# Patient Record
Sex: FEMALE | Race: WHITE | Employment: OTHER | URBAN - METROPOLITAN AREA
[De-identification: names, ages, dates, MRNs, and addresses within clinical notes are randomized per-mention and may not be internally consistent; named-entity substitution may affect disease eponyms.]

---

## 2017-11-14 ENCOUNTER — HOSPITAL ENCOUNTER (OUTPATIENT)
Dept: MRI IMAGING | Age: 67
Discharge: HOME | End: 2017-11-14
Attending: ORTHOPAEDIC SURGERY
Payer: MEDICARE

## 2017-11-14 DIAGNOSIS — M87.00 AVN (AVASCULAR NECROSIS OF BONE) (HCC): ICD-10-CM

## 2017-11-14 PROCEDURE — 72195 MRI PELVIS W/O DYE: CPT

## 2018-10-01 ENCOUNTER — HOSPITAL ENCOUNTER (OUTPATIENT)
Dept: MRI IMAGING | Age: 68
Discharge: HOME | End: 2018-10-01
Attending: ORTHOPAEDIC SURGERY
Payer: MEDICARE

## 2018-10-01 VITALS
RESPIRATION RATE: 13 BRPM | WEIGHT: 175 LBS | BODY MASS INDEX: 31.01 KG/M2 | HEIGHT: 63 IN | HEART RATE: 77 BPM | OXYGEN SATURATION: 96 % | DIASTOLIC BLOOD PRESSURE: 45 MMHG | SYSTOLIC BLOOD PRESSURE: 130 MMHG

## 2018-10-01 DIAGNOSIS — M75.41 IMPINGEMENT SYNDROME OF RIGHT SHOULDER: ICD-10-CM

## 2018-10-01 DIAGNOSIS — G89.11 ACUTE SHOULDER PAIN DUE TO TRAUMA, RIGHT: ICD-10-CM

## 2018-10-01 DIAGNOSIS — M25.511 ACUTE SHOULDER PAIN DUE TO TRAUMA, RIGHT: ICD-10-CM

## 2018-10-01 DIAGNOSIS — S46.011A TRAUMATIC COMPLETE TEAR OF RIGHT ROTATOR CUFF, INITIAL ENCOUNTER: ICD-10-CM

## 2018-10-01 PROCEDURE — 99153 MOD SED SAME PHYS/QHP EA: CPT

## 2018-10-01 PROCEDURE — 99152 MOD SED SAME PHYS/QHP 5/>YRS: CPT

## 2018-10-01 PROCEDURE — 74011250636 HC RX REV CODE- 250/636: Performed by: RADIOLOGY

## 2018-10-01 PROCEDURE — 73221 MRI JOINT UPR EXTREM W/O DYE: CPT

## 2018-10-01 RX ORDER — MIDAZOLAM HYDROCHLORIDE 1 MG/ML
5 INJECTION, SOLUTION INTRAMUSCULAR; INTRAVENOUS
Status: DISPENSED | OUTPATIENT
Start: 2018-10-01 | End: 2018-10-01

## 2018-10-01 RX ORDER — FENTANYL CITRATE 50 UG/ML
100 INJECTION, SOLUTION INTRAMUSCULAR; INTRAVENOUS
Status: DISPENSED | OUTPATIENT
Start: 2018-10-01 | End: 2018-10-01

## 2018-10-01 RX ADMIN — MIDAZOLAM 1 MG: 1 INJECTION INTRAMUSCULAR; INTRAVENOUS at 08:08

## 2018-10-01 RX ADMIN — FENTANYL CITRATE 25 MCG: 50 INJECTION, SOLUTION INTRAMUSCULAR; INTRAVENOUS at 08:11

## 2018-10-01 RX ADMIN — FENTANYL CITRATE 25 MCG: 50 INJECTION, SOLUTION INTRAMUSCULAR; INTRAVENOUS at 08:15

## 2018-10-01 RX ADMIN — FENTANYL CITRATE 25 MCG: 50 INJECTION, SOLUTION INTRAMUSCULAR; INTRAVENOUS at 08:05

## 2018-10-01 RX ADMIN — MIDAZOLAM 1 MG: 1 INJECTION INTRAMUSCULAR; INTRAVENOUS at 08:11

## 2018-10-01 RX ADMIN — MIDAZOLAM 1 MG: 1 INJECTION INTRAMUSCULAR; INTRAVENOUS at 08:15

## 2018-10-01 RX ADMIN — MIDAZOLAM 1 MG: 1 INJECTION INTRAMUSCULAR; INTRAVENOUS at 08:05

## 2018-10-01 RX ADMIN — MIDAZOLAM 1 MG: 1 INJECTION INTRAMUSCULAR; INTRAVENOUS at 08:18

## 2018-10-01 RX ADMIN — FENTANYL CITRATE 25 MCG: 50 INJECTION, SOLUTION INTRAMUSCULAR; INTRAVENOUS at 08:08

## 2018-10-01 NOTE — IP AVS SNAPSHOT
[facility] 
 
 
 [address] [place] [address] 
[phone] Patient: Kathe Reece MRN: [id number] PXY:4/3/9461 A check farzad indicates which time of day the medication should be taken. My Medications ASK your doctor about these medications Instructions Each Dose to Equal  
 Morning Noon Evening Bedtime  
 budesonide-formoterol 160-4.5 mcg/actuation Hfaa Commonly known as:  SYMBICORT Your last dose was: Your next dose is: Take 2 Puffs by inhalation two (2) times a day. 2 Puff CALCIUM 600 PO Your last dose was: Your next dose is: Take 2 Tabs by mouth Daily (before breakfast). 2 Tab  
    
   
   
   
  
 cholecalciferol 1,000 unit tablet Commonly known as:  VITAMIN D3 Your last dose was: Your next dose is: Take 1,000 Units by mouth daily. 1000 Units  
    
   
   
   
  
 docusate sodium 250 mg capsule Commonly known as:  Joselito Dorsey Your last dose was: Your next dose is: Take 250 mg by mouth two (2) times a day. 250 mg  
    
   
   
   
  
 latanoprost 0.005 % ophthalmic solution Commonly known as:  Chhaya Villagran Your last dose was: Your next dose is:    
   
   
 Administer 1 Drop to both eyes nightly. 1 Drop  
    
   
   
   
  
 lisinopril-hydroCHLOROthiazide 10-12.5 mg per tablet Commonly known as:  Jeanna Billings Your last dose was: Your next dose is: Take 1 Tab by mouth daily. 1 Tab  
    
   
   
   
  
 metFORMIN 500 mg tablet Commonly known as:  GLUCOPHAGE Your last dose was: Your next dose is: Take  by mouth daily (with breakfast). montelukast 10 mg tablet Commonly known as:  SINGULAIR Your last dose was: Your next dose is: Take 10 mg by mouth every evening. 10 mg  
    
   
   
   
  
 pravastatin 80 mg tablet Commonly known as:  PRAVACHOL Your last dose was: Your next dose is: Take 80 mg by mouth nightly. 80 mg  
    
   
   
   
  
 SPIRIVA WITH HANDIHALER 18 mcg inhalation capsule Generic drug:  tiotropium Your last dose was: Your next dose is: Take 1 Cap by inhalation daily. 1 Cap VENTOLIN HFA 90 mcg/actuation inhaler Generic drug:  albuterol Your last dose was: Your next dose is: Take 2 Puffs by inhalation as needed. 2 Puff WELCHOL 3.75 gram Pwpk powder packet Generic drug:  colesevelam  
   
Your last dose was: Your next dose is: Take 3.75 g by mouth daily (after breakfast).   
 3.75 g

## 2018-10-01 NOTE — DISCHARGE INSTRUCTIONS
Sedation for a Medical Procedure: After Your Visit  Instructions. Sedatives are used to relax you for a procedure. You may or may not be awake during the procedure. Common side effects of sedation medications include:                   Drowsiness, dizziness, euphoria, sleepiness or confusion. I              Unsteady gait. Loss of fine muscle control and delayed reaction time. Visual                     disturbances, difficulty focusing and blurred vision. Impaired memory recall. Follow-up care is a key component for your health and safety. Be sure to make and go to all medical appointments. Call your doctor if you are having problems. It's also a good idea to keep a list of your allergies, medicines with doses and test results on hand    Home care following your sedation procedure: You may experience some of these side effects or you may not be aware of subtle changes in your behavior or reaction time. Because you received these medications, we are giving you the following instructions: Activity   For your safety, you should not drive until the medicine wears off and you can think clearly and react easily. Do not drive for 24 hours. Rest when you feel tired. Getting enough sleep will help you recover. Diet    You can eat your normal diet. If your stomach is upset, try bland, low-fat foods like plain rice, broiled chicken, toast, and yogurt. Drink plenty of fluids unless your doctor advised you to limit your fluids. Do not consume alcoholic beverages for 24 hours. Instructions  Do not make important personal, business, or legal decisions for 24 hours. Move slowly and carefully, do not make sudden position changes. Be alert for dizziness or lightheadedness and move accordingly. Have a responsible person assist you. Do not drive for 24 hours. Do not operate equipment for 24 hours. [employer], power tools, kitchen appliances, etc.)    Discharge medications:  Resume prior to test medications as prescribed by your personal physician. If you have any questions or concerns call Radiology RN at [phone]  After hours call Radiology on-call at [phone]    Call [phone] any time you think you may need emergency care. For example:                Call if you passed out (lost consciousness). The medicine is not wearing off and you cannot think clearly. Watch closely for changes in your health, and be sure to contact your doctor if                  you have any problems. Where can you learn more? Go to [employer]   Enter G817 in the search box to learn more about \"Sedation for a Medical Procedure: After Your Visit. \"

## 2018-10-01 NOTE — IP AVS SNAPSHOT
[address] 
[phone] Patient: Mina Whatley MRN: [id number] MRJ:2/2/4458 About your hospitalization You were admitted on:  October 1, 2018 You last received care in the:  [address] You were discharged on:  October 1, 2018 Why you were hospitalized Your primary diagnosis was:  Not on File Follow-up Information None Discharge Orders None A check farzad indicates which time of day the medication should be taken. My Medications ASK your doctor about these medications Instructions Each Dose to Equal  
 Morning Noon Evening Bedtime  
 budesonide-formoterol 160-4.5 mcg/actuation Hfaa Commonly known as:  SYMBICORT Your last dose was: Your next dose is: Take 2 Puffs by inhalation two (2) times a day. 2 Puff CALCIUM 600 PO Your last dose was: Your next dose is: Take 2 Tabs by mouth Daily (before breakfast). 2 Tab  
    
   
   
   
  
 cholecalciferol 1,000 unit tablet Commonly known as:  VITAMIN D3 Your last dose was: Your next dose is: Take 1,000 Units by mouth daily. 1000 Units  
    
   
   
   
  
 docusate sodium 250 mg capsule Commonly known as:  [address] Your last dose was: Your next dose is: Take 250 mg by mouth two (2) times a day. 250 mg  
    
   
   
   
  
 latanoprost 0.005 % ophthalmic solution Commonly known as:  Trista Astudillo Your last dose was: Your next dose is:    
   
   
 Administer 1 Drop to both eyes nightly. 1 Drop  
    
   
   
   
  
 lisinopril-hydroCHLOROthiazide 10-12.5 mg per tablet Commonly known as:  Rosalino Lindsay Your last dose was: Your next dose is: Take 1 Tab by mouth daily. 1 Tab metFORMIN 500 mg tablet Commonly known as:  GLUCOPHAGE Your last dose was: Your next dose is: Take  by mouth daily (with breakfast). montelukast 10 mg tablet Commonly known as:  SINGULAIR Your last dose was: Your next dose is: Take 10 mg by mouth every evening. 10 mg  
    
   
   
   
  
 pravastatin 80 mg tablet Commonly known as:  PRAVACHOL Your last dose was: Your next dose is: Take 80 mg by mouth nightly. 80 mg  
    
   
   
   
  
 SPIRIVA WITH HANDIHALER 18 mcg inhalation capsule Generic drug:  tiotropium Your last dose was: Your next dose is: Take 1 Cap by inhalation daily. 1 Cap VENTOLIN HFA 90 mcg/actuation inhaler Generic drug:  albuterol Your last dose was: Your next dose is: Take 2 Puffs by inhalation as needed. 2 Puff WELCHOL 3.75 gram Pwpk powder packet Generic drug:  colesevelam  
   
Your last dose was: Your next dose is: Take 3.75 g by mouth daily (after breakfast). 3.75 g Discharge Instructions Sedation for a Medical Procedure: After Your Visit  Instructions. Sedatives are used to relax you for a procedure. You may or may not be awake during the procedure. Common side effects of sedation medications include:    
 
            Drowsiness, dizziness, euphoria, sleepiness or confusion. I 
            Unsteady gait. Loss of fine muscle control and delayed reaction time. Visual                     disturbances, difficulty focusing and blurred vision. Impaired memory recall. Follow-up care is a key component for your health and safety. Be sure to make and go to all medical appointments.   Call your doctor if you are having problems. It's also a good idea to keep a list of your allergies, medicines with doses and test results on hand Home care following your sedation procedure: You may experience some of these side effects or you may not be aware of subtle changes in your behavior or reaction time. Because you received these medications, we are giving you the following instructions: Activity For your safety, you should not drive until the medicine wears off and you can think clearly and react easily. Do not drive for 24 hours. Rest when you feel tired. Getting enough sleep will help you recover. Diet You can eat your normal diet. If your stomach is upset, try bland, low-fat foods like plain rice, broiled chicken, toast, and yogurt. Drink plenty of fluids unless your doctor advised you to limit your fluids. Do not consume alcoholic beverages for 24 hours. Instructions Do not make important personal, business, or legal decisions for 24 hours. Move slowly and carefully, do not make sudden position changes. Be alert for dizziness or lightheadedness and move accordingly. Have a responsible person assist you. Do not drive for 24 hours. Do not operate equipment for 24 hours. [employer] mowers, power tools, kitchen appliances, etc.) Discharge medications: 
Resume prior to test medications as prescribed by your personal physician. If you have any questions or concerns call Radiology RN at [phone] After hours call Radiology on-call at [phone] Call [phone] any time you think you may need emergency care. For example:  
             Call if you passed out (lost consciousness). The medicine is not wearing off and you cannot think clearly. Watch closely for changes in your health, and be sure to contact your doctor if                  you have any problems. Where can you learn more? Go to [employer].be Enter X479 in the search box to learn more about \"Sedation for a Medical Procedure: After Your Visit. \" Introducing [facility]dez As a [facility] patient, I wanted to make you aware of our electronic visit tool called [facility]. [facility] 24/7 allows you to connect within minutes with a medical provider 24 hours a day, seven days a week via a mobile device or tablet or logging into a secure website from your computer. You can access Bar Hdez from anywhere in the [place]. A virtual visit might be right for you when you have a simple condition and feel like you just dont want to get out of bed, or cant get away from work for an appointment, when your regular [facility] provider is not available (evenings, weekends or holidays), or when youre out of town and need minor care. Electronic visits cost only $49 and if the [facility] 24/7 provider determines a prescription is needed to treat your condition, one can be electronically transmitted to a nearby pharmacy*. Please take a moment to enroll today if you have not already done so. The enrollment process is free and takes just a few minutes. To enroll, please download the [employer] 24/7 bhakti to your tablet or phone, or visit [email or web address] to enroll on your computer. And, as an [address] [place] [address] patient with a [facility] account, the results of your visits will be scanned into your electronic medical record and your primary care provider will be able to view the scanned results. We urge you to continue to see your regular [facility] provider for your ongoing medical care. And while your primary care provider may not be the one available when you seek a Bar Hdez virtual visit, the peace of mind you get from getting a real diagnosis real time can be priceless.    
 
For more information on [facility] [place], view our Frequently Asked Questions (FAQs) at [email or web address]. Sincerely, 
 
Willian Hamm MD 
Chief Medical Officer Steve Bailey *:  certain medications cannot be prescribed via Bar Hdez Unresulted Labs-Please follow up with your PCP about these lab tests Order Current Status MRI SHOULDER RT WO CONT In process Providers Seen During Your Hospitalization Provider Specialty Primary office phone Nithya Watts MD Orthopedic Surgery [phone] Your Primary Care Physician (PCP) Primary Care Physician Office Phone Office Fax Zaki Jose R [phone] You are allergic to the following Allergen Reactions Diclofenac Other (comments) SWELLING, DIFFICULTY BREATHING Flexeril (Cyclobenzaprine) Swelling Hands, face and throat swelling Medrol (Methylprednisolone) Unknown (comments) Naproxen Swelling Tolectin (Tolmetin) Swelling Took this with Flexeril and does not know which med caused swelling of hands,face and throat Recent Documentation Height Weight BMI OB Status Smoking Status 1.6 m 79.4 kg 31 kg/m2 Hysterectomy Former Smoker Emergency Contacts Name Discharge Info Relation Home Work Mobile [address] CAREGIVER [3] Spouse [3] Patient Belongings The following personal items are in your possession at time of discharge: 
                             
 
  
  
 Please provide this summary of care documentation to your next provider. Signatures-by signing, you are acknowledging that this After Visit Summary has been reviewed with you and you have received a copy. Patient Signature:  ____________________________________________________________ Date:  ____________________________________________________________  
  
Mariangel Ibarra Provider Signature:  ____________________________________________________________ Date:  ____________________________________________________________

## 2018-10-01 NOTE — PROGRESS NOTES
9499 Patient brought back to [facility] for assessment prior to MRI right shoulder with sedation. Patient with  and NPO verified. Patient placed in a gown and IV started. S1S2, lungs CTA. Airway assessed. Consent obtained and baseline [facility] Dr. Del Angel Fuss over to consent patient with review of risks and benefits. 9212 Patient taken to [facility] and placed on table and connected to monitor. Time out 0805 Start time 0806 End time 0839. Versed 5 mg and Fentanyl 100 mcg given for sedation, patient tolerated well. 3014 Patient brought back to [facility] and placed back on the monitor. Provided fluids and cookies. Notified ride in waiting area of approximate time of discharge. Patient awake and alert. [id number] Discharge instructions reviewed with verbalization of understanding. 2529 IV removed and patient dressed and patient's ride notified to bring car to [facility] for discharge to home. 1082 Discharge instructions signed as received in computer and patient taken via wheel to [facility].

## 2018-12-21 ENCOUNTER — HOSPITAL ENCOUNTER (OUTPATIENT)
Dept: PREADMISSION TESTING | Age: 68
Discharge: HOME | End: 2018-12-21
Payer: MEDICARE

## 2018-12-21 VITALS
RESPIRATION RATE: 16 BRPM | OXYGEN SATURATION: 95 % | DIASTOLIC BLOOD PRESSURE: 69 MMHG | HEIGHT: 63 IN | BODY MASS INDEX: 31.22 KG/M2 | WEIGHT: 176.2 LBS | HEART RATE: 86 BPM | SYSTOLIC BLOOD PRESSURE: 165 MMHG | TEMPERATURE: 97.9 F

## 2018-12-21 LAB
ABO + RH BLD: NORMAL
ALBUMIN SERPL-MCNC: 4.1 G/DL (ref 3.5–5)
ALBUMIN/GLOB SERPL: 1.3 {RATIO} (ref 1.1–2.2)
ALP SERPL-CCNC: 82 U/L (ref 45–117)
ALT SERPL-CCNC: 28 U/L (ref 12–78)
ANION GAP SERPL CALC-SCNC: 10 MMOL/L (ref 5–15)
APPEARANCE UR: CLEAR
APTT PPP: 29.9 SEC (ref 22.1–32)
AST SERPL-CCNC: 17 U/L (ref 15–37)
ATRIAL RATE: 80 BPM
BACTERIA URNS QL MICRO: NEGATIVE /HPF
BASOPHILS # BLD: 0 K/UL (ref 0–0.1)
BASOPHILS NFR BLD: 1 % (ref 0–1)
BILIRUB SERPL-MCNC: 0.3 MG/DL (ref 0.2–1)
BILIRUB UR QL: NEGATIVE
BLOOD GROUP ANTIBODIES SERPL: NORMAL
BUN SERPL-MCNC: 21 MG/DL (ref 6–20)
BUN/CREAT SERPL: 25 (ref 12–20)
CALCIUM SERPL-MCNC: 9.6 MG/DL (ref 8.5–10.1)
CALCULATED P AXIS, ECG09: 66 DEGREES
CALCULATED R AXIS, ECG10: 49 DEGREES
CALCULATED T AXIS, ECG11: 62 DEGREES
CHLORIDE SERPL-SCNC: 98 MMOL/L (ref 97–108)
CO2 SERPL-SCNC: 28 MMOL/L (ref 21–32)
COLOR UR: ABNORMAL
CREAT SERPL-MCNC: 0.85 MG/DL (ref 0.55–1.02)
DIAGNOSIS, 93000: NORMAL
DIFFERENTIAL METHOD BLD: ABNORMAL
EOSINOPHIL # BLD: 0.2 K/UL (ref 0–0.4)
EOSINOPHIL NFR BLD: 3 % (ref 0–7)
EPITH CASTS URNS QL MICRO: ABNORMAL /LPF
ERYTHROCYTE [DISTWIDTH] IN BLOOD BY AUTOMATED COUNT: 13.3 % (ref 11.5–14.5)
EST. AVERAGE GLUCOSE BLD GHB EST-MCNC: 123 MG/DL
GLOBULIN SER CALC-MCNC: 3.1 G/DL (ref 2–4)
GLUCOSE SERPL-MCNC: 98 MG/DL (ref 65–100)
GLUCOSE UR STRIP.AUTO-MCNC: NEGATIVE MG/DL
HBA1C MFR BLD: 5.9 % (ref 4.2–6.3)
HCT VFR BLD AUTO: 36.6 % (ref 35–47)
HGB BLD-MCNC: 12.4 G/DL (ref 11.5–16)
HGB UR QL STRIP: NEGATIVE
HYALINE CASTS URNS QL MICRO: ABNORMAL /LPF (ref 0–5)
IMM GRANULOCYTES # BLD: 0 K/UL (ref 0–0.04)
IMM GRANULOCYTES NFR BLD AUTO: 0 % (ref 0–0.5)
INR PPP: 1 (ref 0.9–1.1)
KETONES UR QL STRIP.AUTO: NEGATIVE MG/DL
LEUKOCYTE ESTERASE UR QL STRIP.AUTO: ABNORMAL
LYMPHOCYTES # BLD: 1.5 K/UL (ref 0.8–3.5)
LYMPHOCYTES NFR BLD: 31 % (ref 12–49)
MCH RBC QN AUTO: 32.2 PG (ref 26–34)
MCHC RBC AUTO-ENTMCNC: 33.9 G/DL (ref 30–36.5)
MCV RBC AUTO: 95.1 FL (ref 80–99)
MONOCYTES # BLD: 0.8 K/UL (ref 0–1)
MONOCYTES NFR BLD: 16 % (ref 5–13)
NEUTS SEG # BLD: 2.4 K/UL (ref 1.8–8)
NEUTS SEG NFR BLD: 49 % (ref 32–75)
NITRITE UR QL STRIP.AUTO: NEGATIVE
NRBC # BLD: 0 K/UL (ref 0–0.01)
NRBC BLD-RTO: 0 PER 100 WBC
P-R INTERVAL, ECG05: 190 MS
PH UR STRIP: 7.5 [PH] (ref 5–8)
PLATELET # BLD AUTO: 273 K/UL (ref 150–400)
PMV BLD AUTO: 10.5 FL (ref 8.9–12.9)
POTASSIUM SERPL-SCNC: 5.3 MMOL/L (ref 3.5–5.1)
PROT SERPL-MCNC: 7.2 G/DL (ref 6.4–8.2)
PROT UR STRIP-MCNC: NEGATIVE MG/DL
PROTHROMBIN TIME: 9.9 SEC (ref 9–11.1)
Q-T INTERVAL, ECG07: 362 MS
QRS DURATION, ECG06: 72 MS
QTC CALCULATION (BEZET), ECG08: 417 MS
RBC # BLD AUTO: 3.85 M/UL (ref 3.8–5.2)
RBC #/AREA URNS HPF: ABNORMAL /HPF (ref 0–5)
SODIUM SERPL-SCNC: 136 MMOL/L (ref 136–145)
SP GR UR REFRACTOMETRY: 1.01 (ref 1–1.03)
SPECIMEN EXP DATE BLD: NORMAL
THERAPEUTIC RANGE,PTTT: NORMAL SECS (ref 58–77)
UA: UC IF INDICATED,UAUC: ABNORMAL
UROBILINOGEN UR QL STRIP.AUTO: 0.2 EU/DL (ref 0.2–1)
VENTRICULAR RATE, ECG03: 80 BPM
WBC # BLD AUTO: 4.9 K/UL (ref 3.6–11)
WBC URNS QL MICRO: ABNORMAL /HPF (ref 0–4)

## 2018-12-21 PROCEDURE — 85730 THROMBOPLASTIN TIME PARTIAL: CPT

## 2018-12-21 PROCEDURE — 85610 PROTHROMBIN TIME: CPT

## 2018-12-21 PROCEDURE — 36415 COLL VENOUS BLD VENIPUNCTURE: CPT

## 2018-12-21 PROCEDURE — 86901 BLOOD TYPING SEROLOGIC RH(D): CPT

## 2018-12-21 PROCEDURE — 85025 COMPLETE CBC W/AUTO DIFF WBC: CPT

## 2018-12-21 PROCEDURE — 83036 HEMOGLOBIN GLYCOSYLATED A1C: CPT

## 2018-12-21 PROCEDURE — 80053 COMPREHEN METABOLIC PANEL: CPT

## 2018-12-21 PROCEDURE — 81001 URINALYSIS AUTO W/SCOPE: CPT

## 2018-12-21 PROCEDURE — 93005 ELECTROCARDIOGRAM TRACING: CPT

## 2018-12-21 RX ORDER — ASPIRIN 325 MG
325 TABLET ORAL
COMMUNITY
End: 2019-01-09

## 2018-12-21 RX ORDER — ASCORBIC ACID 500 MG
500 TABLET ORAL DAILY
COMMUNITY
End: 2022-06-30

## 2018-12-21 RX ORDER — CALCIUM CARBONATE 500(1250)
2 TABLET ORAL
COMMUNITY
End: 2018-12-21

## 2018-12-21 RX ORDER — VITAMIN E 268 MG
400 CAPSULE ORAL
COMMUNITY

## 2018-12-21 RX ORDER — MAGNESIUM 200 MG
2 TABLET ORAL
COMMUNITY

## 2018-12-21 RX ORDER — OXYCODONE AND ACETAMINOPHEN 10; 325 MG/1; MG/1
1 TABLET ORAL
COMMUNITY
End: 2019-01-09

## 2018-12-21 RX ORDER — GLUCOSAMINE SULFATE 500 MG
1000 TABLET ORAL
COMMUNITY

## 2018-12-21 NOTE — PERIOP NOTES
[address] , [address] [facility]                            MAIN OR                                  [phone]   MAIN PRE OP                          [phone]                                                                                AMBULATORY PRE OP          [phone]  PRE-ADMISSION TESTING    [phone]     Surgery Date:   Monday 1/7/19         Is surgery arrival time given by surgeon? NO  If NO, [facility] staff will call you between 3 and 7pm the day before your surgery with your arrival time. (If your surgery is on a Monday, we will call you the Friday before.)    Call [phone] after 7pm Monday-Friday if you did not receive your arrival time. INSTRUCTIONS BEFORE YOUR SURGERY   When You  Arrive   Arrive at the 2nd [address] [facility] on the day of your surgery  Have your insurance card, photo ID, and any copayment (if needed)     Food   and   Drink   NO food or drink after midnight the night before surgery    This means NO water, gum, mints, coffee, juice, etc.  No alcohol (beer, wine, liquor) 24 hours before and after surgery     Medications to   TAKE   Morning of Surgery   MEDICATIONS TO TAKE THE MORNING OF SURGERY WITH A SIP OF WATER:    Anoro, percocet if needed     Medications  To  STOP      7 days before surgery    Non-Steroidal anti-inflammatory Drugs (NSAID's): for example, Ibuprofen (Advil, Motrin), Naproxen (Aleve)   Aspirin, if taking for pain    Herbal supplements, vitamins, and fish oil   Other:  (Pain medications not listed above, including Tylenol may be taken)   Blood  Thinners    If you take  Aspirin, Plavix, Coumadin, or any blood-thinning or anti-blood clot medicine, talk to the doctor who prescribed the medications for pre-operative instructions - Contact Dr. Maricruz Luna for instructions with aspirin.      Bathing Clothing  Jewelry  Valuables       If you shower the morning of surgery, please do not apply anything to your skin (lotions, powders, deodorant, or makeup, especially mascara)   Follow all special bath instructions (for total joint replacement, spine and bowel surgeries)   Do not shave or trim anywhere 24 hours before surgery   Wear your hair loose or down; no pony-tails, buns, or metal hair clips   Wear loose, comfortable, clean clothes   Wear glasses instead of contacts   Leave money, valuables, and jewelry, including body piercings, at home     Going Home       or Spending the Night    SAME-DAY SURGERY: You must have a responsible adult drive you home and stay with you 24 hours after surgery   ADMITS: If your doctor is keeping you into the hospital after surgery, leave personal belongings/luggage in your car until you have a hospital room number. Hospital discharge time is 12 noon  Drivers must be here before 12 noon unless you are told differently   Special Instructions   16. Special Instructions:  · Use Chlorhexidine Care Fusion wash and sponges 3 days prior to surgery as instructed. · Incentive spirometer given with instructions to practice at home and bring back to the hospital on the day of surgery. · [facility] will contact you if your Hemoglobin A1C is greater than 7.5. · Ensure/Glucerna  sample, nutritional information, and Ensure/Glucerna coupon given. · Pain pamphlet and Call Don't Fall reminder reviewed with patient. ·  parking is complimentary Monday - Friday 7 am - 5 pm  · Bring PTA Medication list day of surgery with the last doses taken documented   · Do not bring medication bottles the day of surgery       Follow all instructions so your surgery wont be cancelled. Please, be on time. If a situation occurs and you are delayed the day of surgery, call [phone] or          [phone]. If your physical condition changes (like a fever, cold, flu, etc.) call your surgeon. The patient was contacted  in person. The patient verbalizes understanding of all instructions and does not  need reinforcement.

## 2018-12-21 NOTE — H&P
PAT Pre-Op History & Physical    Patient: Tylor Kang                  MRN: [id number]          SSN: [id number]  YOB: 1950          Age: 70 y.o. Sex: female        Addendum: MRSA and EKG WNL        Subjective:     Patient is a 70 y.o.  female who presents with history of reaching for a object with her right arm in July, which she states she did farily quickly. She remembers she felt instant pain in her shoulder after. The pain has gotten progressively worse and now she has limited ROM to her right arm. Pain ranges from 4/10-10/10 with the most pain coming at night while trying to sleep. She has to get up in the middle of the night to take a percocet. She notes her right arm is weaker and she has difficulty picking up a coffee cup. She is left hand dominant. She has failed narcotics, injections, PT, ice application. The patient was evaluated in the surgeon's office and it was determined that the most appropriate plan of care is to proceed with surgical intervention. Patient's PCP Aubrie Talley MD    Patient states she went to her pulmonologist for clearance. Past Medical History:   Diagnosis Date    Arthritis     Claustrophobia     COPD     Degenerative disc disease, lumbar 1/6/2016    Diabetes ([address] 2012    She states the metformin is prevention only    Glaucoma     Hypertension     Obesity (BMI 30-39. 9)     Peripheral vascular disease ([address]     stents in each iliac artery, states they are \"watching\" carotids    Unspecified adverse effect of anesthesia     \"BLOOD PRESSURE DROPS\"    Unspecified sleep apnea     Does not uses CPAP      Past Surgical History:   Procedure Laterality Date    HX BACK SURGERY  2006    LUMBAR- PLATES, SCREWS    HX HYSTERECTOMY  1980    HX LUMBAR LAMINECTOMY N/A 2016 & 2017    ALIF and then did the posterior after it didnt work    HX ORTHOPAEDIC Right     [address] HX ORTHOPAEDIC Left 2013    rotator cuff shoulder    VASCULAR SURGERY PROCEDURE UNLIST Bilateral 2010    ILIAC STENT      Prior to Admission medications    Medication Sig Start Date End Date Taking? Authorizing Provider   umeclidinium-vilanterol (ANORO ELLIPTA) 62.5-25 mcg/actuation inhaler Take 1 Puff by inhalation every morning. Yes Provider, Historical   aspirin (ASPIRIN) 325 mg tablet Take 325 mg by mouth every morning. Yes Provider, Historical   glucosamine sulfate (GLUCOSAMINE) 500 mg tablet Take 1,000 mg by mouth daily. Yes Provider, Historical   magnesium 200 mg tab Take 1 Tab by mouth every morning. Yes Provider, Historical   vitamin E (AQUA GEMS) 400 unit capsule Take 400 Units by mouth every morning. Yes Provider, Historical   Omega-3 Fatty Acids 60- mg cpDR Take 2 Tabs by mouth every morning. Yes Provider, Historical   multivit with minerals/lutein (MULTIVITAMIN 50 PLUS PO) Take 1 Tab by mouth every morning. Yes Provider, Historical   oxyCODONE-acetaminophen (PERCOCET)  mg per tablet Take 1 Tab by mouth every six (6) hours as needed for Pain. Yes Provider, Historical   ascorbic acid, vitamin C, (VITAMIN C) 500 mg tablet Take 500 mg by mouth daily. Yes Provider, Historical   CALCIUM PO Take 500 mg by mouth two (2) times a day. Yes Provider, Historical   cholecalciferol (VITAMIN D3) 1,000 unit tablet Take 1,000 Units by mouth every morning. Yes Provider, Historical   docusate sodium (DOK) 250 mg capsule Take 250 mg by mouth two (2) times a day. Yes Provider, Historical   metFORMIN (GLUCOPHAGE) 500 mg tablet Take 500 mg by mouth two (2) times daily (with meals). Yes Provider, Historical   montelukast (SINGULAIR) 10 mg tablet Take 10 mg by mouth every evening. Yes Provider, Historical   lisinopril-hydrochlorothiazide (PRINZIDE, ZESTORETIC) 10-12.5 mg per tablet Take 1 Tab by mouth every morning. Yes Provider, Historical   pravastatin (PRAVACHOL) 80 mg tablet Take 80 mg by mouth nightly.      Yes Provider, Historical   latanoprost (XALATAN) 0.005 % ophthalmic solution Administer 1 Drop to both eyes nightly. Yes Provider, Historical     Current Outpatient Medications   Medication Sig    umeclidinium-vilanterol (ANORO ELLIPTA) 62.5-25 mcg/actuation inhaler Take 1 Puff by inhalation every morning.  aspirin (ASPIRIN) 325 mg tablet Take 325 mg by mouth every morning.  glucosamine sulfate (GLUCOSAMINE) 500 mg tablet Take 1,000 mg by mouth daily.  magnesium 200 mg tab Take 1 Tab by mouth every morning.  vitamin E (AQUA GEMS) 400 unit capsule Take 400 Units by mouth every morning.  Omega-3 Fatty Acids 60- mg cpDR Take 2 Tabs by mouth every morning.  multivit with minerals/lutein (MULTIVITAMIN 50 PLUS PO) Take 1 Tab by mouth every morning.  oxyCODONE-acetaminophen (PERCOCET)  mg per tablet Take 1 Tab by mouth every six (6) hours as needed for Pain.  ascorbic acid, vitamin C, (VITAMIN C) 500 mg tablet Take 500 mg by mouth daily.  CALCIUM PO Take 500 mg by mouth two (2) times a day.  cholecalciferol (VITAMIN D3) 1,000 unit tablet Take 1,000 Units by mouth every morning.  docusate sodium (DOK) 250 mg capsule Take 250 mg by mouth two (2) times a day.  metFORMIN (GLUCOPHAGE) 500 mg tablet Take 500 mg by mouth two (2) times daily (with meals).  montelukast (SINGULAIR) 10 mg tablet Take 10 mg by mouth every evening.  lisinopril-hydrochlorothiazide (PRINZIDE, ZESTORETIC) 10-12.5 mg per tablet Take 1 Tab by mouth every morning.  pravastatin (PRAVACHOL) 80 mg tablet Take 80 mg by mouth nightly.  latanoprost (XALATAN) 0.005 % ophthalmic solution Administer 1 Drop to both eyes nightly. No current facility-administered medications for this encounter.        Allergies   Allergen Reactions    Diclofenac Anaphylaxis     SWELLING, DIFFICULTY BREATHING    Flexeril [Cyclobenzaprine] Swelling     Hands, face and throat swelling    Medrol [Methylprednisolone] Unknown (comments)     She has forgotten    Naproxen Swelling    Tolectin [Tolmetin] Swelling     Took this with Flexeril and does not know which med caused swelling of hands,face and throat      Social History     Tobacco Use    Smoking status: Former Smoker     Packs/day: 2.00     Years: 35.00     Pack years: 70.00     Types: Cigarettes     Last attempt to quit: 10/2/2004     Years since quittin.2    Smokeless tobacco: Never Used   Substance Use Topics    Alcohol use: Yes     Alcohol/week: 2.4 oz     Types: 2 Glasses of wine, 2 Cans of beer per week      Social History     Substance and Sexual Activity   Drug Use No     Family History   Problem Relation Age of Onset    Heart Disease Father     Heart Attack Father 36    No Known Problems Sister     Deep Vein Thrombosis Brother     Heart Disease Brother        Review of Systems    Patient denies difficulty swallowing, mouth sores, or loose teeth. Patient denies any recent dental procedures or any planned prior to surgery. Patient denies chest pain, tightness, pain radiating down left arm, palpitations. Denies dizziness, visual disturbances, or lightheadedness. Patient denies shortness of breath, wheezing, cough, fever, or chills. Patient denies diarrhea, constipation, or abdominal pain. Patient denies urinary problems including dysuria, hesitancy, urgency, or incontinence. Denies skin breakdown, rashes, insect bites or open area. Objective:     Patient Vitals for the past 24 hrs:   Temp Pulse Resp BP SpO2   18 0842 97.9 °F (36.6 °C) 86 16 165/69 95 %     Temp (24hrs), Av.9 °F (36.6 °C), Min:97.9 °F (36.6 °C), Max:97.9 °F (36.6 °C)    Body mass index is 31.21 kg/m². Wt Readings from Last 1 Encounters:   18 79.9 kg (176 lb 3.2 oz)        Physical Exam:     General: Pleasant,  cooperative, no apparent distress, appears stated age. Eyes: Conjunctivae/corneas clear. EOMs intact.    Nose: Nares normal.   Mouth/Throat: Lips, mucosa, and tongue normal. Teeth and gums normal.   Lungs: Clear to auscultation bilaterally. Heart: Regular rate and rhythm, S1, S2 normal. No murmur, click, rub or gallop. Abdomen: Soft, non-tender. Bowel sounds normal. No distention. Musculoskeletal:  Limited ROM to right shoulder. Extremities:  Extremities normal, atraumatic, no cyanosis or edema. Calves                                 supple, non tender to palpation. Pulses: 2+ and symmetric bilateral upper extremities. Cap. refill <2 seconds   Skin: Skin color, texture, turgor normal. No visible open areas, examined fully clothed   Neurologic: CN II-XII grossly intact. Alert and oriented x3. Labs:   Recent Results (from the past 72 hour(s))   CBC WITH AUTOMATED DIFF    Collection Time: 12/21/18  9:21 AM   Result Value Ref Range    WBC 4.9 3.6 - 11.0 K/uL    RBC 3.85 3.80 - 5.20 M/uL    HGB 12.4 11.5 - 16.0 g/dL    HCT 36.6 35.0 - 47.0 %    MCV 95.1 80.0 - 99.0 FL    MCH 32.2 26.0 - 34.0 PG    MCHC 33.9 30.0 - 36.5 g/dL    RDW 13.3 11.5 - 14.5 %    PLATELET 111 668 - 605 K/uL    MPV 10.5 8.9 - 12.9 FL    NRBC 0.0 0  WBC    ABSOLUTE NRBC 0.00 0.00 - 0.01 K/uL    NEUTROPHILS 49 32 - 75 %    LYMPHOCYTES 31 12 - 49 %    MONOCYTES 16 (H) 5 - 13 %    EOSINOPHILS 3 0 - 7 %    BASOPHILS 1 0 - 1 %    IMMATURE GRANULOCYTES 0 0.0 - 0.5 %    ABS. NEUTROPHILS 2.4 1.8 - 8.0 K/UL    ABS. LYMPHOCYTES 1.5 0.8 - 3.5 K/UL    ABS. MONOCYTES 0.8 0.0 - 1.0 K/UL    ABS. EOSINOPHILS 0.2 0.0 - 0.4 K/UL    ABS. BASOPHILS 0.0 0.0 - 0.1 K/UL    ABS. IMM.  GRANS. 0.0 0.00 - 0.04 K/UL    DF AUTOMATED     METABOLIC PANEL, COMPREHENSIVE    Collection Time: 12/21/18  9:21 AM   Result Value Ref Range    Sodium 136 136 - 145 mmol/L    Potassium 5.3 (H) 3.5 - 5.1 mmol/L    Chloride 98 97 - 108 mmol/L    CO2 28 21 - 32 mmol/L    Anion gap 10 5 - 15 mmol/L    Glucose 98 65 - 100 mg/dL    BUN 21 (H) 6 - 20 MG/DL    Creatinine 0.85 0.55 - 1.02 MG/DL    BUN/Creatinine ratio 25 (H) 12 - 20 GFR est AA >60 >60 ml/min/1.73m2    GFR est non-AA >60 >60 ml/min/1.73m2    Calcium 9.6 8.5 - 10.1 MG/DL    Bilirubin, total 0.3 0.2 - 1.0 MG/DL    ALT (SGPT) 28 12 - 78 U/L    AST (SGOT) 17 15 - 37 U/L    Alk.  phosphatase 82 45 - 117 U/L    Protein, total 7.2 6.4 - 8.2 g/dL    Albumin 4.1 3.5 - 5.0 g/dL    Globulin 3.1 2.0 - 4.0 g/dL    A-G Ratio 1.3 1.1 - 2.2     HEMOGLOBIN A1C WITH EAG    Collection Time: 12/21/18  9:21 AM   Result Value Ref Range    Hemoglobin A1c 5.9 4.2 - 6.3 %    Est. average glucose 123 mg/dL   PROTHROMBIN TIME + INR    Collection Time: 12/21/18  9:21 AM   Result Value Ref Range    INR 1.0 0.9 - 1.1      Prothrombin time 9.9 9.0 - 11.1 sec   PTT    Collection Time: 12/21/18  9:21 AM   Result Value Ref Range    aPTT 29.9 22.1 - 32.0 sec    aPTT, therapeutic range     58.0 - 77.0 SECS   URINALYSIS W/ REFLEX CULTURE    Collection Time: 12/21/18  9:21 AM   Result Value Ref Range    Color YELLOW/STRAW      Appearance CLEAR CLEAR      Specific gravity 1.007 1.003 - 1.030      pH (UA) 7.5 5.0 - 8.0      Protein NEGATIVE  NEG mg/dL    Glucose NEGATIVE  NEG mg/dL    Ketone NEGATIVE  NEG mg/dL    Bilirubin NEGATIVE  NEG      Blood NEGATIVE  NEG      Urobilinogen 0.2 0.2 - 1.0 EU/dL    Nitrites NEGATIVE  NEG      Leukocyte Esterase TRACE (A) NEG      WBC 0-4 0 - 4 /hpf    RBC 0-5 0 - 5 /hpf    Epithelial cells FEW FEW /lpf    Bacteria NEGATIVE  NEG /hpf    UA:UC IF INDICATED CULTURE NOT INDICATED BY UA RESULT CNI      Hyaline cast 0-2 0 - 5 /lpf   TYPE & SCREEN    Collection Time: 12/21/18  9:21 AM   Result Value Ref Range    Crossmatch Expiration 01/04/2019     ABO/Rh(D) A POSITIVE     Antibody screen NEG    EKG, 12 LEAD, INITIAL    Collection Time: 12/21/18  9:38 AM   Result Value Ref Range    Ventricular Rate 80 BPM    Atrial Rate 80 BPM    P-R Interval 190 ms    QRS Duration 72 ms    Q-T Interval 362 ms    QTC Calculation (Bezet) 417 ms    Calculated P Axis 66 degrees    Calculated R Axis 49 degrees    Calculated T Axis 62 degrees    Diagnosis       Normal sinus rhythm  Normal ECG  When compared with ECG of 31-MAY-2016 11:32,  No significant change was found         Assessment:     OA Right Shoulder    Plan:     Scheduled for Right Reverse Total Shoulder    Labs reviewed. Potassium a tic high at 5.3. All other labs WNL. EKG pending along with MRSA. ASA plan sent by nurse to vascular physician    Pulmonologist clearance reviewed and placed in paper chart.        Marvel Barnes NP

## 2018-12-22 LAB
BACTERIA SPEC CULT: NORMAL
BACTERIA SPEC CULT: NORMAL
SERVICE CMNT-IMP: NORMAL

## 2018-12-26 NOTE — PERIOP NOTES
Called patient to confirm instruction to stop  10 days prior to surgery per Dr. Olu Wise. Patient verbalized understanding of instructions and marked December 28th as last day for ASA. Patient also confirmed knowledge of plan for other medications.

## 2019-01-04 ENCOUNTER — ANESTHESIA EVENT (OUTPATIENT)
Dept: SURGERY | Age: 69
DRG: 483 | End: 2019-01-04
Payer: MEDICARE

## 2019-01-07 ENCOUNTER — HOSPITAL ENCOUNTER (INPATIENT)
Age: 69
LOS: 2 days | Discharge: HOME | DRG: 483 | End: 2019-01-09
Attending: ORTHOPAEDIC SURGERY | Admitting: ORTHOPAEDIC SURGERY
Payer: MEDICARE

## 2019-01-07 ENCOUNTER — ANESTHESIA (OUTPATIENT)
Dept: SURGERY | Age: 69
DRG: 483 | End: 2019-01-07
Payer: MEDICARE

## 2019-01-07 ENCOUNTER — APPOINTMENT (OUTPATIENT)
Dept: GENERAL RADIOLOGY | Age: 69
DRG: 483 | End: 2019-01-07
Attending: ORTHOPAEDIC SURGERY
Payer: MEDICARE

## 2019-01-07 DIAGNOSIS — M75.120 COMPLETE TEAR OF ROTATOR CUFF, UNSPECIFIED LATERALITY: Primary | ICD-10-CM

## 2019-01-07 PROBLEM — M19.011 OSTEOARTHRITIS OF RIGHT SHOULDER: Status: ACTIVE | Noted: 2019-01-07

## 2019-01-07 LAB
ABO + RH BLD: NORMAL
BLOOD GROUP ANTIBODIES SERPL: NORMAL
GLUCOSE BLD STRIP.AUTO-MCNC: 104 MG/DL (ref 65–100)
GLUCOSE BLD STRIP.AUTO-MCNC: 110 MG/DL (ref 65–100)
GLUCOSE BLD STRIP.AUTO-MCNC: 157 MG/DL (ref 65–100)
GLUCOSE BLD STRIP.AUTO-MCNC: 159 MG/DL (ref 65–100)
HGB BLD-MCNC: 10.4 G/DL (ref 11.5–16)
SERVICE CMNT-IMP: ABNORMAL
SPECIMEN EXP DATE BLD: NORMAL

## 2019-01-07 PROCEDURE — 74011000272 HC RX REV CODE- 272: Performed by: ORTHOPAEDIC SURGERY

## 2019-01-07 PROCEDURE — 74011250636 HC RX REV CODE- 250/636: Performed by: ANESTHESIOLOGY

## 2019-01-07 PROCEDURE — 74011250637 HC RX REV CODE- 250/637: Performed by: ORTHOPAEDIC SURGERY

## 2019-01-07 PROCEDURE — 64415 NJX AA&/STRD BRCH PLXS IMG: CPT

## 2019-01-07 PROCEDURE — 74011250637 HC RX REV CODE- 250/637: Performed by: FAMILY MEDICINE

## 2019-01-07 PROCEDURE — 76210000016 HC OR PH I REC 1 TO 1.5 HR: Performed by: ORTHOPAEDIC SURGERY

## 2019-01-07 PROCEDURE — 77030018836 HC SOL IRR NACL ICUM -A: Performed by: ORTHOPAEDIC SURGERY

## 2019-01-07 PROCEDURE — 77030013837 HC NERV BLK KT BBMI -B

## 2019-01-07 PROCEDURE — C1713 ANCHOR/SCREW BN/BN,TIS/BN: HCPCS | Performed by: ORTHOPAEDIC SURGERY

## 2019-01-07 PROCEDURE — 74011250636 HC RX REV CODE- 250/636: Performed by: ORTHOPAEDIC SURGERY

## 2019-01-07 PROCEDURE — 77030020782 HC GWN BAIR PAWS FLX 3M -B

## 2019-01-07 PROCEDURE — 82962 GLUCOSE BLOOD TEST: CPT

## 2019-01-07 PROCEDURE — 74011250636 HC RX REV CODE- 250/636

## 2019-01-07 PROCEDURE — 77030002922 HC SUT FBRWRE ARTH -B: Performed by: ORTHOPAEDIC SURGERY

## 2019-01-07 PROCEDURE — 76060000034 HC ANESTHESIA 1.5 TO 2 HR: Performed by: ORTHOPAEDIC SURGERY

## 2019-01-07 PROCEDURE — 77030013708 HC HNDPC SUC IRR PULS STRY –B: Performed by: ORTHOPAEDIC SURGERY

## 2019-01-07 PROCEDURE — 0RRJ00Z REPLACEMENT OF RIGHT SHOULDER JOINT WITH REVERSE BALL AND SOCKET SYNTHETIC SUBSTITUTE, OPEN APPROACH: ICD-10-PCS | Performed by: ORTHOPAEDIC SURGERY

## 2019-01-07 PROCEDURE — 77030008684 HC TU ET CUF COVD -B: Performed by: ANESTHESIOLOGY

## 2019-01-07 PROCEDURE — 77030032497 HC WRP SHLDR WO BGS SOLM -B

## 2019-01-07 PROCEDURE — 77030011640 HC PAD GRND REM COVD -A: Performed by: ORTHOPAEDIC SURGERY

## 2019-01-07 PROCEDURE — 77030018673: Performed by: ORTHOPAEDIC SURGERY

## 2019-01-07 PROCEDURE — 74011000250 HC RX REV CODE- 250: Performed by: ORTHOPAEDIC SURGERY

## 2019-01-07 PROCEDURE — 77030006835 HC BLD SAW SAG STRY -B: Performed by: ORTHOPAEDIC SURGERY

## 2019-01-07 PROCEDURE — 77030020788: Performed by: ORTHOPAEDIC SURGERY

## 2019-01-07 PROCEDURE — 74011636637 HC RX REV CODE- 636/637: Performed by: FAMILY MEDICINE

## 2019-01-07 PROCEDURE — 77030018547 HC SUT ETHBND1 J&J -B: Performed by: ORTHOPAEDIC SURGERY

## 2019-01-07 PROCEDURE — 36415 COLL VENOUS BLD VENIPUNCTURE: CPT

## 2019-01-07 PROCEDURE — 77030002933 HC SUT MCRYL J&J -A: Performed by: ORTHOPAEDIC SURGERY

## 2019-01-07 PROCEDURE — 77030010507 HC ADH SKN DERMBND J&J -B

## 2019-01-07 PROCEDURE — 74011250637 HC RX REV CODE- 250/637: Performed by: ANESTHESIOLOGY

## 2019-01-07 PROCEDURE — 65270000029 HC RM PRIVATE

## 2019-01-07 PROCEDURE — 94640 AIRWAY INHALATION TREATMENT: CPT

## 2019-01-07 PROCEDURE — 76010000162 HC OR TIME 1.5 TO 2 HR INTENSV-TIER 1: Performed by: ORTHOPAEDIC SURGERY

## 2019-01-07 PROCEDURE — 77030028700 HC BLD TISS PLSM MEDT -E: Performed by: ORTHOPAEDIC SURGERY

## 2019-01-07 PROCEDURE — 86900 BLOOD TYPING SEROLOGIC ABO: CPT

## 2019-01-07 PROCEDURE — 94664 DEMO&/EVAL PT USE INHALER: CPT

## 2019-01-07 PROCEDURE — 77030026438 HC STYL ET INTUB CARD -A: Performed by: ANESTHESIOLOGY

## 2019-01-07 PROCEDURE — 74011000250 HC RX REV CODE- 250: Performed by: ANESTHESIOLOGY

## 2019-01-07 PROCEDURE — 74011000250 HC RX REV CODE- 250: Performed by: FAMILY MEDICINE

## 2019-01-07 PROCEDURE — 3E0T3BZ INTRODUCTION OF ANESTHETIC AGENT INTO PERIPHERAL NERVES AND PLEXI, PERCUTANEOUS APPROACH: ICD-10-PCS | Performed by: ANESTHESIOLOGY

## 2019-01-07 PROCEDURE — 85018 HEMOGLOBIN: CPT

## 2019-01-07 PROCEDURE — 77030029684 HC NEB SM VOL KT MONA -A

## 2019-01-07 PROCEDURE — 74011000250 HC RX REV CODE- 250

## 2019-01-07 PROCEDURE — C1776 JOINT DEVICE (IMPLANTABLE): HCPCS | Performed by: ORTHOPAEDIC SURGERY

## 2019-01-07 PROCEDURE — 77030039266 HC ADH SKN EXOFIN S2SG -A: Performed by: ORTHOPAEDIC SURGERY

## 2019-01-07 PROCEDURE — 77030031139 HC SUT VCRL2 J&J -A: Performed by: ORTHOPAEDIC SURGERY

## 2019-01-07 PROCEDURE — 73020 X-RAY EXAM OF SHOULDER: CPT

## 2019-01-07 PROCEDURE — 77030020471 HC BIT DRL CREV ZIMM -C: Performed by: ORTHOPAEDIC SURGERY

## 2019-01-07 DEVICE — SHOULDER SHRT ST/COMP RVS GLN -- E1 S4: Type: IMPLANTABLE DEVICE | Site: SHOULDER | Status: FUNCTIONAL

## 2019-01-07 DEVICE — IMPLANTABLE DEVICE
Type: IMPLANTABLE DEVICE | Site: SHOULDER | Status: FUNCTIONAL
Brand: COMPREHENSIVE REVERSE SHOULDER

## 2019-01-07 DEVICE — IMPLANTABLE DEVICE
Type: IMPLANTABLE DEVICE | Site: SHOULDER | Status: FUNCTIONAL
Brand: COMPREHENSIVE SHOULDER SYSTEM

## 2019-01-07 DEVICE — IMPLANTABLE DEVICE
Type: IMPLANTABLE DEVICE | Site: SHOULDER | Status: FUNCTIONAL
Brand: COMPREHENSIVE® REVERSE SHOULDER

## 2019-01-07 DEVICE — GLENOSPHERE RVS STD 0D 36MM -- COMPREHENSIVE: Type: IMPLANTABLE DEVICE | Site: SHOULDER | Status: FUNCTIONAL

## 2019-01-07 RX ORDER — HYDROMORPHONE HYDROCHLORIDE 1 MG/ML
.5-1 INJECTION, SOLUTION INTRAMUSCULAR; INTRAVENOUS; SUBCUTANEOUS
Status: DISCONTINUED | OUTPATIENT
Start: 2019-01-07 | End: 2019-01-07 | Stop reason: HOSPADM

## 2019-01-07 RX ORDER — CEFAZOLIN SODIUM/WATER 2 G/20 ML
2 SYRINGE (ML) INTRAVENOUS EVERY 8 HOURS
Status: COMPLETED | OUTPATIENT
Start: 2019-01-07 | End: 2019-01-08

## 2019-01-07 RX ORDER — FAMOTIDINE 20 MG/1
20 TABLET, FILM COATED ORAL 2 TIMES DAILY
Status: DISCONTINUED | OUTPATIENT
Start: 2019-01-07 | End: 2019-01-09 | Stop reason: HOSPADM

## 2019-01-07 RX ORDER — OXYCODONE HYDROCHLORIDE 5 MG/1
10 TABLET ORAL
Status: DISCONTINUED | OUTPATIENT
Start: 2019-01-07 | End: 2019-01-07 | Stop reason: HOSPADM

## 2019-01-07 RX ORDER — ONDANSETRON 2 MG/ML
4 INJECTION INTRAMUSCULAR; INTRAVENOUS
Status: ACTIVE | OUTPATIENT
Start: 2019-01-07 | End: 2019-01-08

## 2019-01-07 RX ORDER — ONDANSETRON 2 MG/ML
INJECTION INTRAMUSCULAR; INTRAVENOUS AS NEEDED
Status: DISCONTINUED | OUTPATIENT
Start: 2019-01-07 | End: 2019-01-07 | Stop reason: HOSPADM

## 2019-01-07 RX ORDER — AMOXICILLIN 250 MG
1 CAPSULE ORAL 2 TIMES DAILY
Status: DISCONTINUED | OUTPATIENT
Start: 2019-01-07 | End: 2019-01-09 | Stop reason: HOSPADM

## 2019-01-07 RX ORDER — LIDOCAINE HYDROCHLORIDE 40 MG/ML
SOLUTION TOPICAL AS NEEDED
Status: DISCONTINUED | OUTPATIENT
Start: 2019-01-07 | End: 2019-01-07 | Stop reason: HOSPADM

## 2019-01-07 RX ORDER — CEFAZOLIN SODIUM/WATER 2 G/20 ML
2 SYRINGE (ML) INTRAVENOUS ONCE
Status: COMPLETED | OUTPATIENT
Start: 2019-01-07 | End: 2019-01-07

## 2019-01-07 RX ORDER — HYDROMORPHONE HYDROCHLORIDE 2 MG/ML
0.5 INJECTION, SOLUTION INTRAMUSCULAR; INTRAVENOUS; SUBCUTANEOUS
Status: ACTIVE | OUTPATIENT
Start: 2019-01-07 | End: 2019-01-08

## 2019-01-07 RX ORDER — PRAVASTATIN SODIUM 20 MG/1
80 TABLET ORAL
Status: DISCONTINUED | OUTPATIENT
Start: 2019-01-07 | End: 2019-01-09 | Stop reason: HOSPADM

## 2019-01-07 RX ORDER — SODIUM CHLORIDE 0.9 % (FLUSH) 0.9 %
5-10 SYRINGE (ML) INJECTION EVERY 8 HOURS
Status: DISCONTINUED | OUTPATIENT
Start: 2019-01-07 | End: 2019-01-09 | Stop reason: HOSPADM

## 2019-01-07 RX ORDER — LIDOCAINE HYDROCHLORIDE 10 MG/ML
0.1 INJECTION, SOLUTION EPIDURAL; INFILTRATION; INTRACAUDAL; PERINEURAL AS NEEDED
Status: DISCONTINUED | OUTPATIENT
Start: 2019-01-07 | End: 2019-01-07 | Stop reason: HOSPADM

## 2019-01-07 RX ORDER — SODIUM CHLORIDE, SODIUM LACTATE, POTASSIUM CHLORIDE, CALCIUM CHLORIDE 600; 310; 30; 20 MG/100ML; MG/100ML; MG/100ML; MG/100ML
125 INJECTION, SOLUTION INTRAVENOUS CONTINUOUS
Status: DISCONTINUED | OUTPATIENT
Start: 2019-01-07 | End: 2019-01-07 | Stop reason: HOSPADM

## 2019-01-07 RX ORDER — OXYCODONE HYDROCHLORIDE 5 MG/1
10 TABLET ORAL
Status: DISCONTINUED | OUTPATIENT
Start: 2019-01-07 | End: 2019-01-09 | Stop reason: HOSPADM

## 2019-01-07 RX ORDER — ROCURONIUM BROMIDE 10 MG/ML
INJECTION, SOLUTION INTRAVENOUS AS NEEDED
Status: DISCONTINUED | OUTPATIENT
Start: 2019-01-07 | End: 2019-01-07 | Stop reason: HOSPADM

## 2019-01-07 RX ORDER — ONDANSETRON 2 MG/ML
4 INJECTION INTRAMUSCULAR; INTRAVENOUS AS NEEDED
Status: DISCONTINUED | OUTPATIENT
Start: 2019-01-07 | End: 2019-01-07 | Stop reason: HOSPADM

## 2019-01-07 RX ORDER — TRAMADOL HYDROCHLORIDE 50 MG/1
100 TABLET ORAL
Status: DISCONTINUED | OUTPATIENT
Start: 2019-01-07 | End: 2019-01-09 | Stop reason: HOSPADM

## 2019-01-07 RX ORDER — OXYCODONE HCL 10 MG/1
10 TABLET, FILM COATED, EXTENDED RELEASE ORAL ONCE
Status: COMPLETED | OUTPATIENT
Start: 2019-01-07 | End: 2019-01-07

## 2019-01-07 RX ORDER — SODIUM CHLORIDE 9 MG/ML
125 INJECTION, SOLUTION INTRAVENOUS CONTINUOUS
Status: DISPENSED | OUTPATIENT
Start: 2019-01-07 | End: 2019-01-08

## 2019-01-07 RX ORDER — FENTANYL CITRATE 50 UG/ML
25 INJECTION, SOLUTION INTRAMUSCULAR; INTRAVENOUS
Status: DISCONTINUED | OUTPATIENT
Start: 2019-01-07 | End: 2019-01-07 | Stop reason: HOSPADM

## 2019-01-07 RX ORDER — OXYCODONE HYDROCHLORIDE 5 MG/1
5 TABLET ORAL
Status: DISCONTINUED | OUTPATIENT
Start: 2019-01-07 | End: 2019-01-09 | Stop reason: HOSPADM

## 2019-01-07 RX ORDER — POLYETHYLENE GLYCOL 3350 17 G/17G
17 POWDER, FOR SOLUTION ORAL DAILY
Status: DISCONTINUED | OUTPATIENT
Start: 2019-01-07 | End: 2019-01-09 | Stop reason: HOSPADM

## 2019-01-07 RX ORDER — ACETAMINOPHEN 325 MG/1
975 TABLET ORAL ONCE
Status: COMPLETED | OUTPATIENT
Start: 2019-01-07 | End: 2019-01-07

## 2019-01-07 RX ORDER — MIDAZOLAM HYDROCHLORIDE 1 MG/ML
INJECTION, SOLUTION INTRAMUSCULAR; INTRAVENOUS AS NEEDED
Status: DISCONTINUED | OUTPATIENT
Start: 2019-01-07 | End: 2019-01-07 | Stop reason: HOSPADM

## 2019-01-07 RX ORDER — PROPOFOL 10 MG/ML
INJECTION, EMULSION INTRAVENOUS AS NEEDED
Status: DISCONTINUED | OUTPATIENT
Start: 2019-01-07 | End: 2019-01-07 | Stop reason: HOSPADM

## 2019-01-07 RX ORDER — SODIUM CHLORIDE 0.9 % (FLUSH) 0.9 %
5-10 SYRINGE (ML) INJECTION AS NEEDED
Status: DISCONTINUED | OUTPATIENT
Start: 2019-01-07 | End: 2019-01-09 | Stop reason: HOSPADM

## 2019-01-07 RX ORDER — MONTELUKAST SODIUM 10 MG/1
10 TABLET ORAL EVERY EVENING
Status: DISCONTINUED | OUTPATIENT
Start: 2019-01-07 | End: 2019-01-09 | Stop reason: HOSPADM

## 2019-01-07 RX ORDER — HYDROMORPHONE HYDROCHLORIDE 1 MG/ML
.5-1 INJECTION, SOLUTION INTRAMUSCULAR; INTRAVENOUS; SUBCUTANEOUS
Status: DISCONTINUED | OUTPATIENT
Start: 2019-01-07 | End: 2019-01-07 | Stop reason: SDUPTHER

## 2019-01-07 RX ORDER — OXYCODONE AND ACETAMINOPHEN 5; 325 MG/1; MG/1
1 TABLET ORAL
Qty: 42 TAB | Refills: 0 | Status: SHIPPED
Start: 2019-01-07 | End: 2019-06-12

## 2019-01-07 RX ORDER — ACETAMINOPHEN 325 MG/1
650 TABLET ORAL EVERY 6 HOURS
Status: DISCONTINUED | OUTPATIENT
Start: 2019-01-07 | End: 2019-01-09 | Stop reason: HOSPADM

## 2019-01-07 RX ORDER — FENTANYL CITRATE 50 UG/ML
INJECTION, SOLUTION INTRAMUSCULAR; INTRAVENOUS AS NEEDED
Status: DISCONTINUED | OUTPATIENT
Start: 2019-01-07 | End: 2019-01-07 | Stop reason: HOSPADM

## 2019-01-07 RX ORDER — MAGNESIUM SULFATE 100 %
4 CRYSTALS MISCELLANEOUS AS NEEDED
Status: DISCONTINUED | OUTPATIENT
Start: 2019-01-07 | End: 2019-01-09 | Stop reason: HOSPADM

## 2019-01-07 RX ORDER — IPRATROPIUM BROMIDE AND ALBUTEROL SULFATE 2.5; .5 MG/3ML; MG/3ML
3 SOLUTION RESPIRATORY (INHALATION)
Status: DISCONTINUED | OUTPATIENT
Start: 2019-01-07 | End: 2019-01-08

## 2019-01-07 RX ORDER — DEXTROSE 50 % IN WATER (D50W) INTRAVENOUS SYRINGE
12.5-25 AS NEEDED
Status: DISCONTINUED | OUTPATIENT
Start: 2019-01-07 | End: 2019-01-09 | Stop reason: HOSPADM

## 2019-01-07 RX ORDER — INSULIN LISPRO 100 [IU]/ML
INJECTION, SOLUTION INTRAVENOUS; SUBCUTANEOUS
Status: DISCONTINUED | OUTPATIENT
Start: 2019-01-07 | End: 2019-01-07

## 2019-01-07 RX ORDER — LATANOPROST 50 UG/ML
1 SOLUTION/ DROPS OPHTHALMIC
Status: DISCONTINUED | OUTPATIENT
Start: 2019-01-07 | End: 2019-01-09 | Stop reason: HOSPADM

## 2019-01-07 RX ORDER — ASPIRIN 325 MG
325 TABLET, DELAYED RELEASE (ENTERIC COATED) ORAL 2 TIMES DAILY
Status: DISCONTINUED | OUTPATIENT
Start: 2019-01-07 | End: 2019-01-09 | Stop reason: HOSPADM

## 2019-01-07 RX ORDER — NALOXONE HYDROCHLORIDE 0.4 MG/ML
0.4 INJECTION, SOLUTION INTRAMUSCULAR; INTRAVENOUS; SUBCUTANEOUS AS NEEDED
Status: DISCONTINUED | OUTPATIENT
Start: 2019-01-07 | End: 2019-01-09 | Stop reason: HOSPADM

## 2019-01-07 RX ORDER — ROPIVACAINE HYDROCHLORIDE 5 MG/ML
INJECTION, SOLUTION EPIDURAL; INFILTRATION; PERINEURAL AS NEEDED
Status: DISCONTINUED | OUTPATIENT
Start: 2019-01-07 | End: 2019-01-07 | Stop reason: HOSPADM

## 2019-01-07 RX ORDER — DIPHENHYDRAMINE HCL 12.5MG/5ML
12.5 ELIXIR ORAL
Status: ACTIVE | OUTPATIENT
Start: 2019-01-07 | End: 2019-01-08

## 2019-01-07 RX ORDER — ASPIRIN 325 MG
325 TABLET ORAL 2 TIMES DAILY WITH MEALS
Qty: 28 TAB | Refills: 0 | Status: SHIPPED
Start: 2019-01-07 | End: 2019-01-21

## 2019-01-07 RX ADMIN — ROPIVACAINE HYDROCHLORIDE 10 ML: 5 INJECTION, SOLUTION EPIDURAL; INFILTRATION; PERINEURAL at 07:12

## 2019-01-07 RX ADMIN — ACETAMINOPHEN 650 MG: 325 TABLET, FILM COATED ORAL at 12:17

## 2019-01-07 RX ADMIN — Medication 10 ML: at 21:39

## 2019-01-07 RX ADMIN — Medication 2 G: at 07:45

## 2019-01-07 RX ADMIN — SODIUM CHLORIDE, SODIUM LACTATE, POTASSIUM CHLORIDE, AND CALCIUM CHLORIDE 125 ML/HR: 600; 310; 30; 20 INJECTION, SOLUTION INTRAVENOUS at 06:24

## 2019-01-07 RX ADMIN — Medication 2 G: at 21:39

## 2019-01-07 RX ADMIN — INSULIN LISPRO 2 UNITS: 100 INJECTION, SOLUTION INTRAVENOUS; SUBCUTANEOUS at 17:02

## 2019-01-07 RX ADMIN — IPRATROPIUM BROMIDE AND ALBUTEROL SULFATE 3 ML: .5; 3 SOLUTION RESPIRATORY (INHALATION) at 13:39

## 2019-01-07 RX ADMIN — FENTANYL CITRATE 50 MCG: 50 INJECTION, SOLUTION INTRAMUSCULAR; INTRAVENOUS at 08:10

## 2019-01-07 RX ADMIN — LIDOCAINE HYDROCHLORIDE 3 ML: 40 SOLUTION TOPICAL at 07:36

## 2019-01-07 RX ADMIN — PROPOFOL 150 MG: 10 INJECTION, EMULSION INTRAVENOUS at 07:35

## 2019-01-07 RX ADMIN — SODIUM CHLORIDE 125 ML/HR: 900 INJECTION, SOLUTION INTRAVENOUS at 09:29

## 2019-01-07 RX ADMIN — FAMOTIDINE 20 MG: 20 TABLET ORAL at 17:02

## 2019-01-07 RX ADMIN — SODIUM CHLORIDE 125 ML/HR: 900 INJECTION, SOLUTION INTRAVENOUS at 17:51

## 2019-01-07 RX ADMIN — ACETAMINOPHEN 650 MG: 325 TABLET, FILM COATED ORAL at 23:31

## 2019-01-07 RX ADMIN — MIDAZOLAM HYDROCHLORIDE 2 MG: 1 INJECTION, SOLUTION INTRAMUSCULAR; INTRAVENOUS at 07:07

## 2019-01-07 RX ADMIN — ONDANSETRON 4 MG: 2 INJECTION INTRAMUSCULAR; INTRAVENOUS at 08:42

## 2019-01-07 RX ADMIN — ASPIRIN 325 MG: 325 TABLET, COATED ORAL at 17:02

## 2019-01-07 RX ADMIN — DOCUSATE SODIUM AND SENNOSIDES 1 TABLET: 8.6; 5 TABLET, FILM COATED ORAL at 17:02

## 2019-01-07 RX ADMIN — FAMOTIDINE 20 MG: 20 TABLET ORAL at 12:17

## 2019-01-07 RX ADMIN — IPRATROPIUM BROMIDE AND ALBUTEROL SULFATE 3 ML: .5; 3 SOLUTION RESPIRATORY (INHALATION) at 20:04

## 2019-01-07 RX ADMIN — PROPOFOL 50 MG: 10 INJECTION, EMULSION INTRAVENOUS at 08:48

## 2019-01-07 RX ADMIN — ACETAMINOPHEN 650 MG: 325 TABLET, FILM COATED ORAL at 17:02

## 2019-01-07 RX ADMIN — Medication 2 G: at 14:03

## 2019-01-07 RX ADMIN — PRAVASTATIN SODIUM 80 MG: 20 TABLET ORAL at 21:39

## 2019-01-07 RX ADMIN — BUPIVACAINE HYDROCHLORIDE 6 ML/HR: 2.5 INJECTION, SOLUTION EPIDURAL; INFILTRATION; INTRACAUDAL; PERINEURAL at 09:27

## 2019-01-07 RX ADMIN — ACETAMINOPHEN 975 MG: 325 TABLET, FILM COATED ORAL at 06:31

## 2019-01-07 RX ADMIN — PROPOFOL 20 MG: 10 INJECTION, EMULSION INTRAVENOUS at 08:57

## 2019-01-07 RX ADMIN — FENTANYL CITRATE 100 MCG: 50 INJECTION, SOLUTION INTRAMUSCULAR; INTRAVENOUS at 07:07

## 2019-01-07 RX ADMIN — DOCUSATE SODIUM AND SENNOSIDES 1 TABLET: 8.6; 5 TABLET, FILM COATED ORAL at 12:17

## 2019-01-07 RX ADMIN — MONTELUKAST SODIUM 10 MG: 10 TABLET, FILM COATED ORAL at 17:02

## 2019-01-07 RX ADMIN — PROPOFOL 30 MG: 10 INJECTION, EMULSION INTRAVENOUS at 08:53

## 2019-01-07 RX ADMIN — SODIUM CHLORIDE, SODIUM LACTATE, POTASSIUM CHLORIDE, AND CALCIUM CHLORIDE: 600; 310; 30; 20 INJECTION, SOLUTION INTRAVENOUS at 08:43

## 2019-01-07 RX ADMIN — OXYCODONE HYDROCHLORIDE 10 MG: 10 TABLET, FILM COATED, EXTENDED RELEASE ORAL at 06:31

## 2019-01-07 RX ADMIN — ROCURONIUM BROMIDE 50 MG: 10 INJECTION, SOLUTION INTRAVENOUS at 07:35

## 2019-01-07 NOTE — PROGRESS NOTES
Bedside and Verbal shift change report given to [address] [place] [address] (oncoming nurse) by MARY Tolbert (offgoing nurse). Report given with SBAR, Kardex, OR Summary, Intake/Output, MAR and Recent Results.

## 2019-01-07 NOTE — CONSULTS
[address]   Initial Consult Note    Patient:  Lorne Taylor  MRN:  [id number]  YOB: 1950  Age:  70 y.o. Primary care provider:  Deysi Walsh MD    Date of admission:  2019    Date of consultation:  2019    Requesting physician:  Dr. Brenda Stout    Reason for consultation:  Medical management. History of present illness  Lorne  is a 70 y.o. female who is s/p right reverse shoulder arthroplasty POD 0. The Family Medicine Service was consulted for medical management of prediabetes, HTN, COPD, HLD, Glaucoma. Pt feeling well after surgery. Pt denies chills, SOB, CP, headache, change in vision, N/V, abdominal pain. Home Medications   Prior to Admission medications    Medication Sig Start Date End Date Taking? Authorizing Provider   umeclidinium-vilanterol (ANORO ELLIPTA) 62.5-25 mcg/actuation inhaler Take 1 Puff by inhalation every morning. Provider, Historical   aspirin (ASPIRIN) 325 mg tablet Take 325 mg by mouth every morning. Provider, Historical   glucosamine sulfate (GLUCOSAMINE) 500 mg tablet Take 1,000 mg by mouth daily. Provider, Historical   magnesium 200 mg tab Take 1 Tab by mouth every morning. Provider, Historical   vitamin E (AQUA GEMS) 400 unit capsule Take 400 Units by mouth every morning. Provider, Historical   Omega-3 Fatty Acids 60- mg cpDR Take 2 Tabs by mouth every morning. Provider, Historical   multivit with minerals/lutein (MULTIVITAMIN 50 PLUS PO) Take 1 Tab by mouth every morning. Provider, Historical   oxyCODONE-acetaminophen (PERCOCET)  mg per tablet Take 1 Tab by mouth every six (6) hours as needed for Pain. Provider, Historical   ascorbic acid, vitamin C, (VITAMIN C) 500 mg tablet Take 500 mg by mouth daily. Provider, Historical   CALCIUM PO Take 500 mg by mouth two (2) times a day.     Provider, Historical   cholecalciferol (VITAMIN D3) 1,000 unit tablet Take 1,000 Units by mouth every morning. Provider, Historical   docusate sodium (DOK) 250 mg capsule Take 250 mg by mouth two (2) times a day. Provider, Historical   metFORMIN (GLUCOPHAGE) 500 mg tablet Take 500 mg by mouth two (2) times daily (with meals). Provider, Historical   montelukast (SINGULAIR) 10 mg tablet Take 10 mg by mouth every evening. Provider, Historical   lisinopril-hydrochlorothiazide (PRINZIDE, ZESTORETIC) 10-12.5 mg per tablet Take 1 Tab by mouth every morning. Provider, Historical   pravastatin (PRAVACHOL) 80 mg tablet Take 80 mg by mouth nightly. Provider, Historical   latanoprost (XALATAN) 0.005 % ophthalmic solution Administer 1 Drop to both eyes nightly. Provider, Historical       Allergies   Allergies   Allergen Reactions    Diclofenac Anaphylaxis     SWELLING, DIFFICULTY BREATHING    Flexeril [Cyclobenzaprine] Swelling     Hands, face and throat swelling    Medrol [Methylprednisolone] Unknown (comments)     She has forgotten    Naproxen Swelling    Tolectin [Tolmetin] Swelling     Took this with Flexeril and does not know which med caused swelling of hands,face and throat       Past Medical History   Past Medical History:   Diagnosis Date    Arthritis     Claustrophobia     COPD     Degenerative disc disease, lumbar 1/6/2016    Diabetes ([facility] 2012    She states the metformin is prevention only    Glaucoma     Hypertension     Obesity (BMI 30-39. 9)     Peripheral vascular disease ([facility] [facility]     stents in each iliac artery, states they are \"watching\" carotids    Unspecified adverse effect of anesthesia     \"BLOOD PRESSURE DROPS\"    Unspecified sleep apnea     Does not uses CPAP       Past Surgical History  Past Surgical History:   Procedure Laterality Date    HX BACK SURGERY  2006    LUMBAR- PLATES, SCREWS    HX HYSTERECTOMY  1980    HX LUMBAR LAMINECTOMY N/A 2016 & 2017    ALIF and then did the posterior after it didnt work    [employer] Right     ULNAR NERVE SURGERY    HX ORTHOPAEDIC Left 2013    rotator cuff shoulder    VASCULAR SURGERY PROCEDURE UNLIST Bilateral 2010    ILIAC STENT       Family History  Family History   Problem Relation Age of Onset    Heart Disease Father     Heart Attack Father 36    No Known Problems Sister     Deep Vein Thrombosis Brother     Heart Disease Brother        Social History   Patient resides  x  Independently      With family care      Assisted living      SNF      Ambulates  x  Independently      With cane       Assisted walker           Alcohol history     None   x  Social     Chronic     Smoking history    None   x  Former smoker     Current smoker     Social History     Tobacco Use   Smoking Status Former Smoker    Packs/day: 2.00    Years: 35.00    Pack years: 70.00    Types: Cigarettes    Last attempt to quit: 10/2/2004    Years since quittin.2   Smokeless Tobacco Never Used         Illicit drug history  x  None     Former user     Current user     Sexual history    Sexually active    x  Not sexually active     Code status  x  Full code     DNR/DNI     Partial    Code status discussed with the patient/caregivers.       Review of Systems   General ROS: negative for - chills, fatigue, fever or night sweats  Ophthalmic ROS: negative for - blurry vision, double vision, itchy eyes or loss of vision  ENT ROS: negative for - epistaxis, headaches, nasal discharge or sinus pain  Hematological and Lymphatic ROS: negative for - bleeding problems, bruising, fatigue or weight loss  Endocrine ROS: negative for - hot flashes, mood swings or skin changes  Respiratory ROS: negative for - cough, orthopnea, shortness of breath or tachypnea  Cardiovascular ROS: negative for - chest pain, edema, loss of consciousness or murmur  Gastrointestinal ROS: no abdominal pain, change in bowel habits, or black or bloody stools  Genito-Urinary ROS: no dysuria, trouble voiding, or hematuria  Musculoskeletal ROS: negative for - muscle pain or muscular weakness  Neurological ROS: no TIA or stroke symptoms  Dermatological ROS: negative for - eczema or rash    Physical Exam  Visit Vitals  /70 (BP 1 Location: Right arm, BP Patient Position: At rest)   Pulse 84   Temp 98 °F (36.7 °C)   Resp 18   Wt 176 lb 3.2 oz (79.9 kg)   SpO2 98%   BMI 31.21 kg/m²        General: No acute distress. Alert. Cooperative. Head: Normocephalic. Atraumatic. Eyes:  Conjunctiva pink. Sclera white. PERRL. Neck: Supple. Normal ROM. No stiffness. Respiratory: CTAB. No w/r/r/c.   Cardiovascular: RRR. Normal S1,S2. No m/r/g. Pulses 2+ throughout. GI: + bowel sounds. Nontender. No rebound tenderness or guarding. Nondistended. Extremities: Right UE in a sling. No edema. No palpable cord. No tenderness. Musculoskeletal: Full ROM in all extremities. Neuro: CN II-XII grossly intact. Strength 5/5 in all extremities except the RUE 2/2 surgery. Sensation intact in all extremities. Skin: Clear. No rashes. No ulcers. Laboratory Data  Recent Results (from the past 24 hour(s))   TYPE & SCREEN    Collection Time: 01/07/19  6:16 AM   Result Value Ref Range    Crossmatch Expiration 01/10/2019     ABO/Rh(D) A POSITIVE     Antibody screen NEG    GLUCOSE, POC    Collection Time: 01/07/19  6:16 AM   Result Value Ref Range    Glucose (POC) 104 (H) 65 - 100 mg/dL    Performed by [id number], POC    Collection Time: 01/07/19 10:22 AM   Result Value Ref Range    Glucose (POC) 110 (H) 65 - 100 mg/dL    Performed by [facility]        Impression/Recomendations   Sacha Anne is a 70 y.o. female who is s/p RIGHT  REVERSE SHOULDER ARTHROPLASTY  WITH BICEPS TENODESIS AND AXILLARY NERVE DESSECTION(GENERAL WITH BLOCK). The Family Medicine Service was consulted for medical managment.      S/p right shoulder arthroplasty with biceps tenodesis and axillary nerve: POD0   -Pain control regimen per primary  -PT    HTN: 137/73  -Restarting home medication of prinzide 10/12.5 mg daily    COPD: controlled. Room air at home. -Duo-nebs and Singulair     Prediabetes: HgbA1C 5.9  -POC glucose checks   -SSI   -Holding metformin. Glaucoma stable  - Xalatan eye drops    FEN/GI - Regular diabetic diet. NS at 125 mL/hr. Disposition - Plan to d/c per primary team       Thank you very much for allowing us to participate in the care of this pleasant patient. The family medicine service will continue to follow the patient's medical progress along with you. Please do not hesitate to page with any questions or to discuss the case. Patient seen with Dr. Mayank Garces by:      Talita Frazier DO  Family Medicine Resident

## 2019-01-07 NOTE — ANESTHESIA PREPROCEDURE EVALUATION
Anesthetic History No history of anesthetic complications Review of Systems / Medical History Patient summary reviewed, nursing notes reviewed and pertinent labs reviewed Pulmonary COPD Sleep apnea Neuro/Psych Within defined limits Cardiovascular Hypertension PAD 
 
 
  
GI/Hepatic/Renal 
Within defined limits Endo/Other Diabetes Arthritis Other Findings Physical Exam 
 
Airway Mallampati: II 
 
Neck ROM: normal range of motion Mouth opening: Normal 
 
 Cardiovascular Rhythm: regular Rate: normal 
 
 
 
 Dental 
No notable dental hx Pulmonary Breath sounds clear to auscultation Abdominal 
GI exam deferred Other Findings Anesthetic Plan ASA: 3 Anesthesia type: general 
 
 
Post-op pain plan if not by surgeon: peripheral nerve block continuous Induction: Intravenous Anesthetic plan and risks discussed with: Patient

## 2019-01-07 NOTE — ROUTINE PROCESS
TRANSFER - OUT REPORT: 
 
Verbal report given to MARYBETH Alcala(name) on Keli Sloan  being transferred to [facility](unit) for routine post - op Report consisted of patients Situation, Background, Assessment and  
Recommendations(SBAR). Information from the following report(s) SBAR and MAR was reviewed with the receiving nurse. Opportunity for questions and clarification was provided. Patient transported with: 
 O2 @ 2 liters Registered Nurse

## 2019-01-07 NOTE — INTERVAL H&P NOTE
H&P Update: 
Vishal Barboza was seen and examined. History and physical has been reviewed. The patient has been examined.  There have been no significant clinical changes since the completion of the originally dated History and Physical. 
 
Signed By: Laz Duarte MD   
 January 7, 2019 7:30 AM

## 2019-01-07 NOTE — OP NOTES
REVERSE TOTAL SHOULDER ARTHROPLASTY      Date of Procedure: 1/7/2019   Preoperative Diagnosis: RIGHT SHOULDER OSTEO ARTHRITIS  Postoperative Diagnosis: RIGHT SHOULDER OSTEO ARTHRITIS , BICEPS TENDONITIS  Procedure: Procedure(s):  RIGHT  REVERSE SHOULDER ARTHROPLASTY (GENERAL WITH BLOCK) , BICEPS TENODESIS, AXILLARY NERVE DISSECTION  Surgeon: Sandro Bravo MD  Assistant(s):   Anesthesia: General   Estimated Blood Loss: 100cc  Specimens: * No specimens in log *   Complications: None  Implants:  Implant Name Type Inv. Item Serial No.  Lot No. LRB No. Used Action   BASEPLT W/ADAPTER JUAN 25MM -- COMPREHENSIVE - SNA  BASEPLT W/ADAPTER JUAN 25MM -- COMPREHENSIVE NA [employer] [id number] Right 1 Implanted   Glenosphere   NA  [id number] Right 1 Implanted   SCREW LOCKING FIXED 4.75 X 15MM 3.5 HEX - SNA Screw SCREW LOCKING FIXED 4.75 X 15MM 3.5 HEX NA BI[employer]ET TRAUMA [id number] Right 2 Implanted   SCR CTRL RVS 6.5X25MM STRL/RST -- COMPREHENSIVE - SNA  SCR CTRL RVS 6.5X25MM STRL/RST -- COMPREHENSIVE NA [employer] [id number] Right 1 Implanted   STEM HUM LOYDA MINI 11MM -- COMPREHENSIVE - SNA  STEM HUM LOYDA MINI 11MM -- COMPREHENSIVE NA [employer] [id number] Right 1 Implanted   BEARING HUM STD E1 44-36MM --  - SNA  BEARING HUM STD E1 44-36MM --  NA [employer] [id number] Right 1 Implanted   HUM TRAY RVS SHLDR 44MM -- COMPREHENSIVE - SNA  HUM TRAY RVS SHLDR 44MM -- COMPREHENSIVE NA [employer] [id number] Right 1 Implanted   SCR LCK 3.5 HEX 4.75X25 STRL -- COMPREHENSIVE - SNA  SCR LCK 3.5 HEX 4.75X25 STRL -- COMPREHENSIVE NA [employer] [id number] Right 1 Implanted          1 Implanted         INDICATIONS:  The patient is a patient of mine who has had a long history of pain and discomfort in the right shoulder.  After failure of conservative treatment the patient has elected to proceed with surgical intervention for shoulder pain refractory to conservative treatment. DESCRIPTION OF PROCEDURE:  After being informed of the risks and benefits, which include, but are not limited to bleeding, infection, neurovascular damage, wound complications, periprosthetic loosening, fracture/dislocation, and early failure of the prosthesis, the patient consented for the procedure. After adequate general anesthesia all bony prominences were padded well, and the involved shoulder was prepped and draped in a sterile fashion. A standard deltopectoral incision was then made and the cephalic vein was identified and preserved. The vein was retracted medially. The subdeltoid and subcoracoid spaces were then dissected and retracted. The bicipital sheath was incised, and the biceps tendon was then noted to be significantly frayed, and it was tenodesed to the pectoralis major tendon with #2 FiberWire. The bicipital sheath was incised proximally, and the remnant of the subscapularis and capsule was taken down off the lesser tuberosity. The inferior medial capsule was then incised and the shoulder was externally rotated and dislocated. The anatomic neck of the shoulder was then marked and cut with the oscillating saw. We serially reamed and broached the humerus to the appropriate size. At that phase the glenoid was addressed. A lamina  was then inserted, and the axillary nerve was dissected as it coursed off the posterior cord of the brachial plexus. The nerve was dissected and preserved as it passed inferiorly to the capsule. The capsule was then incised and released. The labrum was then circumferentially removed as was the remnant of the biceps tendon. We subsequently released the anterior capsule and then inserted the index finger along the glenoid neck. Tthe guide pin for the reamer was inserted into the glenoid with a 15 degree caudal tilt. Care was taken to be sure that the guide pin exited at least 20-mm medial to the anterior glenoid edge.  The glenoid was then reamed with the base-plate reamer. The excess bone on the periphery was removed with the rongeur and a bur, and the base-plate was then inserted. The central screw was then drilled, measured and inserted. The peripheral locking screws were then inserted as well. After securing the base-plate we sized the glenosphere to establish appropriate tensioning and offset of the shoulder. The final glenosphere was then impacted into the Bhanu taper with the offset inferiorly to prevent scapular notching. We then trialed the humeral socket. The appropriate height of the humeral socket was then established to again provide appropriate tension of the deltoid. The shoulder was taken through a range of motion and no instability or scapular impingement was noted. The trial humeral components were removed, and the final stem was impacted into position. We did pass a #5 FiberWire suture around the stem and out the medial aspect of the humerus to aid in the repair of the capsule and subscapularis. At that point the humeral base plate and cup were then impacted on the humerus. The shoulder was again reduced and the wound was irrigated copiously with antibiotic irrigation. The subscapularis and capsule were repaired to the lesser tuberosity using modified Israel-Christian sutures. The wound was again irrigated copiously with antibiotic irrigation, and the deltopectoral interval was then closed taking care to preserve the cephalic vein. The skin was closed in layers. Sterile dressings were applied, and the patient was awakened and taken to the recovery room in stable condition.

## 2019-01-07 NOTE — ANESTHESIA POSTPROCEDURE EVALUATION
Procedure(s): RIGHT  REVERSE SHOULDER ARTHROPLASTY  WITH BICEPS TENODESIS AND AXILLARY NERVE DESSECTION(GENERAL WITH BLOCK). Anesthesia Post Evaluation Multimodal analgesia: multimodal analgesia used between 6 hours prior to anesthesia start to PACU discharge Patient location during evaluation: bedside Patient participation: complete - patient participated Level of consciousness: awake Pain management: adequate Airway patency: patent Anesthetic complications: no 
Cardiovascular status: acceptable Respiratory status: acceptable Hydration status: acceptable Post anesthesia nausea and vomiting:  none Visit Vitals /52 Pulse 82 Temp 36.8 °C (98.2 °F) Resp 13 SpO2 99%

## 2019-01-07 NOTE — ANESTHESIA PROCEDURE NOTES
Peripheral Block Start time: 1/7/2019 7:06 AM 
End time: 1/7/2019 7:24 AM 
Performed by: Evetta Sacks, MD 
Authorized by: Evetta Sacks, MD  
 
 
Pre-procedure: Indications: at surgeon's request and post-op pain management Preanesthetic Checklist: patient identified, risks and benefits discussed, site marked, timeout performed, anesthesia consent given and patient being monitored Timeout Time: 07:06 Block Type:  
Block Type: Interscalene Laterality:  Right Monitoring:  Continuous pulse ox, frequent vital sign checks, heart rate, responsive to questions and oxygen Injection Technique:  Continuous Procedures: ultrasound guided Patient Position: supine Prep: chlorhexidine Location:  Interscalene Needle Type:  Tuohy Needle Gauge:  18 G Needle Localization:  Ultrasound guidance Assessment: 
Number of attempts:  1 Injection Assessment:  Incremental injection every 5 mL, local visualized surrounding nerve on ultrasound, negative aspiration for blood and no intravascular symptoms Patient tolerance:  Patient tolerated the procedure well with no immediate complications

## 2019-01-07 NOTE — BRIEF OP NOTE
BRIEF OPERATIVE NOTE Date of Procedure: 1/7/2019 Preoperative Diagnosis: RIGHT SHOULDER OSTEO ARTHRITIS Postoperative Diagnosis: RIGHT SHOULDER OSTEO ARTHRITIS Procedure: Procedure(s): RIGHT  REVERSE SHOULDER ARTHROPLASTY (GENERAL WITH BLOCK) Surgeon: Elvia Childers MD 
Assistant(s):  
Anesthesia: General  
Estimated Blood Loss: minimal 
Specimens: * No specimens in log * Findings: RC tear Complications: None Implants:  
Implant Name Type Inv. Item Serial No.  Lot No. LRB No. Used Action BASEPLT W/ADAPTER JUAN 25MM -- COMPREHENSIVE - SNA  BASEPLT W/ADAPTER JUAN 25MM -- COMPREHENSIVE NA [employer] [id number] Right 1 Implanted Glenosphere   NA  [id number] Right 1 Implanted SCREW LOCKING FIXED 4.75 X 15MM 3.5 HEX - SNA Screw SCREW LOCKING FIXED 4.75 X 15MM 3.5 HEX NA BIOMET TRAUMA [id number] Right 2 Implanted SCR CTRL RVS 6.5X25MM STRL/RST -- COMPREHENSIVE - SNA  SCR CTRL RVS 6.5X25MM STRL/RST -- COMPREHENSIVE NA [employer] [id number] Right 1 Implanted STEM HUM LOYDA MINI 11MM -- COMPREHENSIVE - SNA  STEM HUM LOYDA MINI 11MM -- COMPREHENSIVE NA [employer] [id number] Right 1 Implanted BEARING HUM STD E1 44-36MM --  - SNA  BEARING HUM STD E1 44-36MM --  NA [employer] [id number] Right 1 Implanted HUM TRAY RVS SHLDR 44MM -- COMPREHENSIVE - SNA  HUM TRAY RVS SHLDR 44MM -- COMPREHENSIVE NA [employer] [id number] Right 1 Implanted SCR LCK 3.5 HEX 4.75X25 STRL -- COMPREHENSIVE - SNA  SCR LCK 3.5 HEX 4.75X25 STRL -- COMPREHENSIVE NA [employer] [id number] Right 1 Implanted 1 Implanted

## 2019-01-08 ENCOUNTER — APPOINTMENT (OUTPATIENT)
Dept: GENERAL RADIOLOGY | Age: 69
DRG: 483 | End: 2019-01-08
Attending: FAMILY MEDICINE
Payer: MEDICARE

## 2019-01-08 LAB
ANION GAP SERPL CALC-SCNC: 8 MMOL/L (ref 5–15)
ATRIAL RATE: 122 BPM
BUN SERPL-MCNC: 11 MG/DL (ref 6–20)
BUN/CREAT SERPL: 15 (ref 12–20)
CALCIUM SERPL-MCNC: 7.9 MG/DL (ref 8.5–10.1)
CALCULATED P AXIS, ECG09: 49 DEGREES
CALCULATED R AXIS, ECG10: 39 DEGREES
CALCULATED T AXIS, ECG11: 62 DEGREES
CHLORIDE SERPL-SCNC: 105 MMOL/L (ref 97–108)
CO2 SERPL-SCNC: 25 MMOL/L (ref 21–32)
CREAT SERPL-MCNC: 0.72 MG/DL (ref 0.55–1.02)
DIAGNOSIS, 93000: NORMAL
GLUCOSE BLD STRIP.AUTO-MCNC: 127 MG/DL (ref 65–100)
GLUCOSE SERPL-MCNC: 159 MG/DL (ref 65–100)
MAGNESIUM SERPL-MCNC: 1.6 MG/DL (ref 1.6–2.4)
P-R INTERVAL, ECG05: 168 MS
POTASSIUM SERPL-SCNC: 3.7 MMOL/L (ref 3.5–5.1)
Q-T INTERVAL, ECG07: 306 MS
QRS DURATION, ECG06: 68 MS
QTC CALCULATION (BEZET), ECG08: 436 MS
SERVICE CMNT-IMP: ABNORMAL
SODIUM SERPL-SCNC: 138 MMOL/L (ref 136–145)
TROPONIN I SERPL-MCNC: <0.05 NG/ML
TSH SERPL DL<=0.05 MIU/L-ACNC: 1.11 UIU/ML (ref 0.36–3.74)
VENTRICULAR RATE, ECG03: 122 BPM

## 2019-01-08 PROCEDURE — 77010033678 HC OXYGEN DAILY

## 2019-01-08 PROCEDURE — 83735 ASSAY OF MAGNESIUM: CPT

## 2019-01-08 PROCEDURE — 97165 OT EVAL LOW COMPLEX 30 MIN: CPT

## 2019-01-08 PROCEDURE — 74011250636 HC RX REV CODE- 250/636: Performed by: ORTHOPAEDIC SURGERY

## 2019-01-08 PROCEDURE — 94762 N-INVAS EAR/PLS OXIMTRY CONT: CPT

## 2019-01-08 PROCEDURE — 80048 BASIC METABOLIC PNL TOTAL CA: CPT

## 2019-01-08 PROCEDURE — 84443 ASSAY THYROID STIM HORMONE: CPT

## 2019-01-08 PROCEDURE — 74011250636 HC RX REV CODE- 250/636: Performed by: FAMILY MEDICINE

## 2019-01-08 PROCEDURE — 97116 GAIT TRAINING THERAPY: CPT

## 2019-01-08 PROCEDURE — 74011000250 HC RX REV CODE- 250: Performed by: FAMILY MEDICINE

## 2019-01-08 PROCEDURE — 74011250636 HC RX REV CODE- 250/636: Performed by: NURSE PRACTITIONER

## 2019-01-08 PROCEDURE — 82962 GLUCOSE BLOOD TEST: CPT

## 2019-01-08 PROCEDURE — 84484 ASSAY OF TROPONIN QUANT: CPT

## 2019-01-08 PROCEDURE — 74011000250 HC RX REV CODE- 250: Performed by: ORTHOPAEDIC SURGERY

## 2019-01-08 PROCEDURE — 74011250637 HC RX REV CODE- 250/637: Performed by: FAMILY MEDICINE

## 2019-01-08 PROCEDURE — 74011250637 HC RX REV CODE- 250/637: Performed by: ORTHOPAEDIC SURGERY

## 2019-01-08 PROCEDURE — 97110 THERAPEUTIC EXERCISES: CPT

## 2019-01-08 PROCEDURE — 36415 COLL VENOUS BLD VENIPUNCTURE: CPT

## 2019-01-08 PROCEDURE — 93005 ELECTROCARDIOGRAM TRACING: CPT

## 2019-01-08 PROCEDURE — 97161 PT EVAL LOW COMPLEX 20 MIN: CPT

## 2019-01-08 PROCEDURE — 71046 X-RAY EXAM CHEST 2 VIEWS: CPT

## 2019-01-08 PROCEDURE — 97535 SELF CARE MNGMENT TRAINING: CPT

## 2019-01-08 PROCEDURE — 94640 AIRWAY INHALATION TREATMENT: CPT

## 2019-01-08 PROCEDURE — 65660000000 HC RM CCU STEPDOWN

## 2019-01-08 PROCEDURE — 74011250637 HC RX REV CODE- 250/637: Performed by: NURSE PRACTITIONER

## 2019-01-08 RX ORDER — IPRATROPIUM BROMIDE AND ALBUTEROL SULFATE 2.5; .5 MG/3ML; MG/3ML
3 SOLUTION RESPIRATORY (INHALATION)
Status: DISCONTINUED | OUTPATIENT
Start: 2019-01-08 | End: 2019-01-09 | Stop reason: HOSPADM

## 2019-01-08 RX ORDER — MAGNESIUM SULFATE 1 G/100ML
1 INJECTION INTRAVENOUS ONCE
Status: COMPLETED | OUTPATIENT
Start: 2019-01-08 | End: 2019-01-08

## 2019-01-08 RX ORDER — METOPROLOL TARTRATE 25 MG/1
25 TABLET, FILM COATED ORAL EVERY 12 HOURS
Status: DISCONTINUED | OUTPATIENT
Start: 2019-01-08 | End: 2019-01-09 | Stop reason: HOSPADM

## 2019-01-08 RX ORDER — POTASSIUM CHLORIDE 750 MG/1
40 TABLET, FILM COATED, EXTENDED RELEASE ORAL
Status: COMPLETED | OUTPATIENT
Start: 2019-01-08 | End: 2019-01-08

## 2019-01-08 RX ADMIN — ASPIRIN 325 MG: 325 TABLET, COATED ORAL at 17:56

## 2019-01-08 RX ADMIN — IPRATROPIUM BROMIDE AND ALBUTEROL SULFATE 3 ML: .5; 3 SOLUTION RESPIRATORY (INHALATION) at 13:22

## 2019-01-08 RX ADMIN — METOPROLOL TARTRATE 25 MG: 25 TABLET ORAL at 16:34

## 2019-01-08 RX ADMIN — ASPIRIN 325 MG: 325 TABLET, COATED ORAL at 08:36

## 2019-01-08 RX ADMIN — PRAVASTATIN SODIUM 80 MG: 20 TABLET ORAL at 23:24

## 2019-01-08 RX ADMIN — ACETAMINOPHEN 650 MG: 325 TABLET, FILM COATED ORAL at 23:24

## 2019-01-08 RX ADMIN — Medication 10 ML: at 05:05

## 2019-01-08 RX ADMIN — LISINOPRIL: 5 TABLET ORAL at 08:36

## 2019-01-08 RX ADMIN — POTASSIUM CHLORIDE 40 MEQ: 750 TABLET, EXTENDED RELEASE ORAL at 17:56

## 2019-01-08 RX ADMIN — OXYCODONE HYDROCHLORIDE 10 MG: 5 TABLET ORAL at 02:35

## 2019-01-08 RX ADMIN — POLYETHYLENE GLYCOL 3350 17 G: 17 POWDER, FOR SOLUTION ORAL at 08:36

## 2019-01-08 RX ADMIN — DOCUSATE SODIUM AND SENNOSIDES 1 TABLET: 8.6; 5 TABLET, FILM COATED ORAL at 08:36

## 2019-01-08 RX ADMIN — OXYCODONE HYDROCHLORIDE 10 MG: 5 TABLET ORAL at 08:40

## 2019-01-08 RX ADMIN — SODIUM CHLORIDE 125 ML/HR: 900 INJECTION, SOLUTION INTRAVENOUS at 02:28

## 2019-01-08 RX ADMIN — ACETAMINOPHEN 650 MG: 325 TABLET, FILM COATED ORAL at 05:05

## 2019-01-08 RX ADMIN — OXYCODONE HYDROCHLORIDE 10 MG: 5 TABLET ORAL at 23:24

## 2019-01-08 RX ADMIN — ACETAMINOPHEN 650 MG: 325 TABLET, FILM COATED ORAL at 12:22

## 2019-01-08 RX ADMIN — OXYCODONE HYDROCHLORIDE 10 MG: 5 TABLET ORAL at 18:04

## 2019-01-08 RX ADMIN — DOCUSATE SODIUM AND SENNOSIDES 1 TABLET: 8.6; 5 TABLET, FILM COATED ORAL at 17:56

## 2019-01-08 RX ADMIN — SODIUM CHLORIDE 1000 ML: 900 INJECTION, SOLUTION INTRAVENOUS at 12:31

## 2019-01-08 RX ADMIN — FAMOTIDINE 20 MG: 20 TABLET ORAL at 08:36

## 2019-01-08 RX ADMIN — Medication 5 ML: at 14:00

## 2019-01-08 RX ADMIN — IPRATROPIUM BROMIDE AND ALBUTEROL SULFATE 3 ML: .5; 3 SOLUTION RESPIRATORY (INHALATION) at 01:01

## 2019-01-08 RX ADMIN — MONTELUKAST SODIUM 10 MG: 10 TABLET, FILM COATED ORAL at 17:57

## 2019-01-08 RX ADMIN — FAMOTIDINE 20 MG: 20 TABLET ORAL at 17:57

## 2019-01-08 RX ADMIN — Medication 2 G: at 05:05

## 2019-01-08 RX ADMIN — LATANOPROST 1 DROP: 50 SOLUTION/ DROPS OPHTHALMIC at 23:28

## 2019-01-08 RX ADMIN — ACETAMINOPHEN 650 MG: 325 TABLET, FILM COATED ORAL at 17:56

## 2019-01-08 RX ADMIN — IPRATROPIUM BROMIDE AND ALBUTEROL SULFATE 3 ML: .5; 3 SOLUTION RESPIRATORY (INHALATION) at 07:17

## 2019-01-08 RX ADMIN — OXYCODONE HYDROCHLORIDE 10 MG: 5 TABLET ORAL at 12:25

## 2019-01-08 RX ADMIN — MAGNESIUM SULFATE HEPTAHYDRATE 1 G: 1 INJECTION, SOLUTION INTRAVENOUS at 18:04

## 2019-01-08 NOTE — PROGRESS NOTES
Problem: Mobility Impaired (Adult and Pediatric) Goal: *Acute Goals and Plan of Care (Insert Text) Physical Therapy Goals Initiated 1/8/2019 1. Patient will move from supine to sit and sit to supine , scoot up and down and roll side to side in bed with modified independence within 7 day(s). 2.  Patient will transfer from bed to chair and chair to bed with modified independence using the least restrictive device within 7 day(s). 3.  Patient will perform sit to stand with modified independence within 7 day(s). 4.  Patient will ambulate with modified independence for 150 feet with the least restrictive device within 7 day(s). physical Therapy EVALUATION Patient: Allie Pryor (66 y.o. female) Date: 1/8/2019 Primary Diagnosis: RIGHT SHOULDER OSTEO ARTHRITIS Osteoarthritis of right shoulder Procedure(s) (LRB): 
RIGHT  REVERSE SHOULDER ARTHROPLASTY  WITH BICEPS TENODESIS AND AXILLARY NERVE DESSECTION(GENERAL WITH BLOCK) (Right) 1 Day Post-Op Precautions:Fall, Other (comment)(No AROM and NWB RUE ) ASSESSMENT : 
A brief evaluation completed with Ms Macie Faust as she returned from radiology and chest xray. Assisted patient from stretcher in the hallway to bedside chair. Patient with decreased dynamic standing balance and noted mild path deviations and lateral trunk sway with short distance amb 14'. Patient post op day #1  R shoulder reverse TSA and this am with tachycardia and shortness of breath. Chest xray results not yet reviewed and without clearance from RN to conduct further assessment, merely assisted from stretcher to back into room. Educated on On Q and potential side effects and fall prevention. Adjusted patient's sling which had been taken down for test. Positioned RUE on pillow prop support to unfasten neck strap and perform AROM exercises of cervical spine as niesha. Educated as to NWB and no AROM right shoulder, sling all times when upright. Patient verbalized good understanding of all education provided. Anticipate PT will be able to clear for discharge to home with constant support of her able bodied  however need to see again later today, once medical clears to work with PT, for tolerance for functional activity. Need to see patient can tolerate greater distance gait without LOB or symptomatic. She has no entry steps to home and does not use second floor, so does not require clearance of stair climbing before discharged. Patient will benefit from skilled intervention to address the above impairments. Patients rehabilitation potential is considered to be Excellent Factors which may influence rehabilitation potential include:  
[x]         None noted 
[]         Mental ability/status []         Medical condition 
[]         Home/family situation and support systems 
[]         Safety awareness 
[]         Pain tolerance/management 
[]         Other: PLAN : 
Recommendations and Planned Interventions: 
[x]           Bed Mobility Training             []    Neuromuscular Re-Education 
[x]           Transfer Training                   []    Orthotic/Prosthetic Training 
[x]           Gait Training                         []    Modalities []           Therapeutic Exercises           []    Edema Management/Control 
[]           Therapeutic Activities            [x]    Patient and Family Training/Education 
[]           Other (comment): Frequency/Duration: Patient will be followed by physical therapy  daily to address goals. Discharge Recommendations: Outpatient per Dr Mary Melvin Further Equipment Recommendations for Discharge: none SUBJECTIVE:  
Patient stated I am not gasping but I can definitely feel my heart racing and I have to work harder to get my breath.  OBJECTIVE DATA SUMMARY:  
HISTORY:   
Past Medical History:  
Diagnosis Date  Arthritis  Claustrophobia  COPD   
  Degenerative disc disease, lumbar 1/6/2016  Diabetes ([facility] [address] 2012 She states the metformin is prevention only  Glaucoma  Hypertension  Obesity (BMI 30-39. 9)  Peripheral vascular disease ([facility] [address] stents in each iliac artery, states they are \"watching\" carotids  Unspecified adverse effect of anesthesia \"BLOOD PRESSURE DROPS\"  Unspecified sleep apnea Does not uses CPAP Past Surgical History:  
Procedure Laterality Date  HX BACK SURGERY  2006 LUMBAR- PLATES, SCREWS  
 HX HYSTERECTOMY  1980  HX LUMBAR LAMINECTOMY N/A 2016 & 2017 ALIF and then did the posterior after it didnt work  HX ORTHOPAEDIC Right ULNAR NERVE SURGERY  
 HX ORTHOPAEDIC Left 2013  
 rotator cuff shoulder  VASCULAR SURGERY PROCEDURE UNLIST Bilateral 2010 ILIAC STENT Prior Level of Function/Home Situation:  
Personal factors and/or comorbidities impacting plan of care:  
 
Home Situation Home Environment: Private residence # Steps to Enter: 0 One/Two Story Residence: Two story, live on 1st floor Living Alone: No 
Support Systems: Spouse/Significant Other/Partner Patient Expects to be Discharged to[de-identified] Private residence Current DME Used/Available at Home: [employer], straight, Walker, rolling, Shower chair, Grab bars Tub or Shower Type: Tub/Shower combination EXAMINATION/PRESENTATION/DECISION MAKING: Critical Behavior: 
Neurologic State: Alert Orientation Level: Oriented X4 Cognition: Appropriate decision making, Appropriate for age attention/concentration, Appropriate safety awareness Safety/Judgement: Awareness of environment Hearing: Auditory Auditory Impairment: None Range Of Motion: 
AROM: Within functional limits PROM: Within functional limits Strength:   
Strength: Within functional limits Tone & Sensation:  
Tone: Normal 
  
  
  
  
Sensation: Intact(except RUE NT) Coordination: Coordination: Within functional limits Vision:  
Tracking: Able to track stimulus in all quadrants w/o difficulty Diplopia: No 
Acuity: Within Defined Limits Functional Mobility: 
Bed Mobility: 
Rolling: (pt was received up and remained up) Scooting: Supervision Transfers: 
Sit to Stand: Minimum assistance;Assist x1 Stand to Sit: Minimum assistance;Assist x1 Bed to Chair: Minimum assistance;Assist x1(hand held assist) Balance:  
Sitting: Intact Standing: Impaired Standing - Static: Fair Standing - Dynamic : FairAmbulation/Gait Training:Distance (ft): 14 Feet (ft) Assistive Device: Brace/Splint;Gait belt Ambulation - Level of Assistance: Contact guard assistance Gait Abnormalities: Path deviations;Trunk sway increased Stairs - Level of Assistance: (NT) 
 
Pain: 
Pain Scale 1: Numeric (0 - 10) Pain Intensity 1: 7 Pain Location 1: Shoulder Pain Orientation 1: Right Pain Description 1: Aching;Constant Pain Intervention(s) 1: Medication (see MAR) Activity Tolerance:  
Limited - 88-89% 127-129 bpm with amb 14' Please refer to the flowsheet for vital signs taken during this treatment. After treatment:  
[x]         Patient left in no apparent distress sitting up in chair 
[]         Patient left in no apparent distress in bed 
[x]         Call bell left within reach [x]         Nursing notified 
[]         Caregiver present 
[]         Bed alarm activated COMMUNICATION/EDUCATION:  
The patients plan of care was discussed with: Registered Nurse. [x]         Fall prevention education was provided and the patient/caregiver indicated understanding. [x]         Patient/family have participated as able in goal setting and plan of care. []         Patient/family agree to work toward stated goals and plan of care. []         Patient understands intent and goals of therapy, but is neutral about his/her participation. []         Patient is unable to participate in goal setting and plan of care. Thank you for this referral. 
Mason Alonzo, PT, DPT Time Calculation: 23 mins

## 2019-01-08 NOTE — PROGRESS NOTES
Problem: Self Care Deficits Care Plan (Adult) Goal: *Acute Goals and Plan of Care (Insert Text) Occupational Therapy Goals Initiated 1/8/2019 1. Patient will don/doff arm sling at supervision/set-up level within 7 days. 2.  Patient will perform all R UE exercises per physician order with supervision/set-up within 7 days. 3.  Patient will perform upper body ADLs with supervision/set-up  within 7 days. 4. Patient will perform lower body ADLs with supervision/set-up within 7 days. 5.  Patient will perform toilet transfers with supervision/set-up utilizing best technique within 7 days. 6.  Patient will verbalize/demonstrate shoulder precautions during ADLs with 100% accuracy within 7 days. Occupational Therapy TREATMENT Patient: Anthony Parr (26 y.o. female) Date: 1/8/2019 Diagnosis: RIGHT SHOULDER OSTEO ARTHRITIS Osteoarthritis of right shoulder <principal problem not specified> Procedure(s) (LRB): 
RIGHT  REVERSE SHOULDER ARTHROPLASTY  WITH BICEPS TENODESIS AND AXILLARY NERVE DESSECTION(GENERAL WITH BLOCK) (Right) 1 Day Post-Op Precautions: Fall, shoulder/NWB + sling on at all times- R UE Chart, occupational therapy assessment, plan of care, and goals were reviewed. ASSESSMENT: 
Ms. Ramos Jeff was received sitting EOB agreeable to OT. She continues to be limited by elevated HR, ranging from 112-131 bpm before, during, and after activity. Patient had EKG and chest xray which were normal and she has been cleared for activity as tolerated by MD.  Patient requesting to use the bathroom. Assisted to bathroom for toileting, requiring min A for the transfer, hygiene, and clothing management. Patient returned to sitting and required max A for sling adjustments. Patient re-educated on sling management, shoulder precautions, and UE exercises per MD order. She was able to tolerate exercises with minimal discomfort (see below).   Patient would benefit from continued skilled OT to progress towards goals and improve overall independence. Progression toward goals: 
[x]       Improving appropriately and progressing toward goals 
[]       Improving slowly and progressing toward goals 
[]       Not making progress toward goals and plan of care will be adjusted PLAN: 
Patient continues to benefit from skilled intervention to address the above impairments. Continue treatment per established plan of care. Discharge Recommendations:  Per MD recommendation Further Equipment Recommendations for Discharge:  None at this time SUBJECTIVE:  
Patient stated I just feel blah.  OBJECTIVE DATA SUMMARY:  
Cognitive/Behavioral Status: 
Neurologic State: Alert Orientation Level: Oriented X4 Cognition: Appropriate decision making; Appropriate for age attention/concentration; Appropriate safety awareness Perception: Appears intact Perseveration: No perseveration noted Safety/Judgement: Awareness of environment Functional Mobility and Transfers for ADLs:Bed Mobility: 
Rolling: (pt was received up and remained up) Scooting: Supervision Transfers: 
Sit to Stand: Minimum assistance;Assist x1 Functional Transfers Bathroom Mobility: Minimum assistance Toilet Transfer : Minimum assistance;Assist x1 Bed to Chair: Minimum assistance;Assist x1(hand held assist) Balance: 
Sitting: Intact Standing: Impaired Standing - Static: Fair Standing - Dynamic : Fair ADL Intervention: Toileting Toileting Assistance: Minimum assistance Bladder Hygiene: Minimum assistance Bowel Hygiene: Minimum assistance Clothing Management: Minimum assistance Cues: Verbal cues provided Cognitive Retraining Safety/Judgement: Awareness of environment Therapeutic Exercises: (Days 1 to 3) Exercises: 
 
 
EXERCISE Sets Reps Active Active Assist  
Passive Comments Elbow Flexion/ extesnsion 3 10 []           [x]           []            3x day Wrist flexion/ extension 3 10 [x]           []           []            3x day Hand flexion/ extension 3 10 [x]           []           []            3x day Supine passive forward elevation  1 5 []           []           [x]            2x day; plane of scapula (PFE) to 
90 degrees only; Supine PFE by family member or using opposite arm; discomfort limited tolerance Codman's pendulum 1 10 []           []           [x]            clockwise/counter clockwise C-spine AROM 1 10 [x]           []           []            rotation, flexion, + lateral flexion Cautions: - Assure normal neurovascular status - No lifting of involved arm - Shoulder extension is limited; Elbow not to go behind midline of body - Protect the subscapularis repair Goals: 
-Maintain stable prosthesis -Minimize pain and inflammatory response 
-Achieve staged ROM goals 
-Establish stable scapula 
-Initiate pain free rotator cuff and deltoid strengthening Activity Tolerance:  
Patient tolerated tx well. Please refer to the flowsheet for vital signs taken during this treatment. After treatment:  
[x] Patient left in no apparent distress sitting EOB [] Patient left in no apparent distress in bed 
[x] Call bell left within reach [x] Nursing notified 
[x] Caregiver present 
[] Bed alarm activated COMMUNICATION/COLLABORATION:  
The patients plan of care was discussed with: Registered Nurse and patient. JULIAN Muri/L Time Calculation: 24 mins

## 2019-01-08 NOTE — PROGRESS NOTES
Ortho Progress Note Patient: Anthony Parr MRN: [id number]  SSN: [id number] YOB: 1950  Age: 70 y.o. Sex: female POD:    1 Day Post-Op S/P:    Procedure(s): RIGHT  REVERSE SHOULDER ARTHROPLASTY  WITH BICEPS TENODESIS AND AXILLARY NERVE DESSECTION(GENERAL WITH BLOCK) Subjective:  
 
Anthony Parr is resting in bed with the appropriate level of discomfort. The patient denies complaints of dyspnea or chest pain. Objective Objective:  
 
Patient Vitals for the past 12 hrs: 
 BP Temp Pulse Resp SpO2  
01/08/19 0349 160/70 97.6 °F (36.4 °C) (!) 102 17 98 % 01/07/19 2307 155/70 98.4 °F (36.9 °C) (!) 104 16 98 % 01/07/19 1909 140/61 98.3 °F (36.8 °C) 99 18 100 % Recent Labs 01/07/19 
1629 HGB 10.4* Patient is alert and oriented x 3 in NAD. The operative shoulder dressing is clean, dry, and intact. The interscalene block catheter is intact without drainage. The patient is neurovascularly intact. The sling and ice are properly positioned on the operative arm. Assessment: 
 
 Assessment:  
Status post shoulder arthroplasty Plan:  
1. We will continue the current pain management regimen. 2. Physical therapy and occupational therapy will initiate treatment. 3. The patient will be discharged home if medically stable,  the pain level is well controlled and they have been cleared by physical therapy. 4.  Anthony Parr will be discharged home with the peripheral nerve catheter intact. Explicit discharge instructions have been provided to the patient for removal of the nerve block as well post operative instructions and prescriptions. 5. The patient is to follow-up in the office at the scheduled appointment in 10-14 days.

## 2019-01-08 NOTE — PROGRESS NOTES
..Bedside shift change report given to Figueroa Knowles RN (oncoming nurse) by Jg Greene RN (offgoing nurse). Report included the following information SBAR, Kardex, Procedure Summary, Intake/Output, MAR, Accordion, Procedure Verification and Quality Measures.

## 2019-01-08 NOTE — PROGRESS NOTES
0745-PCT made nurse aware patients HR in 120's and holding, sats above 95%. 0849-patient given oxy 10mg for a level 8 pain in right shoulder, made family practice aware of patient sustaining HR in 120's, no orders given at this time. 0945-Family practice up on unit, order to do EKG and chest xray. 1230-Patient HR at this time 115, pulse ox 99% on room air, order to given 1000 cc bolus, will continue to assess. 1530-cardiologist consulted, patient placed on remote tele #13, lab work drawn and sent, discharge on hold for now.

## 2019-01-08 NOTE — PROGRESS NOTES
1/8/2019 
9:40 AM 
Reason for Admission:   Elective - right shoulder RRAT Score:          5 Plan for utilizing home health:      Outpatient PT recommended. Likelihood of Readmission:  Rayray Tripp Transition of Care Plan:                   
CM met with pt for assessment. Demographics and PCP were confirmed. Pt is a 70year old,  female who lives in a private residence with her  (0 exterior steps, 0 interior steps). PTA, pt was able to complete ADLs without the use of DME. Pt has prescription drug coverage, and prefers [address] in [place], [place]. Pt is aware of her scheduled outpatient appt (see AVS). No additional discharge service needs indicated. Pt's  will provide transport. Trupti Fournier MA Care Management Interventions PCP Verified by CM: Yes(Thein. No NN to notify.) Palliative Care Criteria Met (RRAT>21 & CHF Dx)?: No 
Mode of Transport at Discharge: Other (see comment)() Transition of Care Consult (CM Consult): Discharge Planning MyChart Signup: No 
Discharge Durable Medical Equipment: No 
Physical Therapy Consult: Yes Occupational Therapy Consult: Yes Speech Therapy Consult: No 
Current Support Network: Lives with Spouse Confirm Follow Up Transport: Family Plan discussed with Pt/Family/Caregiver: Yes Freedom of Choice Offered: Yes Discharge Location Discharge Placement: Home with outpatient services

## 2019-01-08 NOTE — PROGRESS NOTES
Problem: Self Care Deficits Care Plan (Adult) Goal: *Acute Goals and Plan of Care (Insert Text) Occupational Therapy Goals Initiated 1/8/2019 1. Patient will don/doff arm sling at supervision/set-up level within 7 days. 2.  Patient will perform all R UE exercises per physician order with supervision/set-up within 7 days. 3.  Patient will perform upper body ADLs with supervision/set-up  within 7 days. 4. Patient will perform lower body ADLs with supervision/set-up within 7 days. 5.  Patient will perform toilet transfers with supervision/set-up utilizing best technique within 7 days. 6.  Patient will verbalize/demonstrate shoulder precautions during ADLs with 100% accuracy within 7 days. Occupational Therapy EVALUATION Patient: Tu Delaney (3 y.o. female) Date: 1/8/2019 Primary Diagnosis: RIGHT SHOULDER OSTEO ARTHRITIS Osteoarthritis of right shoulder Procedure(s) (LRB): 
RIGHT  REVERSE SHOULDER ARTHROPLASTY  WITH BICEPS TENODESIS AND AXILLARY NERVE DESSECTION(GENERAL WITH BLOCK) (Right) 1 Day Post-Op Precautions: fall, shoulder/NWB + sling on at all times- R UE    
 
ASSESSMENT : 
Based on the objective data described below, the patient presents with decreased activity tolerance on POD 1 of R reverse TSA. Patient was received in the chair after transferring with assistance from staff. RN reports patient with elevated HR this AM; Ranging from 122-129 bpm while seated with verbal review of information. She has undergone an EKG and chest xray ordered. Cleared for minimal activity only and assisted patient with transfer to the stretcher at end of tx as transport arrived to take patient off the floor for testing. Patient confirmed understanding of education provided regarding shoulder precautions, sling application, ADL retraining/adaptive ADL techniques, and safe transfer techniques.   Patient required min A overall for OOB transfers with hand held assist.  Patient educated and provided visual demonstration of UE exercises per MD order; She confirmed understanding. Handout issued with information reviewed. Patient lives with her  who was present to confirm he is able to assist at home PRN. Patient will require additional OT txs to demonstrate understanding of education reviewed this AM and progress towards goals. Will await test results and clearance for increased activity; RN agreeable to contact this OT if cleared for discharge today by MD.  Will continue to follow per POC. Patient will benefit from skilled intervention to address the above impairments. Patients rehabilitation potential is considered to be Fair Factors which may influence rehabilitation potential include:  
[]             None noted []             Mental ability/status []             Medical condition []             Home/family situation and support systems []             Safety awareness []             Pain tolerance/management 
[]             Other: PLAN : 
Recommendations and Planned Interventions: 
[x]               Self Care Training                  [x]        Therapeutic Activities [x]               Functional Mobility Training    []        Cognitive Retraining 
[x]               Therapeutic Exercises           [x]        Endurance Activities []               Balance Training                   []        Neuromuscular Re-Education []               Visual/Perceptual Training     [x]   Home Safety Training 
[x]               Patient Education                 [x]        Family Training/Education []               Other (comment): Frequency/Duration: Patient will be followed by occupational therapy twice daily to address goals. Discharge Recommendations: Per MD recommendation Further Equipment Recommendations for Discharge: none at this time SUBJECTIVE:  
 Patient stated I feel okay but I want to make sure my heart is ticking fine.  OBJECTIVE DATA SUMMARY:  
HISTORY:  
Past Medical History:  
Diagnosis Date  Arthritis  Claustrophobia  COPD  Degenerative disc disease, lumbar 1/6/2016  Diabetes ([facility] [address] 2012 She states the metformin is prevention only  Glaucoma  Hypertension  Obesity (BMI 30-39. 9)  Peripheral vascular disease ([facility] [address] stents in each iliac artery, states they are \"watching\" carotids  Unspecified adverse effect of anesthesia \"BLOOD PRESSURE DROPS\"  Unspecified sleep apnea Does not uses CPAP Past Surgical History:  
Procedure Laterality Date  HX BACK SURGERY  2006 LUMBAR- PLATES, SCREWS  
 HX HYSTERECTOMY  1980  HX LUMBAR LAMINECTOMY N/A 2016 & 2017 ALIF and then did the posterior after it didnt work  HX ORTHOPAEDIC Right ULNAR NERVE SURGERY  
 HX ORTHOPAEDIC Left 2013  
 rotator cuff shoulder  VASCULAR SURGERY PROCEDURE UNLIST Bilateral 2010 ILIAC STENT Prior Level of Function/Environment/Context: Patient lives with her . Patient reports independence with ADLs and functional mobility PTA. Home Situation Home Environment: Private residence One/Two Story Residence: Two story Living Alone: No 
Support Systems: Spouse/Significant Other/Partner Patient Expects to be Discharged to[de-identified] Private residence Current DME Used/Available at Home: [place], straight, Walker, rolling, Shower chair, Grab bars Tub or Shower Type: Tub/Shower combination Hand dominance: RightEXAMINATION OF PERFORMANCE DEFICITS: 
Cognitive/Behavioral Status: 
Neurologic State: Alert Orientation Level: Oriented X4 Cognition: Appropriate decision making; Appropriate for age attention/concentration; Appropriate safety awareness Perception: Appears intact Perseveration: No perseveration noted Safety/Judgement: Awareness of environment Skin: Intact in the uppers Edema: Mild swelling noted to R, surgical UE otherwise none noted Hearing: Auditory Auditory Impairment: None Vision/Perceptual:   
Tracking: Able to track stimulus in all quadrants w/o difficulty Diplopia: No   
Acuity: Within Defined Limits Range of Motion: 
R upper: not tested s/p R reverse TSA L upper: WDL Strength: 
R upper: not tested s/p R reverse TSA L upper: WDL Coordination: 
Fine Motor Skills-Upper: Left Intact; Right Intact Gross Motor Skills-Upper: Left Intact; Right Intact Tone & Sensation: 
Tone: Normal 
Sensation: patient reports tingling from shoulder to hand- OnQ/nerve block intact and functioning Balance: 
Sitting: Intact Standing: Impaired Standing - Static: Fair Standing - Dynamic : Fair Functional Mobility and Transfers for ADLs:Bed Mobility: 
Rolling: (pt was received up and remained up) Scooting: Supervision Transfers: 
Sit to Stand: Minimum assistance;Assist x1 Stand to Sit: Minimum assistance;Assist x1 Bed to Chair: Minimum assistance;Assist x1(hand held assist) Bathroom Mobility: Minimum assistance Toilet Transfer : Minimum assistance;Assist x1 ADL Assessment: 
Feeding: Supervision;Setup Oral Facial Hygiene/Grooming: Supervision;Setup Bathing: Maximum assistance Upper Body Dressing: Maximum assistance Lower Body Dressing: Maximum assistance Toileting: Minimum assistance Cognitive Retraining Safety/Judgement: Awareness of environment Therapeutic Exercise: 
Verbal review and handout issued of UE exercises per MD order:  
 
 
EXERCISE Elbow Flexion/ extesnsion Wrist flexion/ extension Hand flexion/ extension Supine passive forward elevation Codman's pendulum C-spine AROM Cautions: - Assure normal neurovascular status - No lifting of involved arm - Shoulder extension is limited; Elbow not to go behind midline of body - Protect the subscapularis repair Goals: 
-Maintain stable prosthesis -Minimize pain and inflammatory response 
-Achieve staged ROM goals 
-Establish stable scapula 
-Initiate pain free rotator cuff and deltoid strengthening Functional Measure: 
Barthel Index: 
 
Bathin Bladder: 10 Bowels: 10 
Groomin Dressin Feedin Mobility: 0 Stairs: 0 Toilet Use: 0 Transfer (Bed to Chair and Back): 10 Total: 35 The Barthel ADL Index: Guidelines 1. The index should be used as a record of what a patient does, not as a record of what a patient could do. 2. The main aim is to establish degree of independence from any help, physical or verbal, however minor and for whatever reason. 3. The need for supervision renders the patient not independent. 4. A patient's performance should be established using the best available evidence. Asking the patient, friends/relatives and nurses are the usual sources, but direct observation and common sense are also important. However direct testing is not needed. 5. Usually the patient's performance over the preceding 24-48 hours is important, but occasionally longer periods will be relevant. 6. Middle categories imply that the patient supplies over 50 per cent of the effort. 7. Use of aids to be independent is allowed. Craig Antonio., Barthel, D.W. (1971). Functional evaluation: the Barthel Index. [address] [facility] (14)2. ROHITH Mejias, Jahaira Gonzales., Sudeep Brandt., [place], [address] [place] [address] (). Measuring the change indisability after inpatient rehabilitation; comparison of the responsiveness of the Barthel Index and Functional [place] Measure. Journal of Neurology, Neurosurgery, and Psychiatry, 66(4), 511-454. ZACHARY Rick.DELL.AURELIA, SHAY Mcleod, & Trista Ray MDeepA. (2004.) Assessment of post-stroke quality of life in cost-effectiveness studies: The usefulness of the Barthel Index and the EuroQoL-5D. [facility], 13, 034-09 Occupational Therapy Evaluation Charge Determination History Examination Decision-Making LOW Complexity : Brief history review  LOW Complexity : 1-3 performance deficits relating to physical, cognitive , or psychosocial skils that result in activity limitations and / or participation restrictions  LOW Complexity : No comorbidities that affect functional and no verbal or physical assistance needed to complete eval tasks Based on the above components, the patient evaluation is determined to be of the following complexity level: LOW Activity Tolerance:  
Patient with fair tolerance; limited by elevated HR and need for further testing. Please refer to the flowsheet for vital signs taken during this treatment. After treatment:  
[x] Patient left in no apparent distress sitting up in chair 
[] Patient left in no apparent distress in bed 
[x] Call bell left within reach [x] Nursing notified 
[x] Caregiver present- 
[] Bed alarm activated COMMUNICATION/EDUCATION:  
The patients plan of care was discussed with: Physical Therapist, Registered Nurse and patient and . Enedina Alfaro [x] Home safety education was provided and the patient/caregiver indicated understanding. [x] Patient/family have participated as able in goal setting and plan of care. [x] Patient/family agree to work toward stated goals and plan of care. [] Patient understands intent and goals of therapy, but is neutral about his/her participation. [] Patient is unable to participate in goal setting and plan of care. This patients plan of care is appropriate for delegation to [facility]. Thank you for this referral. 
Donovan Aguero OTR/RADHA Time Calculation: 23 mins

## 2019-01-08 NOTE — CONSULTS
Cardiology Consultation Note          [address], [place], [address] [facility]         Phone [phone]; Fax [phone]            2019  5:25 AM  Jonny Griffiths MD  :  1950   MRN:  [id number]       Reason for consult: post operative sinus tachycardia  Admission Diagnosis: RIGHT SHOULDER OSTEO ARTHRITIS  Osteoarthritis of right shoulder    ASSESSMENT/RECOMMENDATIONS:   Sinus tachycardia, at rest/post opt: ECG shows sinus tachycardia, no acute ST changes.  -Check BMP/Mag/TSH and troponin now   - check echo  - telemetry ordered, recommend she stay overnight to monitor.   - noted mild post opt anemia (hgb 10)  - low dose lopressor started  - multiple CAD RF (PVD, carotid artery stenosis, HTN, DMII, obesity and X smoker), recommend OP stress test- if troponin is WNL. SOB: multifactorial (COPD/mild anemia/tachycardia). Will order echo to check LVEF/valvular abnormalities. -chest xray WNL    Hypertension: continue HCTZ/lisinopril, she did miss her dose yesterday. Start low dose lopressor- titrate prn. Post opt anemia- mild. 12.4>10.4    PVD w hx of rosalie iliac stents/carotid artery stenosis: Asa/statin         Pt personally seen and examined. Chart reviewed. Agree with advanced NP's history, exam and  A/P with changes/additons. Sinus Tachycardia - pt not in significant pain. No CP. ECHO/Tele      Discussed with patient/nursing/family -     Roxanne Hutchinson MD, Kati Sania is a 70 y.o. female I am seeing for sinus tachycardia post operative right shoulder surgery kindly referred by Dr Olga Plummer. PMHx includes PVD s/p bilateral iliac stents (followed by Dr Gabbie Swanson), carotid artery stenosis, DMII (controlled), hypertension, COPD, x-smoker (25 yo + smoking hx), ELMA on CPAP and back surgery. The patient denies prior hx of cardiac cath/MI/coronary stents. She reports palpitations and SOB. Denies chest pain, lightheadedness, dizziness or LE edema.  Right shoulder pain is controlled currently. Allergies   Allergen Reactions    Diclofenac Anaphylaxis     SWELLING, DIFFICULTY BREATHING    Flexeril [Cyclobenzaprine] Swelling     Hands, face and throat swelling    Medrol [Methylprednisolone] Unknown (comments)     She has forgotten    Naproxen Swelling    Tolectin [Tolmetin] Swelling     Took this with Flexeril and does not know which med caused swelling of hands,face and throat         Past Medical History:   Diagnosis Date    Arthritis     Claustrophobia     COPD     Degenerative disc disease, lumbar 1/6/2016    Diabetes ([facility] [address] 2012    She states the metformin is prevention only    Glaucoma     Hypertension     Obesity (BMI 30-39. 9)     Peripheral vascular disease ([facility] [address]     stents in each iliac artery, states they are \"watching\" carotids    Unspecified adverse effect of anesthesia     \"BLOOD PRESSURE DROPS\"    Unspecified sleep apnea     Does not uses CPAP        Past Surgical History:   Procedure Laterality Date    HX BACK SURGERY  2006    LUMBAR- PLATES, SCREWS    HX HYSTERECTOMY  1980    HX LUMBAR LAMINECTOMY N/A 2016 & 2017    ALIF and then did the posterior after it didnt work    HX ORTHOPAEDIC Right     [address] HX ORTHOPAEDIC Left 2013    rotator cuff shoulder    VASCULAR SURGERY PROCEDURE UNLIST Bilateral 2010    ILIAC STENT        . Home Medications:  Prior to Admission Medications   Prescriptions Last Dose Informant Patient Reported? Taking? CALCIUM PO Not Taking at Unknown time  Yes No   Sig: Take 500 mg by mouth two (2) times a day. Omega-3 Fatty Acids 60- mg cpDR 12/31/2018  Yes No   Sig: Take 2 Tabs by mouth every morning. ascorbic acid, vitamin C, (VITAMIN C) 500 mg tablet Not Taking at Unknown time  Yes No   Sig: Take 500 mg by mouth daily. aspirin (ASPIRIN) 325 mg tablet 12/28/2018  Yes No   Sig: Take 325 mg by mouth every morning.    cholecalciferol (VITAMIN D3) 1,000 unit tablet Not Taking at Unknown time  Yes No   Sig: Take 1,000 Units by mouth every morning. docusate sodium (DOK) 250 mg capsule 1/6/2019 at Unknown time  Yes Yes   Sig: Take 250 mg by mouth two (2) times a day. glucosamine sulfate (GLUCOSAMINE) 500 mg tablet Not Taking at Unknown time  Yes No   Sig: Take 1,000 mg by mouth daily. latanoprost (XALATAN) 0.005 % ophthalmic solution 1/6/2019  Yes No   Sig: Administer 1 Drop to both eyes nightly. lisinopril-hydrochlorothiazide (PRINZIDE, ZESTORETIC) 10-12.5 mg per tablet 1/6/2019  Yes No   Sig: Take 1 Tab by mouth every morning.   magnesium 200 mg tab 12/31/2018  Yes No   Sig: Take 1 Tab by mouth every morning. metFORMIN (GLUCOPHAGE) 500 mg tablet 1/6/2019  Yes No   Sig: Take 500 mg by mouth two (2) times daily (with meals). montelukast (SINGULAIR) 10 mg tablet 1/6/2019  Yes No   Sig: Take 10 mg by mouth every evening.   multivit with minerals/lutein (MULTIVITAMIN 50 PLUS PO) 12/31/2018  Yes No   Sig: Take 1 Tab by mouth every morning. oxyCODONE-acetaminophen (PERCOCET)  mg per tablet 1/6/2019  Yes No   Sig: Take 1 Tab by mouth every six (6) hours as needed for Pain.   pravastatin (PRAVACHOL) 80 mg tablet 1/6/2019  Yes No   Sig: Take 80 mg by mouth nightly. umeclidinium-vilanterol (ANORO ELLIPTA) 62.5-25 mcg/actuation inhaler 1/7/2019  Yes No   Sig: Take 1 Puff by inhalation every morning. vitamin E (AQUA GEMS) 400 unit capsule 12/31/2018  Yes No   Sig: Take 400 Units by mouth every morning.       Facility-Administered Medications: None       Hospital Medications:  Current Facility-Administered Medications   Medication Dose Route Frequency    albuterol-ipratropium (DUO-NEB) 2.5 MG-0.5 MG/3 ML  3 mL Nebulization Q6H PRN    metoprolol tartrate (LOPRESSOR) tablet 25 mg  25 mg Oral Q12H    bupivacaine (PF) 0.25 % (ON-Q BAG) 400 ml  6 mL/hr Local Infiltration CONTINUOUS    traMADol (ULTRAM) tablet 100 mg  100 mg Oral Q4H PRN    acetaminophen (TYLENOL) tablet 650 mg 650 mg Oral Q6H    sodium chloride (NS) flush 5-10 mL  5-10 mL IntraVENous Q8H    sodium chloride (NS) flush 5-10 mL  5-10 mL IntraVENous PRN    oxyCODONE IR (ROXICODONE) tablet 5 mg  5 mg Oral Q3H PRN    oxyCODONE IR (ROXICODONE) tablet 10 mg  10 mg Oral Q3H PRN    naloxone (NARCAN) injection 0.4 mg  0.4 mg IntraVENous PRN    famotidine (PEPCID) tablet 20 mg  20 mg Oral BID    senna-docusate (PERICOLACE) 8.6-50 mg per tablet 1 Tab  1 Tab Oral BID    polyethylene glycol (MIRALAX) packet 17 g  17 g Oral DAILY    aspirin delayed-release tablet 325 mg  325 mg Oral BID    montelukast (SINGULAIR) tablet 10 mg  10 mg Oral QPM    pravastatin (PRAVACHOL) tablet 80 mg  80 mg Oral QHS    latanoprost (XALATAN) 0.005 % ophthalmic solution 1 Drop  1 Drop Both Eyes QHS    lisinopril/hydroCHLOROthiazide (PRINZIDE/ZESTORETIC) 10/12.5   Oral DAILY    glucose chewable tablet 16 g  4 Tab Oral PRN    dextrose (D50W) injection syrg 12.5-25 g  12.5-25 g IntraVENous PRN    glucagon (GLUCAGEN) injection 1 mg  1 mg IntraMUSCular PRN          OBJECTIVE       Laboratory and Imaging have been reviewed and are notable for      ECG: sinus tachycardia        Diagnostic Tests:     No results for input(s): CPK, CKMB, TROIQ in the last 72 hours. No lab exists for component: CKQMB, CPKMB  Recent Labs     19  1629   HGB 10.4*         Cardiac work up to date:  No specialty comments available. Social History:  Social History     Tobacco Use    Smoking status: Former Smoker     Packs/day: 2.00     Years: 35.00     Pack years: 70.00     Types: Cigarettes     Last attempt to quit: 10/2/2004     Years since quittin.2    Smokeless tobacco: Never Used   Substance Use Topics    Alcohol use:  Yes     Alcohol/week: 2.4 oz     Types: 2 Glasses of wine, 2 Cans of beer per week       Family History:  Family History   Problem Relation Age of Onset    Heart Disease Father     Heart Attack Father 36    No Known Problems Sister     Deep Vein Thrombosis Brother     Heart Disease Brother        Review of Symptoms:  A comprehensive review of systems was negative except for that written in the HPI. Physical Exam:      Visit Vitals  /74 (BP 1 Location: Left arm, BP Patient Position: Post activity)   Pulse (!) 127 Comment: nurse aware   Temp 97.7 °F (36.5 °C)   Resp 20   Wt 176 lb 3.2 oz (79.9 kg)   SpO2 97%   BMI 31.21 kg/m²     General Appearance:  Well developed, obese ,alert and oriented x 3, and individual in no acute distress. EENT/Mouth:   Hearing grossly normal.Normal oral mucosa,no scleral icterus     Neck: Supple no JVD    Chest:   Lungs clear to auscultation bilaterally,no rales rhonchi or wheezing   Cardiovascular:  Accelerated rate and rhythm, no  Murmur, no rubs or gallop. Abdomen:   Soft, non-tender, bowel sounds are active. Extremities: No edema bilaterally. Pulses detected, no varicosities. Right arm in sling    Skin: Warm and dry. No bruising  Neuro  Moves all extermities and neurologically intact                                                       I have discussed the diagnosis with the patient and the intended plan as seen in the above orders. Questions were answered concerning future plans. I have discussed medication side effects and warnings with the patient as well. Pradeepoumoupaul Tesha is in agreement to the plan listed above and wishes to proceed. Thank you for this consult.       Brant Bernardo NP

## 2019-01-08 NOTE — PROGRESS NOTES
Scott Jichristian HernandezGeetha Cavazos [address] Office [phone] Fax [phone] Assessment and Plan  
 
Sabrina Elam is a 70 y.o. female who is s/p RIGHT  REVERSE SHOULDER ARTHROPLASTY  WITH BICEPS TENODESIS AND AXILLARY NERVE DESSECTION(GENERAL WITH BLOCK). The [facility] was consulted for medical managment. 24 Hour Events: No acute events. S/p right shoulder arthroplasty with biceps tenodesis and axillary nerve: POD 1 
-Pain control regimen per primary 
  
Tachycardia 2/2 to pain:pulse ranging from  since surgery likely secondary to pain, dehydration or duoneb side effect.  
- Pain management per primary team. 
 
HTN: 137/73, BP elevated at 155/68 secondary to pain. 
-Restarting home medication of prinzide 10/12.5 mg daily 
  
COPD: controlled. Room air at home. -Duo-nebs and Singulair  
  
Prediabetes: HgbA1C 5.9 
-POC glucose checks  
-SSI  
-Holding metformin. 
  
Glaucoma stable - Xalatan eye drops 
  
FEN/GI - Regular diabetic diet. NS at 125 mL/hr. Disposition - Plan to d/c per primary team 
 
 
 
Patient will be discussed with Dr. Preston Recio, Attending Physician Zeynep Villela MD 
Family Medicine Resident Subjective / Objective Subjective Patient complaining of pain in right shoulder. Tolerating regular diet. Voiding appropriately. Temp (24hrs), Av °F (36.7 °C), Min:97.6 °F (36.4 °C), Max:98.4 °F (36.9 °C) Objective Respiratory: O2 Flow Rate (L/min): 2 l/min O2 Device: Room air Visit Vitals /68 (BP 1 Location: Left arm, BP Patient Position: At rest;Head of bed elevated (Comment degrees)) Pulse (!) 121 Temp 97.8 °F (36.6 °C) Resp 18 Wt 176 lb 3.2 oz (79.9 kg) SpO2 97% BMI 31.21 kg/m² General: No acute distress. Alert. Cooperative. Head: Normocephalic. Atraumatic. Eyes:              Conjunctiva pink. Sclera white. PERRL. Ears:              Ear canals patent. TM non-erythematous. Nose:             Septum midline. Mucosa pink. No drainage. Throat: Mucosa pink. Moist mucous membranes. No tonsillar exudates or erythema. Palate movement equal bilaterally. Neck: Supple. Normal ROM. No stiffness. Respiratory: CTAB. No w/r/r/c.  
Cardiovascular: RRR. Normal S1,S2. No m/r/g. Pulses 2+ throughout. GI: + bowel sounds. Nontender. No rebound tenderness or guarding. Nondistended. Extremities:  LE edema. Distal pulses intact. Right upper extremity immobilized, with surgical packing. Musculoskeletal: Full ROM in all extremities. Skin: Warm, dry. No rashes. Neuro: CN II-XII grossly intact. I/O: 
Date 01/07/19 0700 - 01/08/19 0510 01/08/19 0700 - 01/09/19 5421 Shift 9516-4323 2114-6753 24 Hour Total 6101-3978 3855-5586 24 Hour Total  
INTAKE  
I.V.(mL/kg/hr) 1200(1.3)  1200(0.6) Volume (lactated Ringers infusion) 1200  1200 Shift Total(mL/kg) 1200(15)  1200(15)     
OUTPUT Urine(mL/kg/hr) 1500(1.6) 2500(2.6) 4000(2.1) Urine Voided 1500 2500 4000 Shift Total(mL/kg) 1500(18.8) 2500(31.3) 4000(50) NET -300 -2500 -2800 Weight (kg) 79.9 79.9 79.9 79.9 79.9 79.9  
 
 
CBC: 
Recent Labs 01/07/19 
1629 HGB 10.4* Metabolic Panel: No results for input(s): NA, K, CL, CO2, BUN, CREA, GLU, CA, MG, PHOS, ALB, TBIL, SGOT, ALT, INR, MG in the last 72 hours. No lab exists for component: INREXT, INREXT For Billing No chief complaint on file. Hospital Problems  Never Reviewed Codes Class Noted POA Osteoarthritis of right shoulder ICD-10-CM: M19.011 
ICD-9-CM: 715.91  1/7/2019 Unknown

## 2019-01-09 VITALS
OXYGEN SATURATION: 97 % | BODY MASS INDEX: 31.21 KG/M2 | RESPIRATION RATE: 18 BRPM | DIASTOLIC BLOOD PRESSURE: 69 MMHG | WEIGHT: 176.2 LBS | SYSTOLIC BLOOD PRESSURE: 157 MMHG | HEART RATE: 88 BPM | TEMPERATURE: 97.7 F

## 2019-01-09 LAB
ANION GAP SERPL CALC-SCNC: 8 MMOL/L (ref 5–15)
BUN SERPL-MCNC: 9 MG/DL (ref 6–20)
BUN/CREAT SERPL: 11 (ref 12–20)
CALCIUM SERPL-MCNC: 8.7 MG/DL (ref 8.5–10.1)
CHLORIDE SERPL-SCNC: 105 MMOL/L (ref 97–108)
CO2 SERPL-SCNC: 26 MMOL/L (ref 21–32)
CREAT SERPL-MCNC: 0.83 MG/DL (ref 0.55–1.02)
ERYTHROCYTE [DISTWIDTH] IN BLOOD BY AUTOMATED COUNT: 13.8 % (ref 11.5–14.5)
GLUCOSE BLD STRIP.AUTO-MCNC: 106 MG/DL (ref 65–100)
GLUCOSE BLD STRIP.AUTO-MCNC: 128 MG/DL (ref 65–100)
GLUCOSE SERPL-MCNC: 109 MG/DL (ref 65–100)
HCT VFR BLD AUTO: 28.4 % (ref 35–47)
HGB BLD-MCNC: 9.3 G/DL (ref 11.5–16)
MAGNESIUM SERPL-MCNC: 2 MG/DL (ref 1.6–2.4)
MCH RBC QN AUTO: 32.3 PG (ref 26–34)
MCHC RBC AUTO-ENTMCNC: 32.7 G/DL (ref 30–36.5)
MCV RBC AUTO: 98.6 FL (ref 80–99)
NRBC # BLD: 0 K/UL (ref 0–0.01)
NRBC BLD-RTO: 0 PER 100 WBC
PLATELET # BLD AUTO: 196 K/UL (ref 150–400)
PMV BLD AUTO: 10.6 FL (ref 8.9–12.9)
POTASSIUM SERPL-SCNC: 4.4 MMOL/L (ref 3.5–5.1)
RBC # BLD AUTO: 2.88 M/UL (ref 3.8–5.2)
SERVICE CMNT-IMP: ABNORMAL
SERVICE CMNT-IMP: ABNORMAL
SODIUM SERPL-SCNC: 139 MMOL/L (ref 136–145)
WBC # BLD AUTO: 6 K/UL (ref 3.6–11)

## 2019-01-09 PROCEDURE — 74011000250 HC RX REV CODE- 250: Performed by: ORTHOPAEDIC SURGERY

## 2019-01-09 PROCEDURE — 74011250637 HC RX REV CODE- 250/637: Performed by: STUDENT IN AN ORGANIZED HEALTH CARE EDUCATION/TRAINING PROGRAM

## 2019-01-09 PROCEDURE — 74011250637 HC RX REV CODE- 250/637: Performed by: NURSE PRACTITIONER

## 2019-01-09 PROCEDURE — 97110 THERAPEUTIC EXERCISES: CPT

## 2019-01-09 PROCEDURE — 85027 COMPLETE CBC AUTOMATED: CPT

## 2019-01-09 PROCEDURE — 97116 GAIT TRAINING THERAPY: CPT

## 2019-01-09 PROCEDURE — 36415 COLL VENOUS BLD VENIPUNCTURE: CPT

## 2019-01-09 PROCEDURE — 97535 SELF CARE MNGMENT TRAINING: CPT

## 2019-01-09 PROCEDURE — 82962 GLUCOSE BLOOD TEST: CPT

## 2019-01-09 PROCEDURE — 74011250637 HC RX REV CODE- 250/637: Performed by: ORTHOPAEDIC SURGERY

## 2019-01-09 PROCEDURE — 93306 TTE W/DOPPLER COMPLETE: CPT

## 2019-01-09 PROCEDURE — 83735 ASSAY OF MAGNESIUM: CPT

## 2019-01-09 PROCEDURE — 94760 N-INVAS EAR/PLS OXIMETRY 1: CPT

## 2019-01-09 PROCEDURE — 80048 BASIC METABOLIC PNL TOTAL CA: CPT

## 2019-01-09 RX ORDER — LANOLIN ALCOHOL/MO/W.PET/CERES
1 CREAM (GRAM) TOPICAL
Status: DISCONTINUED | OUTPATIENT
Start: 2019-01-10 | End: 2019-01-09 | Stop reason: HOSPADM

## 2019-01-09 RX ORDER — METOPROLOL TARTRATE 25 MG/1
25 TABLET, FILM COATED ORAL EVERY 12 HOURS
Qty: 60 TAB | Refills: 1 | Status: SHIPPED | OUTPATIENT
Start: 2019-01-09 | End: 2019-03-12 | Stop reason: SDUPTHER

## 2019-01-09 RX ORDER — DOCUSATE SODIUM 100 MG/1
100 CAPSULE, LIQUID FILLED ORAL
Qty: 30 CAP | Refills: 2 | Status: SHIPPED | OUTPATIENT
Start: 2019-01-09 | End: 2019-04-09

## 2019-01-09 RX ORDER — DOCUSATE SODIUM 100 MG/1
100 CAPSULE, LIQUID FILLED ORAL DAILY
Status: DISCONTINUED | OUTPATIENT
Start: 2019-01-09 | End: 2019-01-09 | Stop reason: HOSPADM

## 2019-01-09 RX ORDER — LANOLIN ALCOHOL/MO/W.PET/CERES
325 CREAM (GRAM) TOPICAL
Qty: 60 TAB | Refills: 0 | Status: SHIPPED | OUTPATIENT
Start: 2019-01-10

## 2019-01-09 RX ADMIN — POLYETHYLENE GLYCOL 3350 17 G: 17 POWDER, FOR SOLUTION ORAL at 09:16

## 2019-01-09 RX ADMIN — OXYCODONE HYDROCHLORIDE 10 MG: 5 TABLET ORAL at 06:23

## 2019-01-09 RX ADMIN — ASPIRIN 325 MG: 325 TABLET, COATED ORAL at 09:16

## 2019-01-09 RX ADMIN — DOCUSATE SODIUM 100 MG: 100 CAPSULE, LIQUID FILLED ORAL at 11:40

## 2019-01-09 RX ADMIN — METOPROLOL TARTRATE 25 MG: 25 TABLET ORAL at 09:16

## 2019-01-09 RX ADMIN — ACETAMINOPHEN 650 MG: 325 TABLET, FILM COATED ORAL at 06:23

## 2019-01-09 RX ADMIN — LISINOPRIL: 5 TABLET ORAL at 09:15

## 2019-01-09 RX ADMIN — OXYCODONE HYDROCHLORIDE 10 MG: 5 TABLET ORAL at 11:40

## 2019-01-09 RX ADMIN — ACETAMINOPHEN 650 MG: 325 TABLET, FILM COATED ORAL at 11:40

## 2019-01-09 RX ADMIN — DOCUSATE SODIUM AND SENNOSIDES 1 TABLET: 8.6; 5 TABLET, FILM COATED ORAL at 09:16

## 2019-01-09 RX ADMIN — FAMOTIDINE 20 MG: 20 TABLET ORAL at 09:16

## 2019-01-09 NOTE — PROGRESS NOTES
Scott Mccormack Jesus [address] Office [phone] Fax [phone] Assessment and Plan  
 
Sandra Elizalde is a 70 y.o. female with PMH of PVD, carotid artery stenosis, HTN, DM II, obesity and former smoker who is s/p RIGHT  REVERSE SHOULDER ARTHROPLASTY  WITH BICEPS TENODESIS AND AXILLARY NERVE DESSECTION(GENERAL WITH BLOCK). The [facility] Service was consulted for medical managment. 24 Hour Events: Patient unable to go home given she was SOB and had tachycardia. S/p right shoulder arthroplasty with biceps tenodesis and axillary nerve: POD 3 
-Pain control regimen per primary 
  
Sinus Tachycardia likely 2/2 to pain (improved): Troponin, Mg, TSH wnl.  
- Low dose lopressor 25 mg tab. TSH normal, EKG sinus tachycardia, CXR wnl. Ranging from 83-93 since 8 pm .  
- 2D echo pending - Telemetry Postop Anemia: Hgb dropped from 12.4 to 9.3 postop. - CBC am 
- Ferrous sulfate BID 
- Colace BID  
 
HTN: 137/73, BP elevated at 155/68 yesterday but had not received antiHTN at that time.  
-Restarting home medication of prinzide 10/12.5 mg daily 
- Started on lopressor 
  
SOB 2/2 COPD: controlled. Room air at home. CXR wnl.  
-Duo-nebs and Singulair  
  
PVD: hx of iliac stents and carotid artery stenosis 
-ASA 81 mg 
- Pravastatin Prediabetes: HgbA1C 5.9 
-POC glucose checks  
-SSI  
-Holding metformin. 
  
Glaucoma stable - Xalatan eye drops 
  
FEN/GI - Regular diabetic diet. NS at 125 mL/hr. Disposition - Plan to d/c per primary team 
 
Medically stable for discharge when cleared by cardiology. Patient will be discussed with Dr. Aneesh Salmeron, Attending Physician Gomez Jordan MD 
Family Medicine Resident Subjective / Objective Subjective Patient complaining of pain in right shoulder. Tolerating regular diet. Voiding appropriately. Temp (24hrs), Av.2 °F (36.8 °C), Min:97.7 °F (36.5 °C), Max:99.4 °F (37.4 °C) Objective: Vital signs: (most recent): Blood pressure 134/49, pulse 93, temperature 99.4 °F (37.4 °C), resp. rate 18, weight 176 lb 3.2 oz (79.9 kg), SpO2 94 %. Respiratory: O2 Flow Rate (L/min): 2 l/min O2 Device: Room air Visit Vitals /69 (BP 1 Location: Left arm, BP Patient Position: At rest;Head of bed elevated (Comment degrees)) Pulse 88 Temp 97.7 °F (36.5 °C) Resp 18 Wt 176 lb 3.2 oz (79.9 kg) SpO2 97% BMI 31.21 kg/m² General: No acute distress. Alert. Cooperative. Head: Normocephalic. Atraumatic. Eyes:              Conjunctiva pink. Sclera white. PERRL. Ears:              Ear canals patent. TM non-erythematous. Nose:             Septum midline. Mucosa pink. No drainage. Throat: Mucosa pink. Moist mucous membranes. No tonsillar exudates or erythema. Palate movement equal bilaterally. Neck: Supple. Normal ROM. No stiffness. Respiratory: CTAB. No w/r/r/c.  
Cardiovascular: RRR. Normal S1,S2. No m/r/g. Pulses 2+ throughout. GI: + bowel sounds. Nontender. No rebound tenderness or guarding. Nondistended. Extremities:  LE edema. Distal pulses intact. Right upper extremity immobilized, with surgical packing. Musculoskeletal: Full ROM in all extremities. Skin: Warm, dry. No rashes. Neuro: CN II-XII grossly intact. I/O: 
Date 01/08/19 0700 - 01/09/19 4113 01/09/19 0700 - 01/10/19 4654 Shift 5314-2273 2146-0878 24 Hour Total 7776-7370 3709-0315 24 Hour Total  
INTAKE Shift Total(mL/kg) OUTPUT Urine(mL/kg/hr) Urine Occurrence(s) 2 x  2 x Shift Total(mL/kg) NET Weight (kg) 79.9 79.9 79.9 79.9 79.9 79.9  
 
 
CBC: 
Recent Labs 01/09/19 
8551 01/07/19 
1629 WBC 6.0  --   
HGB 9.3* 10.4* HCT 28.4*  --   
  -- Metabolic Panel: 
Recent Labs 01/09/19 
6592 01/08/19 
1551  138  
K 4.4 3.7  105 CO2 26 25 BUN 9 11 CREA 0.83 0.72 * 159* CA 8.7 7.9*  
MG 2.0 1.6 For Billing No chief complaint on file. Hospital Problems  Never Reviewed Codes Class Noted POA Osteoarthritis of right shoulder ICD-10-CM: M19.011 
ICD-9-CM: 715.91  1/7/2019 Unknown

## 2019-01-09 NOTE — DISCHARGE INSTRUCTIONS
Take iron supplement daily. Shoulder Surgery Discharge Instructions  Dr. Giselle Rebolledo    Please take the time to review the following instructions before you leave the hospital and use them as guidelines during your recovery from surgery. If you have any questions, you may contact my office at [phone]. Wound Care / Dressing Change    Do NOT remove your dressing. Keep the clear, plastic dressing intact until your follow up in the office. Showering / Bathing    If the clear plastic dressing is intact and there is no drainage, you may shower. If the dressing is loose or water gets under it please notify our office. If there is drainage, please call the office immediately. Sling    Keep your arm in the immobilizer/sling at all times except when showering, changing your clothes, and doing the exercises shown to you by Dr. Hayder Arroyo or your physical/occupational therapist prior to your discharge from the hospital.  Keep your arm at your side when changing your clothes and showering. Do not move it away from your body. Ice and Elevation    Ice therapy should be used consistently for 48 hours after surgery. Subsequently, you should ice 3 times per day for 20 minutes at a time. After the first week, you may use ice as needed for pain and swelling. Please ensure there is protective barrier between your skin and the ice. Diet    You may advance your regular diet as tolerated. Increase your clear liquid intake for the next 2-3 days. Medications  Your prescriptions were sent to the pharmacy on file. We are unable to change the  narcotic prescription that has already been sent today. If you would like to change your pharmacy for FUTURE prescriptions, please call the office. Pharmacy on File: [facility]      1. Pain: You were prescribed a narcotic medication for pain control. Please  use the medication as prescribed.   Pain medications may cause constipation - Colace or Milk of Magnesia may be used as needed. Other possible side effects of pain medications are dizziness, headache, nausea, vomiting, and urinary retention. Discontinue the pain medication if you develop itching, rash, shortness of breath, or difficulties swallowing. If these symptoms become severe or arent relieved by discontinuing the medication, you should seek immediate medical attention. You cannot drive or operate machinery while taking narcotic medication. Some pain medications already contain Tylenol/Acetaminophen. Please read your prescription pain medicine bottle prior to taking additional Tylenol. Do not exceed 3000mg of Tylenol/Acetaminophen in a 24 hour period. Refills of pain medication are authorized during office hours only   ( Monday to Friday 8am-5pm)        2. Blood Thinner:  You have been given a prescription for Aspirin 325mg. Please take one tablet twice daily for 14 days. Do not take anti-inflammatory medication (Advil, Aleve, Motrin) while taking the blood thinner. 3. Stool Softeners:  Pain medications can cause constipation. Stool softeners, warm prune juice and increasing your water and fiber intake can help prevent constipation. Do not take laxatives. 4. You should resume other medications you were taking prior to your surgery. Pain medication may change the effects of any antidepressant medication you are taking. If you have any questions about possible interactions between your regular medications and the pain medication you should consult the physician who prescribes your regular medications. 5. Anemia: Take ferrous sulfate 325 mg two times a day with meal for anemia. This may cause constipation so take colace two times a day as needed. Physical Therapy:  You must begin outpatient physical therapy 2-3 days after your surgery. Your were given a prescription when you scheduled your surgery.    If you do not have an appointment scheduled or a therapy prescription please call Dr. Jennifer Ames' office immediately. APPOINTMENT: Genia Ventura 1-9 at 10am    Follow-Up Appointment:  Please follow up at your scheduled appointment 10-14 days from the day of your surgery. If you do not already have an appointment, please call our office at [phone] for your follow up appointment. Your appointment will likely be with Deanna Choi PA-C. Irene Rollins assists Dr. Jennifer Ames in surgery and will be able to review your operation and answer your questions. APPOINTMENT: 1-17 at 10:15am      Important Signs and Symptoms:  If any of the following signs and symptoms occurs, you should contact Dr. Tonya Garrido office. Please be advised if a problem arises which you feel requires immediate medical attention or you are unable to contact Dr. Tonya Garrido office, you should seek immediate medical attention. Signs and symptoms to watch for include:    1. A sudden increase in swelling and/or redness or warmth at the area of your surgery which isnt relieved by rest, ice, and elevation. 2. Oral temperature greater than 101degrees for 12 hours or more which isnt relieved by an increase in fluid intake and taking two Tylenol every 4-6 hours. 3. Excessive drainage from your incisions, or drainage which hasnt stopped by 72 hours after your surgery. 4. Calf pain, ever, chills, shortness of breath, chest pain, nausea, vomiting or other signs and symptoms which are of concern to you. Unless you are having a true medical emergency,   you MUST call Dr. Jennifer Ames' office   BEFORE proceeding to the Emergency Department. A provider is on call 24 hours/day. Exercises after Shoulder Surgery  1.  Passive Forward Flexion:                             Instructions:  - Lay on your back  - Take your non surgical arm and grab the wrist of your surgical arm   - Use your non surgical arm to lift your surgical arm over your head with the elbow straight   - Slowly return your arm to your side using your non surgical arm  - Repeat 20 times in the morning and 20 times in the evening  Remember:  - Passive motion: Your arm is lifted with the help of your other hand. o The repair is protected when you do passive motion  - Active motion: You lift your arm by using your shoulder muscles. o DO NOT DO ANY ACTIVE MOTION FOR NOW    2. Codman Pendulum Exercises                                         Instructions:  - Bend forward about 90° at the waist and support yourself on a sturdy object with your non surgical arm  (bend slightly at the knees to protect your back)  - Gently allow your surgical arm to hang towards the ground  - Move your hips forward and backward allowing your arm to swing slightly  - Arm can move forward, backward, and in small circles (clockwise and counterclockwise)  o Remember: let your body move your arm, do not use your arm muscles  - Begin performing the exercise for about 30 seconds. Progress to 2 minutes.   - Repeat 2-3 times per day

## 2019-01-09 NOTE — PROGRESS NOTES
Cardiology Progress Note [address], [place], [address] Phone [phone]; Fax [phone] 
 
 
 
2019 10:45 AM  
 
Admit Date:           2019 Admit Diagnosis:  RIGHT SHOULDER OSTEO ARTHRITIS Osteoarthritis of right shoulder :          1950 MRN:          [id number] ASSESSMENT/RECOMMENDATION:  
Sinus tachycardia, at rest/post opt: HR NSR 
ECG 19 sinus tachycardia, no acute ST changes. 
-K and Mag WNL (s/p replete) -TSH WNL  
-echo shows pEF. Mild MR/Tr/PH 
- noted mild post opt anemia (hgb 9.3) 
- tolerating low dose metoprolol- cont on d/c 
- multiple CAD RF (PVD, carotid artery stenosis, HTN, DMII, obesity and X smoker), recommend OP stress test- troponin is WNL. 
  
SOB: multifactorial (COPD/mild anemia/tachycardia). Will order echo to check LVEF/valvular abnormalities. -chest xray WNL 
  
Hypertension: continue HCTZ/lisinopril and BB, encouraged her to monitor BP at home, goal SBP <140 
  
Post opt anemia- mild. 12.4>9.3. She was recommended Fe supplement  
  
PVD w hx of rosalie iliac stents/carotid artery stenosis: Asa/statin Okay for d/c from a cardiac standpoint follow up with Dr Emeterio Renteria in 1 month or sooner- appt on AVS 
 
Pt personally seen and examined. Chart reviewed. Agree with advanced NP's history, exam and  A/P with changes/additons. HR improved F/u as OP Lars Worrell MD, [facility] No intake/output data recorded. Last 3 Recorded Weights in this Encounter 19 1131 Weight: 176 lb 3.2 oz (79.9 kg)  1901 -  0700 In: -  
Out: 2500 [Urine:2500] SUBJECTIVE Viral Leghorn denies palpitations, irregular heart beat, SOB, chest pain or LE edema. Feels much better today/rested No lightheadedness or dizziness Current Facility-Administered Medications Medication Dose Route Frequency  [START ON 1/10/2019] ferrous sulfate tablet 325 mg  1 Tab Oral DAILY WITH BREAKFAST  docusate sodium (COLACE) capsule 100 mg  100 mg Oral DAILY  albuterol-ipratropium (DUO-NEB) 2.5 MG-0.5 MG/3 ML  3 mL Nebulization Q6H PRN  
 metoprolol tartrate (LOPRESSOR) tablet 25 mg  25 mg Oral Q12H  
 traMADol (ULTRAM) tablet 100 mg  100 mg Oral Q4H PRN  
 acetaminophen (TYLENOL) tablet 650 mg  650 mg Oral Q6H  
 sodium chloride (NS) flush 5-10 mL  5-10 mL IntraVENous Q8H  
 sodium chloride (NS) flush 5-10 mL  5-10 mL IntraVENous PRN  
 oxyCODONE IR (ROXICODONE) tablet 5 mg  5 mg Oral Q3H PRN  
 oxyCODONE IR (ROXICODONE) tablet 10 mg  10 mg Oral Q3H PRN  
 naloxone (NARCAN) injection 0.4 mg  0.4 mg IntraVENous PRN  
 famotidine (PEPCID) tablet 20 mg  20 mg Oral BID  senna-docusate (PERICOLACE) 8.6-50 mg per tablet 1 Tab  1 Tab Oral BID  polyethylene glycol (MIRALAX) packet 17 g  17 g Oral DAILY  aspirin delayed-release tablet 325 mg  325 mg Oral BID  montelukast (SINGULAIR) tablet 10 mg  10 mg Oral QPM  
 pravastatin (PRAVACHOL) tablet 80 mg  80 mg Oral QHS  latanoprost (XALATAN) 0.005 % ophthalmic solution 1 Drop  1 Drop Both Eyes QHS  lisinopril/hydroCHLOROthiazide (PRINZIDE/ZESTORETIC) 10/12.5   Oral DAILY  glucose chewable tablet 16 g  4 Tab Oral PRN  
 dextrose (D50W) injection syrg 12.5-25 g  12.5-25 g IntraVENous PRN  
 glucagon (GLUCAGEN) injection 1 mg  1 mg IntraMUSCular PRN OBJECTIVE No intake or output data in the 24 hours ending 01/09/19 1045 Review of Systems - History obtained from the patient AS PER  HPI Telemetry NSR 
 
PHYSICAL EXAM  
  
 
Visit Vitals /69 (BP 1 Location: Left arm, BP Patient Position: At rest;Head of bed elevated (Comment degrees)) Pulse 88 Temp 97.7 °F (36.5 °C) Resp 18 Wt 176 lb 3.2 oz (79.9 kg) SpO2 97% BMI 31.21 kg/m² Gen: Well-developed, well-nourished, in no acute distress  alert and oriented x 3 HEENT:  Pink conjunctivae, Hearing grossly normal.No scleral icterus or conjunctival, moist mucous membranes Neck: Supple,No JVD Resp: No accessory muscle use, Clear breath sounds, No rales or rhonchi 
Card: Regular Rate,Rythm,Normal S1, S2, No murmurs, rubs or gallop. No thrills. GI:          soft, non-tender MSK: No cyanosis or clubbing, good capillary refill Skin: No rashes or ulcers, no bruising. right arm in sling Neuro:  Cranial nerves are grossly intact, moving all four extremities, no focal deficit, follows commands appropriately Psych:  Good insight, oriented to person, place and time, alert, Nml Affect LE: No edema DATA REVIEW Laboratory and Imaging have been reviewed by me and are notable for Recent Labs 01/08/19 
1551 TROIQ <0.05 Recent Labs 01/09/19 
5325 01/08/19 
1551 01/07/19 
1629  138  --   
K 4.4 3.7  --   
CO2 26 25  --   
BUN 9 11  --   
CREA 0.83 0.72  --   
* 159*  --   
MG 2.0 1.6  --   
WBC 6.0  --   --   
HGB 9.3*  --  10.4* HCT 28.4*  --   --   
  --   --   
 
 
 
 
 
Elva Pilar, NP

## 2019-01-09 NOTE — PROGRESS NOTES
Ortho Progress Note Patient: Luna Santos MRN: [id number]  SSN: [id number] YOB: 1950  Age: 70 y.o. Sex: female POD:    2 Days Post-Op S/P:    Procedure(s): RIGHT  REVERSE SHOULDER ARTHROPLASTY  WITH BICEPS TENODESIS AND AXILLARY NERVE DESSECTION(GENERAL WITH BLOCK) Subjective:  
 
Luna Santos is resting in bed with the appropriate level of discomfort. The patient denies complaints of dyspnea or chest pain. Objective Objective:  
 
Patient Vitals for the past 12 hrs: 
 BP Temp Pulse Resp SpO2  
01/09/19 0439 157/69 97.7 °F (36.5 °C) 83 18 97 % 01/09/19 0013 136/63 97.9 °F (36.6 °C) 84 18 94 % 01/08/19 2321   84    
01/08/19 2000 134/49 99.4 °F (37.4 °C) 93 18 94 % Recent Labs 01/08/19 
1551 01/07/19 
1629 HGB  --  10.4*   --   
K 3.7  --   
  --   
CO2 25  --   
BUN 11  --   
CREA 0.72  --   
*  --   
 
 
Patient is alert and oriented x 3 in NAD. The operative shoulder dressing is clean, dry, and intact. The interscalene block catheter is intact without drainage. The patient is neurovascularly intact. The sling and ice are properly positioned on the operative arm. Assessment: 
 
 Assessment:  
Status post shoulder arthroplasty Plan:  
1. We will continue the current pain management regimen. 2. Physical therapy and occupational therapy will continue treatment according to protocol. 3. The patient will be discharged home if medically stable, the pain level is well controlled and they have been cleared by physical therapy. 4.  Luna Santos will be discharged home with the peripheral nerve catheter intact. Explicit discharge instructions have been provided to the patient for removal of the nerve block as well post operative instructions and prescriptions. 5. The patient is to follow-up in the office at the scheduled appointment in 10-14 days.

## 2019-01-09 NOTE — PROGRESS NOTES
1/9/2019 
8:55 AM 
Pt discharge noted. CM reviewed EMR. Pt will received outpatient PT upon discharge. No additional DC service needs indicated. Pt's  will provide transport.

## 2019-02-08 ENCOUNTER — OFFICE VISIT (OUTPATIENT)
Dept: CARDIOLOGY CLINIC | Age: 69
End: 2019-02-08

## 2019-02-08 VITALS
BODY MASS INDEX: 31.18 KG/M2 | HEART RATE: 63 BPM | DIASTOLIC BLOOD PRESSURE: 68 MMHG | SYSTOLIC BLOOD PRESSURE: 158 MMHG | OXYGEN SATURATION: 98 % | RESPIRATION RATE: 18 BRPM | WEIGHT: 176 LBS | HEIGHT: 63 IN

## 2019-02-08 DIAGNOSIS — E78.5 HYPERLIPIDEMIA, UNSPECIFIED HYPERLIPIDEMIA TYPE: ICD-10-CM

## 2019-02-08 DIAGNOSIS — I73.9 PVD (PERIPHERAL VASCULAR DISEASE) (HCC): Primary | ICD-10-CM

## 2019-02-08 DIAGNOSIS — I10 ESSENTIAL HYPERTENSION: ICD-10-CM

## 2019-02-08 NOTE — PROGRESS NOTES
Maria A Roth MD    [id number] [facility] [place], [address] [place]    Office [phone],F [phone]  Pager [phone]    Mellisa Dobbins is a 70 y.o. female is here for f/u visit . Primary care physician:  Mindi Ventura MD    Patient Active Problem List   Diagnosis Code    Other affections of shoulder region, not elsewhere classified M75.80    Primary localized osteoarthrosis, shoulder region M19.019    Rotator cuff tear M75.100    Biceps tendinopathy M67.929    Degenerative disc disease, lumbar M51.36    Osteoarthritis of right shoulder M19.011       Dear Dr. Litzy Chisholm,    I had the pleasure of seeing Ms. Mellisa Dobbins in the office today. Chief complaint:  Chief Complaint   Patient presents with   [facility] Follow Up     Patient presents today for a hospital follow up on 1/7/19    Irregular Heart Beat       Assessment:    HTN  PVD w hx of rosalie iliac stents/carotid artery stenosis ( Dr Jersey Recinos)  Hx of Sinus tachycardia  HLD    Plan:     Garett Gilliland study for cardiac risk stratification  Cont Meds  F/u 1 year or earlier based on stress results. Patient understands the plan. All questions were answered to the patient's satisfaction. Medication Side Effects and Warnings were discussed with patient: yes  Patient Labs were reviewed and or requested:  yes  Patient Past Records were reviewed and or requested: yes    I appreciate the opportunity to be involved in Ms. Joshi. See note below for details. Please do not hesitate to contact us with questions or concerns. Maria A Roth MD    Cardiac Testing/ Procedures: A. Cardiac Cath/PCI:    B.ECHO/ROYAL: 1/8/19 - EF 55-60%/Mild MR    C. StressNuclear/Stress ECHO/Stress test:    D.Vascular:    E. EP:    F. Miscellaneous:    Subjective:  Mellisa Dobbins is a 70 y.o. female who returns for follow up visit. She was recently hospitalized - R shoulder surgery; Seen by cardiology for sinus tachycardia.  Nml EF on echo  Doing well since DC. No CP/Dyspnea/dizziness/syncope        ROS:  (bold if positive, if negative)             Medications before admission:    Current Outpatient Medications   Medication Sig Dispense    metoprolol tartrate (LOPRESSOR) 25 mg tablet Take 1 Tab by mouth every twelve (12) hours. 60 Tab    ferrous sulfate 325 mg (65 mg iron) tablet Take 1 Tab by mouth daily (with breakfast). 60 Tab    docusate sodium (COLACE) 100 mg capsule Take 1 Cap by mouth two (2) times daily as needed for Constipation for up to 90 days. 30 Cap    umeclidinium-vilanterol (ANORO ELLIPTA) 62.5-25 mcg/actuation inhaler Take 1 Puff by inhalation every morning.  glucosamine sulfate (GLUCOSAMINE) 500 mg tablet Take 1,000 mg by mouth daily.  magnesium 200 mg tab Take 1 Tab by mouth every morning.  vitamin E (AQUA GEMS) 400 unit capsule Take 400 Units by mouth every morning.  Omega-3 Fatty Acids 60- mg cpDR Take 2 Tabs by mouth every morning.  multivit with minerals/lutein (MULTIVITAMIN 50 PLUS PO) Take 1 Tab by mouth every morning.  ascorbic acid, vitamin C, (VITAMIN C) 500 mg tablet Take 500 mg by mouth daily.  CALCIUM PO Take 500 mg by mouth two (2) times a day.  cholecalciferol (VITAMIN D3) 1,000 unit tablet Take 1,000 Units by mouth every morning.  docusate sodium (DOK) 250 mg capsule Take 250 mg by mouth two (2) times a day.  metFORMIN (GLUCOPHAGE) 500 mg tablet Take 500 mg by mouth two (2) times daily (with meals).  montelukast (SINGULAIR) 10 mg tablet Take 10 mg by mouth every evening.  lisinopril-hydrochlorothiazide (PRINZIDE, ZESTORETIC) 10-12.5 mg per tablet Take 1 Tab by mouth every morning.  pravastatin (PRAVACHOL) 80 mg tablet Take 80 mg by mouth nightly.  latanoprost (XALATAN) 0.005 % ophthalmic solution Administer 1 Drop to both eyes nightly.        oxyCODONE-acetaminophen (PERCOCET) 5-325 mg per tablet Take 1 Tab by mouth every four (4) hours as needed for Pain. Max Daily Amount: 6 Tabs. (Patient not taking: Reported on 2/8/2019) 42 Tab     No current facility-administered medications for this visit. Family History of CAD:    Yes    Social History:  Current  Smoker  No ( Quit 2004)    Physical Exam:  Visit Vitals  /68 (BP 1 Location: Left arm, BP Patient Position: Sitting)   Pulse 63   Resp 18   Ht 5' 3\" (1.6 m)   Wt 176 lb (79.8 kg)   SpO2 98%   BMI 31.18 kg/m²          Gen: Well-developed, well-nourished, in no acute distress  Neck: Supple,No JVD, No Carotid Bruit,   Resp: No accessory muscle use, Clear breath sounds, No rales or rhonchi  Card: Regular Rate,Rythm,Normal S1, S2, No murmurs, rubs or gallop. No thrills.    Abd:  Soft, non-tender, non-distended,BS+,   MSK: No cyanosis  Skin: No rashes    Neuro: moving all four extremities , follows commands appropriately  Psych:  Good insight, oriented to person, place , alert, Nml Affect  LE: No edema    EKG:       LABS:        Lab Results   Component Value Date/Time    WBC 6.0 01/09/2019 06:49 AM    HGB 9.3 (L) 01/09/2019 06:49 AM    HCT 28.4 (L) 01/09/2019 06:49 AM    PLATELET 586 22/73/8726 06:49 AM     Lab Results   Component Value Date/Time    Sodium 139 01/09/2019 06:49 AM    Potassium 4.4 01/09/2019 06:49 AM    Chloride 105 01/09/2019 06:49 AM    CO2 26 01/09/2019 06:49 AM    Anion gap 8 01/09/2019 06:49 AM    Glucose 109 (H) 01/09/2019 06:49 AM    BUN 9 01/09/2019 06:49 AM    Creatinine 0.83 01/09/2019 06:49 AM    BUN/Creatinine ratio 11 (L) 01/09/2019 06:49 AM    GFR est AA >60 01/09/2019 06:49 AM    GFR est non-AA >60 01/09/2019 06:49 AM    Calcium 8.7 01/09/2019 06:49 AM       Lab Results   Component Value Date/Time    aPTT 29.9 12/21/2018 09:21 AM     Lab Results   Component Value Date/Time    INR 1.0 12/21/2018 09:21 AM    INR 0.9 05/31/2016 12:30 PM    Prothrombin time 9.9 12/21/2018 09:21 AM    Prothrombin time 9.4 05/31/2016 12:30 PM     No components found for: LAST LIPIDS        Lars Antionette Ruiz MD

## 2019-02-08 NOTE — PROGRESS NOTES
Chief Complaint   Patient presents with   [facility] Follow Up     Patient presents today for a hospital follow up on 1/7/19    Irregular Heart Beat     Visit Vitals  /68 (BP 1 Location: Left arm, BP Patient Position: Sitting)   Pulse 63   Resp 18   Ht 5' 3\" (1.6 m)   Wt 176 lb (79.8 kg)   SpO2 98%   BMI 31.18 kg/m²

## 2019-03-12 RX ORDER — METOPROLOL TARTRATE 25 MG/1
25 TABLET, FILM COATED ORAL EVERY 12 HOURS
Qty: 60 TAB | Refills: 1 | Status: SHIPPED | OUTPATIENT
Start: 2019-03-12 | End: 2019-05-08 | Stop reason: SDUPTHER

## 2019-03-12 NOTE — TELEPHONE ENCOUNTER
Medication per verbal order Dr. Mike Choudhary. Requested Prescriptions     Pending Prescriptions Disp Refills    metoprolol tartrate (LOPRESSOR) 25 mg tablet 60 Tab 1     Sig: Take 1 Tab by mouth every twelve (12) hours.

## 2019-05-08 RX ORDER — METOPROLOL TARTRATE 25 MG/1
TABLET, FILM COATED ORAL
Qty: 60 TAB | Refills: 6 | Status: SHIPPED | OUTPATIENT
Start: 2019-05-08 | End: 2019-12-11 | Stop reason: SDUPTHER

## 2019-05-08 NOTE — TELEPHONE ENCOUNTER
Last OV 2/8/19  Next OV 2/10/20    Requested Prescriptions     Pending Prescriptions Disp Refills    metoprolol tartrate (LOPRESSOR) 25 mg tablet [Pharmacy Med Name: METOPROLOL TARTRATE 25 MG TAB] 60 Tab 6     Sig: take 1 tablet by mouth every 12 hours

## 2019-06-03 ENCOUNTER — HOSPITAL ENCOUNTER (OUTPATIENT)
Dept: MRI IMAGING | Age: 69
Discharge: HOME | End: 2019-06-03
Attending: ORTHOPAEDIC SURGERY
Payer: MEDICARE

## 2019-06-03 VITALS
BODY MASS INDEX: 31.89 KG/M2 | HEIGHT: 63 IN | WEIGHT: 180 LBS | HEART RATE: 81 BPM | SYSTOLIC BLOOD PRESSURE: 114 MMHG | DIASTOLIC BLOOD PRESSURE: 46 MMHG | RESPIRATION RATE: 18 BRPM | OXYGEN SATURATION: 97 %

## 2019-06-03 DIAGNOSIS — M99.61 OSSEOUS AND SUBLUXATION STENOSIS OF INTERVERTEBRAL FORAMINA OF CERVICAL REGION: ICD-10-CM

## 2019-06-03 DIAGNOSIS — M47.812 OSTEOARTHRITIS CERVICAL SPINE: ICD-10-CM

## 2019-06-03 DIAGNOSIS — M43.12 SPONDYLOLISTHESIS OF CERVICAL REGION: ICD-10-CM

## 2019-06-03 DIAGNOSIS — M54.12 CERVICAL RADICULOPATHY: ICD-10-CM

## 2019-06-03 DIAGNOSIS — M48.02 SPINAL STENOSIS IN CERVICAL REGION: ICD-10-CM

## 2019-06-03 PROCEDURE — 72141 MRI NECK SPINE W/O DYE: CPT

## 2019-06-03 PROCEDURE — 99152 MOD SED SAME PHYS/QHP 5/>YRS: CPT

## 2019-06-03 PROCEDURE — 99153 MOD SED SAME PHYS/QHP EA: CPT

## 2019-06-03 PROCEDURE — 74011250636 HC RX REV CODE- 250/636: Performed by: RADIOLOGY

## 2019-06-03 RX ORDER — MIDAZOLAM HYDROCHLORIDE 1 MG/ML
5 INJECTION, SOLUTION INTRAMUSCULAR; INTRAVENOUS
Status: DISPENSED | OUTPATIENT
Start: 2019-06-03 | End: 2019-06-03

## 2019-06-03 RX ORDER — FENTANYL CITRATE 50 UG/ML
100 INJECTION, SOLUTION INTRAMUSCULAR; INTRAVENOUS
Status: DISPENSED | OUTPATIENT
Start: 2019-06-03 | End: 2019-06-03

## 2019-06-03 RX ADMIN — MIDAZOLAM 1 MG: 1 INJECTION INTRAMUSCULAR; INTRAVENOUS at 08:22

## 2019-06-03 RX ADMIN — FENTANYL CITRATE 25 MCG: 50 INJECTION, SOLUTION INTRAMUSCULAR; INTRAVENOUS at 08:16

## 2019-06-03 RX ADMIN — FENTANYL CITRATE 25 MCG: 50 INJECTION, SOLUTION INTRAMUSCULAR; INTRAVENOUS at 08:19

## 2019-06-03 RX ADMIN — FENTANYL CITRATE 25 MCG: 50 INJECTION, SOLUTION INTRAMUSCULAR; INTRAVENOUS at 08:22

## 2019-06-03 RX ADMIN — MIDAZOLAM 1 MG: 1 INJECTION INTRAMUSCULAR; INTRAVENOUS at 08:16

## 2019-06-03 RX ADMIN — MIDAZOLAM 1 MG: 1 INJECTION INTRAMUSCULAR; INTRAVENOUS at 08:19

## 2019-06-03 NOTE — PROGRESS NOTES
7848 Patient obtained from waiting area with ride sister-in -law René Rm. NPO verified and brought to [facility] for assessment prior to MRI sedation. PIV placed and patient placed in a gown. Consent obtained. S1S2, Lungs CTA. 26 Dr. Fuentes Ink over to consent with review of risks and benefits. 0800 Patient taken to [facility] and placed on table and connected to monitor/fluids/oxygen. Time out 816 Start time 0816 End time 0851. Versed 3 mg and Fentanyl 75 mcg given for sedation. Patient tolerated well.   0900 Patient brought back to [facility] for recovery. Awake and denies pain, tolerating crackers and soda. And sister in law brought to bedside. [id number] Discharge papers reviewed with verbalization of understanding. Paper copy given and signed as received in computer. PIV d/ed and patient up and dressed for discharge. 3443 Patient taken out via wheelchair to [facility]. CD provided.

## 2019-06-03 NOTE — DISCHARGE INSTRUCTIONS
Patient Education        Sedation for a Medical Procedure: Care Instructions  Your Care Instructions    For a minor procedure or surgery, you will get a sedative to help you relax. This drug will make you sleepy. It is usually given in a vein (by IV). A shot may also be used to numb the area. If you had local anesthesia, you may feel some pain and discomfort as it wears off. If you have pain, don't be afraid to say so. Pain medicine works better if you take it before the pain gets bad. Common side effects from sedation include:  · Feeling sleepy. (Your doctors and nurses will make sure you are not too sleepy to go home.)  · Nausea and vomiting. This usually does not last long. · Feeling tired. Follow-up care is a key part of your treatment and safety. Be sure to make and go to all appointments, and call your doctor if you are having problems. It's also a good idea to know your test results and keep a list of the medicines you take. How can you care for yourself at home? Activity    · Don't do anything for 24 hours that requires attention to detail. It takes time for the medicine effects to completely wear off.     · For your safety, you should not drive or operate any machinery that could be dangerous until the medicine wears off and you can think clearly and react easily.     · Rest when you feel tired. Getting enough sleep will help you recover. Diet    · You can eat your normal diet, unless your doctor gives you other instructions. If your stomach is upset, try clear liquids and bland, low-fat foods like plain toast or rice.     · Drink plenty of fluids (unless your doctor tells you not to).   · Don't drink alcohol for 24 hours. Medicines    · Be safe with medicines. Read and follow all instructions on the label. ? If the doctor gave you a prescription medicine for pain, take it as prescribed.   ? If you are not taking a prescription pain medicine, ask your doctor if you can take an over-the-counter medicine.     · If you think your pain medicine is making you sick to your stomach:  ? Take your medicine after meals (unless your doctor has told you not to). ? Ask your doctor for a different pain medicine. When should you call for help? Call 911 anytime you think you may need emergency care. For example, call if:    · You have severe trouble breathing.     · You passed out (lost consciousness).    Call your doctor now or seek immediate medical care if:    · You have trouble breathing.     · You have ongoing or worsening nausea or vomiting.     · You have a fever.     · You have a new or worse headache.     · The medicine is not wearing off and you can't think clearly.    Watch closely for changes in your health, and be sure to contact your doctor if:    · You do not get better as expected. Where can you learn more? Go to http://felecia-savlador.info/. Enter O641 in the search box to learn more about \"Sedation for a Medical Procedure: Care Instructions. \"  Current as of: March 28, 2018  Content Version: 11.9  © 1039-7917 [employer]. Care instructions adapted under license by [employer] (which disclaims liability or warranty for this information). If you have questions about a medical condition or this instruction, always ask your healthcare professional. [address] any warranty or liability for your use of this information.

## 2019-06-07 NOTE — PERIOP NOTES
PAT appointment scheduled for 6/12/19 @ 1100  Patient instructed to report to 2nd floor registration at 1030am  Patient instructed they did not need to be fasting for PAT appointment  Patient told to bring list of medications/dosages and a list of their specialist with MD phone numbers

## 2019-06-12 ENCOUNTER — HOSPITAL ENCOUNTER (OUTPATIENT)
Dept: PREADMISSION TESTING | Age: 69
Discharge: HOME | End: 2019-06-12
Payer: MEDICARE

## 2019-06-12 VITALS
TEMPERATURE: 97.5 F | BODY MASS INDEX: 32.46 KG/M2 | SYSTOLIC BLOOD PRESSURE: 171 MMHG | RESPIRATION RATE: 24 BRPM | WEIGHT: 183.2 LBS | DIASTOLIC BLOOD PRESSURE: 72 MMHG | HEIGHT: 63 IN | OXYGEN SATURATION: 98 % | HEART RATE: 66 BPM

## 2019-06-12 LAB
25(OH)D3 SERPL-MCNC: 34.4 NG/ML (ref 30–100)
ABO + RH BLD: NORMAL
ALBUMIN SERPL-MCNC: 4.1 G/DL (ref 3.5–5)
ALBUMIN/GLOB SERPL: 1.2 {RATIO} (ref 1.1–2.2)
ALP SERPL-CCNC: 91 U/L (ref 45–117)
ALT SERPL-CCNC: 24 U/L (ref 12–78)
ANION GAP SERPL CALC-SCNC: 5 MMOL/L (ref 5–15)
APPEARANCE UR: CLEAR
APTT PPP: 30.6 SEC (ref 22.1–32)
AST SERPL-CCNC: 18 U/L (ref 15–37)
ATRIAL RATE: 65 BPM
BACTERIA URNS QL MICRO: NEGATIVE /HPF
BASOPHILS # BLD: 0 K/UL (ref 0–0.1)
BASOPHILS NFR BLD: 0 % (ref 0–1)
BILIRUB SERPL-MCNC: 0.3 MG/DL (ref 0.2–1)
BILIRUB UR QL: NEGATIVE
BLOOD GROUP ANTIBODIES SERPL: NORMAL
BUN SERPL-MCNC: 19 MG/DL (ref 6–20)
BUN/CREAT SERPL: 21 (ref 12–20)
CALCIUM SERPL-MCNC: 9.7 MG/DL (ref 8.5–10.1)
CALCULATED P AXIS, ECG09: 38 DEGREES
CALCULATED R AXIS, ECG10: 49 DEGREES
CALCULATED T AXIS, ECG11: 57 DEGREES
CHLORIDE SERPL-SCNC: 101 MMOL/L (ref 97–108)
CO2 SERPL-SCNC: 29 MMOL/L (ref 21–32)
COLOR UR: NORMAL
CREAT SERPL-MCNC: 0.89 MG/DL (ref 0.55–1.02)
DIAGNOSIS, 93000: NORMAL
DIFFERENTIAL METHOD BLD: ABNORMAL
EOSINOPHIL # BLD: 0.2 K/UL (ref 0–0.4)
EOSINOPHIL NFR BLD: 4 % (ref 0–7)
EPITH CASTS URNS QL MICRO: NORMAL /LPF
ERYTHROCYTE [DISTWIDTH] IN BLOOD BY AUTOMATED COUNT: 14.9 % (ref 11.5–14.5)
EST. AVERAGE GLUCOSE BLD GHB EST-MCNC: 126 MG/DL
GLOBULIN SER CALC-MCNC: 3.3 G/DL (ref 2–4)
GLUCOSE SERPL-MCNC: 93 MG/DL (ref 65–100)
GLUCOSE UR STRIP.AUTO-MCNC: NEGATIVE MG/DL
HBA1C MFR BLD: 6 % (ref 4.2–6.3)
HCT VFR BLD AUTO: 37.9 % (ref 35–47)
HGB BLD-MCNC: 12.7 G/DL (ref 11.5–16)
HGB UR QL STRIP: NEGATIVE
HYALINE CASTS URNS QL MICRO: NORMAL /LPF (ref 0–5)
IMM GRANULOCYTES # BLD AUTO: 0 K/UL (ref 0–0.04)
IMM GRANULOCYTES NFR BLD AUTO: 0 % (ref 0–0.5)
INR PPP: 1 (ref 0.9–1.1)
KETONES UR QL STRIP.AUTO: NEGATIVE MG/DL
LEUKOCYTE ESTERASE UR QL STRIP.AUTO: NEGATIVE
LYMPHOCYTES # BLD: 1.7 K/UL (ref 0.8–3.5)
LYMPHOCYTES NFR BLD: 37 % (ref 12–49)
MCH RBC QN AUTO: 32.6 PG (ref 26–34)
MCHC RBC AUTO-ENTMCNC: 33.5 G/DL (ref 30–36.5)
MCV RBC AUTO: 97.2 FL (ref 80–99)
MONOCYTES # BLD: 0.8 K/UL (ref 0–1)
MONOCYTES NFR BLD: 17 % (ref 5–13)
NEUTS SEG # BLD: 1.9 K/UL (ref 1.8–8)
NEUTS SEG NFR BLD: 42 % (ref 32–75)
NITRITE UR QL STRIP.AUTO: NEGATIVE
NRBC # BLD: 0 K/UL (ref 0–0.01)
NRBC BLD-RTO: 0 PER 100 WBC
P-R INTERVAL, ECG05: 196 MS
PH UR STRIP: 7.5 [PH] (ref 5–8)
PLATELET # BLD AUTO: 347 K/UL (ref 150–400)
PMV BLD AUTO: 10.4 FL (ref 8.9–12.9)
POTASSIUM SERPL-SCNC: 5.3 MMOL/L (ref 3.5–5.1)
PROT SERPL-MCNC: 7.4 G/DL (ref 6.4–8.2)
PROT UR STRIP-MCNC: NEGATIVE MG/DL
PROTHROMBIN TIME: 9.8 SEC (ref 9–11.1)
Q-T INTERVAL, ECG07: 392 MS
QRS DURATION, ECG06: 80 MS
QTC CALCULATION (BEZET), ECG08: 407 MS
RBC # BLD AUTO: 3.9 M/UL (ref 3.8–5.2)
RBC #/AREA URNS HPF: NORMAL /HPF (ref 0–5)
SODIUM SERPL-SCNC: 135 MMOL/L (ref 136–145)
SP GR UR REFRACTOMETRY: 1.01 (ref 1–1.03)
SPECIMEN EXP DATE BLD: NORMAL
THERAPEUTIC RANGE,PTTT: NORMAL SECS (ref 58–77)
UA: UC IF INDICATED,UAUC: NORMAL
UROBILINOGEN UR QL STRIP.AUTO: 0.2 EU/DL (ref 0.2–1)
VENTRICULAR RATE, ECG03: 65 BPM
WBC # BLD AUTO: 4.5 K/UL (ref 3.6–11)
WBC URNS QL MICRO: NORMAL /HPF (ref 0–4)

## 2019-06-12 PROCEDURE — 83036 HEMOGLOBIN GLYCOSYLATED A1C: CPT

## 2019-06-12 PROCEDURE — 93005 ELECTROCARDIOGRAM TRACING: CPT

## 2019-06-12 PROCEDURE — 80053 COMPREHEN METABOLIC PANEL: CPT

## 2019-06-12 PROCEDURE — 85730 THROMBOPLASTIN TIME PARTIAL: CPT

## 2019-06-12 PROCEDURE — 85610 PROTHROMBIN TIME: CPT

## 2019-06-12 PROCEDURE — 86900 BLOOD TYPING SEROLOGIC ABO: CPT

## 2019-06-12 PROCEDURE — 85025 COMPLETE CBC W/AUTO DIFF WBC: CPT

## 2019-06-12 PROCEDURE — 36415 COLL VENOUS BLD VENIPUNCTURE: CPT

## 2019-06-12 PROCEDURE — 82306 VITAMIN D 25 HYDROXY: CPT

## 2019-06-12 PROCEDURE — 81001 URINALYSIS AUTO W/SCOPE: CPT

## 2019-06-12 RX ORDER — CALCIUM CARBONATE 500(1250)
1 TABLET ORAL 2 TIMES DAILY
COMMUNITY
End: 2021-11-02

## 2019-06-12 RX ORDER — ASPIRIN 325 MG
325 TABLET ORAL DAILY
Status: ON HOLD | COMMUNITY
End: 2019-06-18

## 2019-06-12 NOTE — H&P
CHAR Pre-Op History & Physical    Patient: Patti Connell                  MRN: [id number]          SSN: [id number]            YOB: 1950                  Chief Complaint:   Pre-Op Exam - Right arm numbness          HPI:   Patient is a 70 y.o.  female  who presents with history of having a right reverse total shoulder in January of this year. She started to develop right arm pain and numbness. She thought it was r/t her shoulder surgery so she went back to surgeon. After further testing it was determined she had cervical issues. She was removed from PT and placed on prednisone which helped the pain to improve. She has noted the numbness and tingling has gotten worse in her right hand which is constant. She has now started to feel the tingling in her left hand intermittently. If she is sitting in the chair holding something up or using the phone her left hand will start to tingle. She will wake in the middle of the night with both hands asleep. She is LHD. She notes in the middle of all of this she had a gout attack and was placed back on prednisone which has made the neck pain disappear. She has failed traction, PT, Prednisone x 2. The patient was evaluated in the surgeon's office and it was determined that the most appropriate plan of care is to proceed with surgical intervention. Patient's PCP Courtney Bradshaw MD      Past Medical History:   Diagnosis Date    Arthritis     Claustrophobia     COPD     Degenerative disc disease, lumbar 1/6/2016    Diabetes ([address] 2012    She states the metformin is prevention only    Glaucoma     Gout attack 04/2019    Big toe    H/O sinus tachycardia 01/08/2019    Following shoulder surgery    High potassium 12/2018    Chronic- Stays around 5.3    History of nuclear stress test 02/10/2019    Normal    Hypertension     Obesity (BMI 30-39. 9)     Peripheral vascular disease ([address]     stents in each iliac artery, states they are \"watching\" carotids  Unspecified adverse effect of anesthesia     BP drops    Unspecified sleep apnea     Does not uses CPAP      Past Surgical History:   Procedure Laterality Date    HX BACK SURGERY  2006    LUMBAR- PLATES, SCREWS    HX HYSTERECTOMY  1980    HX LUMBAR LAMINECTOMY N/A 2016 & 2017    ALIF and then did the posterior after it didnt work    HX ORTHOPAEDIC Right     Ulnar nerve    HX ROTATOR CUFF REPAIR Left 2013    HX SHOULDER REPLACEMENT Right 01/07/2019    VASCULAR SURGERY PROCEDURE UNLIST Bilateral 2010    ILIAC STENT      Prior to Admission medications    Medication Sig Start Date End Date Taking? Authorizing Provider   calcium carbonate (OS-VAL) 500 mg calcium (1,250 mg) tablet Take 1 Tab by mouth two (2) times a day. Yes Provider, Historical   aspirin (ASPIRIN) 325 mg tablet Take 325 mg by mouth daily. Yes Provider, Historical   metoprolol tartrate (LOPRESSOR) 25 mg tablet take 1 tablet by mouth every 12 hours 5/8/19  Yes Aniceto Jacome MD   ferrous sulfate 325 mg (65 mg iron) tablet Take 1 Tab by mouth daily (with breakfast). 1/10/19  Yes Abby Garcia MD   umeclidinium-vilanterol [facility] ELLIPTA) 62.5-25 mcg/actuation inhaler Take 1 Puff by inhalation every morning. Yes Provider, Historical   glucosamine sulfate (GLUCOSAMINE) 500 mg tablet Take 1,000 mg by mouth daily. Yes Provider, Historical   magnesium 200 mg tab Take 1 Tab by mouth every morning. Yes Provider, Historical   vitamin E (AQUA GEMS) 400 unit capsule Take 400 Units by mouth every morning. Yes Provider, Historical   Omega-3 Fatty Acids 60- mg cpDR Take 2 Tabs by mouth every morning. Yes Provider, Historical   multivit with minerals/lutein (MULTIVITAMIN 50 PLUS PO) Take 1 Tab by mouth every morning. Yes Provider, Historical   ascorbic acid, vitamin C, (VITAMIN C) 500 mg tablet Take 500 mg by mouth daily.    Yes Provider, Historical   cholecalciferol (VITAMIN D3) 1,000 unit tablet Take 1,000 Units by mouth every morning. Yes Provider, Historical   docusate sodium (DOK) 250 mg capsule Take 250 mg by mouth two (2) times a day. Yes Provider, Historical   metFORMIN (GLUCOPHAGE) 500 mg tablet Take 500 mg by mouth two (2) times daily (with meals). Yes Provider, Historical   montelukast (SINGULAIR) 10 mg tablet Take 10 mg by mouth every evening. Yes Provider, Historical   lisinopril-hydrochlorothiazide (PRINZIDE, ZESTORETIC) 10-12.5 mg per tablet Take 1 Tab by mouth every morning. Yes Provider, Historical   pravastatin (PRAVACHOL) 80 mg tablet Take 80 mg by mouth nightly. Yes Provider, Historical   latanoprost (XALATAN) 0.005 % ophthalmic solution Administer 1 Drop to both eyes nightly. Yes Provider, Historical       Allergies   Allergen Reactions    Diclofenac Anaphylaxis     SWELLING, DIFFICULTY BREATHING    Flexeril [Cyclobenzaprine] Swelling     Hands, face, throat swelling    Medrol [Methylprednisolone] Unknown (comments)     She has forgotten    Naproxen Swelling    Tolectin [Tolmetin] Swelling     Took this with Flexeril and does not know which med caused swelling of hands,face, throat        Social History     Tobacco Use    Smoking status: Former Smoker     Packs/day: 2.00     Years: 35.00     Pack years: 70.00     Types: Cigarettes     Last attempt to quit: 10/2/2004     Years since quittin.7    Smokeless tobacco: Never Used   Substance Use Topics    Alcohol use:  Yes     Alcohol/week: 2.4 oz     Types: 2 Glasses of wine, 2 Cans of beer per week      Social History     Substance and Sexual Activity   Drug Use No     Family History   Problem Relation Age of Onset    Heart Disease Father     Heart Attack Father 36    No Known Problems Sister     Deep Vein Thrombosis Brother     Heart Disease Brother        Revised Cardiac Risk Index (RCRI)      Rate if cardiac death, nonfatal MI, nonfatal cardiac arrest by number of risk factor- None - 0.4%      Other Risk Factors:   Screening for ETOH use:  Done and low risk  Smoking status:   None    Personal or FH of bleeding problems:  No  Personal or FH of blood clots:  No  Personal or FH of anesthesia problems:   No    Pulmonary Risk:  Asthma or COPD:  No  Body mass index is 32.45 kg/m². Known ELMA:  Yes  Albumin normal, BUN normal    Review of Systems:    Constitutional: Negative for chills and fever  HENT: Negative for congestion and sore throat  Eyes: negative for blurred vision and double vision  Respiratory: Negative for cough, shortness of breath and wheezing  Cardiovascular: Negative for chest pain and palpitations  Gastrointestinal: Negative for abdominal pain, constipation, diarrhea and nausea  Genitourinary: Negative for dysuria and hematuria  Musculoskeletal: Positive for right hand numbness and tingling  Skin: Negative for rash, open wounds  Neurological: Negative for dizziness, tremors and headaches  Psychiatric: Negative for depression. The patient is not nervous/anxious. Objective:     Patient Vitals for the past 24 hrs:   Temp Pulse Resp BP SpO2   19 1030 97.5 °F (36.4 °C) 66 24 171/72 98 %     Temp (24hrs), Av.5 °F (36.4 °C), Min:97.5 °F (36.4 °C), Max:97.5 °F (36.4 °C)    Body mass index is 32.45 kg/m². Wt Readings from Last 1 Encounters:   19 83.1 kg (183 lb 3.2 oz)        Physical Exam:     General: Pleasant,  cooperative, no apparent distress, appears stated age. Eyes: Conjunctivae/corneas clear. EOMs intact. Nose: Nares normal.   Mouth/Throat: Lips, mucosa, and tongue normal. Teeth and gums normal.   Lungs: Clear to auscultation bilaterally. Heart: Regular rate and rhythm, S1, S2 normal. No murmur, click, rub or gallop. Abdomen: Soft, non-tender. Bowel sounds normal. No distention. Musculoskeletal:   weak bilaterally   Extremities:  Extremities normal, atraumatic, no cyanosis or edema. Calves                                 supple, non tender to palpation.    Pulses: 2+ and symmetric bilateral upper extremities. Cap. refill <2 seconds   Skin: Skin color, texture, turgor normal. No visible open areas, examined fully clothed   Neurologic: CN II-XII grossly intact. Alert and oriented x3. Labs:   Recent Results (from the past 72 hour(s))   TYPE & SCREEN    Collection Time: 06/12/19 11:04 AM   Result Value Ref Range    Crossmatch Expiration 06/21/2019     ABO/Rh(D) A POSITIVE     Antibody screen NEG    CBC WITH AUTOMATED DIFF    Collection Time: 06/12/19 11:04 AM   Result Value Ref Range    WBC 4.5 3.6 - 11.0 K/uL    RBC 3.90 3.80 - 5.20 M/uL    HGB 12.7 11.5 - 16.0 g/dL    HCT 37.9 35.0 - 47.0 %    MCV 97.2 80.0 - 99.0 FL    MCH 32.6 26.0 - 34.0 PG    MCHC 33.5 30.0 - 36.5 g/dL    RDW 14.9 (H) 11.5 - 14.5 %    PLATELET 573 958 - 602 K/uL    MPV 10.4 8.9 - 12.9 FL    NRBC 0.0 0  WBC    ABSOLUTE NRBC 0.00 0.00 - 0.01 K/uL    NEUTROPHILS 42 32 - 75 %    LYMPHOCYTES 37 12 - 49 %    MONOCYTES 17 (H) 5 - 13 %    EOSINOPHILS 4 0 - 7 %    BASOPHILS 0 0 - 1 %    IMMATURE GRANULOCYTES 0 0.0 - 0.5 %    ABS. NEUTROPHILS 1.9 1.8 - 8.0 K/UL    ABS. LYMPHOCYTES 1.7 0.8 - 3.5 K/UL    ABS. MONOCYTES 0.8 0.0 - 1.0 K/UL    ABS. EOSINOPHILS 0.2 0.0 - 0.4 K/UL    ABS. BASOPHILS 0.0 0.0 - 0.1 K/UL    ABS. IMM. GRANS. 0.0 0.00 - 0.04 K/UL    DF AUTOMATED     METABOLIC PANEL, COMPREHENSIVE    Collection Time: 06/12/19 11:04 AM   Result Value Ref Range    Sodium 135 (L) 136 - 145 mmol/L    Potassium 5.3 (H) 3.5 - 5.1 mmol/L    Chloride 101 97 - 108 mmol/L    CO2 29 21 - 32 mmol/L    Anion gap 5 5 - 15 mmol/L    Glucose 93 65 - 100 mg/dL    BUN 19 6 - 20 MG/DL    Creatinine 0.89 0.55 - 1.02 MG/DL    BUN/Creatinine ratio 21 (H) 12 - 20      GFR est AA >60 >60 ml/min/1.73m2    GFR est non-AA >60 >60 ml/min/1.73m2    Calcium 9.7 8.5 - 10.1 MG/DL    Bilirubin, total 0.3 0.2 - 1.0 MG/DL    ALT (SGPT) 24 12 - 78 U/L    AST (SGOT) 18 15 - 37 U/L    Alk.  phosphatase 91 45 - 117 U/L    Protein, total 7.4 6.4 - 8.2 g/dL    Albumin 4.1 3.5 - 5.0 g/dL    Globulin 3.3 2.0 - 4.0 g/dL    A-G Ratio 1.2 1.1 - 2.2     HEMOGLOBIN A1C WITH EAG    Collection Time: 06/12/19 11:04 AM   Result Value Ref Range    Hemoglobin A1c 6.0 4.2 - 6.3 %    Est. average glucose 126 mg/dL   CULTURE, MRSA    Collection Time: 06/12/19 11:04 AM   Result Value Ref Range    Special Requests: NO SPECIAL REQUESTS      Culture result: MRSA NOT PRESENT AT 18 HOURS     PROTHROMBIN TIME + INR    Collection Time: 06/12/19 11:04 AM   Result Value Ref Range    INR 1.0 0.9 - 1.1      Prothrombin time 9.8 9.0 - 11.1 sec   PTT    Collection Time: 06/12/19 11:04 AM   Result Value Ref Range    aPTT 30.6 22.1 - 32.0 sec    aPTT, therapeutic range     58.0 - 77.0 SECS   URINALYSIS W/ REFLEX CULTURE    Collection Time: 06/12/19 11:04 AM   Result Value Ref Range    Color YELLOW/STRAW      Appearance CLEAR CLEAR      Specific gravity 1.007 1.003 - 1.030      pH (UA) 7.5 5.0 - 8.0      Protein NEGATIVE  NEG mg/dL    Glucose NEGATIVE  NEG mg/dL    Ketone NEGATIVE  NEG mg/dL    Bilirubin NEGATIVE  NEG      Blood NEGATIVE  NEG      Urobilinogen 0.2 0.2 - 1.0 EU/dL    Nitrites NEGATIVE  NEG      Leukocyte Esterase NEGATIVE  NEG      WBC 0-4 0 - 4 /hpf    RBC 0-5 0 - 5 /hpf    Epithelial cells FEW FEW /lpf    Bacteria NEGATIVE  NEG /hpf    UA:UC IF INDICATED CULTURE NOT INDICATED BY UA RESULT CNI      Hyaline cast 0-2 0 - 5 /lpf   VITAMIN D, 25 HYDROXY    Collection Time: 06/12/19 11:04 AM   Result Value Ref Range    Vitamin D 25-Hydroxy 34.4 30 - 100 ng/mL   EKG, 12 LEAD, INITIAL    Collection Time: 06/12/19 11:39 AM   Result Value Ref Range    Ventricular Rate 65 BPM    Atrial Rate 65 BPM    P-R Interval 196 ms    QRS Duration 80 ms    Q-T Interval 392 ms    QTC Calculation (Bezet) 407 ms    Calculated P Axis 38 degrees    Calculated R Axis 49 degrees    Calculated T Axis 57 degrees    Diagnosis       Normal sinus rhythm  Normal ECG  When compared with ECG of 08-JAN-2019 09:53,  Vent.  rate has decreased BY  57 BPM  Confirmed by Johnny Segura MD. ([id number]) on 6/12/2019 1:49:44 PM         Assessment and Plan     1. CERVICAL RADICULOPATHY  2. Pre-op general physical exam    Scheduled for C 4-7 ACDF    Labs and EKG reviewed for upcoming surgery. MRSA pending    Preoperative Clearance  Per RCRI, the patient has a 0.4% risk of cardiac death, nonfatal MI, nonfatal cardiac arrest based on no risk factors. Per ACC/AHA guidelines, patient is low risk for a(n) intermediate risk surgery and may proceed to planned surgery with the above noted risk.       Darius Shabazz, NP

## 2019-06-12 NOTE — PERIOP NOTES
[address] , [address] [facility]                            MAIN OR                                  [phone]   MAIN PRE OP                          [phone]                                                                                AMBULATORY PRE OP          [phone]  PRE-ADMISSION TESTING    [phone]     Surgery Date:   Tuesday 6/18/19         Is surgery arrival time given by surgeon? NO  If NO, [facility] staff will call you between 3 and 7pm the day before your surgery with your arrival time. (If your surgery is on a Monday, we will call you the Friday before.)    Call [phone] after 7pm Monday-Friday if you did not receive your arrival time. INSTRUCTIONS BEFORE YOUR SURGERY   When You  Arrive   Arrive at the [address] [facility] on the day of your surgery  Have your insurance card, photo ID, and any copayment (if needed)     Food   and   Drink   NO food or drink after midnight the night before surgery    This means NO water, gum, mints, coffee, juice, etc.  No alcohol (beer, wine, liquor) 24 hours before and after surgery     Medications to   TAKE   Morning of Surgery   MEDICATIONS TO TAKE THE MORNING OF SURGERY WITH A SIP OF WATER:    Metoprolol, anoro     Medications  To  STOP      7 days before surgery    Non-Steroidal anti-inflammatory Drugs (NSAID's): for example, Ibuprofen (Advil, Motrin), Naproxen (Aleve)   Aspirin, if taking for pain    Herbal supplements, vitamins, and fish oil   Other:  (Pain medications not listed above, including Tylenol may be taken)   Blood  Thinners    If you take  Aspirin, Plavix, Coumadin, or any blood-thinning or anti-blood clot medicine, talk to the doctor who prescribed the medications for pre-operative instructions.  - Aspirin as directed by Dr. Kauffman Began  If you shower the morning of surgery, please do not apply anything to your skin (lotions, powders, deodorant, or makeup, especially mascara)   Follow all special bath instructions (for total joint replacement, spine and bowel surgeries)   Do not shave or trim anywhere 24 hours before surgery   Wear your hair loose or down; no pony-tails, buns, or metal hair clips   Wear loose, comfortable, clean clothes   Wear glasses instead of contacts   Leave money, valuables, and jewelry, including body piercings, at home     Going Home       or Spending the Night    SAME-DAY SURGERY: You must have a responsible adult drive you home and stay with you 24 hours after surgery   ADMITS: If your doctor is keeping you into the hospital after surgery, leave personal belongings/luggage in your car until you have a hospital room number. Hospital discharge time is 12 noon  Drivers must be here before 12 noon unless you are told differently   Special Instructions   16. Special Instructions:  · Use Chlorhexidine Care Fusion wash and sponges 3 days prior to surgery as instructed. · Incentive spirometer given with instructions to practice at home and bring back to the hospital on the day of surgery. · [facility] will contact you if your Hemoglobin A1C is greater than 7.5. · Ensure/Glucerna  sample, nutritional information, and Ensure/Glucerna coupon given. · Pain pamphlet and Call Don't Fall reminder reviewed with patient. ·  parking is complimentary Monday - Friday 7 am - 5 pm  · Bring PTA Medication list day of surgery with the last doses taken documented   · Do not bring medication bottles the day of surgery       Follow all instructions so your surgery wont be cancelled. Please, be on time. If a situation occurs and you are delayed the day of surgery, call [phone] or          [phone]. If your physical condition changes (like a fever, cold, flu, etc.) call your surgeon. The patient was contacted  in person.  Home medication reviewed and verified during PAT appointment. The patient verbalizes understanding of all instructions and does not  need reinforcement.

## 2019-06-13 LAB
BACTERIA SPEC CULT: NORMAL
BACTERIA SPEC CULT: NORMAL
SERVICE CMNT-IMP: NORMAL

## 2019-06-17 ENCOUNTER — ANESTHESIA EVENT (OUTPATIENT)
Dept: SURGERY | Age: 69
End: 2019-06-17
Payer: MEDICARE

## 2019-06-17 NOTE — ANESTHESIA PREPROCEDURE EVALUATION
Anesthetic History   No history of anesthetic complications            Review of Systems / Medical History  Patient summary reviewed, nursing notes reviewed and pertinent labs reviewed    Pulmonary    COPD    Sleep apnea: No treatment           Neuro/Psych   Within defined limits           Cardiovascular    Hypertension            Pertinent negatives: PVD: iliac artery stents, carotids being followed.        GI/Hepatic/Renal  Within defined limits              Endo/Other    Diabetes    Arthritis     Other Findings   Comments: claustrrophobia  Glaucoma  Gout  hyperkalemia           Physical Exam    Airway  Mallampati: II    Neck ROM: normal range of motion   Mouth opening: Normal     Cardiovascular    Rhythm: regular  Rate: normal         Dental  No notable dental hx       Pulmonary  Breath sounds clear to auscultation               Abdominal  GI exam deferred       Other Findings            Anesthetic Plan    ASA: 3  Anesthesia type: general          Induction: Intravenous  Anesthetic plan and risks discussed with: Patient

## 2019-06-18 ENCOUNTER — APPOINTMENT (OUTPATIENT)
Dept: GENERAL RADIOLOGY | Age: 69
End: 2019-06-18
Attending: ORTHOPAEDIC SURGERY
Payer: MEDICARE

## 2019-06-18 ENCOUNTER — ANESTHESIA (OUTPATIENT)
Dept: SURGERY | Age: 69
End: 2019-06-18
Payer: MEDICARE

## 2019-06-18 ENCOUNTER — HOSPITAL ENCOUNTER (OUTPATIENT)
Age: 69
Setting detail: OBSERVATION
Discharge: HOME | End: 2019-06-19
Attending: ORTHOPAEDIC SURGERY | Admitting: ORTHOPAEDIC SURGERY
Payer: MEDICARE

## 2019-06-18 DIAGNOSIS — M54.12 CERVICAL RADICULOPATHY: Primary | ICD-10-CM

## 2019-06-18 PROBLEM — M47.812 OSTEOARTHRITIS OF CERVICAL SPINE: Status: ACTIVE | Noted: 2019-06-18

## 2019-06-18 LAB
GLUCOSE BLD STRIP.AUTO-MCNC: 118 MG/DL (ref 65–100)
GLUCOSE BLD STRIP.AUTO-MCNC: 169 MG/DL (ref 65–100)
SERVICE CMNT-IMP: ABNORMAL
SERVICE CMNT-IMP: ABNORMAL

## 2019-06-18 PROCEDURE — C1713 ANCHOR/SCREW BN/BN,TIS/BN: HCPCS | Performed by: ORTHOPAEDIC SURGERY

## 2019-06-18 PROCEDURE — 77030020061 HC IV BLD WRMR ADMIN SET 3M -B: Performed by: ANESTHESIOLOGY

## 2019-06-18 PROCEDURE — 77030014647 HC SEAL FBRN TISSL BAXT -D: Performed by: ORTHOPAEDIC SURGERY

## 2019-06-18 PROCEDURE — 97161 PT EVAL LOW COMPLEX 20 MIN: CPT

## 2019-06-18 PROCEDURE — 76210000017 HC OR PH I REC 1.5 TO 2 HR: Performed by: ORTHOPAEDIC SURGERY

## 2019-06-18 PROCEDURE — 74011250636 HC RX REV CODE- 250/636: Performed by: ORTHOPAEDIC SURGERY

## 2019-06-18 PROCEDURE — 77030020269 HC MISC IMPL: Performed by: ORTHOPAEDIC SURGERY

## 2019-06-18 PROCEDURE — 94640 AIRWAY INHALATION TREATMENT: CPT

## 2019-06-18 PROCEDURE — 77030008771 HC TU NG SALEM SUMP -A: Performed by: ANESTHESIOLOGY

## 2019-06-18 PROCEDURE — 99218 HC RM OBSERVATION: CPT

## 2019-06-18 PROCEDURE — 77030038222 HC BUR MIDSREX MEDT -D: Performed by: ORTHOPAEDIC SURGERY

## 2019-06-18 PROCEDURE — 74011250636 HC RX REV CODE- 250/636: Performed by: ANESTHESIOLOGY

## 2019-06-18 PROCEDURE — 82962 GLUCOSE BLOOD TEST: CPT

## 2019-06-18 PROCEDURE — 77030018673: Performed by: ORTHOPAEDIC SURGERY

## 2019-06-18 PROCEDURE — 77030040356 HC CORD BPLR FRCP COVD -A: Performed by: ORTHOPAEDIC SURGERY

## 2019-06-18 PROCEDURE — 74011250636 HC RX REV CODE- 250/636

## 2019-06-18 PROCEDURE — 77030011640 HC PAD GRND REM COVD -A: Performed by: ORTHOPAEDIC SURGERY

## 2019-06-18 PROCEDURE — 77030029099 HC BN WAX SSPC -A: Performed by: ORTHOPAEDIC SURGERY

## 2019-06-18 PROCEDURE — 77030031139 HC SUT VCRL2 J&J -A: Performed by: ORTHOPAEDIC SURGERY

## 2019-06-18 PROCEDURE — 74011000250 HC RX REV CODE- 250

## 2019-06-18 PROCEDURE — 77030003666 HC NDL SPINAL BD -A: Performed by: ORTHOPAEDIC SURGERY

## 2019-06-18 PROCEDURE — 77030014368: Performed by: ORTHOPAEDIC SURGERY

## 2019-06-18 PROCEDURE — 74011000250 HC RX REV CODE- 250: Performed by: ORTHOPAEDIC SURGERY

## 2019-06-18 PROCEDURE — 74011000250 HC RX REV CODE- 250: Performed by: NURSE PRACTITIONER

## 2019-06-18 PROCEDURE — 77030034169 HC GRFT BN BIOACTV INTRFC 1G BSTEB -F: Performed by: ORTHOPAEDIC SURGERY

## 2019-06-18 PROCEDURE — 77030002996 HC SUT SLK J&J -A: Performed by: ORTHOPAEDIC SURGERY

## 2019-06-18 PROCEDURE — 77030033138 HC SUT PGA STRATFX J&J -B: Performed by: ORTHOPAEDIC SURGERY

## 2019-06-18 PROCEDURE — 74011000272 HC RX REV CODE- 272: Performed by: ORTHOPAEDIC SURGERY

## 2019-06-18 PROCEDURE — 74011250636 HC RX REV CODE- 250/636: Performed by: NURSE PRACTITIONER

## 2019-06-18 PROCEDURE — 77030018836 HC SOL IRR NACL ICUM -A: Performed by: ORTHOPAEDIC SURGERY

## 2019-06-18 PROCEDURE — 77030039787 HC GRFT BN DBM OSTEO INLC -C: Performed by: ORTHOPAEDIC SURGERY

## 2019-06-18 PROCEDURE — 77030018846 HC SOL IRR STRL H20 ICUM -A: Performed by: ORTHOPAEDIC SURGERY

## 2019-06-18 PROCEDURE — 77030035129: Performed by: ORTHOPAEDIC SURGERY

## 2019-06-18 PROCEDURE — 77030034850: Performed by: ORTHOPAEDIC SURGERY

## 2019-06-18 PROCEDURE — 77030010512 HC APPL CLP LIG J&J -C: Performed by: ORTHOPAEDIC SURGERY

## 2019-06-18 PROCEDURE — 77030013079 HC BLNKT BAIR HGGR 3M -A: Performed by: ORTHOPAEDIC SURGERY

## 2019-06-18 PROCEDURE — 77030032490 HC SLV COMPR SCD KNE COVD -B

## 2019-06-18 PROCEDURE — 76060000039 HC ANESTHESIA 4 TO 4.5 HR: Performed by: ORTHOPAEDIC SURGERY

## 2019-06-18 PROCEDURE — 77030039266 HC ADH SKN EXOFIN S2SG -A: Performed by: ORTHOPAEDIC SURGERY

## 2019-06-18 PROCEDURE — 77030012935 HC DRSG AQUACEL BMS -B: Performed by: ORTHOPAEDIC SURGERY

## 2019-06-18 PROCEDURE — 74011250637 HC RX REV CODE- 250/637: Performed by: NURSE PRACTITIONER

## 2019-06-18 PROCEDURE — 77030008684 HC TU ET CUF COVD -B: Performed by: ANESTHESIOLOGY

## 2019-06-18 PROCEDURE — C1763 CONN TISS, NON-HUMAN: HCPCS | Performed by: ORTHOPAEDIC SURGERY

## 2019-06-18 PROCEDURE — 77030019908 HC STETH ESOPH SIMS -A: Performed by: ANESTHESIOLOGY

## 2019-06-18 PROCEDURE — 77030011267 HC ELECTRD BLD COVD -A: Performed by: ORTHOPAEDIC SURGERY

## 2019-06-18 PROCEDURE — 77030026438 HC STYL ET INTUB CARD -A: Performed by: ANESTHESIOLOGY

## 2019-06-18 PROCEDURE — 77030020268 HC MISC GENERAL SUPPLY: Performed by: ORTHOPAEDIC SURGERY

## 2019-06-18 PROCEDURE — 77030025623 HC BUR RND PRECIS STRY -D: Performed by: ORTHOPAEDIC SURGERY

## 2019-06-18 PROCEDURE — 77030019726 HC CGE SPN VBR LRD INLC -G: Performed by: ORTHOPAEDIC SURGERY

## 2019-06-18 PROCEDURE — 77030027138 HC INCENT SPIROMETER -A

## 2019-06-18 PROCEDURE — 77030029684 HC NEB SM VOL KT MONA -A

## 2019-06-18 PROCEDURE — 76010000175 HC OR TIME 4 TO 4.5 HR INTENSV-TIER 1: Performed by: ORTHOPAEDIC SURGERY

## 2019-06-18 PROCEDURE — 77030020782 HC GWN BAIR PAWS FLX 3M -B

## 2019-06-18 DEVICE — 3.5MM VARIABLE SCREW, SELF DRILLING, 14MM
Type: IMPLANTABLE DEVICE | Site: SPINE CERVICAL | Status: FUNCTIONAL
Brand: SHORELINE ACS

## 2019-06-18 DEVICE — GRAFT BNE SUB 7.5GM PTTY SYN SIGNAFUSE: Type: IMPLANTABLE DEVICE | Site: SPINE CERVICAL | Status: FUNCTIONAL

## 2019-06-18 DEVICE — INTERBODY, 16X14X6MM, 7 DEGREE, STERILE
Type: IMPLANTABLE DEVICE | Site: SPINE CERVICAL | Status: FUNCTIONAL
Brand: SHORELINE ACS

## 2019-06-18 DEVICE — INTERBODY, 20X15X7MM, 10 DEGREE, STERILE
Type: IMPLANTABLE DEVICE | Site: SPINE CERVICAL | Status: FUNCTIONAL
Brand: SHORELINE ACS

## 2019-06-18 DEVICE — 6MM, LOCKING COVER
Type: IMPLANTABLE DEVICE | Site: SPINE CERVICAL | Status: FUNCTIONAL
Brand: SHORELINE ACS

## 2019-06-18 DEVICE — 3.5MM VARIABLE SCREW, SELF DRILLING, 16MM
Type: IMPLANTABLE DEVICE | Site: SPINE CERVICAL | Status: FUNCTIONAL
Brand: SHORELINE ACS

## 2019-06-18 DEVICE — 7MM, LOCKING COVER
Type: IMPLANTABLE DEVICE | Site: SPINE CERVICAL | Status: FUNCTIONAL
Brand: SHORELINE ACS

## 2019-06-18 DEVICE — INTERFACE IS A SYNTHETIC BIOACTIVE BONE GRAFT FOR USE IN THE REPAIR OF OSSEOUS DEFECTS. IT IS SUPPLIED AS IRREGULAR SYNTHETIC GRANULES OF BIOACTIVE GLASS (45S5 BIOGLASS), SIZED FROM 200 MICRONS TO 420 MICRONS. WHEN IMPLANTED IN LIVING TISSUE, THE MATERIAL UNDERGOES A TIME DEPENDENT SURFACE MODIFICATION. THE SURFACE REACTION RESULTS IN THE FORMATION OF A CALCIUM PHOSPHATE LAYER, WHICH IS EQUIVALENT IN COMPOSITION AND STRUCTURE TO THE HYDROXYAPATITE FOUND IN BONE MINERAL. THE BIOLOGICAL APATITE LAYER OF THE GRANULES PROVIDES AN OSTEOCONDUCTIVE SCAFFOLD FOR THE GENERATION OF NEW OSSEOUS TISSUE. NEW BONE INFILTRATES AROUND THE GRANULES ALLOWING THE REPAIR OF THE DEFECT AS THE GRANULES ARE ABSORBED.
Type: IMPLANTABLE DEVICE | Site: SPINE CERVICAL | Status: FUNCTIONAL
Brand: INTERFACE

## 2019-06-18 DEVICE — 7MM, 4-HOLE ANTERIOR PLATE
Type: IMPLANTABLE DEVICE | Site: SPINE CERVICAL | Status: FUNCTIONAL
Brand: SHORELINE ACS

## 2019-06-18 RX ORDER — OXYCODONE HYDROCHLORIDE 5 MG/1
5 TABLET ORAL
Status: DISCONTINUED | OUTPATIENT
Start: 2019-06-18 | End: 2019-06-19 | Stop reason: HOSPADM

## 2019-06-18 RX ORDER — ROCURONIUM BROMIDE 10 MG/ML
INJECTION, SOLUTION INTRAVENOUS AS NEEDED
Status: DISCONTINUED | OUTPATIENT
Start: 2019-06-18 | End: 2019-06-18 | Stop reason: HOSPADM

## 2019-06-18 RX ORDER — FACIAL-BODY WIPES
10 EACH TOPICAL DAILY PRN
Status: DISCONTINUED | OUTPATIENT
Start: 2019-06-20 | End: 2019-06-19 | Stop reason: HOSPADM

## 2019-06-18 RX ORDER — CEFAZOLIN SODIUM 1 G/3ML
INJECTION, POWDER, FOR SOLUTION INTRAMUSCULAR; INTRAVENOUS AS NEEDED
Status: DISCONTINUED | OUTPATIENT
Start: 2019-06-18 | End: 2019-06-18 | Stop reason: HOSPADM

## 2019-06-18 RX ORDER — LATANOPROST 50 UG/ML
1 SOLUTION/ DROPS OPHTHALMIC
Status: DISCONTINUED | OUTPATIENT
Start: 2019-06-18 | End: 2019-06-19 | Stop reason: HOSPADM

## 2019-06-18 RX ORDER — CEFAZOLIN SODIUM/WATER 2 G/20 ML
2 SYRINGE (ML) INTRAVENOUS EVERY 8 HOURS
Status: COMPLETED | OUTPATIENT
Start: 2019-06-18 | End: 2019-06-19

## 2019-06-18 RX ORDER — SODIUM CHLORIDE 0.9 % (FLUSH) 0.9 %
5-40 SYRINGE (ML) INJECTION EVERY 8 HOURS
Status: DISCONTINUED | OUTPATIENT
Start: 2019-06-18 | End: 2019-06-19 | Stop reason: HOSPADM

## 2019-06-18 RX ORDER — ONDANSETRON 2 MG/ML
INJECTION INTRAMUSCULAR; INTRAVENOUS AS NEEDED
Status: DISCONTINUED | OUTPATIENT
Start: 2019-06-18 | End: 2019-06-18 | Stop reason: HOSPADM

## 2019-06-18 RX ORDER — METFORMIN HYDROCHLORIDE 500 MG/1
500 TABLET ORAL 2 TIMES DAILY WITH MEALS
Status: DISCONTINUED | OUTPATIENT
Start: 2019-06-18 | End: 2019-06-19 | Stop reason: HOSPADM

## 2019-06-18 RX ORDER — MONTELUKAST SODIUM 10 MG/1
10 TABLET ORAL EVERY EVENING
Status: DISCONTINUED | OUTPATIENT
Start: 2019-06-18 | End: 2019-06-19 | Stop reason: HOSPADM

## 2019-06-18 RX ORDER — MORPHINE SULFATE 4 MG/ML
2 INJECTION INTRAVENOUS
Status: DISCONTINUED | OUTPATIENT
Start: 2019-06-18 | End: 2019-06-19 | Stop reason: HOSPADM

## 2019-06-18 RX ORDER — ONDANSETRON 2 MG/ML
4 INJECTION INTRAMUSCULAR; INTRAVENOUS
Status: DISCONTINUED | OUTPATIENT
Start: 2019-06-18 | End: 2019-06-19 | Stop reason: HOSPADM

## 2019-06-18 RX ORDER — IPRATROPIUM BROMIDE AND ALBUTEROL SULFATE 2.5; .5 MG/3ML; MG/3ML
3 SOLUTION RESPIRATORY (INHALATION)
Status: DISCONTINUED | OUTPATIENT
Start: 2019-06-18 | End: 2019-06-19 | Stop reason: HOSPADM

## 2019-06-18 RX ORDER — NALOXONE HYDROCHLORIDE 0.4 MG/ML
0.2 INJECTION, SOLUTION INTRAMUSCULAR; INTRAVENOUS; SUBCUTANEOUS
Status: DISCONTINUED | OUTPATIENT
Start: 2019-06-18 | End: 2019-06-18 | Stop reason: HOSPADM

## 2019-06-18 RX ORDER — DEXAMETHASONE SODIUM PHOSPHATE 4 MG/ML
INJECTION, SOLUTION INTRA-ARTICULAR; INTRALESIONAL; INTRAMUSCULAR; INTRAVENOUS; SOFT TISSUE AS NEEDED
Status: DISCONTINUED | OUTPATIENT
Start: 2019-06-18 | End: 2019-06-18 | Stop reason: HOSPADM

## 2019-06-18 RX ORDER — FAMOTIDINE 20 MG/1
20 TABLET, FILM COATED ORAL 2 TIMES DAILY
Status: DISCONTINUED | OUTPATIENT
Start: 2019-06-18 | End: 2019-06-19 | Stop reason: HOSPADM

## 2019-06-18 RX ORDER — PRAVASTATIN SODIUM 20 MG/1
80 TABLET ORAL
Status: DISCONTINUED | OUTPATIENT
Start: 2019-06-18 | End: 2019-06-19 | Stop reason: HOSPADM

## 2019-06-18 RX ORDER — LIDOCAINE HYDROCHLORIDE 10 MG/ML
0.1 INJECTION, SOLUTION EPIDURAL; INFILTRATION; INTRACAUDAL; PERINEURAL AS NEEDED
Status: DISCONTINUED | OUTPATIENT
Start: 2019-06-18 | End: 2019-06-18 | Stop reason: HOSPADM

## 2019-06-18 RX ORDER — SODIUM CHLORIDE 9 MG/ML
125 INJECTION, SOLUTION INTRAVENOUS CONTINUOUS
Status: DISCONTINUED | OUTPATIENT
Start: 2019-06-18 | End: 2019-06-19 | Stop reason: HOSPADM

## 2019-06-18 RX ORDER — ACETAMINOPHEN 500 MG
1000 TABLET ORAL EVERY 6 HOURS
Status: DISCONTINUED | OUTPATIENT
Start: 2019-06-18 | End: 2019-06-19 | Stop reason: HOSPADM

## 2019-06-18 RX ORDER — EPHEDRINE SULFATE/0.9% NACL/PF 50 MG/5 ML
SYRINGE (ML) INTRAVENOUS AS NEEDED
Status: DISCONTINUED | OUTPATIENT
Start: 2019-06-18 | End: 2019-06-18 | Stop reason: HOSPADM

## 2019-06-18 RX ORDER — AMOXICILLIN 250 MG
1 CAPSULE ORAL 2 TIMES DAILY
Status: DISCONTINUED | OUTPATIENT
Start: 2019-06-18 | End: 2019-06-19 | Stop reason: HOSPADM

## 2019-06-18 RX ORDER — DEXAMETHASONE SODIUM PHOSPHATE 4 MG/ML
10 INJECTION, SOLUTION INTRA-ARTICULAR; INTRALESIONAL; INTRAMUSCULAR; INTRAVENOUS; SOFT TISSUE EVERY 8 HOURS
Status: DISCONTINUED | OUTPATIENT
Start: 2019-06-18 | End: 2019-06-19 | Stop reason: HOSPADM

## 2019-06-18 RX ORDER — SUCCINYLCHOLINE CHLORIDE 20 MG/ML
INJECTION INTRAMUSCULAR; INTRAVENOUS AS NEEDED
Status: DISCONTINUED | OUTPATIENT
Start: 2019-06-18 | End: 2019-06-18 | Stop reason: HOSPADM

## 2019-06-18 RX ORDER — FENTANYL CITRATE 50 UG/ML
INJECTION, SOLUTION INTRAMUSCULAR; INTRAVENOUS AS NEEDED
Status: DISCONTINUED | OUTPATIENT
Start: 2019-06-18 | End: 2019-06-18 | Stop reason: HOSPADM

## 2019-06-18 RX ORDER — SODIUM CHLORIDE, SODIUM LACTATE, POTASSIUM CHLORIDE, CALCIUM CHLORIDE 600; 310; 30; 20 MG/100ML; MG/100ML; MG/100ML; MG/100ML
125 INJECTION, SOLUTION INTRAVENOUS CONTINUOUS
Status: DISCONTINUED | OUTPATIENT
Start: 2019-06-18 | End: 2019-06-18 | Stop reason: HOSPADM

## 2019-06-18 RX ORDER — NALOXONE HYDROCHLORIDE 0.4 MG/ML
0.4 INJECTION, SOLUTION INTRAMUSCULAR; INTRAVENOUS; SUBCUTANEOUS AS NEEDED
Status: DISCONTINUED | OUTPATIENT
Start: 2019-06-18 | End: 2019-06-19 | Stop reason: HOSPADM

## 2019-06-18 RX ORDER — METOPROLOL TARTRATE 25 MG/1
25 TABLET, FILM COATED ORAL 2 TIMES DAILY
Status: DISCONTINUED | OUTPATIENT
Start: 2019-06-18 | End: 2019-06-19 | Stop reason: HOSPADM

## 2019-06-18 RX ORDER — CEFAZOLIN SODIUM/WATER 2 G/20 ML
2 SYRINGE (ML) INTRAVENOUS ONCE
Status: COMPLETED | OUTPATIENT
Start: 2019-06-18 | End: 2019-06-18

## 2019-06-18 RX ORDER — DIPHENHYDRAMINE HYDROCHLORIDE 50 MG/ML
12.5 INJECTION, SOLUTION INTRAMUSCULAR; INTRAVENOUS AS NEEDED
Status: DISCONTINUED | OUTPATIENT
Start: 2019-06-18 | End: 2019-06-18 | Stop reason: HOSPADM

## 2019-06-18 RX ORDER — PROPOFOL 10 MG/ML
INJECTION, EMULSION INTRAVENOUS
Status: DISCONTINUED | OUTPATIENT
Start: 2019-06-18 | End: 2019-06-18 | Stop reason: HOSPADM

## 2019-06-18 RX ORDER — OXYCODONE HYDROCHLORIDE 5 MG/1
10 TABLET ORAL
Status: DISCONTINUED | OUTPATIENT
Start: 2019-06-18 | End: 2019-06-19 | Stop reason: HOSPADM

## 2019-06-18 RX ORDER — FAMOTIDINE 10 MG/ML
INJECTION INTRAVENOUS AS NEEDED
Status: DISCONTINUED | OUTPATIENT
Start: 2019-06-18 | End: 2019-06-18 | Stop reason: HOSPADM

## 2019-06-18 RX ORDER — HYDROMORPHONE HYDROCHLORIDE 1 MG/ML
.25-1 INJECTION, SOLUTION INTRAMUSCULAR; INTRAVENOUS; SUBCUTANEOUS
Status: DISCONTINUED | OUTPATIENT
Start: 2019-06-18 | End: 2019-06-18 | Stop reason: HOSPADM

## 2019-06-18 RX ORDER — KETAMINE HYDROCHLORIDE 10 MG/ML
INJECTION, SOLUTION INTRAMUSCULAR; INTRAVENOUS AS NEEDED
Status: DISCONTINUED | OUTPATIENT
Start: 2019-06-18 | End: 2019-06-18 | Stop reason: HOSPADM

## 2019-06-18 RX ORDER — MIDAZOLAM HYDROCHLORIDE 1 MG/ML
INJECTION, SOLUTION INTRAMUSCULAR; INTRAVENOUS AS NEEDED
Status: DISCONTINUED | OUTPATIENT
Start: 2019-06-18 | End: 2019-06-18 | Stop reason: HOSPADM

## 2019-06-18 RX ORDER — TRAMADOL HYDROCHLORIDE 50 MG/1
50 TABLET ORAL
Status: DISCONTINUED | OUTPATIENT
Start: 2019-06-18 | End: 2019-06-19 | Stop reason: HOSPADM

## 2019-06-18 RX ORDER — PROPOFOL 10 MG/ML
INJECTION, EMULSION INTRAVENOUS AS NEEDED
Status: DISCONTINUED | OUTPATIENT
Start: 2019-06-18 | End: 2019-06-18 | Stop reason: HOSPADM

## 2019-06-18 RX ORDER — FLUMAZENIL 0.1 MG/ML
0.2 INJECTION INTRAVENOUS
Status: DISCONTINUED | OUTPATIENT
Start: 2019-06-18 | End: 2019-06-18 | Stop reason: HOSPADM

## 2019-06-18 RX ORDER — POLYETHYLENE GLYCOL 3350 17 G/17G
17 POWDER, FOR SOLUTION ORAL DAILY
Status: DISCONTINUED | OUTPATIENT
Start: 2019-06-19 | End: 2019-06-19 | Stop reason: HOSPADM

## 2019-06-18 RX ORDER — SODIUM CHLORIDE 0.9 % (FLUSH) 0.9 %
5-40 SYRINGE (ML) INJECTION AS NEEDED
Status: DISCONTINUED | OUTPATIENT
Start: 2019-06-18 | End: 2019-06-19 | Stop reason: HOSPADM

## 2019-06-18 RX ORDER — GLYCOPYRROLATE 0.2 MG/ML
INJECTION INTRAMUSCULAR; INTRAVENOUS AS NEEDED
Status: DISCONTINUED | OUTPATIENT
Start: 2019-06-18 | End: 2019-06-18 | Stop reason: HOSPADM

## 2019-06-18 RX ORDER — LANOLIN ALCOHOL/MO/W.PET/CERES
200 CREAM (GRAM) TOPICAL DAILY
Status: DISCONTINUED | OUTPATIENT
Start: 2019-06-19 | End: 2019-06-19 | Stop reason: HOSPADM

## 2019-06-18 RX ADMIN — OXYCODONE HYDROCHLORIDE 5 MG: 5 TABLET ORAL at 16:37

## 2019-06-18 RX ADMIN — IPRATROPIUM BROMIDE AND ALBUTEROL SULFATE 3 ML: .5; 3 SOLUTION RESPIRATORY (INHALATION) at 12:56

## 2019-06-18 RX ADMIN — Medication 2 G: at 22:03

## 2019-06-18 RX ADMIN — BENZOCAINE, MENTHOL 1 LOZENGE: 15; 3.6 LOZENGE ORAL at 16:00

## 2019-06-18 RX ADMIN — DEXAMETHASONE SODIUM PHOSPHATE 8 MG: 4 INJECTION, SOLUTION INTRA-ARTICULAR; INTRALESIONAL; INTRAMUSCULAR; INTRAVENOUS; SOFT TISSUE at 08:54

## 2019-06-18 RX ADMIN — IPRATROPIUM BROMIDE AND ALBUTEROL SULFATE 3 ML: .5; 3 SOLUTION RESPIRATORY (INHALATION) at 19:22

## 2019-06-18 RX ADMIN — HYDROMORPHONE HYDROCHLORIDE 0.5 MG: 1 INJECTION, SOLUTION INTRAMUSCULAR; INTRAVENOUS; SUBCUTANEOUS at 12:10

## 2019-06-18 RX ADMIN — PRAVASTATIN SODIUM 80 MG: 20 TABLET ORAL at 22:01

## 2019-06-18 RX ADMIN — FAMOTIDINE 20 MG: 20 TABLET ORAL at 16:37

## 2019-06-18 RX ADMIN — ROCURONIUM BROMIDE 10 MG: 10 INJECTION, SOLUTION INTRAVENOUS at 08:06

## 2019-06-18 RX ADMIN — PROPOFOL 150 MG: 10 INJECTION, EMULSION INTRAVENOUS at 08:06

## 2019-06-18 RX ADMIN — PROPOFOL 75 MCG/KG/MIN: 10 INJECTION, EMULSION INTRAVENOUS at 08:37

## 2019-06-18 RX ADMIN — METOPROLOL TARTRATE 25 MG: 25 TABLET ORAL at 17:50

## 2019-06-18 RX ADMIN — LATANOPROST 1 DROP: 50 SOLUTION/ DROPS OPHTHALMIC at 23:52

## 2019-06-18 RX ADMIN — HYDROMORPHONE HYDROCHLORIDE 0.5 MG: 1 INJECTION, SOLUTION INTRAMUSCULAR; INTRAVENOUS; SUBCUTANEOUS at 12:23

## 2019-06-18 RX ADMIN — HYDROMORPHONE HYDROCHLORIDE 0.5 MG: 1 INJECTION, SOLUTION INTRAMUSCULAR; INTRAVENOUS; SUBCUTANEOUS at 12:53

## 2019-06-18 RX ADMIN — Medication 2 G: at 08:39

## 2019-06-18 RX ADMIN — BENZOCAINE, MENTHOL 1 LOZENGE: 15; 3.6 LOZENGE ORAL at 16:59

## 2019-06-18 RX ADMIN — MIDAZOLAM HYDROCHLORIDE 2 MG: 1 INJECTION, SOLUTION INTRAMUSCULAR; INTRAVENOUS at 07:59

## 2019-06-18 RX ADMIN — SUCCINYLCHOLINE CHLORIDE 120 MG: 20 INJECTION INTRAMUSCULAR; INTRAVENOUS at 08:07

## 2019-06-18 RX ADMIN — PROPOFOL 50 MG: 10 INJECTION, EMULSION INTRAVENOUS at 08:10

## 2019-06-18 RX ADMIN — KETAMINE HYDROCHLORIDE 40 MG: 10 INJECTION, SOLUTION INTRAMUSCULAR; INTRAVENOUS at 08:04

## 2019-06-18 RX ADMIN — SODIUM CHLORIDE, SODIUM LACTATE, POTASSIUM CHLORIDE, AND CALCIUM CHLORIDE 125 ML/HR: 600; 310; 30; 20 INJECTION, SOLUTION INTRAVENOUS at 07:15

## 2019-06-18 RX ADMIN — FENTANYL CITRATE 100 MCG: 50 INJECTION, SOLUTION INTRAMUSCULAR; INTRAVENOUS at 11:43

## 2019-06-18 RX ADMIN — SENNOSIDES, DOCUSATE SODIUM 1 TABLET: 50; 8.6 TABLET, FILM COATED ORAL at 16:37

## 2019-06-18 RX ADMIN — SODIUM CHLORIDE, SODIUM LACTATE, POTASSIUM CHLORIDE, AND CALCIUM CHLORIDE: 600; 310; 30; 20 INJECTION, SOLUTION INTRAVENOUS at 08:34

## 2019-06-18 RX ADMIN — MONTELUKAST SODIUM 10 MG: 10 TABLET, FILM COATED ORAL at 22:17

## 2019-06-18 RX ADMIN — GLYCOPYRROLATE 0.2 MG: 0.2 INJECTION INTRAMUSCULAR; INTRAVENOUS at 07:59

## 2019-06-18 RX ADMIN — Medication 2 G: at 15:02

## 2019-06-18 RX ADMIN — METFORMIN HYDROCHLORIDE 500 MG: 500 TABLET ORAL at 16:37

## 2019-06-18 RX ADMIN — Medication 20 MG: at 08:30

## 2019-06-18 RX ADMIN — ROCURONIUM BROMIDE 30 MG: 10 INJECTION, SOLUTION INTRAVENOUS at 09:04

## 2019-06-18 RX ADMIN — DEXAMETHASONE SODIUM PHOSPHATE 10 MG: 4 INJECTION, SOLUTION INTRAMUSCULAR; INTRAVENOUS at 15:02

## 2019-06-18 RX ADMIN — FAMOTIDINE 20 MG: 10 INJECTION INTRAVENOUS at 07:59

## 2019-06-18 RX ADMIN — OXYCODONE HYDROCHLORIDE 10 MG: 5 TABLET ORAL at 22:01

## 2019-06-18 RX ADMIN — ACETAMINOPHEN 1000 MG: 500 TABLET ORAL at 23:52

## 2019-06-18 RX ADMIN — Medication 10 ML: at 22:03

## 2019-06-18 RX ADMIN — CEFAZOLIN SODIUM 2 G: 1 INJECTION, POWDER, FOR SOLUTION INTRAMUSCULAR; INTRAVENOUS at 08:47

## 2019-06-18 RX ADMIN — ACETAMINOPHEN 1000 MG: 500 TABLET ORAL at 16:37

## 2019-06-18 RX ADMIN — SODIUM CHLORIDE 125 ML/HR: 900 INJECTION, SOLUTION INTRAVENOUS at 12:15

## 2019-06-18 RX ADMIN — BENZOCAINE, MENTHOL 1 LOZENGE: 15; 3.6 LOZENGE ORAL at 18:48

## 2019-06-18 RX ADMIN — BENZOCAINE, MENTHOL 1 LOZENGE: 15; 3.6 LOZENGE ORAL at 22:01

## 2019-06-18 RX ADMIN — ONDANSETRON 4 MG: 2 INJECTION INTRAMUSCULAR; INTRAVENOUS at 08:54

## 2019-06-18 NOTE — ANESTHESIA POSTPROCEDURE EVALUATION
Procedure(s):  C4-7 ANTERIOR CERVICAL DISCECTOMY FUSION WITH ILIAC CREST BONE MARROW ASPIRATE. general    Anesthesia Post Evaluation      Multimodal analgesia: multimodal analgesia not used between 6 hours prior to anesthesia start to PACU discharge  Patient location during evaluation: PACU  Patient participation: complete - patient participated  Level of consciousness: awake  Pain management: adequate  Airway patency: patent  Anesthetic complications: no  Cardiovascular status: acceptable, blood pressure returned to baseline and hemodynamically stable  Respiratory status: acceptable  Hydration status: acceptable  Post anesthesia nausea and vomiting:  controlled      Vitals Value Taken Time   /66 6/18/2019  1:00 PM   Temp 36.6 °C (97.8 °F) 6/18/2019 12:00 PM   Pulse 102 6/18/2019  1:11 PM   Resp 10 6/18/2019  1:11 PM   SpO2 95 % 6/18/2019  1:11 PM   Vitals shown include unvalidated device data.

## 2019-06-18 NOTE — OP NOTES
[address] [facility], [address] [address]    OPERATIVE REPORT      NAME: Nuris Galeas    AGE: 70 y.o. YOB: 1950    MEDICAL RECORD NUMBER: [id number]    DATE OF SURGERY: 6/18/2019    OPERATIVE REPORT     PREOPERATIVE DIAGNOSIS: Cervical stenosis , Cervical radiculopathy, Cervical spondylolisthesis C4-5     POSTOPERATIVE DIAGNOSIS: Cervical stenosis, Cervical radiculopathy, Cervical spondylolisthesis C4-5    OPERATIVE PROCEDURE:   1)C4-5 anterior cervical diskectomy for decompression of spinal cord and bilateral exiting nerve roots  2)C5-6 anterior cervical diskectomy for decompression of spinal cord and bilateral exiting nerve roots  3)C6-7 anterior cervical diskectomy for decompression of spinal cord and bilateral exiting nerve roots. 4)C4-5 anterior cervical interbody fusion  5)C5-6 anterior cervical interbody fusion  6)C6-7 anterior cervical interbody fusion  7) insertion of structural cage at C4-5, size 6mm height, 14x16 footprint  8) insertion of Nanometalene cage at C5-6, size 6mm height, 14x16 footprint  9) insertion of Nanometalene cage at C6-7, size 7mm height, 15x20 footprint  10) Anterior cervical instrumentation with plating applied to anterior aspect of C4-7 vertebral bodies Seaspine Cassandra plate  11) Morselized allograft Signafuse, Osteoamp fibers, Seaspine osteostrand  12)Use of operative microscope   13) iliac crest bone marrow aspirate from left iliac crest for spinal fusion      SURGEON: Syd Soto MD     ASSISTANT: Kishore Martell NP was present and scrubbed for entire procedure and provided assistance with exposure, retraction and wound closure.      ANESTHESIA: General    COMPLICATIONS: None    ESTIMATED BLOOD LOSS: 20 cc    INSTRUMENTATION: Seaspine Cassandra    NEUROMONITORING: SSEPs , MEP's and spontaneous EMGs    INDICATION FOR PROCEDURE: The patient is a very pleasant 70 y.o. female with cervical stenosis, bilateral arm numbness, right greater than left, bilateral upper extremity weakness, right greater than left and neck pain and balance disturbance. She had severe central stenosis at C5-6, moderate at C6-7, right greater than left foraminal at both levels, then at C4-5, spondylolisthesis 4 mm. Preop symptoms failed to improve with conservative care. The patient elected to proceed with operative intervention. She was aware of the risks, benefits, and alternatives. Have re-reviewed realistic expectations regarding symptom improvement. She provided informed consent. PROCEDURE: The patient was identified in the preoperative holding area. The anterior cervical spine was marked by me. SCD's applied in preop. Immediately preop patient demonstrated right upper extremity numbness and weakness, some weakness in LUE . She was transferred to the operating room where general anesthesia was given. She was also given perioperative antibiotic, Ancef. The patient was placed supine on the operating room table with head supported on donut pillow. All bony prominences were well-padded including cubital and carpal tunnels, iliac crests knees and ankles. The shoulders were taped very gently due to previous right shoulder replacement and left rotator cuff repair. Shoulder roll was applied. The anterior cervical spine was prepped and draped in the usual standard fashion. We performed a surgical time-out. Garza wells tongs were applied just above the EAM with 10 pounds traction. Flouroscopy was brought in and levels marked on the skin. The spondylolisthesis at C4-5 reduced with the traction. I made a skin incision on the left side. It was oblique. I exposed the anterior cervical spine with standard anterior corbin minaya approach staying medial to carotid sheath and lateral to trachea-esophageal complex. Prevertebral fascia was identified and split with scissors and spread cephalad and caudal with peanut elevators.  I placed a needle into the disk space to verify our levels. I exposed the disc spaces with handheld retractors, penfield elevator and bipolar cautery from uncus to uncus. Removal of anterior osteophytes with rongeur and preliminary diskectomy performed. I then palpated the left iliac crest, ASIS. I made small stab incision 2 cm posterior to ASIS. Jamshidi needle introduced to left ileum, bone marrow aspirate obtained with syringe and then floseal applied for hemostasis. After achieving excellent hemostasis, I then proceeded with closure of skin with dermabond and sterile dressing. I brought in the operating room microscope. I performed a diskectomy at C4-5. I decompressed the spinal cord and nerve roots bilaterally. Mild central stenosis and left greater than right foraminal stenosis here. I prepared the endplates to bleeding bone. We had good hemostasis. I performed trial sizing. I placed a structural interbody spacer packed with the local autograft bone and morsellized allograft combined with Bone marrow aspirate into C4-5 with the appropriate amount of tension and alignment. I performed a diskectomy at C5-C6. I decompressed the spinal cord and nerve roots bilaterally. Central canal was extremely stenotic, PLL taken down with nerve hook and 1 mm kerrison. I prepared the endplates to bleeding bone. We had good hemostasis. I performed trial sizing. I placed a structural interbody spacer packed with the local autograft bone and morsellized allograft combined with Bone marrow aspirate into C5-C6 with the appropriate amount of tension and alignment. I performed an identical procedure at C6-C7. The endplates were prepared to bleeding bone. Here, there was moderate central and right greater than left foraminal stenosis. The spinal cord and nerve roots were decompressed.  I placed a structural interbody spacer packed with the local autograft bone and morsellized allograft combined with Bone marrow aspirate into C6-C7 with the appropriate amount of tension and alignment. I then placed an anterior cervical plate into C4, C5, C6, and C7. The screws were locked to the plate according to the manufacture's specification. Final flouroscopic imaging demonstrated safe position of hardware and interbodies. We had good hemostasis. I copiously irrigated the entire wound. I placed a deep drain. Trachea-esophageal complex and carotid sheath inspected and no evidence of injury. The wound was closed with 3-0 Vicryl and 4-0 Stratafix. A sterile dressing was applied. Neurological monitoring remained at baseline. The patient was extubated and transferred to the recovery room in good medical condition. Discussed surgery with patient's family and answered questions. I, Dr. Shivam Quiles, performed the above procedures. Amandeep Shore NP was present and scrubbed for entire procedure and provided assistance with exposure, retraction and wound closure.     Shivam Quiles MD  6/18/2019

## 2019-06-18 NOTE — H&P
H&P Update:  Elvis Leiva was seen and examined. History and physical has been reviewed. The patient has been examined. There have been no significant clinical changes since the completion of the originally dated History and Physical.  Patient identified by surgeon; surgical site was confirmed by patient and surgeon. Neck and right greater than left arm pain, numbness mostly in right arm radiating to entire hand, some numbness on left. +balance disturbance. Weakness greater in RUE than LUE  Sensation diminished RUE forearm and hand  Discomfort with downward traction on both shoulders, greater on left than right. Reviewed risks/benefits of surgery and expectations for neurological healing with patient and family. Consent signed.

## 2019-06-18 NOTE — PROGRESS NOTES
6/18/2019 .4:19 PM [facility] has accepted pt.     6/18/2019 4:02 PM Case management consult for discharge planning, home health received. Met with pt, pt's  and pt's sister in law. Charted address and phone number confirmed. Observation notice provided in writing to patient and/or caregiver as well as verbal explanation of the policy. Patients who are in outpatient status also receive the Observation notice. Reason for Admission:     PREOPERATIVE DIAGNOSIS: Cervical stenosis , Cervical radiculopathy, Cervical spondylolisthesis C4-5                   RRAT Score: 11                    Plan for utilizing home health: Yes, choice letter signed for [facility]. Referral sent to [facility] via [employer]. Current Advanced Directive/Advance Care Plan:   Emma Yadav [phone]                          Transition of Care Plan: home with home health and family    Pt will have her  and sister in law at home to assist during her recovery. There are no steps to enter pt's home and she lives in [place], [place]. Pt has access to a rw. Pt has rx coverage and fills her scripts at [address] [place] [address]. Pt's family will transport pt home. CM will follow.  CARYL Cates  Care Management Interventions  PCP Verified by CM: Yes(Anette Lantigua, no nurse navigator )  Transition of Care Consult (CM Consult): Discharge Planning, [address]: No  Reason Outside [place]: Patient already serviced by other home care/hospice agency  MyChart Signup: No  Discharge Durable Medical Equipment: No  Physical Therapy Consult: Yes  Occupational Therapy Consult: Yes  Speech Therapy Consult: No  Current Support Network: Lives with Spouse, Own Home  Plan discussed with Pt/Family/Caregiver: Yes  Freedom of Choice Offered: (S) Yes  Discharge Location  Discharge Placement: Home with home health

## 2019-06-18 NOTE — PERIOP NOTES
TRANSFER - OUT REPORT:    Verbal report given to [address] Yolanda Wagoner RN(name) on Emily Feliz  being transferred to [facility](unit) for routine post - op       Report consisted of patients Situation, Background, Assessment and   Recommendations(SBAR). Information from the following report(s) SBAR, OR Summary, Procedure Summary and Intake/Output was reviewed with the receiving nurse. Opportunity for questions and clarification was provided. Patient transported with:   Registered Nurse     Family at bedside. Stable. Call bell within reach.

## 2019-06-18 NOTE — BRIEF OP NOTE
BRIEF OPERATIVE NOTE    Date of Procedure: 6/18/2019   Preoperative Diagnosis: CERVICAL RADICULOPATHY  OSTEOARTHRITIS OF CERVICAL SPINE, UNDPECIFIED SPINAL OSTEOARTHRITIS COMPLICATION STATUS  SPONDYLOLISTHESIS OF CERVICAL REGION  OSSEOUS AND SUBLUXATION STENOSIS OF INTERVERTEBRAL FORAMINA OF CERVICAL REGION   SPINAL STENOSIS OF CERVICAL REGION  Postoperative Diagnosis: CERVICAL RADICULOPATHY  OSTEOARTHRITIS OF CERVICAL SPINE, UNDPECIFIED SPINAL OSTEOARTHRITIS COMPLICATION STATUS  SPONDYLOLISTHESIS OF CERVICAL REGION  OSSEOUS AND SUBLUXATION STENOSIS  INTERVERTEBRAL FORAMINA OF CERVICAL REGION   SPINAL STENOSIS OF CERVICAL REGION    Procedure(s):  C4-7 ANTERIOR CERVICAL DISCECTOMY FUSION WITH ILIAC CREST BONE MARROW ASPIRATE  Surgeon(s) and Role:     Isha Roberts MD - Primary         Surgical Assistant: Asmita Mckeon NP    Surgical Staff:  Circ-1: Ivon Jenkins RN  Circ-Relief: Johnson Estrada RN  Scrub Tech-1: Maday Anderson  Scrub Tech-Relief: Francisca Anderson  Nurse Practitioner: Morgan Arteaga NP  Event Time In Time Out   Incision Start 1821    Incision Close 1141      Anesthesia: General   Estimated Blood Loss: 20 cc  Specimens: * No specimens in log *   Findings: stenosis C4-7, spondylolisthesis Y9-5   Complications: none  Implants:   Implant Name Type Inv.  Item Serial No.  Lot No. LRB No. Used Action   OsteoAmp Select Fibers Bone  [id number] [facility] [id number] N/A 1 Implanted   OsteoStrand Demineralized Bone Fibers Bone  [id number] [id number] N/A 1 Implanted   OsteoStrand Demineralized Bone Fibers Bone  [id number]  [id number] N/A 1 Implanted   GRAFT BNE BIOACTV INTERFC 1GM -- INTERFACE - SNA  GRAFT BNE BIOACTV INTERFC 1GM -- INTERFACE NA [employer] [id number] N/A 1 Implanted   GRAFT BNE PUTTY BIOACTV 7.5GM -- SIGNAFUSE - SNA  GRAFT BNE PUTTY BIOACTV 7.5GM -- SIGNAFUSE NA [employer] [id number] N/A 1 Implanted   INTERBODY [id number] 10DEG STRL -- [facility] ACS - SN/A Interbody INTERBODY [id number] 10DEG STRL -- SHORELINE ACS N/A [employer] [address] [id number] N/A 1 Implanted   INTERBODY [address] ACS - SN/A Interbody INTERBODY [id number] 7DEG STRL -- SHORELINE ACS N/A [employer] SEASPINE [id number] N/A 1 Implanted   INTERBODY [address] ACS - SN/A Interbody INTERBODY [id number] 7DEG STRL -- SHORELINE ACS N/A [employer] [address] [id number] N/A 1 Implanted

## 2019-06-18 NOTE — PROGRESS NOTES
1915  Report received pt lying in bed assisted to BR voided without difficulty assisted back to bed     2203  Pt given meds without difficulty refused decadron given 10 mg oxycodone     2352  Rounds made pt given scheduled tylenol voiced no other needs at this time       0030  Pt resting quietly no distress noted call bell within reach     0150  Pt resting quietly no distress noted     0330  Rounds made no distress noted call bell within reach     0534  Pt given med without difficulty assisted to BR. Noted blood on gown observed drain disconnected from tube cleaned both side re inserted tube to drain and activated drain will continue to monitor drain     0630  Drainage noted in drain     0740  Bedside and Verbal shift change report given to Ludy (oncoming nurse) by Alexandra Contreras (offgoing nurse). Report included the following information SBAR, Kardex, ED Summary, OR Summary, Procedure Summary, Intake/Output, MAR and Recent Results.

## 2019-06-18 NOTE — PROGRESS NOTES
Problem: Mobility Impaired (Adult and Pediatric)  Goal: *Acute Goals and Plan of Care (Insert Text)  Description  Physical Therapy Goals  Initiated 6/18/2019    1. Patient will move from supine to sit and sit to supine  in bed with independence within 4 days. 2. Patient will perform sit to stand with modified independence within 4 days. 3. Patient will ambulate with modified independence for 100 feet with the least restrictive device within 4 days. 4. Patient will verbalize and demonstrate understanding of spinal precautions (No bending, lifting greater than 5 lbs, or twisting; log-roll technique; frequent repositioning as instructed) within 4 days. Outcome: Progressing Towards Goal   PHYSICAL THERAPY EVALUATION  Patient: Asher Bourgeois (66 y.o. female)  Date: 6/18/2019  Primary Diagnosis: Cervical radiculopathy [M54.12]  Osteoarthritis of cervical spine [M47.812]  Procedure(s) (LRB):  C4-7 ANTERIOR CERVICAL DISCECTOMY FUSION WITH ILIAC CREST BONE MARROW ASPIRATE (N/A) Day of Surgery   Precautions:   Fall, Back    ASSESSMENT :  Based on the objective data described below, the patient presents with decreased bed mobility, transfers, slightly unsteady gait, increased HR and BP following admission for ACDF with ICBG. Patient was received in bed alert and willing to work with PT. [place] collar and drain in place. Educated patient regarding back precautions and she is familiar since she has had several back surgeries. No pain reported other than sore throat. Patient rolled and sat on edge of bed with min assist of 2. She stood and ambulated to door with rolling walker, then returned to bed without the walker, +2 min assist. Patient is more steady with the walker. HR ranged from 116-118 and BP is recorded in doc flow sheets. O2 sats stable on room air. Notified nursing of BP and HR. At baseline, patient was independent. She lives with her  who is currently using a walker.  Sister-in-law plans to come and assist patient following surgery. PT will place order for rolling walker and recommend HHPT upon discharge. Patient will benefit from skilled intervention to address the above impairments. Patient?s rehabilitation potential is considered to be Good  Factors which may influence rehabilitation potential include:   ? None noted  ? Mental ability/status  ? Medical condition  ? Home/family situation and support systems  ? Safety awareness  ? Pain tolerance/management  ? Other:      PLAN :  Recommendations and Planned Interventions:  ?           Bed Mobility Training             ? Neuromuscular Re-Education  ? Transfer Training                   ? Orthotic/Prosthetic Training  ? Gait Training                         ? Modalities  ? Therapeutic Exercises           ? Edema Management/Control  ? Therapeutic Activities            ? Patient and Family Training/Education  ? Other (comment):    Frequency/Duration: Patient will be followed by physical therapy  twice daily to address goals. Discharge Recommendations: Home Health  Further Equipment Recommendations for Discharge: rolling walker     SUBJECTIVE:   Patient stated ? I went to the bathroom with the nurse earlier. ?    OBJECTIVE DATA SUMMARY:   HISTORY:    Past Medical History:   Diagnosis Date    Arthritis     Claustrophobia     COPD     Degenerative disc disease, lumbar 1/6/2016    Diabetes ([facility] [address] 2012    She states the metformin is prevention only    Glaucoma     Gout attack 04/2019    Big toe    H/O sinus tachycardia 01/08/2019    Following shoulder surgery    High potassium 12/2018    Chronic- Stays around 5.3    History of nuclear stress test 02/10/2019    Normal    Hypertension     Obesity (BMI 30-39. 9)     Peripheral vascular disease ([address]     stents in each iliac artery, states they are \"watching\" carotids    Unspecified adverse effect of anesthesia     BP drops    Unspecified sleep apnea     Does not uses CPAP     Past Surgical History:   Procedure Laterality Date    HX BACK SURGERY  2006    LUMBAR- PLATES, SCREWS    HX HYSTERECTOMY  1980    HX LUMBAR LAMINECTOMY N/A 2016 & 2017    ALIF and then did the posterior after it didnt work    HX ORTHOPAEDIC Right     Ulnar nerve    HX ROTATOR CUFF REPAIR Left 2013    HX SHOULDER REPLACEMENT Right 01/07/2019    VASCULAR SURGERY PROCEDURE UNLIST Bilateral 2010    ILIAC STENT     Prior Level of Function/Home Situation: independent  Personal factors and/or comorbidities impacting plan of care: slightly unsteady gait. Home Situation  Home Environment: Private residence  One/Two Story Residence: Two story, live on 1st floor  Lift Chair Available: No  Living Alone: No  Support Systems: Spouse/Significant Other/Partner  Patient Expects to be Discharged to[de-identified] Private residence  Current DME Used/Available at Home: None    EXAMINATION/PRESENTATION/DECISION MAKING:   Critical Behavior:  Neurologic State: Alert  Orientation Level: Oriented to person, Oriented to place, Oriented to situation  Cognition: Follows commands  Safety/Judgement: Insight into deficits, Good awareness of safety precautions  Hearing: Auditory  Auditory Impairment: None  Skin:  not fully observed    Range Of Motion:  AROM: Within functional limits                       Strength:    Strength: Generally decreased, functional                    Tone & Sensation:   Tone: Normal              Sensation: Intact                       Functional Mobility:  Bed Mobility:  Rolling: Additional time;Minimum assistance  Supine to Sit: Additional time;Minimum assistance;Assist x2  Sit to Supine: Assist x2;Minimum assistance  Scooting: Modified independent  Transfers:  Sit to Stand: Assist x2;Contact guard assistance  Stand to Sit: Assist x2;Contact guard assistance                       Balance:   Sitting: Intact  Standing: Intact; With support  Ambulation/Gait Training:  Distance (ft): 20 Feet (ft)  Assistive Device: Brace/Splint; Walker, rolling;Gait belt  Ambulation - Level of Assistance: Assist x2;Minimal assistance        Gait Abnormalities: Path deviations                                                       Functional Measure:  Barthel Index:    Bathin  Bladder: 10  Bowels: 10  Groomin  Dressin  Feeding: 10  Mobility: 0  Stairs: 0  Toilet Use: 5  Transfer (Bed to Chair and Back): 5  Total: 50/100       Percentage of impairment   0%   1-19%   20-39%   40-59%   60-79%   80-99%   100%   Barthel Score 0-100 100 99-80 79-60 59-40 20-39 1-19   0     The Barthel ADL Index: Guidelines  1. The index should be used as a record of what a patient does, not as a record of what a patient could do. 2. The main aim is to establish degree of independence from any help, physical or verbal, however minor and for whatever reason. 3. The need for supervision renders the patient not independent. 4. A patient's performance should be established using the best available evidence. Asking the patient, friends/relatives and nurses are the usual sources, but direct observation and common sense are also important. However direct testing is not needed. 5. Usually the patient's performance over the preceding 24-48 hours is important, but occasionally longer periods will be relevant. 6. Middle categories imply that the patient supplies over 50 per cent of the effort. 7. Use of aids to be independent is allowed. Joy Harris., Barthel, D.W. (). Functional evaluation: the Barthel Index. [address] [facility] (14)2. ROHITH Walton, Cas Pedersen., Clay Barriga., [place], [address] [place] [address] (). Measuring the change indisability after inpatient rehabilitation; comparison of the responsiveness of the Barthel Index and Functional [place] Measure. Journal of Neurology, Neurosurgery, and Psychiatry, 66(4), 687-926.   JAY Sanchez, SHAY Mcleod, Alejandra Higuera M.A. (2004.) Assessment of post-stroke quality of life in cost-effectiveness studies: The usefulness of the Barthel Index and the EuroQoL-5D. Quality of Life Research, 15, 379-           Physical Therapy Evaluation Charge Determination   History Examination Presentation Decision-Making   MEDIUM  Complexity : 1-2 comorbidities / personal factors will impact the outcome/ POC  LOW Complexity : 1-2 Standardized tests and measures addressing body structure, function, activity limitation and / or participation in recreation  MEDIUM Complexity : Evolving with changing characteristics  Other outcome measures Barthel  LOW       Based on the above components, the patient evaluation is determined to be of the following complexity level: LOW     Pain:  Pain Scale 1: Numeric (0 - 10)  Pain Intensity 1: 0  Pain Location 1: Throat is sore          Activity Tolerance:   Limited due to increased HR and blood pressure  Please refer to the flowsheet for vital signs taken during this treatment. After treatment:   ?         Patient left in no apparent distress sitting up in chair  ? Patient left in no apparent distress in bed  ? Call bell left within reach  ? Nursing notified  ? Caregiver present  ? Bed alarm activated    COMMUNICATION/EDUCATION:   The patient?s plan of care was discussed with: Registered Nurse. ?         Fall prevention education was provided and the patient/caregiver indicated understanding. ? Patient/family have participated as able in goal setting and plan of care. ?         Patient/family agree to work toward stated goals and plan of care. ?         Patient understands intent and goals of therapy, but is neutral about his/her participation. ? Patient is unable to participate in goal setting and plan of care.     Thank you for this referral.  Duke Hammans, PT   Time Calculation: 15 mins

## 2019-06-18 NOTE — PROGRESS NOTES
Patient seen in recovery room  Pre op right arm  Pain and B/L hand numbness arm currently not present. Extubated uneventfully  Postop pain  Controlled. Patient Vitals for the past 4 hrs:   Pulse Resp BP SpO2   06/18/19 1210 (!) 103 19 157/53 93 %   06/18/19 1205 (!) 103 15 157/61 94 %   06/18/19 1200 (!) 105 20 159/60 95 %   06/18/19 1155 (!) 105 -- 158/58 97 %   06/18/19 1153 -- -- 155/60 --       Following commands  Dressing clean and dry  hemovac in place and functioning with minimal output  Strong motor RUE and LUE, RLE and LLE   Sensation grossly intact to LT     Stable postop ACDF   D/C aaron in PACU  [place] collar  -periop antibiotics  -SCD's  -advance diet  -mobilize  -pain control as needed   -plan d/c home tomorrow  -will have nebulizer treatment in PACU, then Q4 prn.

## 2019-06-19 VITALS
SYSTOLIC BLOOD PRESSURE: 164 MMHG | RESPIRATION RATE: 16 BRPM | DIASTOLIC BLOOD PRESSURE: 69 MMHG | OXYGEN SATURATION: 99 % | TEMPERATURE: 97.9 F | HEART RATE: 101 BPM

## 2019-06-19 LAB — HGB BLD-MCNC: 10.5 G/DL (ref 11.5–16)

## 2019-06-19 PROCEDURE — 74011000250 HC RX REV CODE- 250: Performed by: NURSE PRACTITIONER

## 2019-06-19 PROCEDURE — 97116 GAIT TRAINING THERAPY: CPT

## 2019-06-19 PROCEDURE — 97165 OT EVAL LOW COMPLEX 30 MIN: CPT

## 2019-06-19 PROCEDURE — 77030012935 HC DRSG AQUACEL BMS -B

## 2019-06-19 PROCEDURE — 74011250636 HC RX REV CODE- 250/636: Performed by: NURSE PRACTITIONER

## 2019-06-19 PROCEDURE — 94640 AIRWAY INHALATION TREATMENT: CPT

## 2019-06-19 PROCEDURE — 36415 COLL VENOUS BLD VENIPUNCTURE: CPT

## 2019-06-19 PROCEDURE — 94760 N-INVAS EAR/PLS OXIMETRY 1: CPT

## 2019-06-19 PROCEDURE — 85018 HEMOGLOBIN: CPT

## 2019-06-19 PROCEDURE — 97535 SELF CARE MNGMENT TRAINING: CPT

## 2019-06-19 PROCEDURE — 99218 HC RM OBSERVATION: CPT

## 2019-06-19 PROCEDURE — 74011250637 HC RX REV CODE- 250/637: Performed by: NURSE PRACTITIONER

## 2019-06-19 RX ORDER — NALOXONE HYDROCHLORIDE 4 MG/.1ML
SPRAY NASAL
Qty: 1 EACH | Refills: 0 | Status: SHIPPED | OUTPATIENT
Start: 2019-06-19 | End: 2021-04-21 | Stop reason: ALTCHOICE

## 2019-06-19 RX ORDER — CHOLECALCIFEROL TAB 125 MCG (5000 UNIT) 125 MCG
5000 TAB ORAL DAILY
Qty: 60 TAB | Refills: 1 | Status: SHIPPED | OUTPATIENT
Start: 2019-06-19 | End: 2021-11-02

## 2019-06-19 RX ORDER — OXYCODONE HYDROCHLORIDE 5 MG/1
10 TABLET ORAL
Qty: 56 TAB | Refills: 0 | Status: SHIPPED | OUTPATIENT
Start: 2019-06-19 | End: 2019-06-26

## 2019-06-19 RX ORDER — ACETAMINOPHEN 500 MG
1000 TABLET ORAL EVERY 6 HOURS
Qty: 60 TAB | Refills: 0 | Status: SHIPPED | OUTPATIENT
Start: 2019-06-19 | End: 2022-07-13

## 2019-06-19 RX ADMIN — LISINOPRIL: 5 TABLET ORAL at 08:52

## 2019-06-19 RX ADMIN — Medication 10 ML: at 07:41

## 2019-06-19 RX ADMIN — POLYETHYLENE GLYCOL 3350 17 G: 17 POWDER, FOR SOLUTION ORAL at 08:54

## 2019-06-19 RX ADMIN — IPRATROPIUM BROMIDE AND ALBUTEROL SULFATE 3 ML: .5; 3 SOLUTION RESPIRATORY (INHALATION) at 07:16

## 2019-06-19 RX ADMIN — OXYCODONE HYDROCHLORIDE 5 MG: 5 TABLET ORAL at 08:52

## 2019-06-19 RX ADMIN — METOPROLOL TARTRATE 25 MG: 25 TABLET ORAL at 05:34

## 2019-06-19 RX ADMIN — ACETAMINOPHEN 1000 MG: 500 TABLET ORAL at 05:34

## 2019-06-19 RX ADMIN — SENNOSIDES, DOCUSATE SODIUM 1 TABLET: 50; 8.6 TABLET, FILM COATED ORAL at 08:52

## 2019-06-19 RX ADMIN — Medication 10 ML: at 08:55

## 2019-06-19 RX ADMIN — MAGNESIUM OXIDE TAB 400 MG (241.3 MG ELEMENTAL MG) 200 MG: 400 (241.3 MG) TAB at 08:52

## 2019-06-19 RX ADMIN — IPRATROPIUM BROMIDE AND ALBUTEROL SULFATE 3 ML: .5; 3 SOLUTION RESPIRATORY (INHALATION) at 01:32

## 2019-06-19 RX ADMIN — Medication 2 G: at 05:35

## 2019-06-19 RX ADMIN — METFORMIN HYDROCHLORIDE 500 MG: 500 TABLET ORAL at 08:52

## 2019-06-19 RX ADMIN — FAMOTIDINE 20 MG: 20 TABLET ORAL at 08:52

## 2019-06-19 NOTE — PROGRESS NOTES
Received scripts and a co[py of instructions. Scripts included a script for Oxycodone IR. Nurse read and explained all medications and instructions.  Verbalized understanding

## 2019-06-19 NOTE — PROGRESS NOTES
Occupational Therapy EVALUATION/discharge  Patient: Hugo Schmidt (89 y.o. female)  Date: 6/19/2019  Primary Diagnosis: Cervical radiculopathy [M54.12]  Osteoarthritis of cervical spine [M47.812]  Procedure(s) (LRB):  C4-7 ANTERIOR CERVICAL DISCECTOMY FUSION WITH ILIAC CREST BONE MARROW ASPIRATE (N/A) 1 Day Post-Op   Precautions:  Fall, Back    ASSESSMENT:   Based on the objective data described below, the patient presents with good overall activity tolerance on POD 1 of C4-7 anterior cervical discectomy fusion. Patient lives with her  who is unable to provide assistance at home however she has arranged for her sister-in-law to stay with her at discharge to assist as needed. Patient was educated on all cervical precautions, brace/collar application, adaptive ADL techniques, and safe transfer techniques. Patient was independent with bed mobility and supervision level for OOB transfers using rolling walker. Patient was independent/mod I level for all ADLs. Patient has no further skilled OT needs and is cleared from OT services at this time. Discharge Recommendations: None  Further Equipment Recommendations for Discharge: none       SUBJECTIVE:   Patient stated I am ready to go home.     OBJECTIVE DATA SUMMARY:   HISTORY:   Past Medical History:   Diagnosis Date    Arthritis     Claustrophobia     COPD     Degenerative disc disease, lumbar 1/6/2016    Diabetes ([address] 2012    She states the metformin is prevention only    Glaucoma     Gout attack 04/2019    Big toe    H/O sinus tachycardia 01/08/2019    Following shoulder surgery    High potassium 12/2018    Chronic- Stays around 5.3    History of nuclear stress test 02/10/2019    Normal    Hypertension     Obesity (BMI 30-39. 9)     Peripheral vascular disease ([address]     stents in each iliac artery, states they are \"watching\" carotids    Unspecified adverse effect of anesthesia     BP drops    Unspecified sleep apnea     Does not uses CPAP     Past Surgical History:   Procedure Laterality Date    HX BACK SURGERY  2006    LUMBAR- PLATES, SCREWS    HX HYSTERECTOMY  1980    HX LUMBAR LAMINECTOMY N/A 2016 & 2017    ALIF and then did the posterior after it didnt work    HX ORTHOPAEDIC Right     Ulnar nerve    HX ROTATOR CUFF REPAIR Left 2013    HX SHOULDER REPLACEMENT Right 01/07/2019    VASCULAR SURGERY PROCEDURE UNLIST Bilateral 2010    ILIAC STENT       Prior Level of Function/Environment/Context: Patient lives with her . She was independent with ADLs and functional mobility PTA. Home Situation  Home Environment: Private residence  One/Two Story Residence: Two story, live on 1st floor  Lift Chair Available: No  Living Alone: No  Support Systems: Spouse/Significant Other/Partner  Patient Expects to be Discharged to[de-identified] Private residence  Current DME Used/Available at Home: Adaptive dressing aides  Tub or Shower Type: Shower    Hand dominance: Right    EXAMINATION OF PERFORMANCE DEFICITS:  Cognitive/Behavioral Status:  Neurologic State: Alert  Orientation Level: Oriented X4  Cognition: Appropriate decision making; Appropriate for age attention/concentration; Appropriate safety awareness  Perception: Appears intact  Perseveration: No perseveration noted  Safety/Judgement: Awareness of environment;Good awareness of safety precautions    Skin: Intact in the uppers    Edema: None noted in the uppers    Hearing: Auditory  Auditory Impairment: None    Vision/Perceptual:    Tracking: Able to track stimulus in all quadrants w/o difficulty    Diplopia: No    Acuity: Within Defined Limits    Corrective Lenses: Glasses    Range of Motion:  WDL in the uppers    Strength:  WDL in the uppers     Coordination:  Fine Motor Skills-Upper: Left Intact; Right Intact    Gross Motor Skills-Upper: Left Intact; Right Intact    Tone & Sensation:  Tone: Normal  Sensation: patient reports dulled sensation distally at the hand although improved from pre surgery    Balance:  Sitting: Intact  Standing: Intact    Functional Mobility and Transfers for ADLs:  Bed Mobility:  Rolling: Independent(log roll)  Supine to Sit: Independent  Sit to Supine: (pt remained up at end of tx)  Scooting: Independent    Transfers:  Sit to Stand: Supervision  Stand to Sit: Supervision  Bed to Chair: Supervision  Bathroom Mobility: Supervision/set up  Toilet Transfer : Supervision    ADL Assessment:  Feeding: Independent    Oral Facial Hygiene/Grooming: Independent    Bathing: Modified independent    Upper Body Dressing: Independent    Lower Body Dressing: Modified independent    Toileting: Modified independent    Cognitive Retraining  Safety/Judgement: Awareness of environment;Good awareness of safety precautions     Patient educated on the role of OT and benefits of OOB activity. Patient educated on adaptive dressing techniques. Patient instructed to bring LE's into tailor/figure four position to don pants or prop on stool if unable to achieve tailor position. Emphasis on bringing clothing or any ADL objects up to midline/direct visual field instead of leaning forward or down to avoid increased strain on neck and to avoid breaking precautions. Patient instructed not to bend or twist neck when completing all activities, especially important when completing ADLs. Patient instructed not to lift more than 5 pounds. Patient instructed on safe transfers, including pacing self and ensure full body is rotating to avoid twisting at the cervical region. Education provided regarding environmental modifications including lowering or raising height of items (TV, computer, books) to position them in midline to again avoid increased neck strain or breaking precautions. Patient demonstrated good understanding of all education provided.        Functional Measure:  Barthel Index:    Bathin  Bladder: 10  Bowels: 10  Groomin  Dressing: 10  Feeding: 10  Mobility: 10  Stairs: 0  Toilet Use: 10  Transfer (Bed to Chair and Back): 10  Total: 80/100        Percentage of impairment   0%   1-19%   20-39%   40-59%   60-79%   80-99%   100%   Barthel Score 0-100 100 99-80 79-60 59-40 20-39 1-19   0     The Barthel ADL Index: Guidelines  1. The index should be used as a record of what a patient does, not as a record of what a patient could do. 2. The main aim is to establish degree of independence from any help, physical or verbal, however minor and for whatever reason. 3. The need for supervision renders the patient not independent. 4. A patient's performance should be established using the best available evidence. Asking the patient, friends/relatives and nurses are the usual sources, but direct observation and common sense are also important. However direct testing is not needed. 5. Usually the patient's performance over the preceding 24-48 hours is important, but occasionally longer periods will be relevant. 6. Middle categories imply that the patient supplies over 50 per cent of the effort. 7. Use of aids to be independent is allowed. Erasto Calle., Barthel, D.W. ([id number]). Functional evaluation: the Barthel Index. [address] [facility] (14)2. ROHITH Huff, Luba Ax., [facility] [place] [place], [address] [place] [address] (1999). Measuring the change indisability after inpatient rehabilitation; comparison of the responsiveness of the Barthel Index and Functional [place] Measure. Journal of Neurology, Neurosurgery, and Psychiatry, 66(4), 811-855. Emily Dover, [place].A, ROBERT McleodJ.ABBIE, & Rosalinda Lombardo, M.A. (2004.) Assessment of post-stroke quality of life in cost-effectiveness studies: The usefulness of the Barthel Index and the EuroQoL-5D.  Quality of Life Research, 15, 867-18       Occupational Therapy Evaluation Charge Determination   History Examination Decision-Making   LOW Complexity : Brief history review  LOW Complexity : 1-3 performance deficits relating to physical, cognitive , or psychosocial skils that result in activity limitations and / or participation restrictions  LOW Complexity : No comorbidities that affect functional and no verbal or physical assistance needed to complete eval tasks       Based on the above components, the patient evaluation is determined to be of the following complexity level: LOW      Activity Tolerance:   Patient tolerated eval well. Please refer to the flowsheet for vital signs taken during this treatment. After treatment:   [x]  Patient left in no apparent distress sitting up in chair  []  Patient left in no apparent distress in bed  [x]  Call bell left within reach  [x]  Nursing notified  []  Caregiver present  []  Bed alarm activated    COMMUNICATION/EDUCATION:   Communication/Collaboration:  [x]      Home safety education was provided and the patient/caregiver indicated understanding. [x]      Patient/family have participated as able and agree with findings and recommendations. []      Patient is unable to participate in plan of care at this time. Findings and recommendations were discussed with: Registered Nurse and patient.     Chu Smalls OTR/L  Time Calculation: 38 mins

## 2019-06-19 NOTE — DISCHARGE INSTRUCTIONS
Syd Portillo, [address]  Office Phone: [phone]  Neck Surgery Discharge Instructions  Activities   You are going home a well person, be as active as possible. Your only exercise should be walking. Start with short frequent walks and increase your walking distance each day. Start with walking twice a day for 5 minutes and increase your distance each day 2-3 minutes until you reach 25 minutes twice a day. Limit the amount of time you sit to 20-30 minute intervals. Sitting for prolonged periods of time will be uncomfortable for you following your surgery.  Do not lift anything over 8-10 pounds (about 1 gallon of milk), and do not do any bending or straining.  Avoid reaching overhead in this post-operative period   Do not do any neck exercises until you have been instructed by your doctor.  When you are in the bed, you may lay on your back or on either side. Do not lie on your stomach.  Continue using your incentive spirometer regularly for deep breathing exercises   You may resume sexual relations 2 weeks after your surgery    Diet   You may resume your normal diet. If your throat is sore, you may want to eat soft foods for a few days. Generally, foods should be soft and moist, avoid very dry or brittle foods (crackers, fried foods). Be sure to drink plenty of fluids, it is important to keep yourself hydrated. If you begin having trouble swallowing, call the office immediately.  Avoid alcoholic beverages and ABSOLUTELY NO tobacco products. Tobacco products will interfere with your healing. If you continue to use tobacco, you may end up needing another surgery in the future.  To support your fusion, nutrition is important. You should consume approximately 60-80 grams of lean protein daily, 5,000-10,000 mg Vit D3, 4887-6828 mg Calcium per day. Medications   Do not take anti-inflammatory medications or aspirin unless instructed by your physician.    Take your pain medication as directed.  Do NOT take additional Tylenol if your prescribed pain medication has acetaminophen in it (Endocet/Percocet, Lortab, Norco). You may sub   It is important to have regular bowel movements. Pain medications may cause constipation. Stool softeners, prune juice, and increasing your water and fiber intake may help in preventing constipation.  Do NOT take laxatives if at all possible except in severe situations. It can results in a vicious cycle of constipation and diarrhea.  Do not be alarmed if you still have some of the same symptoms you had prior to surgery. The nerves often require time to heal after the pressure has been relieved. You may experience pain in your shoulders or between your shoulder blades, which is common after this surgery. The level of pain you experience should improve as your body heals. Driving   You may not drive or return to work until instructed by your physician. However, you may ride in the car for short periods of time. Neck collar   Wear your neck brace. You may remove it for short breaks, when eating or showering. You must keep the brace on while sleeping and when ambulating. Showering   You may shower from shoulders down once you go home, wait for approximately 5 days after your surgery to shower head to toe and only if your incision is not draining.  You may remove your brace during showers.  Do not rub or apply lotion or ointments to the incision site.  Do not use tub baths, swimming pools or Jacuzzis. Caring for your incision   Keep the dressing on until 7 days after surgery. At that point, if the incision is dry and without drainage, you may keep the wound open to air without cover.  You may have steri-strips on your incision (small, white pieces of tape). Do not pull the steri-strips; they will fall off on their own after several days.   If you have sutures or staples, they will be removed by home health or when you see your physician.  Do not rub or apply any lotions or ointments to your incision site. Follow Up   Once you are home, call your physicians office to schedule an appointment 2-3 weeks after surgery. Notify your physician if you develop any of the following conditions:   Fever above 101 degrees for 24 hours.  Nausea or vomiting.  Severe headache.  Inability to urinate.  Loss of bowel or bladder control (sudden onset of incontinence).  Changes in sensation in your extremities (numbness, tingling, loss of color).  Severe pain or pain not relieved by medications.  Redness, swelling, or drainage from your incision.  Persistent pain in the chest.    Pain in the calf of either leg.  Increased weakness (if this is greater than before your surgery).

## 2019-06-19 NOTE — PROGRESS NOTES
6/19/2019 10:18 AM Rw order received and sent to [facility]. Rw delivered to pt. [facility] was sent pt's discharge clinicals and notified pt will discharge home today via [employer].  CARYL Kathleen

## 2019-06-19 NOTE — PROGRESS NOTES
ORTHOPAEDIC CERVICAL FUSION PROGRESS NOTE    NAME:     Darylene Dike   :       1950   MRN:       [id number]   DATE:      2019    POD:              1 Day Post-Op  S/P:              Procedure(s):  C4-7 ANTERIOR CERVICAL DISCECTOMY FUSION WITH ILIAC CREST BONE MARROW ASPIRATE    SUBJECTIVE:  C/O sore throat , Lozenges helped. Reports improvement in preop  Right  Arm pain and BUE numbness,  Mild numbness in right hand, a lot better than pre op. Postop pain controlled  With oxycodone and tylenol  Tolerating PO intake well, had dinner last night and breakfast today. Walked  Out of room yesterday and going to bathroom . Denies nausea/vomiting, headache, chest pain or shortness of breath  Recent Labs     19  0609   HGB 10.5*     Patient Vitals for the past 12 hrs:   BP Temp Pulse Resp SpO2   19 0720 155/72 97.2 °F (36.2 °C) 83 18 100 %   19 0318 134/78 97.5 °F (36.4 °C) (!) 106 17 96 %   19 2319 176/78 98 °F (36.7 °C) 98 18 96 %     Exam:  Positive strength/ROM bilat upper ext. Neuro intact to sensation   Anterior neck dressing is wet in one spot.   VISTA collar inplace / intact  Hemovac: Out put 30ml since surgery      PLAN: 1 Day Post-Op, improved   Continue PO pain medications as needed  Continue SCD's, frequent ambulation  Advance to regular diet  Continue cervical orthosis  Continue Vit D3  D/C to home today  D/C Rx on chart

## 2019-06-19 NOTE — PROGRESS NOTES
Problem: Mobility Impaired (Adult and Pediatric)  Goal: *Acute Goals and Plan of Care (Insert Text)  Description  Physical Therapy Goals  Initiated 6/18/2019    1. Patient will move from supine to sit and sit to supine  in bed with independence within 4 days. 2. Patient will perform sit to stand with modified independence within 4 days. 3. Patient will ambulate with modified independence for 100 feet with the least restrictive device within 4 days. 4. Patient will verbalize and demonstrate understanding of spinal precautions (No bending, lifting greater than 5 lbs, or twisting; log-roll technique; frequent repositioning as instructed) within 4 days. Outcome: Progressing Towards Goal   PHYSICAL THERAPY TREATMENT/DISCHARGE  Patient: Esha Barth (53 y.o. female)  Date: 6/19/2019  Diagnosis: Cervical radiculopathy [M54.12]  Osteoarthritis of cervical spine [M47.812] <principal problem not specified>  Procedure(s) (LRB):  C4-7 ANTERIOR CERVICAL DISCECTOMY FUSION WITH ILIAC CREST BONE MARROW ASPIRATE (N/A) 1 Day Post-Op  Precautions: Fall, Back  Chart, physical therapy assessment, plan of care and goals were reviewed. ASSESSMENT:  Pt received dressed and sitting up in chair with cervical collar in place. Pt able to verbalize all neck precautions. Systolic  with HR of 90. Sit to stand with SBA. Gait of 400' tolerated well using RW and SBA. Path deviations reported yesterday with PT, thus ordered RW. Pt demonstrating listing to the R without use of RW today. Recommending SPC on L with HHPT to train gait and balance upon discharge. Pt managed 1 step with RW with CGA. Returned to chair in room with elevated systolic BP of 192. Discharge RN present and reviewing BP meds with pt to assess if all on board. Pt agreeable to monitor BP at home as well. RN informed of PT clearance for discharge home. Progression toward goals:  ?      Improving appropriately and progressing toward goals  ? Improving slowly and progressing toward goals  ? Not making progress toward goals and plan of care will be adjusted     PLAN:  Patient will be discharged from acute skilled physical therapy at this time. Rationale for discharge:  ? Goals Achieved  ? Plateau Reached  ? Patient not participating in therapy  ? Other:  Discharge Recommendations:  Home Health  Further Equipment Recommendations for Discharge:  has RW; pt to get [facility] on own      SUBJECTIVE:   Patient stated ? I will check my BP at home. ?    OBJECTIVE DATA SUMMARY:   Critical Behavior:  Neurologic State: Alert  Orientation Level: Oriented X4  Cognition: Appropriate decision making, Appropriate for age attention/concentration, Appropriate safety awareness  Safety/Judgement: Awareness of environment, Good awareness of safety precautions  Functional Mobility Training:  Bed Mobility:  Rolling: Independent(log roll)  Supine to Sit: Independent  Sit to Supine: (pt remained up at end of tx)  Scooting: Independent        Transfers:  Sit to Stand: Supervision  Stand to Sit: Supervision        Bed to Chair: Supervision                    Balance:  Sitting: Intact  Standing: Intact  Ambulation/Gait Training:  Distance (ft): 400 Feet (ft)  Assistive Device: Brace/Splint;Crutches;Walker, rolling  Ambulation - Level of Assistance: Stand-by assistance        Gait Abnormalities: Path deviations(to R without RW)              Speed/Micki: Accelerated                     Barthel Index:    Bathin  Bladder: 10  Bowels: 10  Groomin  Dressing: 10  Feeding: 10  Mobility: 10  Stairs: 0  Toilet Use: 10  Transfer (Bed to Chair and Back): 10  Total: 80/100       Percentage of impairment   0%   1-19%   20-39%   40-59%   60-79%   80-99%   100%   Barthel Score 0-100 100 99-80 79-60 59-40 20-39 1-19   0     The Barthel ADL Index: Guidelines  1. The index should be used as a record of what a patient does, not as a record of what a patient could do.   2. The main aim is to establish degree of independence from any help, physical or verbal, however minor and for whatever reason. 3. The need for supervision renders the patient not independent. 4. A patient's performance should be established using the best available evidence. Asking the patient, friends/relatives and nurses are the usual sources, but direct observation and common sense are also important. However direct testing is not needed. 5. Usually the patient's performance over the preceding 24-48 hours is important, but occasionally longer periods will be relevant. 6. Middle categories imply that the patient supplies over 50 per cent of the effort. 7. Use of aids to be independent is allowed. Trinity Lam., Barthel, D.W. (8062). Functional evaluation: the Barthel Index. [address] [facility] (14)2. Joe Kang allyn ROHITH Heredia, Rei Milton., [facility] [place], [facility] (1999). Measuring the change indisability after inpatient rehabilitation; comparison of the responsiveness of the Barthel Index and Functional [place] Measure. Journal of Neurology, Neurosurgery, and Psychiatry, 66(4), 552-330. Daphne Mullins, N.J.A, SHAY Mcleod, & Arnulfo Bahena, MDeepA. (2004.) Assessment of post-stroke quality of life in cost-effectiveness studies: The usefulness of the Barthel Index and the EuroQoL-5D. Quality of Life Research, 15, 166-82      Stairs:  Number of Stairs Trained: 1  Stairs - Level of Assistance: Contact guard assistance  Pain:  Pain Scale 1: Numeric (0 - 10)  Pain Intensity 1: 0  Pain Location 1: Neck  Pain Orientation 1: Anterior  Pain Description 1: Sore  Pain Intervention(s) 1: Medication (see MAR)  Activity Tolerance:   good  Please refer to the flowsheet for vital signs taken during this treatment. After treatment:   ? Patient left in no apparent distress sitting up in chair  ? Patient left in no apparent distress in bed  ? Call bell left within reach  ? Nursing notified  ? Caregiver present  ?  Bed alarm activated    COMMUNICATION/COLLABORATION:   The patient?s plan of care was discussed with: Registered Nurse    José Miguel Lyles, PT   Time Calculation: 23 mins

## 2019-06-24 NOTE — DISCHARGE SUMMARY
Orthopedic Service Discharge Summary    Patient ID:  Hugo Schmidt  [id number]  female  70 y.o.  1950    Admit date: 6/18/2019    Discharge date and time: 6/19/2019 11:38 AM     Admitting Physician: Peter Garcia MD     Discharge Physician: Peter Garcia MD    Consulting Physician(s): Treatment Team: Utilization Review: [facility]; Care Manager: Lois Aguero    Date of Surgery: 6/18/2019     Preoperative Diagnosis:  CERVICAL RADICULOPATHY  OSTEOARTHRITIS OF CERVICAL SPINE, UNDPECIFIED SPINAL OSTEOARTHRITIS COMPLICATION STATUS  SPONDYLOLISTHESIS OF CERVICAL REGION  OSSEOUS AND SUBLUXATION STENOSIS OF INTERVERTEBRAL FORAMINA OF CERVICAL REGION   SPINAL STENOSIS OF CERVICAL REGION    Postoperative Diagnosis: CERVICAL RADICULOPATHY  OSTEOARTHRITIS OF CERVICAL SPINE, UNDPECIFIED SPINAL OSTEOARTHRITIS COMPLICATION STATUS  SPONDYLOLISTHESIS OF CERVICAL REGION  OSSEOUS AND SUBLUXATION STENOSIS  INTERVERTEBRAL FORAMINA OF CERVICAL REGION   SPINAL STENOSIS OF CERVICAL REGION    Procedure(s): Procedure(s):  C4-7 ANTERIOR CERVICAL DISCECTOMY FUSION WITH ILIAC CREST BONE MARROW ASPIRATE    Surgeon: Surgeon(s) and Role:     Isha Roberts MD - Primary      Anesthesia:  General    Preoperative Medical Clearance:                           HPI:  Pt is a 70 y.o. female who has a history of Cervical radiculopathy [M54.12]  Osteoarthritis of cervical spine [M47.812]  with pain and limitations of activities of daily living who presents at this time for a   Bilateral cervical radiculitis, right greater than left following the failure of conservative management.     PMH:   Past Medical History:   Diagnosis Date    Arthritis     Claustrophobia     COPD     Degenerative disc disease, lumbar 1/6/2016    Diabetes ([facility] [address] 2012    She states the metformin is prevention only    Glaucoma     Gout attack 04/2019    Big toe    H/O sinus tachycardia 01/08/2019    Following shoulder surgery    High potassium 12/2018    Chronic- Stays around 5.3    History of nuclear stress test 02/10/2019    Normal    Hypertension     Obesity (BMI 30-39. 9)     Peripheral vascular disease ([address]     stents in each iliac artery, states they are \"watching\" carotids    Unspecified adverse effect of anesthesia     BP drops    Unspecified sleep apnea     Does not uses CPAP       Medications upon admission :   Prior to Admission Medications   Prescriptions Last Dose Informant Patient Reported? Taking? Omega-3 Fatty Acids 60- mg cpDR 6/12/2019  Yes No   Sig: Take 2 Tabs by mouth every morning. ascorbic acid, vitamin C, (VITAMIN C) 500 mg tablet 6/12/2019  Yes No   Sig: Take 500 mg by mouth daily. calcium carbonate (OS-VAL) 500 mg calcium (1,250 mg) tablet 6/12/2019  Yes No   Sig: Take 1 Tab by mouth two (2) times a day. cholecalciferol (VITAMIN D3) 1,000 unit tablet 6/12/2019  Yes No   Sig: Take 1,000 Units by mouth every morning. docusate sodium (DOK) 250 mg capsule 6/12/2019  Yes No   Sig: Take 250 mg by mouth two (2) times a day. ferrous sulfate 325 mg (65 mg iron) tablet 6/12/2019  No No   Sig: Take 1 Tab by mouth daily (with breakfast). glucosamine sulfate (GLUCOSAMINE) 500 mg tablet 6/12/2019  Yes No   Sig: Take 1,000 mg by mouth daily. latanoprost (XALATAN) 0.005 % ophthalmic solution 6/17/2019 at 2000  Yes No   Sig: Administer 1 Drop to both eyes nightly. lisinopril-hydrochlorothiazide (PRINZIDE, ZESTORETIC) 10-12.5 mg per tablet 6/17/2019 at 0900  Yes No   Sig: Take 1 Tab by mouth every morning.   magnesium 200 mg tab 6/12/2019  Yes No   Sig: Take 1 Tab by mouth every morning. metFORMIN (GLUCOPHAGE) 500 mg tablet 6/17/2019 at 1900  Yes No   Sig: Take 500 mg by mouth two (2) times daily (with meals). metoprolol tartrate (LOPRESSOR) 25 mg tablet 6/18/2019 at 0400  No Yes   Sig: take 1 tablet by mouth every 12 hours   montelukast (SINGULAIR) 10 mg tablet 6/17/2019 at 2200  Yes No   Sig: Take 10 mg by mouth every evening. multivit with minerals/lutein (MULTIVITAMIN 50 PLUS PO) 6/12/2019  Yes No   Sig: Take 1 Tab by mouth every morning. pravastatin (PRAVACHOL) 80 mg tablet 6/17/2019 at 2200  Yes No   Sig: Take 80 mg by mouth nightly. umeclidinium-vilanterol (ANORO ELLIPTA) 62.5-25 mcg/actuation inhaler 6/18/2019 at 0400  Yes Yes   Sig: Take 1 Puff by inhalation every morning. vitamin E (AQUA GEMS) 400 unit capsule 6/12/2019  Yes No   Sig: Take 400 Units by mouth every morning. Facility-Administered Medications: None        Allergies: Allergies   Allergen Reactions    Diclofenac Anaphylaxis     SWELLING, DIFFICULTY BREATHING    Flexeril [Cyclobenzaprine] Swelling     Hands, face, throat swelling    Medrol [Methylprednisolone] Unknown (comments)     She has forgotten    Naproxen Swelling    Tolectin [Tolmetin] Swelling     Took this with Flexeril and does not know which med caused swelling of hands,face, throat        Hospital Course: The patient underwent surgery. Complications:  None; patient tolerated the procedure well. Was taken to the PACU in stable condition and then transferred to the Orthopedics floor. Perioperative Antibiotics:  Ancef     Postoperative Pain Management:  Acetaminophen and Oxycodone  DVT Prophylaxis: SCD    Postoperative transfusions:     none Banked PRBCs     Post Op complications: none    Hemoglobin at discharge:    Lab Results   Component Value Date/Time    HGB 10.5 (L) 06/19/2019 06:09 AM    INR 1.0 06/12/2019 11:04 AM       POD # 1. Incision - clean, dry and intact. No significant erythema or swelling. Neurovascular exam within normal limits. Wound appears to be healing without any evidence of infection. Pt had a HVAC drain the was removed on day 1. Dressing was changed to Aquacel. .      Physical Therapy started on the day of surgery and progressed to ambulation without assistance. Range of motion  limited by pain.   At the time of discharge, the pre op right arm pain,numbness, weakness and numbness in the left arm was improved, and had understanding of precautions needed following surgery. Discharged to: Home. Discharge instructions:  -See Full Summary of discharge instructions attached  -Anticoagulate with none  -Resume pre hospital diet            -Resume home medications   Discharge Medication List as of 6/19/2019 10:39 AM      START taking these medications    Details   acetaminophen (TYLENOL) 500 mg tablet Take 2 Tabs by mouth every six (6) hours. , Print, Disp-60 Tab, R-0      benzocaine-menthol (CEPACOL) 15-3.6 mg lozg lozenge Take 1 Lozenge by mouth as needed (sore throat). , Print, Disp-20 Lozenge, R-0      oxyCODONE IR (ROXICODONE) 5 mg immediate release tablet Take 2 Tabs by mouth every six (6) hours as needed for Pain for up to 7 days. Max Daily Amount: 40 mg., Print, Disp-56 Tab, R-0      naloxone (NARCAN) 4 mg/actuation nasal spray Use 1 spray intranasally, then discard. Repeat with new spray every 2 min as needed for opioid overdose symptoms, alternating nostrils. , Print, Disp-1 Each, R-0         CONTINUE these medications which have CHANGED    Details   cholecalciferol, VITAMIN D3, (VITAMIN D3) 5,000 unit tab tablet Take 1 Tab by mouth daily. , Print, Disp-60 Tab, R-1         CONTINUE these medications which have NOT CHANGED    Details   calcium carbonate (OS-VAL) 500 mg calcium (1,250 mg) tablet Take 1 Tab by mouth two (2) times a day., Historical Med      metoprolol tartrate (LOPRESSOR) 25 mg tablet take 1 tablet by mouth every 12 hours, Normal, Disp-60 Tab, R-6      ferrous sulfate 325 mg (65 mg iron) tablet Take 1 Tab by mouth daily (with breakfast). , Print, Disp-60 Tab, R-0      umeclidinium-vilanterol (ANORO ELLIPTA) 62.5-25 mcg/actuation inhaler Take 1 Puff by inhalation every morning., Historical Med      glucosamine sulfate (GLUCOSAMINE) 500 mg tablet Take 1,000 mg by mouth daily. , Historical Med      magnesium 200 mg tab Take 1 Tab by mouth every morning., Historical Med      vitamin E (AQUA GEMS) 400 unit capsule Take 400 Units by mouth every morning., Historical Med      Omega-3 Fatty Acids 60- mg cpDR Take 2 Tabs by mouth every morning., Historical Med      multivit with minerals/lutein (MULTIVITAMIN 50 PLUS PO) Take 1 Tab by mouth every morning., Historical Med      ascorbic acid, vitamin C, (VITAMIN C) 500 mg tablet Take 500 mg by mouth daily. , Historical Med      docusate sodium (DOK) 250 mg capsule Take 250 mg by mouth two (2) times a day., Historical Med      metFORMIN (GLUCOPHAGE) 500 mg tablet Take 500 mg by mouth two (2) times daily (with meals). , Historical Med      montelukast (SINGULAIR) 10 mg tablet Take 10 mg by mouth every evening., Historical Med      lisinopril-hydrochlorothiazide (PRINZIDE, ZESTORETIC) 10-12.5 mg per tablet Take 1 Tab by mouth every morning., Historical Med      pravastatin (PRAVACHOL) 80 mg tablet Take 80 mg by mouth nightly.  , Historical Med      latanoprost (XALATAN) 0.005 % ophthalmic solution Administer 1 Drop to both eyes nightly.  , Historical Med         STOP taking these medications       aspirin (ASPIRIN) 325 mg tablet Comments:   Reason for Stopping:            per medical continuation form  -Discharge activity: Activity as tolerated and No driving while on analgesics  -Ambulate  Frequently  -Wound Care Keep wound clean and dry, Reinforce dressing PRN, Ice to area for comfort and As directed  -Follow up in office in 2 weeks      Signed:  Refugio Chow NP  6/24/2019  2:24 PM        No att. providers found

## 2019-11-11 ENCOUNTER — HOSPITAL ENCOUNTER (OUTPATIENT)
Dept: GENERAL RADIOLOGY | Age: 69
Discharge: HOME | End: 2019-11-11
Payer: MEDICARE

## 2019-11-11 DIAGNOSIS — J44.9 CHRONIC OBSTRUCTIVE PULMONARY DISEASE (COPD) (HCC): ICD-10-CM

## 2019-11-11 PROCEDURE — 71046 X-RAY EXAM CHEST 2 VIEWS: CPT

## 2019-12-11 RX ORDER — METOPROLOL TARTRATE 25 MG/1
TABLET, FILM COATED ORAL
Qty: 60 TAB | Refills: 0 | Status: SHIPPED | OUTPATIENT
Start: 2019-12-11 | End: 2020-01-15

## 2020-01-15 RX ORDER — METOPROLOL TARTRATE 25 MG/1
TABLET, FILM COATED ORAL
Qty: 60 TAB | Refills: 0 | Status: SHIPPED | OUTPATIENT
Start: 2020-01-15 | End: 2020-02-13

## 2020-02-10 ENCOUNTER — OFFICE VISIT (OUTPATIENT)
Dept: CARDIOLOGY CLINIC | Age: 70
End: 2020-02-10

## 2020-02-10 VITALS
DIASTOLIC BLOOD PRESSURE: 56 MMHG | BODY MASS INDEX: 32.78 KG/M2 | HEART RATE: 81 BPM | OXYGEN SATURATION: 96 % | SYSTOLIC BLOOD PRESSURE: 118 MMHG | WEIGHT: 185 LBS | HEIGHT: 63 IN

## 2020-02-10 DIAGNOSIS — E78.5 HYPERLIPIDEMIA, UNSPECIFIED HYPERLIPIDEMIA TYPE: ICD-10-CM

## 2020-02-10 DIAGNOSIS — I73.9 PVD (PERIPHERAL VASCULAR DISEASE) (HCC): ICD-10-CM

## 2020-02-10 DIAGNOSIS — I10 ESSENTIAL HYPERTENSION: Primary | ICD-10-CM

## 2020-02-10 RX ORDER — ASPIRIN 325 MG
325 TABLET ORAL DAILY
COMMUNITY
End: 2022-06-30

## 2020-02-10 RX ORDER — AMLODIPINE BESYLATE 2.5 MG/1
TABLET ORAL
COMMUNITY
Start: 2020-01-06 | End: 2021-01-22

## 2020-02-10 NOTE — PROGRESS NOTES
Daniel Keating MD    [address] [place], [address] [facility]    Office [phone],MICHAEL [phone]  Pager [phone]    Esha Barth is a 78 y.o. female is here for f/u visit . Primary care physician:  Guera Marquez MD    Patient Active Problem List   Diagnosis Code    Other affections of shoulder region, not elsewhere classified M75.80    Primary localized osteoarthrosis, shoulder region M19.019    Rotator cuff tear M75.100    Biceps tendinopathy M67.929    Degenerative disc disease, lumbar M51.36    Osteoarthritis of right shoulder M19.011    Osteoarthritis of cervical spine M47.812    Cervical radiculopathy M54.12       Dear Dr. Grupo Delgado,    I had the pleasure of seeing Ms. Esha Barth in the office today. Chief complaint:  Chief Complaint   Patient presents with    Hypertension    Cholesterol Problem    Other     PVD       Assessment:    HTN  PVD w hx of rosalie iliac stents/carotid artery stenosis ( Dr Beatriz Martinez)   Hx of Sinus tachycardia  HLD    Plan:     BP nml  2019 - saw vascular surgeon - had doppler study done which was OK  Cont Meds  Lipids per PCP  F/u 1 year /prn    Patient understands the plan. All questions were answered to the patient's satisfaction. Medication Side Effects and Warnings were discussed with patient: yes  Patient Labs were reviewed and or requested:  yes  Patient Past Records were reviewed and or requested: yes    I appreciate the opportunity to be involved in Ms. Joshi. See note below for details. Please do not hesitate to contact us with questions or concerns. Daniel Keating MD    Cardiac Testing/ Procedures: A. Cardiac Cath/PCI:    B.ECHO/ROYAL: 1/8/19 - EF 55-60%/Mild MR    C. StressNuclear/Stress ECHO/Stress test: 3/3029 - Nml Lexu nuc ; 78%    D.Vascular:    E. EP:    F. Miscellaneous:    Subjective:  Esha Barth is a 78 y.o. female who returns for follow up visit.      No CP/Dyspnea/dizziness/syncope  Continues to have hip and knee pain        ROS:  (bold if positive, if negative)             Medications before admission:    Current Outpatient Medications   Medication Sig Dispense    amLODIPine (NORVASC) 2.5 mg tablet TAKE ONE TABLET BY MOUTH EVERY DAY     aspirin (ASPIRIN) 325 mg tablet Take 325 mg by mouth daily.  metoprolol tartrate (LOPRESSOR) 25 mg tablet take 1 tablet by mouth every 12 hours 60 Tab    acetaminophen (TYLENOL) 500 mg tablet Take 2 Tabs by mouth every six (6) hours. 60 Tab    benzocaine-menthol (CEPACOL) 15-3.6 mg lozg lozenge Take 1 Lozenge by mouth as needed (sore throat). 20 Lozenge    cholecalciferol, VITAMIN D3, (VITAMIN D3) 5,000 unit tab tablet Take 1 Tab by mouth daily. 60 Tab    naloxone (NARCAN) 4 mg/actuation nasal spray Use 1 spray intranasally, then discard. Repeat with new spray every 2 min as needed for opioid overdose symptoms, alternating nostrils. 1 Each    calcium carbonate (OS-VAL) 500 mg calcium (1,250 mg) tablet Take 1 Tab by mouth two (2) times a day.  ferrous sulfate 325 mg (65 mg iron) tablet Take 1 Tab by mouth daily (with breakfast). 60 Tab    umeclidinium-vilanterol (ANORO ELLIPTA) 62.5-25 mcg/actuation inhaler Take 1 Puff by inhalation every morning.  glucosamine sulfate (GLUCOSAMINE) 500 mg tablet Take 1,000 mg by mouth daily.  magnesium 200 mg tab Take 1 Tab by mouth every morning.  vitamin E (AQUA GEMS) 400 unit capsule Take 400 Units by mouth every morning.  Omega-3 Fatty Acids 60- mg cpDR Take 2 Tabs by mouth every morning.  multivit with minerals/lutein (MULTIVITAMIN 50 PLUS PO) Take 1 Tab by mouth every morning.  ascorbic acid, vitamin C, (VITAMIN C) 500 mg tablet Take 500 mg by mouth daily.  docusate sodium (DOK) 250 mg capsule Take 250 mg by mouth two (2) times a day.  metFORMIN (GLUCOPHAGE) 500 mg tablet Take 500 mg by mouth two (2) times daily (with meals).      montelukast (SINGULAIR) 10 mg tablet Take 10 mg by mouth every evening.  lisinopril-hydrochlorothiazide (PRINZIDE, ZESTORETIC) 10-12.5 mg per tablet Take 1 Tab by mouth every morning.  pravastatin (PRAVACHOL) 80 mg tablet Take 80 mg by mouth nightly.  latanoprost (XALATAN) 0.005 % ophthalmic solution Administer 1 Drop to both eyes nightly. No current facility-administered medications for this visit. Family History of CAD:    Yes    Social History:  Current  Smoker  No ( Quit 2004)    Physical Exam:  Visit Vitals  /56 (BP 1 Location: Left arm, BP Patient Position: Sitting)   Pulse 81   Ht 5' 3\" (1.6 m)   Wt 185 lb (83.9 kg)   SpO2 96%   BMI 32.77 kg/m²          Gen: Well-developed, well-nourished, in no acute distress  Neck: Supple,No JVD, No Carotid Bruit,   Resp: No accessory muscle use, Clear breath sounds, No rales or rhonchi  Card: Regular Rate,Rythm,Normal S1, S2, No murmurs, rubs or gallop. No thrills.    Abd:  Soft, non-tender, non-distended,BS+,   MSK: No cyanosis  Skin: No rashes    Neuro: moving all four extremities , follows commands appropriately  Psych:  Good insight, oriented to person, place , alert, Nml Affect  LE: No edema    EKG:       LABS:        Lab Results   Component Value Date/Time    WBC 4.5 06/12/2019 11:04 AM    HGB 10.5 (L) 06/19/2019 06:09 AM    HCT 37.9 06/12/2019 11:04 AM    PLATELET 029 81/20/8157 11:04 AM     Lab Results   Component Value Date/Time    Sodium 135 (L) 06/12/2019 11:04 AM    Potassium 5.3 (H) 06/12/2019 11:04 AM    Chloride 101 06/12/2019 11:04 AM    CO2 29 06/12/2019 11:04 AM    Anion gap 5 06/12/2019 11:04 AM    Glucose 93 06/12/2019 11:04 AM    BUN 19 06/12/2019 11:04 AM    Creatinine 0.89 06/12/2019 11:04 AM    BUN/Creatinine ratio 21 (H) 06/12/2019 11:04 AM    GFR est AA >60 06/12/2019 11:04 AM    GFR est non-AA >60 06/12/2019 11:04 AM    Calcium 9.7 06/12/2019 11:04 AM       Lab Results   Component Value Date/Time    aPTT 30.6 06/12/2019 11:04 AM     Lab Results   Component Value Date/Time    INR 1.0 06/12/2019 11:04 AM    INR 1.0 12/21/2018 09:21 AM    INR 0.9 05/31/2016 12:30 PM    Prothrombin time 9.8 06/12/2019 11:04 AM    Prothrombin time 9.9 12/21/2018 09:21 AM    Prothrombin time 9.4 05/31/2016 12:30 PM     No components found for: Clemente Rosa MD

## 2020-02-10 NOTE — PROGRESS NOTES
Micki Newsome is a 78 y.o. female    Chief Complaint   Patient presents with    Hypertension    Cholesterol Problem    Other     PVD       Chest pain No    SOB with COPD    Dizziness No    Swelling No    Refills metoprolol to [facility]     Visit Vitals  /56 (BP 1 Location: Left arm, BP Patient Position: Sitting)   Pulse 81   Ht 5' 3\" (1.6 m)   Wt 185 lb (83.9 kg)   SpO2 96%   BMI 32.77 kg/m²       1. Have you been to the ER, urgent care clinic since your last visit? Hospitalized since your last visit? [facility] 6/18-6/19/2019    2. Have you seen or consulted any other health care providers outside of the [address] [place] [address] since your last visit? Include any pap smears or colon screening.   No

## 2020-02-10 NOTE — PATIENT INSTRUCTIONS
High Blood Pressure: Care Instructions  Overview    It's normal for blood pressure to go up and down throughout the day. But if it stays up, you have high blood pressure. Another name for high blood pressure is hypertension. Despite what a lot of people think, high blood pressure usually doesn't cause headaches or make you feel dizzy or lightheaded. It usually has no symptoms. But it does increase your risk of stroke, heart attack, and other problems. You and your doctor will talk about your risks of these problems based on your blood pressure. Your doctor will give you a goal for your blood pressure. Your goal will be based on your health and your age. Lifestyle changes, such as eating healthy and being active, are always important to help lower blood pressure. You might also take medicine to reach your blood pressure goal.  Follow-up care is a key part of your treatment and safety. Be sure to make and go to all appointments, and call your doctor if you are having problems. It's also a good idea to know your test results and keep a list of the medicines you take. How can you care for yourself at home? Medical treatment  · If you stop taking your medicine, your blood pressure will go back up. You may take one or more types of medicine to lower your blood pressure. Be safe with medicines. Take your medicine exactly as prescribed. Call your doctor if you think you are having a problem with your medicine. · Talk to your doctor before you start taking aspirin every day. Aspirin can help certain people lower their risk of a heart attack or stroke. But taking aspirin isn't right for everyone, because it can cause serious bleeding. · See your doctor regularly. You may need to see the doctor more often at first or until your blood pressure comes down. · If you are taking blood pressure medicine, talk to your doctor before you take decongestants or anti-inflammatory medicine, such as ibuprofen.  Some of these medicines can raise blood pressure. · Learn how to check your blood pressure at home. Lifestyle changes  · Stay at a healthy weight. This is especially important if you put on weight around the waist. Losing even 10 pounds can help you lower your blood pressure. · If your doctor recommends it, get more exercise. Walking is a good choice. Bit by bit, increase the amount you walk every day. Try for at least 30 minutes on most days of the week. You also may want to swim, bike, or do other activities. · Avoid or limit alcohol. Talk to your doctor about whether you can drink any alcohol. · Try to limit how much sodium you eat to less than 2,300 milligrams (mg) a day. Your doctor may ask you to try to eat less than 1,500 mg a day. · Eat plenty of fruits (such as bananas and oranges), vegetables, legumes, whole grains, and low-fat dairy products. · Lower the amount of saturated fat in your diet. Saturated fat is found in animal products such as milk, cheese, and meat. Limiting these foods may help you lose weight and also lower your risk for heart disease. · Do not smoke. Smoking increases your risk for heart attack and stroke. If you need help quitting, talk to your doctor about stop-smoking programs and medicines. These can increase your chances of quitting for good. When should you call for help? Call  911 anytime you think you may need emergency care. This may mean having symptoms that suggest that your blood pressure is causing a serious heart or blood vessel problem. Your blood pressure may be over 180/120.   For example, call  911 if:    · You have symptoms of a heart attack. These may include:  ? Chest pain or pressure, or a strange feeling in the chest.  ? Sweating. ? Shortness of breath. ? Nausea or vomiting. ? Pain, pressure, or a strange feeling in the back, neck, jaw, or upper belly or in one or both shoulders or arms. ? Lightheadedness or sudden weakness.   ? A fast or irregular heartbeat.     · You have symptoms of a stroke. These may include:  ? Sudden numbness, tingling, weakness, or loss of movement in your face, arm, or leg, especially on only one side of your body. ? Sudden vision changes. ? Sudden trouble speaking. ? Sudden confusion or trouble understanding simple statements. ? Sudden problems with walking or balance. ? A sudden, severe headache that is different from past headaches.     · You have severe back or belly pain.    Do not wait until your blood pressure comes down on its own. Get help right away.   Call your doctor now or seek immediate care if:    · Your blood pressure is much higher than normal (such as 180/120 or higher), but you don't have symptoms.     · You think high blood pressure is causing symptoms, such as:  ? Severe headache.  ? Blurry vision.    Watch closely for changes in your health, and be sure to contact your doctor if:    · Your blood pressure measures higher than your doctor recommends at least 2 times. That means the top number is higher or the bottom number is higher, or both.     · You think you may be having side effects from your blood pressure medicine. Where can you learn more? Go to http://felecia-salvador.info/. Enter S784 in the search box to learn more about \"High Blood Pressure: Care Instructions. \"  Current as of: April 9, 2019  Content Version: 12.2  © 6958-2493 [employer]. Care instructions adapted under license by [employer] (which disclaims liability or warranty for this information). If you have questions about a medical condition or this instruction, always ask your healthcare professional. [address] any warranty or liability for your use of this information.

## 2020-02-10 NOTE — LETTER
2/10/20 Patient: Zeynep Godwin YOB: 1950 Date of Visit: 2/10/2020 Margi Mcdaniels MD 
[address] [facility] [address] VIA Facsimile: [phone] Dear Margi Mcdaniels MD, Thank you for referring Ms. Edna Stephenson to [facility] for evaluation. My notes for this consultation are attached. If you have questions, please do not hesitate to call me. I look forward to following your patient along with you. Sincerely, Eliana Michael MD

## 2020-02-13 RX ORDER — METOPROLOL TARTRATE 25 MG/1
TABLET, FILM COATED ORAL
Qty: 180 TAB | Refills: 3 | Status: SHIPPED | OUTPATIENT
Start: 2020-02-13 | End: 2020-03-10 | Stop reason: SDUPTHER

## 2020-03-11 RX ORDER — METOPROLOL TARTRATE 25 MG/1
TABLET, FILM COATED ORAL
Qty: 180 TAB | Refills: 3 | Status: SHIPPED | OUTPATIENT
Start: 2020-03-11 | End: 2021-04-13

## 2020-03-11 NOTE — TELEPHONE ENCOUNTER
Requested Prescriptions     Pending Prescriptions Disp Refills    metoprolol tartrate (LOPRESSOR) 25 mg tablet 180 Tab 3     Sig: take 1 tablet by mouth every 12 hours

## 2020-11-09 ENCOUNTER — HOSPITAL ENCOUNTER (EMERGENCY)
Age: 70
Discharge: HOME | End: 2020-11-09
Attending: EMERGENCY MEDICINE
Payer: MEDICARE

## 2020-11-09 ENCOUNTER — APPOINTMENT (OUTPATIENT)
Dept: GENERAL RADIOLOGY | Age: 70
End: 2020-11-09
Attending: EMERGENCY MEDICINE
Payer: MEDICARE

## 2020-11-09 VITALS
HEIGHT: 63 IN | DIASTOLIC BLOOD PRESSURE: 54 MMHG | RESPIRATION RATE: 18 BRPM | HEART RATE: 81 BPM | OXYGEN SATURATION: 98 % | WEIGHT: 180 LBS | BODY MASS INDEX: 31.89 KG/M2 | SYSTOLIC BLOOD PRESSURE: 130 MMHG | TEMPERATURE: 98.4 F

## 2020-11-09 DIAGNOSIS — S51.011A LACERATION OF SKIN OF RIGHT ELBOW, INITIAL ENCOUNTER: Primary | ICD-10-CM

## 2020-11-09 PROCEDURE — 99282 EMERGENCY DEPT VISIT SF MDM: CPT

## 2020-11-09 PROCEDURE — 73080 X-RAY EXAM OF ELBOW: CPT

## 2020-11-09 PROCEDURE — 75810000293 HC SIMP/SUPERF WND  RPR

## 2020-11-09 NOTE — ED TRIAGE NOTES
\" I stumbled about an hour ago and fell onto my right elbow and its hurting, and its a little cut there \"

## 2020-11-09 NOTE — ED PROVIDER NOTES
EMERGENCY DEPARTMENT HISTORY AND PHYSICAL EXAM      Date: 11/9/2020  Patient Name: Regla Oliveira    History of Presenting Illness     Chief Complaint   Patient presents with   [facility] Fall    Elbow Pain       History Provided By: Patient    HPI: Regla Oliveira, 69 y.o. female with a past medical history significant for multiple medical problems presents to the ED with cc of injury to right elbow after she stumbled and fell. No other injuries reported patient is having persistent pain at site of injury. There are no other complaints, changes, or physical findings at this time. PCP: Shania Singleton MD    No current facility-administered medications on file prior to encounter. Current Outpatient Medications on File Prior to Encounter   Medication Sig Dispense Refill    metoprolol tartrate (LOPRESSOR) 25 mg tablet take 1 tablet by mouth every 12 hours 180 Tab 3    amLODIPine (NORVASC) 2.5 mg tablet TAKE ONE TABLET BY MOUTH EVERY DAY      aspirin (ASPIRIN) 325 mg tablet Take 325 mg by mouth daily.  acetaminophen (TYLENOL) 500 mg tablet Take 2 Tabs by mouth every six (6) hours. 60 Tab 0    benzocaine-menthol (CEPACOL) 15-3.6 mg lozg lozenge Take 1 Lozenge by mouth as needed (sore throat). 20 Lozenge 0    cholecalciferol, VITAMIN D3, (VITAMIN D3) 5,000 unit tab tablet Take 1 Tab by mouth daily. 60 Tab 1    naloxone (NARCAN) 4 mg/actuation nasal spray Use 1 spray intranasally, then discard. Repeat with new spray every 2 min as needed for opioid overdose symptoms, alternating nostrils. 1 Each 0    calcium carbonate (OS-VAL) 500 mg calcium (1,250 mg) tablet Take 1 Tab by mouth two (2) times a day.  ferrous sulfate 325 mg (65 mg iron) tablet Take 1 Tab by mouth daily (with breakfast). 60 Tab 0    umeclidinium-vilanterol (ANORO ELLIPTA) 62.5-25 mcg/actuation inhaler Take 1 Puff by inhalation every morning.  glucosamine sulfate (GLUCOSAMINE) 500 mg tablet Take 1,000 mg by mouth daily.       magnesium 200 mg tab Take 1 Tab by mouth every morning.  vitamin E (AQUA GEMS) 400 unit capsule Take 400 Units by mouth every morning.  Omega-3 Fatty Acids 60- mg cpDR Take 2 Tabs by mouth every morning.  multivit with minerals/lutein (MULTIVITAMIN 50 PLUS PO) Take 1 Tab by mouth every morning.  ascorbic acid, vitamin C, (VITAMIN C) 500 mg tablet Take 500 mg by mouth daily.  docusate sodium (DOK) 250 mg capsule Take 250 mg by mouth two (2) times a day.  metFORMIN (GLUCOPHAGE) 500 mg tablet Take 500 mg by mouth two (2) times daily (with meals).  montelukast (SINGULAIR) 10 mg tablet Take 10 mg by mouth every evening.  lisinopril-hydrochlorothiazide (PRINZIDE, ZESTORETIC) 10-12.5 mg per tablet Take 1 Tab by mouth every morning.  pravastatin (PRAVACHOL) 80 mg tablet Take 80 mg by mouth nightly.  latanoprost (XALATAN) 0.005 % ophthalmic solution Administer 1 Drop to both eyes nightly. Past History     Past Medical History:  Past Medical History:   Diagnosis Date    Arthritis     Claustrophobia     COPD     Degenerative disc disease, lumbar 1/6/2016    Diabetes ([address] 2012    She states the metformin is prevention only    Glaucoma     Gout attack 04/2019    Big toe    H/O sinus tachycardia 01/08/2019    Following shoulder surgery    High potassium 12/2018    Chronic- Stays around 5.3    History of nuclear stress test 02/10/2019    Normal    Hypertension     Obesity (BMI 30-39. 9)     Peripheral vascular disease ([address]     stents in each iliac artery, states they are \"watching\" carotids    Unspecified adverse effect of anesthesia     BP drops    Unspecified sleep apnea     Does not uses CPAP       Past Surgical History:  Past Surgical History:   Procedure Laterality Date    HX BACK SURGERY  2006    LUMBAR- PLATES, SCREWS    HX HYSTERECTOMY  1980    HX LUMBAR LAMINECTOMY N/A 2016 & 2017    ALIF and then did the posterior after it didnt work    HX ORTHOPAEDIC Right     Ulnar nerve    HX ROTATOR CUFF REPAIR Left     HX SHOULDER REPLACEMENT Right 2019    VASCULAR SURGERY PROCEDURE UNLIST Bilateral 2010    ILIAC STENT       Family History:  Family History   Problem Relation Age of Onset    Heart Disease Father     Heart Attack Father 36    No Known Problems Sister     Deep Vein Thrombosis Brother     Heart Disease Brother        Social History:  Social History     Tobacco Use    Smoking status: Former Smoker     Packs/day: 2.00     Years: 35.00     Pack years: 70.00     Types: Cigarettes     Last attempt to quit: 10/2/2004     Years since quittin.1    Smokeless tobacco: Never Used   Substance Use Topics    Alcohol use: Yes     Alcohol/week: 4.0 standard drinks     Types: 2 Glasses of wine, 2 Cans of beer per week    Drug use: No       Allergies: Allergies   Allergen Reactions    Diclofenac Anaphylaxis     SWELLING, DIFFICULTY BREATHING    Flexeril [Cyclobenzaprine] Swelling     Hands, face, throat swelling    Medrol [Methylprednisolone] Unknown (comments)     She has forgotten    Naproxen Swelling    Tolectin [Tolmetin] Swelling     Took this with Flexeril and does not know which med caused swelling of hands,face, throat         Review of Systems     Review of Systems   Constitutional: Negative. HENT: Negative. Eyes: Negative. Respiratory: Negative. Cardiovascular: Negative. Gastrointestinal: Negative. Endocrine: Negative. Genitourinary: Negative. Musculoskeletal: Negative. Skin: Positive for wound. As in HPI small wound to right elbow. Neurological: Negative. Hematological: Negative. Psychiatric/Behavioral: Negative. All other systems reviewed and are negative. Physical Exam     Physical Exam  Vitals signs and nursing note reviewed. Constitutional:       Appearance: Normal appearance. She is normal weight. HENT:      Head: Normocephalic and atraumatic.       Nose: Nose normal. Mouth/Throat:      Mouth: Mucous membranes are moist.      Pharynx: Oropharynx is clear. Eyes:      Extraocular Movements: Extraocular movements intact. Conjunctiva/sclera: Conjunctivae normal.      Pupils: Pupils are equal, round, and reactive to light. Neck:      Musculoskeletal: Normal range of motion and neck supple. Cardiovascular:      Rate and Rhythm: Normal rate and regular rhythm. Pulses: Normal pulses. Heart sounds: Normal heart sounds. Pulmonary:      Effort: Pulmonary effort is normal.      Breath sounds: Normal breath sounds. Abdominal:      General: Abdomen is flat. Palpations: Abdomen is soft. Musculoskeletal: Normal range of motion. Skin:     General: Skin is warm and dry. Capillary Refill: Capillary refill takes less than 2 seconds. Comments: Small superficial skin laceration R elbow   Neurological:      General: No focal deficit present. Mental Status: She is alert and oriented to person, place, and time. Psychiatric:         Mood and Affect: Mood normal.         Behavior: Behavior normal.         Lab and Diagnostic Study Results     Labs -   No results found for this or any previous visit (from the past 12 hour(s)). Radiologic Studies -   @lastxrresult@  CT Results  (Last 48 hours)    None        CXR Results  (Last 48 hours)    None        Study Result     EXAM: XR ELBOW RT MIN 3 V     INDICATION: injury/pain     COMPARISON: X-ray of the right forearm fiber 5 2016     FINDINGS:   A few small foci of bone density at the ulnar aspect of the distal right  humerus. Otherwise no displaced fracture. No large joint effusion.     IMPRESSION  IMPRESSION:   A few small foci of bone density at ulnar aspect of the distal humerus, best  seen on the AP view, which is likely chronic but can represent small avulsion  injury.  Correlate with physical exam.     Procedure area of superficial laceration cleaned in the usual sterile manner, the wound measures approximately 2 cm. Wound edges apposed and Dermabond applied. Patient tolerated procedure well. No complications    Medical Decision Making   - I am the first provider for this patient. - I reviewed the vital signs, available nursing notes, past medical history, past surgical history, family history and social history. - Initial assessment performed. The patients presenting problems have been discussed, and they are in agreement with the care plan formulated and outlined with them. I have encouraged them to ask questions as they arise throughout their visit. Vital Signs-Reviewed the patient's vital signs. No data found. Records Reviewed: Nursing Notes    ED Course/Provider Notes (Medical Decision Making): Uneventful ED course, clinical improvement with therapy, patient will be discharged to followup with PCP as directed      Disposition     Disposition: Condition stable and improved  DC- Adult Discharges: All of the diagnostic tests were reviewed and questions answered. Diagnosis, care plan and treatment options were discussed. The patient understands the instructions and will follow up as directed. The patients results have been reviewed with them. They have been counseled regarding their diagnosis. The patient verbally convey understanding and agreement of the signs, symptoms, diagnosis, treatment and prognosis and additionally agrees to follow up as recommended with their PCP in 24 - 48 hours. They also agree with the care-plan and convey that all of their questions have been answered. I have also put together some discharge instructions for them that include: 1) educational information regarding their diagnosis, 2) how to care for their diagnosis at home, as well a 3) list of reasons why they would want to return to the ED prior to their follow-up appointment, should their condition change. Discharged    DISCHARGE PLAN:  1.    Current Discharge Medication List      CONTINUE these medications which have NOT CHANGED    Details   metoprolol tartrate (LOPRESSOR) 25 mg tablet take 1 tablet by mouth every 12 hours  Qty: 180 Tab, Refills: 3      amLODIPine (NORVASC) 2.5 mg tablet TAKE ONE TABLET BY MOUTH EVERY DAY      aspirin (ASPIRIN) 325 mg tablet Take 325 mg by mouth daily. acetaminophen (TYLENOL) 500 mg tablet Take 2 Tabs by mouth every six (6) hours. Qty: 60 Tab, Refills: 0      benzocaine-menthol (CEPACOL) 15-3.6 mg lozg lozenge Take 1 Lozenge by mouth as needed (sore throat). Qty: 20 Lozenge, Refills: 0      cholecalciferol, VITAMIN D3, (VITAMIN D3) 5,000 unit tab tablet Take 1 Tab by mouth daily. Qty: 60 Tab, Refills: 1      naloxone (NARCAN) 4 mg/actuation nasal spray Use 1 spray intranasally, then discard. Repeat with new spray every 2 min as needed for opioid overdose symptoms, alternating nostrils. Qty: 1 Each, Refills: 0      calcium carbonate (OS-VAL) 500 mg calcium (1,250 mg) tablet Take 1 Tab by mouth two (2) times a day. ferrous sulfate 325 mg (65 mg iron) tablet Take 1 Tab by mouth daily (with breakfast). Qty: 60 Tab, Refills: 0      umeclidinium-vilanterol (ANORO ELLIPTA) 62.5-25 mcg/actuation inhaler Take 1 Puff by inhalation every morning. glucosamine sulfate (GLUCOSAMINE) 500 mg tablet Take 1,000 mg by mouth daily. magnesium 200 mg tab Take 1 Tab by mouth every morning. vitamin E (AQUA GEMS) 400 unit capsule Take 400 Units by mouth every morning. Omega-3 Fatty Acids 60- mg cpDR Take 2 Tabs by mouth every morning.      multivit with minerals/lutein (MULTIVITAMIN 50 PLUS PO) Take 1 Tab by mouth every morning. ascorbic acid, vitamin C, (VITAMIN C) 500 mg tablet Take 500 mg by mouth daily. docusate sodium (DOK) 250 mg capsule Take 250 mg by mouth two (2) times a day. metFORMIN (GLUCOPHAGE) 500 mg tablet Take 500 mg by mouth two (2) times daily (with meals).       montelukast (SINGULAIR) 10 mg tablet Take 10 mg by mouth every evening. lisinopril-hydrochlorothiazide (PRINZIDE, ZESTORETIC) 10-12.5 mg per tablet Take 1 Tab by mouth every morning. pravastatin (PRAVACHOL) 80 mg tablet Take 80 mg by mouth nightly. latanoprost (XALATAN) 0.005 % ophthalmic solution Administer 1 Drop to both eyes nightly. 2.   Follow-up Information     Follow up With Specialties Details Why Contact Info    Kevin Ambrocio MD Family Medicine In 2 days For wound re-check [facility]  [facility] [place] [address] [facility]   [phone]          3. Return to ED if worse   4. Discharge Medication List as of 11/9/2020  4:37 PM            Diagnosis     Clinical Impression:   1. Laceration of skin of right elbow, initial encounter        Attestations:    Carly Xavier MD    Please note that this dictation was completed with [employer], the computer voice recognition software. Quite often unanticipated grammatical, syntax, homophones, and other interpretive errors are inadvertently transcribed by the computer software. Please disregard these errors. Please excuse any errors that have escaped final proofreading. Thank you.

## 2020-11-09 NOTE — DISCHARGE INSTRUCTIONS
Wound care instructions as described - keep the area of injury dry and follow-up with your PCP in 2 days for wound recheck. Return to emergency room for any new or worsening symptoms.

## 2020-11-22 LAB — SARS-COV-2, NAA: NOT DETECTED

## 2021-01-22 ENCOUNTER — OFFICE VISIT (OUTPATIENT)
Dept: NEUROLOGY | Age: 71
End: 2021-01-22

## 2021-01-22 VITALS
SYSTOLIC BLOOD PRESSURE: 120 MMHG | HEART RATE: 73 BPM | DIASTOLIC BLOOD PRESSURE: 50 MMHG | BODY MASS INDEX: 31.89 KG/M2 | HEIGHT: 63 IN | TEMPERATURE: 95.2 F | WEIGHT: 180 LBS | OXYGEN SATURATION: 96 % | RESPIRATION RATE: 14 BRPM

## 2021-01-22 DIAGNOSIS — R40.20 LOC (LOSS OF CONSCIOUSNESS) (HCC): ICD-10-CM

## 2021-01-22 DIAGNOSIS — R56.9 SEIZURE-LIKE ACTIVITY (HCC): Primary | ICD-10-CM

## 2021-01-22 PROCEDURE — G8427 DOCREV CUR MEDS BY ELIG CLIN: HCPCS | Performed by: PSYCHIATRY & NEUROLOGY

## 2021-01-22 PROCEDURE — 1090F PRES/ABSN URINE INCON ASSESS: CPT | Performed by: PSYCHIATRY & NEUROLOGY

## 2021-01-22 PROCEDURE — 3017F COLORECTAL CA SCREEN DOC REV: CPT | Performed by: PSYCHIATRY & NEUROLOGY

## 2021-01-22 PROCEDURE — G8536 NO DOC ELDER MAL SCRN: HCPCS | Performed by: PSYCHIATRY & NEUROLOGY

## 2021-01-22 PROCEDURE — G8400 PT W/DXA NO RESULTS DOC: HCPCS | Performed by: PSYCHIATRY & NEUROLOGY

## 2021-01-22 PROCEDURE — 99204 OFFICE O/P NEW MOD 45 MIN: CPT | Performed by: PSYCHIATRY & NEUROLOGY

## 2021-01-22 PROCEDURE — G8417 CALC BMI ABV UP PARAM F/U: HCPCS | Performed by: PSYCHIATRY & NEUROLOGY

## 2021-01-22 PROCEDURE — G8510 SCR DEP NEG, NO PLAN REQD: HCPCS | Performed by: PSYCHIATRY & NEUROLOGY

## 2021-01-22 PROCEDURE — 1101F PT FALLS ASSESS-DOCD LE1/YR: CPT | Performed by: PSYCHIATRY & NEUROLOGY

## 2021-01-22 NOTE — PROGRESS NOTES
Chief Complaint   Patient presents with    New Patient    Loss of Consciousness     Passed out 12/28/20, hit forehead     Does not remember episode, just \"felt funny right before, remembered she had to get cold rag, then woke up on floor and had wet myself\"      [facility]. Pt states the only thing found was UTI. No episodes since.

## 2021-01-22 NOTE — PATIENT INSTRUCTIONS
RESULT POLICY If we have ordered testing for you, know that; \"NO NEWS IS GOOD NEWS! \" It is our policy that we no longer call patients with results, nor do we  give test results over the phone. We schedule follow up appointments so that your results can be discussed in person. This allows you to address any questions you have regarding the results. If you choose to go to an imaging center outside of [facility], it is your responsibility to bring imaging report and disc to follow up appointment. If something of concern is revealed on your test, we will contact you to discuss the matter and if needed schedule a sooner follow up appointment. Additionally, results may be found by using the My Chart feature and one of our patient service representatives at the  can give you instructions on how to access this feature to utilize our electronic medical record system. Thank you for your understanding. PRESCRIPTION REFILL POLICY [facility] Statement to Patients April 1, 2014 In an effort to ensure the large volume of patient prescription refills is processed in the most efficient and expeditious manner, we are asking our patients to assist us by calling your Pharmacy for all prescription refills, this will include also your  Mail Order Pharmacy. The pharmacy will contact our office electronically to continue the refill process. Please do not wait until the last minute to call your pharmacy. We need at least 48 hours (2days) to fill prescriptions. We also encourage you to call your pharmacy before going to  your prescription to make sure it is ready. With regard to controlled substance prescription refill requests (narcotic refills) that need to be picked up at our office, we ask your cooperation by providing us with at least 72 hours (3days) notice that you will need a refill. We will not refill narcotic prescription refill requests after 4:00pm on any weekday, Monday through Thursday, or after 2:00pm on Fridays, or on the weekends. We encourage everyone to explore another way of getting your prescription refill request processed using [employer], our patient web portal through our electronic medical record system. [employer] is an efficient and effective way to communicate your medication request directly to the office and  downloadable as an bhakti on your smart phone . [employer] also features a review functionality that allows you to view your medication list as well as leave messages for your physician. Are you ready to get connected? If so please review the attatched instructions or speak to any of our staff to get you set up right away! Thank you so much for your cooperation. Should you have any questions please contact our Practice Administrator.

## 2021-01-22 NOTE — PROGRESS NOTES
[facility] and [address] at [facility] at [facility]1 [address], [address] [facility] [address] [address] Diaz [address]  [phone] Office  [phone]           Referring: Evin Holland MD      Chief Complaint   Patient presents with    New Patient    Loss of Consciousness     Passed out 12/28/20, hit forehead     This very pleasant 74-year-old lady presents for initial neurologic consultation regarding an episode of loss of consciousness at the end of December. She says that she was unpacking some things from a box from [employer] in her kitchen. She started to feel unwell. She felt floaty as she describes it a bit lightheaded. She remembers thinking she needed to get to the bathroom so she can get a cool washcloth and put on her face. Her next recollection is awakening facedown on the floor in the kitchen with her legs entangled in the chair having struck her eye and lost her water. She is unsure as to how long she was out. She says she was confused when she awakened. She saw Dr. Cristobal Jones and went for some blood work and a CT of the head. The CT was said to be normal.  She has not had any further events. She had one event many years ago where she says she lost consciousness very briefly and she recalls as soon as she hit the ground she was awake again. Her blood sugar was low at that time. She has never had a seizure. She has a nephew with seizure who is about 23or 21years old. No previous CNS infections. She has not had any further. There were no inciting factors and that she was completely well. No changes in medicine. No illness. No reason that she can think of to have had this happen. She did not hear anything prior to the event. She did not smell anything funny or have a funny taste in her mouth.   She had no abnormal sensation well up in her got she just felt lightheaded as if she was floating. Review finds emergency department visit November 9, 2020 after she took a fall and injured her elbow. She was said to a stumbled. She had a small superficial skin laceration on the right elbow. X-ray question small avulsion injury. Her laceration was closed with Dermabond and she was released. Past Medical History:   Diagnosis Date    Arthritis     Claustrophobia     COPD     Degenerative disc disease, lumbar 1/6/2016    Diabetes ([facility] [address] 2012    She states the metformin is prevention only    Glaucoma     Gout attack 04/2019    Big toe    H/O sinus tachycardia 01/08/2019    Following shoulder surgery    High potassium 12/2018    Chronic- Stays around 5.3    History of nuclear stress test 02/10/2019    Normal    Hypertension     Obesity (BMI 30-39. 9)     Peripheral vascular disease ([facility] [address] [facility]     stents in each iliac artery, states they are \"watching\" carotids    Unspecified adverse effect of anesthesia     BP drops    Unspecified sleep apnea     Does not uses CPAP       Past Surgical History:   Procedure Laterality Date    HX BACK SURGERY  2006    LUMBAR- PLATES, SCREWS    HX HYSTERECTOMY  1980    HX LUMBAR LAMINECTOMY N/A 2016 & 2017    ALIF and then did the posterior after it didnt work    HX ORTHOPAEDIC Right     Ulnar nerve    HX ROTATOR CUFF REPAIR Left 2013    HX SHOULDER REPLACEMENT Right 01/07/2019    VASCULAR SURGERY PROCEDURE UNLIST Bilateral 2010    ILIAC STENT       Current Outpatient Medications   Medication Sig Dispense Refill    metoprolol tartrate (LOPRESSOR) 25 mg tablet take 1 tablet by mouth every 12 hours 180 Tab 3    aspirin (ASPIRIN) 325 mg tablet Take 325 mg by mouth daily.  acetaminophen (TYLENOL) 500 mg tablet Take 2 Tabs by mouth every six (6) hours. 60 Tab 0    cholecalciferol, VITAMIN D3, (VITAMIN D3) 5,000 unit tab tablet Take 1 Tab by mouth daily.  60 Tab 1    calcium carbonate (OS-VAL) 500 mg calcium (1,250 mg) tablet Take 1 Tab by mouth two (2) times a day.  ferrous sulfate 325 mg (65 mg iron) tablet Take 1 Tab by mouth daily (with breakfast). 60 Tab 0    umeclidinium-vilanterol (ANORO ELLIPTA) 62.5-25 mcg/actuation inhaler Take 1 Puff by inhalation every morning.  glucosamine sulfate (GLUCOSAMINE) 500 mg tablet Take 1,000 mg by mouth daily.  magnesium 200 mg tab Take 1 Tab by mouth every morning.  vitamin E (AQUA GEMS) 400 unit capsule Take 400 Units by mouth every morning.  Omega-3 Fatty Acids 60- mg cpDR Take 2 Tabs by mouth every morning.  multivit with minerals/lutein (MULTIVITAMIN 50 PLUS PO) Take 1 Tab by mouth every morning.  ascorbic acid, vitamin C, (VITAMIN C) 500 mg tablet Take 500 mg by mouth daily.  docusate sodium (DOK) 250 mg capsule Take 250 mg by mouth two (2) times a day.  metFORMIN (GLUCOPHAGE) 500 mg tablet Take 500 mg by mouth two (2) times daily (with meals).  montelukast (SINGULAIR) 10 mg tablet Take 10 mg by mouth every evening.  lisinopril-hydrochlorothiazide (PRINZIDE, ZESTORETIC) 10-12.5 mg per tablet Take 1 Tab by mouth every morning.  pravastatin (PRAVACHOL) 80 mg tablet Take 80 mg by mouth nightly.  latanoprost (XALATAN) 0.005 % ophthalmic solution Administer 1 Drop to both eyes nightly.  naloxone (NARCAN) 4 mg/actuation nasal spray Use 1 spray intranasally, then discard. Repeat with new spray every 2 min as needed for opioid overdose symptoms, alternating nostrils.  1 Each 0        Allergies   Allergen Reactions    Diclofenac Anaphylaxis     SWELLING, DIFFICULTY BREATHING    Flexeril [Cyclobenzaprine] Swelling     Hands, face, throat swelling    Medrol [Methylprednisolone] Unknown (comments)     She has forgotten    Naproxen Swelling    Tolectin [Tolmetin] Swelling     Took this with Flexeril and does not know which med caused swelling of hands,face, throat       Social History     Tobacco Use    Smoking status: Former Smoker     Packs/day: 2.00     Years: 35.00     Pack years: 70.00     Types: Cigarettes     Quit date: 10/2/2004     Years since quittin.3    Smokeless tobacco: Never Used   Substance Use Topics    Alcohol use: Yes     Alcohol/week: 4.0 standard drinks     Types: 2 Glasses of wine, 2 Cans of beer per week    Drug use: No       Family History   Problem Relation Age of Onset    Heart Disease Father     Heart Attack Father 36    No Known Problems Sister     Deep Vein Thrombosis Brother     Heart Disease Brother        Review of Systems  Pertinent positives and negatives as noted with remainder of comprehensive review negative    Examination  Visit Vitals  BP (!) 120/50 (BP 1 Location: Left arm, BP Patient Position: Sitting)   Pulse 73   Temp (!) 95.2 °F (35.1 °C)   Resp 14   Ht 5' 3\" (1.6 m)   Wt 81.6 kg (180 lb)   SpO2 96%   BMI 31.89 kg/m²     Pleasant, well appearing. Dress and grooming are appropriate. No scleral icterus is present. Oropharynx is clear. Supple neck without bruit appreciated. Heart regular. Pulses are symmetrical.  No edema in the lower extremities. Neurologically, she is awake, alert, and oriented with normal speech and language. Her cognition is normal.    Intact cranial nerves 2-12. No nystagmus. Visual fields full to confrontation. Disk margins are flat bilaterally. She has normal bulk and tone. She has no abnormal movement. She has no pronation or drift. She generates full strength in the upper and lower extremities to direct confrontational testing. Reflexes are symmetrical in the upper and lower extremities bilaterally. No pathologic reflexes are elicited. .  Finger nose finger and rapid alternating movements are normal.  Steady gait. No sensory deficit to primary modalities.      Impression/Plan  40-year-old lady with loss of consciousness with prodrome and confusion as well as loss of water quite suspicious for seizure  MRI of the brain and we will do this with conscious sedation as she has severe claustrophobia and has been unable to complete even an open MRI with oral sedation. Routine asleep around EEGs  Carotid Doppler    We did discuss today that about 10% of the population will have an isolated seizure and never have another. Discussed that while her event is suspicious for seizure I cannot be 100% convinced. Follow-up after testing  Muna Koenig MD        This note was created using voice recognition software. Despite editing, there may be syntax errors.

## 2021-01-25 ENCOUNTER — TELEPHONE (OUTPATIENT)
Dept: NEUROLOGY | Age: 71
End: 2021-01-25

## 2021-01-25 NOTE — TELEPHONE ENCOUNTER
----- Message from Juana Alejandra sent at 1/25/2021 11:02 AM EST -----  Regarding: Dr. Lexa Trejo Message/Vendor Calls    Caller's first and last name: Pt      Reason for call: fax doppler report      Callback required yes/no and why: No      Best contact number(s): [phone]      Details to clarify the request: Pt is scheduled in vascular surgery this Friday (01/29) to see Dr. Xander Orozco. She had a doppler last week and they are requesting the office fax over the results to [phone] Attn: Luis Lincoln in Dr. Gerald Pierce office.       Juana Alejandra

## 2021-01-28 ENCOUNTER — OFFICE VISIT (OUTPATIENT)
Dept: NEUROLOGY | Age: 71
End: 2021-01-28

## 2021-01-28 DIAGNOSIS — R56.9 SEIZURE-LIKE ACTIVITY (HCC): Primary | ICD-10-CM

## 2021-01-28 NOTE — PROCEDURES
EEG:      Date:  01/28/21    Requesting Physician:  Crystal Sauer. MD Manasa    An EEG is requested in this 58-year-old lady with seizure-like activity to evaluate for epileptiform abnormality. Medications:  Medications are listed as Lopressor, Narcan, Glucosamine, Metformin, Singulair, Prinzide, Pravachol. This tracing is obtained during the awake state. During wakefulness there are intermittent runs of posteriorly-dominant and symmetric low-to-medium amplitude 10 cycle per second activities which attenuate with eye opening. Lower-voltage faster-frequency activities are seen symmetrically over the anterior head regions. Hyperventilation is not performed secondary to COVID-19 precautions. Intermittent photic stimulation induces symmetric posterior driving responses. Sleep is not attained. Interpretation: This EEG recorded during the awake state is normal.  No epileptiform abnormalities are seen.

## 2021-02-02 ENCOUNTER — OFFICE VISIT (OUTPATIENT)
Dept: NEUROLOGY | Age: 71
End: 2021-02-02

## 2021-02-02 VITALS — TEMPERATURE: 97.5 F

## 2021-02-02 DIAGNOSIS — R56.9 SEIZURE-LIKE ACTIVITY (HCC): Primary | ICD-10-CM

## 2021-02-02 NOTE — PROCEDURES
EEG:      Date:  02/02/21    Requesting Physician:  Saul Casanova. MD Manasa    A sleep-deprived EEG is requested in this 79-year-old lady with seizure-like activity to evaluate for epileptiform abnormality. Medications:  Medications are listed as Metoprolol, Pravachol, Narcan, multivitamin, Tylenol, Anoro, Ellipta, iron, aspirin. This tracing is obtained during the awake, drowsy and sleeping states. During wakefulness there are intermittent runs of posteriorly-dominant and symmetric low-to-medium amplitude 9 cycle per second activities which attenuate with eye opening. Lower-voltage faster-frequency activities are seen symmetrically over the anterior head regions. Hyperventilation is not performed secondary to COVID-19 precautions. Intermittent photic stimulation little alters the tracing. During drowsiness, the background rhythms attenuate and are replaced with diffuse, symmetric, theta range activities. There is the later emergence of symmetric vertex sharp waves and sleep spindles. Interpretation:   This sleep-deprived EEG recorded during the awake, drowsy and sleeping states is normal.

## 2021-02-04 ENCOUNTER — HOSPITAL ENCOUNTER (OUTPATIENT)
Dept: MRI IMAGING | Age: 71
Discharge: HOME | End: 2021-02-04
Attending: PSYCHIATRY & NEUROLOGY
Payer: MEDICARE

## 2021-02-04 VITALS
SYSTOLIC BLOOD PRESSURE: 116 MMHG | DIASTOLIC BLOOD PRESSURE: 44 MMHG | HEART RATE: 73 BPM | WEIGHT: 180 LBS | BODY MASS INDEX: 31.89 KG/M2 | OXYGEN SATURATION: 98 % | RESPIRATION RATE: 20 BRPM

## 2021-02-04 DIAGNOSIS — R40.20 LOC (LOSS OF CONSCIOUSNESS) (HCC): ICD-10-CM

## 2021-02-04 DIAGNOSIS — R56.9 SEIZURE-LIKE ACTIVITY (HCC): ICD-10-CM

## 2021-02-04 PROCEDURE — 70553 MRI BRAIN STEM W/O & W/DYE: CPT

## 2021-02-04 PROCEDURE — A9575 INJ GADOTERATE MEGLUMI 0.1ML: HCPCS | Performed by: RADIOLOGY

## 2021-02-04 PROCEDURE — 74011250636 HC RX REV CODE- 250/636: Performed by: PSYCHIATRY & NEUROLOGY

## 2021-02-04 PROCEDURE — 74011250636 HC RX REV CODE- 250/636: Performed by: RADIOLOGY

## 2021-02-04 PROCEDURE — 99152 MOD SED SAME PHYS/QHP 5/>YRS: CPT

## 2021-02-04 PROCEDURE — 99153 MOD SED SAME PHYS/QHP EA: CPT

## 2021-02-04 RX ORDER — GADOTERATE MEGLUMINE 376.9 MG/ML
17 INJECTION INTRAVENOUS
Status: COMPLETED | OUTPATIENT
Start: 2021-02-04 | End: 2021-02-04

## 2021-02-04 RX ORDER — MIDAZOLAM HYDROCHLORIDE 1 MG/ML
.5-5 INJECTION, SOLUTION INTRAMUSCULAR; INTRAVENOUS
Status: DISCONTINUED | OUTPATIENT
Start: 2021-02-04 | End: 2021-02-04

## 2021-02-04 RX ORDER — FENTANYL CITRATE 50 UG/ML
25-100 INJECTION, SOLUTION INTRAMUSCULAR; INTRAVENOUS
Status: DISCONTINUED | OUTPATIENT
Start: 2021-02-04 | End: 2021-02-04

## 2021-02-04 RX ADMIN — FENTANYL CITRATE 50 MCG: 50 INJECTION, SOLUTION INTRAMUSCULAR; INTRAVENOUS at 08:23

## 2021-02-04 RX ADMIN — MIDAZOLAM HYDROCHLORIDE 2 MG: 1 INJECTION, SOLUTION INTRAMUSCULAR; INTRAVENOUS at 08:15

## 2021-02-04 RX ADMIN — GADOTERATE MEGLUMINE 16 ML: 376.9 INJECTION INTRAVENOUS at 08:46

## 2021-02-04 NOTE — PROGRESS NOTES
0730 Pt walked back to [facility] for MRI of brain with and without contrast for moderate sedation. Pt A&Ox3 no C/O pain she stated she passed out and fell in December and that why she is having the MRI. ASA 2 Airway 2, Lungs clear bi lat, S1 and S2 NSR.   0745 PIV placed LFT lower forearm #22 flushed and positive blood return. 0800 Dr. Glenn white obtained informed consent for MRI with sedation. 0805 Pt taken to MRI and placed supine on table. 0815 Time out performed and sedation given. 5023 MRI completed pt tolerated well. Total sedation given was Versed 2 mg and Fentanyl 50 mcg.   0905 Pt brought back to [facility] for post procedure monitoring. 0920 Pt sitting up in stretcher no C/O pain drinking ginger ale and eating crackers. 0930 Discharge instructions given to pt and she had no questions or concerns. 0940 LFT forearm #22 removed and sofie and coban over site. Disk of pt MRI given to her. 0945 Pt discharged to [facility] to the care of her niece.

## 2021-02-04 NOTE — DISCHARGE INSTRUCTIONS
Patient Education   sedatio     Learning About Sedation  What is sedation? Sedation is medicine that helps you feel relaxed and comfortable. It may be used with an injection to numb the area or other medicine to reduce pain. It is often used in procedures like a colonoscopy or a biopsy. It is also used in many minor surgeries. You may be awake and able to talk with your care team. Or you may fall asleep. You might remember little, if anything, of the procedure. What are the levels of sedation? There are three levels of sedation. You may start at one level and go to a deeper level during your procedure. Your doctor may give you oxygen to make sure your blood has plenty of oxygen while you're sleepy. Minimal.   In the lowest level of sedation, you are awake and relaxed and can do what the doctor asks you to do. You can understand questions and answer them. It can help you feel calm during your procedure. And it can be used if deeper levels of sedation are less safe for you. Minimal sedation is used for very minor procedures such as the removal of a small skin lesion or a vasectomy. Moderate. You are relaxed and feel drowsy. You may fall asleep, but someone can wake you easily. Afterward, you may not remember anything about your procedure. Moderate sedation is used for more uncomfortable procedures like small hernia repairs, some eye surgeries, or colonoscopies. Deep. You are asleep (unconscious) during your procedure. You will get oxygen and may need help with breathing. You probably won't remember anything. Deep sedation is used during procedures like hand or foot surgeries or tests that can make you very uncomfortable. How do you prepare? Your doctor will tell you what to expect when you have sedation. You will be asked to sign a consent form that says you understand the risks. You will get instructions to help you prepare for your procedure. You'll be told when to stop eating or drinking.  If you take medicine, you will be told what you can and can't take beforehand. Do not smoke for as long as possible, but at least 1 month, before your procedure. Smoking can increase your risk of problems under sedation and can delay your recovery. If you need help quitting, talk to your doctor about stop-smoking programs and medicines. These can increase your chances of quitting for good. Arrange for a friend or a family member to drive you home afterward. Don't plan to drive yourself. How is it done? Sedation is usually given in a vein in the arm (intravenously, or IV). It is often used with local or regional anesthesia. The local type numbs a small part of the body. The regional type blocks pain to a larger area of the body. With lighter levels of sedation, a doctor or nurse will watch your vital signs. This includes checking your breathing, blood pressure, and heart rate. With deeper levels, an anesthesia professional may be there during the procedure to help keep you safe. This is often called monitored anesthesia care (MAC). What are the risks? Serious problems are rare. They include breathing that slows or stops and an allergic reaction to the medicine. Some health issues may increase the risk of problems. These include:  · Smoking. · Sleep apnea. This happens during sleep when a blocked airway causes breathing problems. · Being overweight. What can you expect after sedation? After your procedure, you will have time to let the sedation wear off. You may feel some pain or discomfort from your procedure. If you have pain, don't be afraid to say so. Pain medicine works better if you take it before the pain gets bad. You may feel some of the side effects of sedation for a while. They include:  · Feeling tired or sleepy. · Nausea and vomiting. This is rare and does not last long. You may be given medicine to help you feel better. · A headache.   If you've had sedation, wait 24 hours before you drive or operate heavy machinery, make important decisions, or sign legal documents. It takes time for the medicine's effects to completely wear off. Current as of: May 27, 2020               Content Version: 12.6  © 2006-2020 [employer]. Care instructions adapted under license by [employer] (which disclaims liability or warranty for this information). If you have questions about a medical condition or this instruction, always ask your healthcare professional. [place] [address] any warranty or liability for your use of this information. MRI Adult Sedation Discharge Instructions      Procedure Performed: MRI of Brain with and without contrast    Medications Given: Versed 2 mg and Fentanyl 50 mcg    Common side effects associated with each of these medications includes:  · Drowsiness, dizziness, euphoria, sleepiness or confusion  · Impaired memory recall  · Unsteady gait, loss of fine muscle control and delayed reaction time  · Visual disturbances, difficulty focusing, blurred vision    You may experience some of these side effects or you may not be aware of subtle changes in your behavior or reaction time. Because you received these medications, we are giving you the following instructions. Discharge Instructions:  · Do not consume alcoholic beverages for a minimum of 24 hours  · Do not make important personal, legal or business decisions for 24 hours  · Move slowly and carefully, do not make sudden position changes. Be alert for dizziness or lightheadedness and move accordingly. Have a responsible person assist you.   · Do not drive for 24 hours  · Do not operate equipment for 24 hours - [employer], power tools, Kitchen accessories: stove, etc.    Diet/Diet Restrictions: Advance your diet as tolerated        If you have any questions or concerns contact:     A)  Rad hours of operation are Monday - Friday 7AM - 5PM at      B) After hours call CT at [phone]             OR     C) Call your Physician             OR     D) If you feel you have a life threatening emergency call 911    If you report to an emergency room, doctors office or hospital within 24 hours, BRING THIS [address] and give it to the nurse or physician attending to you.

## 2021-02-16 ENCOUNTER — OFFICE VISIT (OUTPATIENT)
Dept: CARDIOLOGY CLINIC | Age: 71
End: 2021-02-16
Payer: MEDICARE

## 2021-02-16 VITALS
BODY MASS INDEX: 31.54 KG/M2 | HEIGHT: 63 IN | WEIGHT: 178 LBS | HEART RATE: 72 BPM | SYSTOLIC BLOOD PRESSURE: 124 MMHG | OXYGEN SATURATION: 97 % | DIASTOLIC BLOOD PRESSURE: 62 MMHG

## 2021-02-16 DIAGNOSIS — E78.5 HYPERLIPIDEMIA, UNSPECIFIED HYPERLIPIDEMIA TYPE: ICD-10-CM

## 2021-02-16 DIAGNOSIS — R55 SYNCOPE, UNSPECIFIED SYNCOPE TYPE: ICD-10-CM

## 2021-02-16 DIAGNOSIS — E11.59 DM TYPE 2 CAUSING VASCULAR DISEASE (HCC): ICD-10-CM

## 2021-02-16 DIAGNOSIS — I73.9 PVD (PERIPHERAL VASCULAR DISEASE) (HCC): ICD-10-CM

## 2021-02-16 DIAGNOSIS — I10 ESSENTIAL HYPERTENSION: Primary | ICD-10-CM

## 2021-02-16 PROCEDURE — 3046F HEMOGLOBIN A1C LEVEL >9.0%: CPT | Performed by: INTERNAL MEDICINE

## 2021-02-16 PROCEDURE — G8417 CALC BMI ABV UP PARAM F/U: HCPCS | Performed by: INTERNAL MEDICINE

## 2021-02-16 PROCEDURE — G8752 SYS BP LESS 140: HCPCS | Performed by: INTERNAL MEDICINE

## 2021-02-16 PROCEDURE — 93005 ELECTROCARDIOGRAM TRACING: CPT | Performed by: INTERNAL MEDICINE

## 2021-02-16 PROCEDURE — G8754 DIAS BP LESS 90: HCPCS | Performed by: INTERNAL MEDICINE

## 2021-02-16 PROCEDURE — 99214 OFFICE O/P EST MOD 30 MIN: CPT | Performed by: INTERNAL MEDICINE

## 2021-02-16 PROCEDURE — 1101F PT FALLS ASSESS-DOCD LE1/YR: CPT | Performed by: INTERNAL MEDICINE

## 2021-02-16 PROCEDURE — G8536 NO DOC ELDER MAL SCRN: HCPCS | Performed by: INTERNAL MEDICINE

## 2021-02-16 PROCEDURE — 1090F PRES/ABSN URINE INCON ASSESS: CPT | Performed by: INTERNAL MEDICINE

## 2021-02-16 PROCEDURE — 2022F DILAT RTA XM EVC RTNOPTHY: CPT | Performed by: INTERNAL MEDICINE

## 2021-02-16 PROCEDURE — 93010 ELECTROCARDIOGRAM REPORT: CPT | Performed by: INTERNAL MEDICINE

## 2021-02-16 PROCEDURE — G8427 DOCREV CUR MEDS BY ELIG CLIN: HCPCS | Performed by: INTERNAL MEDICINE

## 2021-02-16 PROCEDURE — 3017F COLORECTAL CA SCREEN DOC REV: CPT | Performed by: INTERNAL MEDICINE

## 2021-02-16 PROCEDURE — G8510 SCR DEP NEG, NO PLAN REQD: HCPCS | Performed by: INTERNAL MEDICINE

## 2021-02-16 PROCEDURE — G0463 HOSPITAL OUTPT CLINIC VISIT: HCPCS | Performed by: INTERNAL MEDICINE

## 2021-02-16 PROCEDURE — G8400 PT W/DXA NO RESULTS DOC: HCPCS | Performed by: INTERNAL MEDICINE

## 2021-02-16 NOTE — PROGRESS NOTES
Room 7    Visit Vitals  /62 (BP 1 Location: Left upper arm, BP Patient Position: Sitting, BP Cuff Size: Large adult)   Pulse 72   Ht 5' 3\" (1.6 m)   Wt 178 lb (80.7 kg)   SpO2 97%   BMI 31.53 kg/m²       Duplex 1-    Chest pain: no  Shortness of breath: YES, but COPD  Edema: no  Palpitations: no  Dizziness: no    New diagnosis/Surgeries: Passed out in December, PCP ref her to Neuro, Dr. Yvon Vale, had EEG x 2, MRI of Brain. Appt.  On 2- (looking for seizure)     ER/Hospitalizations: no    Refills: no

## 2021-02-16 NOTE — PROGRESS NOTES
Susan Rubin MD    [id number] [facility] [place], [address] [facility]    Office [phone],QWT [phone]      Lauren Curtis is a 69 y.o. female is here for f/u visit . Dear Dr. Maday Infante,    I had the pleasure of seeing Ms. Lauren Curtis in the office today. Chief complaint: As documented in EMR    Assessment:  Syncope-December 2020  HTN  PVD w hx of rosalie iliac stents/carotid artery stenosis ( Dr Estephania Denise)   Carotid artery disease  Hx of Sinus tachycardia  HLD  Diabetes mellitus-controlled per patient    Plan:     BP nml  2021 - saw vascular surgeon - had doppler study done which was OK per patient  Followed by neurology. Will do cardiac work-up for syncope. Event monitor-30-day, echocardiogram.  Patient has significant risk factors including peripheral vascular disease/carotid artery disease-coronary CTA  Cont Meds  Lipids per PCP. On statin. Target LDL less than 70  F/u 6 to 8 weeks/earlier as needed    Patient understands the plan. All questions were answered to the patient's satisfaction. Medication Side Effects and Warnings were discussed with patient: yes  Patient Labs were reviewed and or requested:  yes  Patient Past Records were reviewed and or requested: yes    I appreciate the opportunity to be involved in Ms. Joshi. See note below for details. Please do not hesitate to contact us with questions or concerns. Susan Rubin MD    Cardiac Testing/ Procedures: A. Cardiac Cath/PCI:    B.ECHO/ROYAL: 1/8/19 - EF 55-60%/Mild MR    C. StressNuclear/Stress ECHO/Stress test: 3/2019 - Nml Kadie nuc ; 78%    D.Vascular: 1/22/21 Bilateral ICA mid range 50-79%    E. EP:    F. Miscellaneous:    Subjective:  Lauren Curtis is a 69 y.o. female who returns for follow up visit. Patient had an episode of syncope in December 2020. She describes it as a sudden loss of consciousness. She remembers standing in the kitchen and then when she came around she felt groggy and tired. She did have urinary incontinence. She has seen neurology and a work-up has been done including EEG/MRI brain. No prodromal symptoms of dizziness, palpitations, chest pain prior to her syncopal episode. Since then no further episodes of dizziness/presyncope/syncope. No CP/Dyspnea  Continues to have hip and knee pain        ROS:  (bold if positive, if negative)             Medications before admission:    Current Outpatient Medications   Medication Sig Dispense    cranberry fruit extract (CRANBERRY EXTRACT PO) Take  by mouth.  metoprolol tartrate (LOPRESSOR) 25 mg tablet take 1 tablet by mouth every 12 hours 180 Tab    aspirin (ASPIRIN) 325 mg tablet Take 325 mg by mouth daily.  acetaminophen (TYLENOL) 500 mg tablet Take 2 Tabs by mouth every six (6) hours. 60 Tab    cholecalciferol, VITAMIN D3, (VITAMIN D3) 5,000 unit tab tablet Take 1 Tab by mouth daily. 60 Tab    calcium carbonate (OS-VAL) 500 mg calcium (1,250 mg) tablet Take 1 Tab by mouth two (2) times a day.  ferrous sulfate 325 mg (65 mg iron) tablet Take 1 Tab by mouth daily (with breakfast). 60 Tab    umeclidinium-vilanterol (ANORO ELLIPTA) 62.5-25 mcg/actuation inhaler Take 1 Puff by inhalation every morning.  glucosamine sulfate (GLUCOSAMINE) 500 mg tablet Take 1,000 mg by mouth daily.  magnesium 200 mg tab Take 1 Tab by mouth every morning.  vitamin E (AQUA GEMS) 400 unit capsule Take 400 Units by mouth every morning.  Omega-3 Fatty Acids 60- mg cpDR Take 2 Tabs by mouth every morning.  ascorbic acid, vitamin C, (VITAMIN C) 500 mg tablet Take 500 mg by mouth daily.  docusate sodium (DOK) 250 mg capsule Take 250 mg by mouth two (2) times a day.  metFORMIN (GLUCOPHAGE) 500 mg tablet Take 500 mg by mouth two (2) times daily (with meals).  montelukast (SINGULAIR) 10 mg tablet Take 10 mg by mouth every evening.      lisinopril-hydrochlorothiazide (PRINZIDE, ZESTORETIC) 10-12.5 mg per tablet Take 1 Tab by mouth every morning.  pravastatin (PRAVACHOL) 80 mg tablet Take 80 mg by mouth nightly.  latanoprost (XALATAN) 0.005 % ophthalmic solution Administer 1 Drop to both eyes nightly.  naloxone (NARCAN) 4 mg/actuation nasal spray Use 1 spray intranasally, then discard. Repeat with new spray every 2 min as needed for opioid overdose symptoms, alternating nostrils. 1 Each    multivit with minerals/lutein (MULTIVITAMIN 50 PLUS PO) Take 1 Tab by mouth every morning. No current facility-administered medications for this visit. Family History of CAD:    Yes    Social History:  Current  Smoker  No ( Quit 2004)    Physical Exam:  Visit Vitals  /62 (BP 1 Location: Left upper arm, BP Patient Position: Sitting, BP Cuff Size: Large adult)   Pulse 72   Ht 5' 3\" (1.6 m)   Wt 178 lb (80.7 kg)   SpO2 97%   BMI 31.53 kg/m²          Gen: Well-developed, well-nourished, in no acute distress  Neck: Supple,No JVD, No Carotid Bruit,   Resp: No accessory muscle use, Clear breath sounds, No rales or rhonchi  Card: Regular Rate,Rythm,Normal S1, S2, No murmurs, rubs or gallop. No thrills.    Abd:  Soft, non-tender, non-distended,BS+,   MSK: No cyanosis  Skin: No rashes    Neuro: moving all four extremities , follows commands appropriately  Psych:  Good insight, oriented to person, place , alert, Nml Affect  LE: No edema    EKG: Sinus rhythm, normal axis, normal intervals      LABS:        Lab Results   Component Value Date/Time    WBC 4.5 06/12/2019 11:04 AM    HGB 10.5 (L) 06/19/2019 06:09 AM    HCT 37.9 06/12/2019 11:04 AM    PLATELET 569 40/45/9131 11:04 AM     Lab Results   Component Value Date/Time    Sodium 135 (L) 06/12/2019 11:04 AM    Potassium 5.3 (H) 06/12/2019 11:04 AM    Chloride 101 06/12/2019 11:04 AM    CO2 29 06/12/2019 11:04 AM    Anion gap 5 06/12/2019 11:04 AM    Glucose 93 06/12/2019 11:04 AM    BUN 19 06/12/2019 11:04 AM    Creatinine 0.89 06/12/2019 11:04 AM    BUN/Creatinine ratio 21 (H) 06/12/2019 11:04 AM    GFR est AA >60 06/12/2019 11:04 AM    GFR est non-AA >60 06/12/2019 11:04 AM    Calcium 9.7 06/12/2019 11:04 AM             Betzaida Baird MD

## 2021-02-16 NOTE — PATIENT INSTRUCTIONS
Fainting: Care Instructions Your Care Instructions When you faint, or pass out, you lose consciousness for a short time. A brief drop in blood flow to the brain often causes it. When you fall or lie down, more blood flows to your brain and you regain consciousness. Emotional stress, pain, or overheatingespecially if you have been standingcan make you faint. In these cases, fainting is usually not serious. But fainting can be a sign of a more serious problem. Your doctor may want you to have more tests to rule out other causes. The treatment you need depends on the reason why you fainted. The doctor has checked you carefully, but problems can develop later. If you notice any problems or new symptoms, get medical treatment right away. Follow-up care is a key part of your treatment and safety. Be sure to make and go to all appointments, and call your doctor if you are having problems. It's also a good idea to know your test results and keep a list of the medicines you take. How can you care for yourself at home? · Drink plenty of fluids to prevent dehydration. If you have kidney, heart, or liver disease and have to limit fluids, talk with your doctor before you increase your fluid intake. When should you call for help? Call 911 anytime you think you may need emergency care. For example, call if: 
  · You have symptoms of a heart problem. These may include: 
? Chest pain or pressure. ? Severe trouble breathing. ? A fast or irregular heartbeat. ? Lightheadedness or sudden weakness. ? Coughing up pink, foamy mucus. ? Passing out. After you call 911, the  may tell you to chew 1 adult-strength or 2 to 4 low-dose aspirin. Wait for an ambulance. Do not try to drive yourself.  
  · You have symptoms of a stroke. These may include: 
? Sudden numbness, tingling, weakness, or loss of movement in your face, arm, or leg, especially on only one side of your body. ? Sudden vision changes. ? Sudden trouble speaking. ? Sudden confusion or trouble understanding simple statements. ? Sudden problems with walking or balance. ? A sudden, severe headache that is different from past headaches.  
  · You passed out (lost consciousness) again. Watch closely for changes in your health, and be sure to contact your doctor if: 
  · You do not get better as expected. Where can you learn more? Go to http://www.gray.com/ Enter W093 in the search box to learn more about \"Fainting: Care Instructions. \" Current as of: June 26, 2019               Content Version: 12.6 © 2105-0047 [employer]. Care instructions adapted under license by [employer] (which disclaims liability or warranty for this information). If you have questions about a medical condition or this instruction, always ask your healthcare professional. [address] any warranty or liability for your use of this information.

## 2021-02-16 NOTE — LETTER
2/16/2021 Patient: Julisa Calderon YOB: 1950 Date of Visit: 2/16/2021 Jai Cordero MD 
[address] [facility] [address] [address] Fax: [phone] Dear Jai Cordero MD, Thank you for referring Ms. Kevin Ahuja to [facility] for evaluation. My notes for this consultation are attached. If you have questions, please do not hesitate to call me. I look forward to following your patient along with you. Sincerely, Cindy Ayala MD

## 2021-02-17 ENCOUNTER — ANCILLARY PROCEDURE (OUTPATIENT)
Dept: CARDIOLOGY CLINIC | Age: 71
End: 2021-02-17
Payer: MEDICARE

## 2021-02-17 VITALS
WEIGHT: 178 LBS | SYSTOLIC BLOOD PRESSURE: 122 MMHG | DIASTOLIC BLOOD PRESSURE: 68 MMHG | HEIGHT: 63 IN | BODY MASS INDEX: 31.54 KG/M2

## 2021-02-17 DIAGNOSIS — R55 SYNCOPE, UNSPECIFIED SYNCOPE TYPE: ICD-10-CM

## 2021-02-17 DIAGNOSIS — E78.5 HYPERLIPIDEMIA, UNSPECIFIED HYPERLIPIDEMIA TYPE: ICD-10-CM

## 2021-02-17 DIAGNOSIS — I10 ESSENTIAL HYPERTENSION: ICD-10-CM

## 2021-02-17 PROCEDURE — 93306 TTE W/DOPPLER COMPLETE: CPT | Performed by: INTERNAL MEDICINE

## 2021-02-18 ENCOUNTER — TELEPHONE (OUTPATIENT)
Dept: CARDIOLOGY CLINIC | Age: 71
End: 2021-02-18

## 2021-02-18 LAB
ECHO AO ASC DIAM: 2.48 CM
ECHO AO ROOT DIAM: 2.86 CM
ECHO AV AREA PEAK VELOCITY: 2.41 CM2
ECHO AV AREA VTI: 2.64 CM2
ECHO AV AREA/BSA PEAK VELOCITY: 1.3 CM2/M2
ECHO AV AREA/BSA VTI: 1.4 CM2/M2
ECHO AV MEAN GRADIENT: 2.74 MMHG
ECHO AV PEAK GRADIENT: 5.17 MMHG
ECHO AV PEAK VELOCITY: 113.63 CM/S
ECHO AV VTI: 23.97 CM
ECHO EST RA PRESSURE: 3 MMHG
ECHO LA AREA 4C: 18.04 CM2
ECHO LA MAJOR AXIS: 3.83 CM
ECHO LA MINOR AXIS: 2.08 CM
ECHO LA VOL 2C: 48.75 ML (ref 22–52)
ECHO LA VOL 4C: 50.15 ML (ref 22–52)
ECHO LA VOL BP: 54.18 ML (ref 22–52)
ECHO LA VOL/BSA BIPLANE: 29.44 ML/M2 (ref 16–28)
ECHO LA VOLUME INDEX A2C: 26.49 ML/M2 (ref 16–28)
ECHO LA VOLUME INDEX A4C: 27.25 ML/M2 (ref 16–28)
ECHO LV E' LATERAL VELOCITY: 8.17 CM/S
ECHO LV E' SEPTAL VELOCITY: 7.91 CM/S
ECHO LV EDV A2C: 61.31 ML
ECHO LV EDV A4C: 81.91 ML
ECHO LV EDV BP: 70.94 ML (ref 56–104)
ECHO LV EDV INDEX A4C: 44.5 ML/M2
ECHO LV EDV INDEX BP: 38.5 ML/M2
ECHO LV EDV NDEX A2C: 33.3 ML/M2
ECHO LV EJECTION FRACTION A2C: 61 PERCENT
ECHO LV EJECTION FRACTION A4C: 68 PERCENT
ECHO LV EJECTION FRACTION BIPLANE: 64.5 PERCENT (ref 55–100)
ECHO LV ESV A2C: 23.75 ML
ECHO LV ESV A4C: 26.36 ML
ECHO LV ESV BP: 25.22 ML (ref 19–49)
ECHO LV ESV INDEX A2C: 12.9 ML/M2
ECHO LV ESV INDEX A4C: 14.3 ML/M2
ECHO LV ESV INDEX BP: 13.7 ML/M2
ECHO LV INTERNAL DIMENSION DIASTOLIC: 4.19 CM (ref 3.9–5.3)
ECHO LV INTERNAL DIMENSION SYSTOLIC: 2.73 CM
ECHO LV IVSD: 1.26 CM (ref 0.6–0.9)
ECHO LV MASS 2D: 199.9 G (ref 67–162)
ECHO LV MASS INDEX 2D: 108.6 G/M2 (ref 43–95)
ECHO LV POSTERIOR WALL DIASTOLIC: 1.34 CM (ref 0.6–0.9)
ECHO LVOT DIAM: 1.78 CM
ECHO LVOT PEAK GRADIENT: 4.88 MMHG
ECHO LVOT PEAK VELOCITY: 110.4 CM/S
ECHO LVOT SV: 63.2 ML
ECHO LVOT VTI: 25.52 CM
ECHO MV A VELOCITY: 85.62 CM/S
ECHO MV AREA PHT: 3.88 CM2
ECHO MV E DECELERATION TIME (DT): 195.33 MS
ECHO MV E VELOCITY: 95.91 CM/S
ECHO MV E/A RATIO: 1.12
ECHO MV E/E' LATERAL: 11.74
ECHO MV E/E' RATIO (AVERAGED): 11.93
ECHO MV E/E' SEPTAL: 12.13
ECHO MV PRESSURE HALF TIME (PHT): 56.65 MS
ECHO RA AREA 4C: 12.94 CM2
ECHO RIGHT VENTRICULAR SYSTOLIC PRESSURE (RVSP): 29.6 MMHG
ECHO RV INTERNAL DIMENSION: 2.97 CM
ECHO RV TAPSE: 1.89 CM (ref 1.5–2)
ECHO TV REGURGITANT MAX VELOCITY: 257.86 CM/S
ECHO TV REGURGITANT PEAK GRADIENT: 26.6 MMHG
LA VOL DISK BP: 50.65 ML (ref 22–52)

## 2021-02-18 NOTE — TELEPHONE ENCOUNTER
Verified patient with two patient identifiers. Called patient and informed her of normal Echo test results.

## 2021-02-18 NOTE — TELEPHONE ENCOUNTER
----- Message from Sebas Yost MD sent at 2/18/2021 12:40 PM EST -----  ECHO - nml LVEF/pump function

## 2021-02-22 ENCOUNTER — OFFICE VISIT (OUTPATIENT)
Dept: NEUROLOGY | Age: 71
End: 2021-02-22
Payer: MEDICARE

## 2021-02-22 VITALS
SYSTOLIC BLOOD PRESSURE: 120 MMHG | DIASTOLIC BLOOD PRESSURE: 52 MMHG | RESPIRATION RATE: 16 BRPM | HEART RATE: 79 BPM | TEMPERATURE: 96.8 F | WEIGHT: 178 LBS | BODY MASS INDEX: 31.53 KG/M2

## 2021-02-22 DIAGNOSIS — R40.20 LOC (LOSS OF CONSCIOUSNESS) (HCC): Primary | ICD-10-CM

## 2021-02-22 PROCEDURE — G8400 PT W/DXA NO RESULTS DOC: HCPCS | Performed by: PSYCHIATRY & NEUROLOGY

## 2021-02-22 PROCEDURE — G8417 CALC BMI ABV UP PARAM F/U: HCPCS | Performed by: PSYCHIATRY & NEUROLOGY

## 2021-02-22 PROCEDURE — 3017F COLORECTAL CA SCREEN DOC REV: CPT | Performed by: PSYCHIATRY & NEUROLOGY

## 2021-02-22 PROCEDURE — G8427 DOCREV CUR MEDS BY ELIG CLIN: HCPCS | Performed by: PSYCHIATRY & NEUROLOGY

## 2021-02-22 PROCEDURE — G8536 NO DOC ELDER MAL SCRN: HCPCS | Performed by: PSYCHIATRY & NEUROLOGY

## 2021-02-22 PROCEDURE — G8752 SYS BP LESS 140: HCPCS | Performed by: PSYCHIATRY & NEUROLOGY

## 2021-02-22 PROCEDURE — G8510 SCR DEP NEG, NO PLAN REQD: HCPCS | Performed by: PSYCHIATRY & NEUROLOGY

## 2021-02-22 PROCEDURE — 1090F PRES/ABSN URINE INCON ASSESS: CPT | Performed by: PSYCHIATRY & NEUROLOGY

## 2021-02-22 PROCEDURE — 1101F PT FALLS ASSESS-DOCD LE1/YR: CPT | Performed by: PSYCHIATRY & NEUROLOGY

## 2021-02-22 PROCEDURE — G8754 DIAS BP LESS 90: HCPCS | Performed by: PSYCHIATRY & NEUROLOGY

## 2021-02-22 PROCEDURE — 99213 OFFICE O/P EST LOW 20 MIN: CPT | Performed by: PSYCHIATRY & NEUROLOGY

## 2021-02-22 NOTE — PROGRESS NOTES
[facility] and [address] at [facility] at [address] [facility], [address] [facility] [address] [facility], [address] [place] [address]   [phone]              Chief Complaint   Patient presents with    Results     eeg     Current Outpatient Medications   Medication Sig Dispense Refill    cranberry fruit extract (CRANBERRY EXTRACT PO) Take  by mouth.  metoprolol tartrate (LOPRESSOR) 25 mg tablet take 1 tablet by mouth every 12 hours 180 Tab 3    aspirin (ASPIRIN) 325 mg tablet Take 325 mg by mouth daily.  acetaminophen (TYLENOL) 500 mg tablet Take 2 Tabs by mouth every six (6) hours. 60 Tab 0    cholecalciferol, VITAMIN D3, (VITAMIN D3) 5,000 unit tab tablet Take 1 Tab by mouth daily. 60 Tab 1    naloxone (NARCAN) 4 mg/actuation nasal spray Use 1 spray intranasally, then discard. Repeat with new spray every 2 min as needed for opioid overdose symptoms, alternating nostrils. 1 Each 0    calcium carbonate (OS-VAL) 500 mg calcium (1,250 mg) tablet Take 1 Tab by mouth two (2) times a day.  ferrous sulfate 325 mg (65 mg iron) tablet Take 1 Tab by mouth daily (with breakfast). 60 Tab 0    umeclidinium-vilanterol (ANORO ELLIPTA) 62.5-25 mcg/actuation inhaler Take 1 Puff by inhalation every morning.  glucosamine sulfate (GLUCOSAMINE) 500 mg tablet Take 1,000 mg by mouth daily.  magnesium 200 mg tab Take 1 Tab by mouth every morning.  vitamin E (AQUA GEMS) 400 unit capsule Take 400 Units by mouth every morning.  Omega-3 Fatty Acids 60- mg cpDR Take 2 Tabs by mouth every morning.  multivit with minerals/lutein (MULTIVITAMIN 50 PLUS PO) Take 1 Tab by mouth every morning.  ascorbic acid, vitamin C, (VITAMIN C) 500 mg tablet Take 500 mg by mouth daily.  docusate sodium (DOK) 250 mg capsule Take 250 mg by mouth two (2) times a day.       metFORMIN (GLUCOPHAGE) 500 mg tablet Take 500 mg by mouth two (2) times daily (with meals).  montelukast (SINGULAIR) 10 mg tablet Take 10 mg by mouth every evening.  lisinopril-hydrochlorothiazide (PRINZIDE, ZESTORETIC) 10-12.5 mg per tablet Take 1 Tab by mouth every morning.  pravastatin (PRAVACHOL) 80 mg tablet Take 80 mg by mouth nightly.  latanoprost (XALATAN) 0.005 % ophthalmic solution Administer 1 Drop to both eyes nightly. Allergies   Allergen Reactions    Diclofenac Anaphylaxis     SWELLING, DIFFICULTY BREATHING    Flexeril [Cyclobenzaprine] Swelling     Hands, face, throat swelling    Medrol [Methylprednisolone] Unknown (comments)     She has forgotten    Naproxen Swelling    Tolectin [Tolmetin] Swelling     Took this with Flexeril and does not know which med caused swelling of hands,face, throat     Social History     Tobacco Use    Smoking status: Former Smoker     Packs/day: 2.00     Years: 35.00     Pack years: 70.00     Types: Cigarettes     Quit date: 10/2/2004     Years since quittin.4    Smokeless tobacco: Never Used   Substance Use Topics    Alcohol use: Yes     Alcohol/week: 4.0 standard drinks     Types: 2 Glasses of wine, 2 Cans of beer per week    Drug use: No     71-year-old lady returns today for follow-up after her recent initial consultation with me for an episode of loss of consciousness. Her examination was unremarkable. We sent her for several studies including MRI of the brain which I personally reviewed today and that has age-related changes but nothing remarkable. Carotid Doppler with low-end 50-79% stenosis bilaterally. Sleep deprived EEG unremarkable and routine EEG unremarkable. Audiology visit finds a 30-day event monitor and echocardiogram were ordered. This was from visit 2021    Today she reports further events. She is waiting on her 30-day event monitor.   She tells me her echo was fine    Examination  Visit Vitals  BP (!) 120/52 (BP 1 Location: Left upper arm, BP Patient Position: Sitting, BP Cuff Size: Adult)   Pulse 79   Temp 96.8 °F (36 °C)   Resp 16   Wt 80.7 kg (178 lb)   BMI 31.53 kg/m²     Pleasantly. Awake alert oriented and conversant. Normal speech and language. No ataxia     Impression/Plan  Syncopal episode without recurrence  Neurologic work-up unremarkable  Continue to work with cardiology    Follow-up as needed    Pilar Salomon MD        This note was created using voice recognition software. Despite editing, there may be syntax errors.

## 2021-03-24 ENCOUNTER — TELEPHONE (OUTPATIENT)
Dept: CARDIOLOGY CLINIC | Age: 71
End: 2021-03-24

## 2021-03-24 DIAGNOSIS — E11.59 DM TYPE 2 CAUSING VASCULAR DISEASE (HCC): ICD-10-CM

## 2021-03-24 DIAGNOSIS — R55 SYNCOPE, UNSPECIFIED SYNCOPE TYPE: ICD-10-CM

## 2021-03-24 DIAGNOSIS — I73.9 PVD (PERIPHERAL VASCULAR DISEASE) (HCC): ICD-10-CM

## 2021-03-24 DIAGNOSIS — I10 ESSENTIAL HYPERTENSION, BENIGN: Primary | ICD-10-CM

## 2021-03-24 DIAGNOSIS — R07.9 CHEST PAIN, UNSPECIFIED TYPE: ICD-10-CM

## 2021-03-24 DIAGNOSIS — E78.5 HYPERLIPIDEMIA, UNSPECIFIED HYPERLIPIDEMIA TYPE: ICD-10-CM

## 2021-03-24 NOTE — TELEPHONE ENCOUNTER
Claudia with [facility] stated that they need an updated diagnosis code for the patient's CT Coronary Artery order due to an ABN trigger. Patient is scheduled on 4/7/21. Please advise.     Phone #: [phone]  Thanks

## 2021-03-26 ENCOUNTER — TELEPHONE (OUTPATIENT)
Dept: CARDIOLOGY CLINIC | Age: 71
End: 2021-03-26

## 2021-03-26 NOTE — TELEPHONE ENCOUNTER
Patient calling in regards to CT on 04/07. States the nurse there is unsure if metoprolol needs to be increased that day.     Phone: [phone]

## 2021-03-31 RX ORDER — ATENOLOL 25 MG/1
TABLET ORAL
Qty: 2 TAB | Refills: 0 | Status: SHIPPED | OUTPATIENT
Start: 2021-03-31 | End: 2021-04-21 | Stop reason: ALTCHOICE

## 2021-03-31 NOTE — TELEPHONE ENCOUNTER
Called patient advised to take the Metoprolol as prescribed as well as the Atenolol 25 mg the night before and 25 mg on the morning of the test.    Patient verbalized understanding.     Requested Prescriptions     Pending Prescriptions Disp Refills    atenoloL (TENORMIN) 25 mg tablet 2 Tab 0     Sig: Take 1 TAB night before and 1 TAB the morning of the test.

## 2021-04-07 ENCOUNTER — HOSPITAL ENCOUNTER (OUTPATIENT)
Dept: CT IMAGING | Age: 71
Discharge: HOME | End: 2021-04-07
Attending: INTERNAL MEDICINE
Payer: MEDICARE

## 2021-04-07 VITALS — SYSTOLIC BLOOD PRESSURE: 123 MMHG | HEART RATE: 64 BPM | DIASTOLIC BLOOD PRESSURE: 72 MMHG

## 2021-04-07 DIAGNOSIS — E78.5 HYPERLIPIDEMIA, UNSPECIFIED HYPERLIPIDEMIA TYPE: ICD-10-CM

## 2021-04-07 DIAGNOSIS — E11.59 DM TYPE 2 CAUSING VASCULAR DISEASE (HCC): ICD-10-CM

## 2021-04-07 DIAGNOSIS — I73.9 PVD (PERIPHERAL VASCULAR DISEASE) (HCC): ICD-10-CM

## 2021-04-07 DIAGNOSIS — I10 ESSENTIAL HYPERTENSION: ICD-10-CM

## 2021-04-07 DIAGNOSIS — R55 SYNCOPE, UNSPECIFIED SYNCOPE TYPE: ICD-10-CM

## 2021-04-07 LAB — CREAT BLD-MCNC: 0.9 MG/DL (ref 0.6–1.3)

## 2021-04-07 PROCEDURE — 75574 CT ANGIO HRT W/3D IMAGE: CPT

## 2021-04-07 PROCEDURE — 82565 ASSAY OF CREATININE: CPT

## 2021-04-07 PROCEDURE — 74011000636 HC RX REV CODE- 636: Performed by: INTERNAL MEDICINE

## 2021-04-07 PROCEDURE — 74011250637 HC RX REV CODE- 250/637: Performed by: INTERNAL MEDICINE

## 2021-04-07 RX ORDER — IODIXANOL 320 MG/ML
50 INJECTION, SOLUTION INTRAVASCULAR ONCE
Status: COMPLETED | OUTPATIENT
Start: 2021-04-07 | End: 2021-04-07

## 2021-04-07 RX ORDER — IODIXANOL 320 MG/ML
100 INJECTION, SOLUTION INTRAVASCULAR ONCE
Status: DISCONTINUED | OUTPATIENT
Start: 2021-04-07 | End: 2021-04-07

## 2021-04-07 RX ORDER — NITROGLYCERIN 0.4 MG/1
0.4 TABLET SUBLINGUAL AS NEEDED
Status: DISCONTINUED | OUTPATIENT
Start: 2021-04-07 | End: 2021-04-11 | Stop reason: HOSPADM

## 2021-04-07 RX ADMIN — NITROGLYCERIN 0.4 MG: 0.4 TABLET SUBLINGUAL at 08:58

## 2021-04-07 RX ADMIN — IODIXANOL 100 ML: 320 INJECTION, SOLUTION INTRAVASCULAR at 08:47

## 2021-04-07 NOTE — PROGRESS NOTES
0825- Pt to CT room. IV SL established by Arlinda Kussmaul, RT. Pt tolerated well. Pt on beta blocker PTA and took additional per her physician. HR-65    0831- CT scan begins. 6576- NTG given. IV patent. No complaints. HR-64    0840- CT scan complete. Pt tolerated procedure well. Pt c/o some dizziness when sitting up. Given juice and nutrigrain bar. Pt is diabetic and has not eaten    0850- Pt feels much better after eating. IV D/C'd intact without problem. D/C instructions reviewed with pt and copy given. Verbalized understanding. D/C'd amb, stable, NAD.

## 2021-04-13 RX ORDER — METOPROLOL TARTRATE 25 MG/1
TABLET, FILM COATED ORAL
Qty: 180 TAB | Refills: 1 | Status: SHIPPED | OUTPATIENT
Start: 2021-04-13 | End: 2021-07-15

## 2021-04-14 ENCOUNTER — DOCUMENTATION ONLY (OUTPATIENT)
Dept: CARDIOLOGY CLINIC | Age: 71
End: 2021-04-14

## 2021-04-14 NOTE — PROGRESS NOTES
Event monitor 3/40/21-4/12/2021  30 events transmitted-0 symptomatic, 30 device triggered, first-degree AV block-rate 74 bpm  7368 PACs with PAC burden of less than 1%  6838 PVCs with PVC burden of less than 1%  No significant arrhythmias    Has appointment to see ANP 4/20/2021. Appointment with me was canceled and rescheduled with ANP because of scheduling conflict. Event monitor results to be reviewed at that time. Lars Freedman MD, [facility]

## 2021-04-19 NOTE — PROGRESS NOTES
[address] [place], [address] [facility]    Office [phone]  Fax [phone]      Georjean Cowden is a 69 y.o. female last seen by Dr. Ana María Malhotra ~ 8 weeks ago for evaluation of syncopal episode. She is here today for follow up. Since her last visit, she notes that Neurology work up was negative. \"Only thing that came up  was a UTI\". She was treated with antibiotics and has had no recurrent urinary issues. Assessment/Plan:  Syncope - 1 episode which occurred 12/2020. No recurrence. 30 day Holter without evidence of arrhythmia. Carotid duplex stable. Echo WNL. Neurology evaluation reported as benign. Etiology of episode remains unclear.   - Discussed ongoing monitoring of daily BP and HR  - Encouraged prudent hydration  - Advised her to notify us of any recurrent sxs. Briefly discussed the role of ILR should she have recurrent sxs. HTN - normotensive in the office today. Mild LVH noted on recent Echo. - Continue current medication regimen with Metoprolol 25 mg BID  - Low sodium diet  - Continue home monitoring     Diffuse atherosclerosis - hx of PVD w hx of rosalie iliac stents, carotid artery stenosis (both followed by Dr. Betsy Sifuentes). Coronary CTA performed and shows evidence of CAD (Ca score 504 - predominately in RCA with score of 288). Recent negative ischemic evaluation with Lexiscan Cardiolite in 3/2019. No exertional angina sxs at a good functional capacity. - Continue ASA and statin therapy. - Reviewed goal LDL of < 50. She is scheduled for repeat labs with PCP this week. All recent test results and recommendations provided to patient to discuss with Dr. Zen Raphael.   - Continue with aggressive risk factor modification - discussed daily walking and a HH diet to promote weight loss. Hx of Sinus tachycardia - no recurrent s/sxs of tachycardia. HR 70's in the office today. - Continue BB    Dyslipidemia - on Pravastatin 80 mg daily.   Goal LDL < 50, as discussed above. Diabetes mellitus - on Metformin alone. Unsure of last HgA1c, but discussed a goal of 6%. - Weight loss, as above  - she may benefit from GLT2 inhibitor such as Jardiance for DM and cardiac benefits. Close monitoring of urinary sxs with recent reports of UTI and her report that \"that wasn't my first one\"        Plan for follow up with Dr. Yoni Liu again in 6 months. Return to clinic sooner PRN. She was encouraged to call back with any questions or concerns prior to that time. Subjective:  Karlie Baez is a 69 y.o. female remains active around her home. She denies any exertional chest pain or dyspnea. More rigorous exercise has been limited by chronic back and joint pain. No recurrent syncope or near syncope. \"I'm feeling pretty good\". She denies any tachycardia or palpitations. No lightheadedness or dizziness. No edema, orthopnea or PND. ROS:  (bold if positive, if negative)            Current Outpatient Medications   Medication Sig Dispense    acetaminophen-codeine (TYLENOL #3) 300-30 mg per tablet TAKE 1 TABLET BY MOUTH AS NEEDED EVERY 6 HOURS FOR 30 DAYS.  metoprolol tartrate (LOPRESSOR) 25 mg tablet TAKE 1 TABLET BY MOUTH  EVERY 12 HOURS 180 Tab    cranberry fruit extract (CRANBERRY EXTRACT PO) Take  by mouth.  aspirin (ASPIRIN) 325 mg tablet Take 325 mg by mouth daily.  acetaminophen (TYLENOL) 500 mg tablet Take 2 Tabs by mouth every six (6) hours. 60 Tab    cholecalciferol, VITAMIN D3, (VITAMIN D3) 5,000 unit tab tablet Take 1 Tab by mouth daily. 60 Tab    calcium carbonate (OS-VAL) 500 mg calcium (1,250 mg) tablet Take 1 Tab by mouth two (2) times a day.  ferrous sulfate 325 mg (65 mg iron) tablet Take 1 Tab by mouth daily (with breakfast). 60 Tab    umeclidinium-vilanterol (ANORO ELLIPTA) 62.5-25 mcg/actuation inhaler Take 1 Puff by inhalation every morning.      glucosamine sulfate (GLUCOSAMINE) 500 mg tablet Take 1,000 mg by mouth daily.  magnesium 200 mg tab Take 1 Tab by mouth every morning.  vitamin E (AQUA GEMS) 400 unit capsule Take 400 Units by mouth every morning.  Omega-3 Fatty Acids 60- mg cpDR Take 2 Tabs by mouth every morning.  ascorbic acid, vitamin C, (VITAMIN C) 500 mg tablet Take 500 mg by mouth daily.  docusate sodium (DOK) 250 mg capsule Take 250 mg by mouth two (2) times a day.  metFORMIN (GLUCOPHAGE) 500 mg tablet Take 500 mg by mouth two (2) times daily (with meals).  lisinopril-hydrochlorothiazide (PRINZIDE, ZESTORETIC) 10-12.5 mg per tablet Take 1 Tab by mouth every morning.  pravastatin (PRAVACHOL) 80 mg tablet Take 80 mg by mouth nightly.  latanoprost (XALATAN) 0.005 % ophthalmic solution Administer 1 Drop to both eyes nightly.  montelukast (SINGULAIR) 10 mg tablet Take 10 mg by mouth every evening. No current facility-administered medications for this visit. Family History of CAD:    Yes    Social History:  Current  Smoker  No ( Quit 2004)    Physical Exam:  Visit Vitals  BP (!) 118/56 (BP 1 Location: Left upper arm, BP Patient Position: Sitting)   Pulse 72   Wt 177 lb (80.3 kg)   SpO2 94%   BMI 31.35 kg/m²       Gen: Well-developed, well-nourished, in no acute distress  Neck: Supple,No JVD  Resp: No accessory muscle use, Clear breath sounds, No rales or rhonchi  Card: Regular Rate,Rythm,Normal S1, S2, No murmurs, rubs or gallop. No thrills. Abd:  Soft, non-tender, non-distended,BS+   MSK: No cyanosis  Skin: No rashes    Neuro: moving all four extremities , follows commands appropriately  Psych:  Good insight, oriented to person, place , alert, Nml Affect  LE: No edema    Cardiac Testing/ Procedures: A. Cardiac Cath/PCI:    B.ECHO/ROYAL: 1/8/19 - EF 55-60%/Mild MR    C. StressNuclear/Stress ECHO/Stress test: 3/2019 - Nml Kadie nuc ; 78%    D.Vascular: 1/22/21 Bilateral ICA mid range 50-79%      Cardiac testing that was reviewed and discussed with patient today:   Event monitor 3/40/  30 events transmitted-0 symptomatic, 30 device triggered, first-degree AV block-rate 74 bpm  7368 PACs with PAC burden of less than 1%  6838 PVCs with PVC burden of less than 1%  No significant arrhythmias       CALCIUM SCORING:  Left main: 36. Left anterior descendin. Left circumflex: 21. Right coronary: 288. Posterior descendin. Second diagonal: 2  Marginal branch: 81   TOTAL CAC SCORE: 504. Carotid duplex   Right Carotid  The right CCA is patent. Carotid bulb has heterogeneous plaque. There is moderate stenosis in the right ICA (50-69%). The right ICA has mild and heterogeneous plaque. The right ECA is patent. The right vertebral is antegrade. Left Carotid  The left CCA is patent. Carotid bulb has heterogeneous plaque. There is moderate stenosis in the left ICA (50-69%). The left ICA has mild and heterogeneous plaque. The left ECA is patent. The left vertebral is antegrade. Echo  · LV: Estimated LVEF is 60 - 65%. Biplane method used to measure ejection fraction. Normal cavity size and systolic function (ejection fraction normal). Mild concentric hypertrophy. Wall motion: normal.  · TV: Mild tricuspid valve regurgitation is present. · LA: Left [facility] volume index is 29.45 mL/m2.       Ashlyn Ames NP

## 2021-04-20 ENCOUNTER — OFFICE VISIT (OUTPATIENT)
Dept: CARDIOLOGY CLINIC | Age: 71
End: 2021-04-20
Payer: MEDICARE

## 2021-04-20 VITALS
DIASTOLIC BLOOD PRESSURE: 56 MMHG | OXYGEN SATURATION: 94 % | BODY MASS INDEX: 31.35 KG/M2 | SYSTOLIC BLOOD PRESSURE: 118 MMHG | HEART RATE: 72 BPM | WEIGHT: 177 LBS

## 2021-04-20 DIAGNOSIS — I10 ESSENTIAL HYPERTENSION: ICD-10-CM

## 2021-04-20 DIAGNOSIS — E66.9 OBESITY (BMI 30-39.9): Primary | ICD-10-CM

## 2021-04-20 DIAGNOSIS — I73.9 PERIPHERAL VASCULAR DISEASE (HCC): ICD-10-CM

## 2021-04-20 DIAGNOSIS — E55.9 VITAMIN D DEFICIENCY: ICD-10-CM

## 2021-04-20 DIAGNOSIS — Z87.898 HX OF SYNCOPE: ICD-10-CM

## 2021-04-20 PROCEDURE — 3017F COLORECTAL CA SCREEN DOC REV: CPT | Performed by: NURSE PRACTITIONER

## 2021-04-20 PROCEDURE — G8754 DIAS BP LESS 90: HCPCS | Performed by: NURSE PRACTITIONER

## 2021-04-20 PROCEDURE — G0463 HOSPITAL OUTPT CLINIC VISIT: HCPCS | Performed by: NURSE PRACTITIONER

## 2021-04-20 PROCEDURE — G8752 SYS BP LESS 140: HCPCS | Performed by: NURSE PRACTITIONER

## 2021-04-20 PROCEDURE — 99214 OFFICE O/P EST MOD 30 MIN: CPT | Performed by: NURSE PRACTITIONER

## 2021-04-20 PROCEDURE — 1090F PRES/ABSN URINE INCON ASSESS: CPT | Performed by: NURSE PRACTITIONER

## 2021-04-20 PROCEDURE — 1101F PT FALLS ASSESS-DOCD LE1/YR: CPT | Performed by: NURSE PRACTITIONER

## 2021-04-20 PROCEDURE — G8536 NO DOC ELDER MAL SCRN: HCPCS | Performed by: NURSE PRACTITIONER

## 2021-04-20 PROCEDURE — G8427 DOCREV CUR MEDS BY ELIG CLIN: HCPCS | Performed by: NURSE PRACTITIONER

## 2021-04-20 PROCEDURE — G8417 CALC BMI ABV UP PARAM F/U: HCPCS | Performed by: NURSE PRACTITIONER

## 2021-04-20 PROCEDURE — G8432 DEP SCR NOT DOC, RNG: HCPCS | Performed by: NURSE PRACTITIONER

## 2021-04-20 PROCEDURE — G8400 PT W/DXA NO RESULTS DOC: HCPCS | Performed by: NURSE PRACTITIONER

## 2021-04-20 RX ORDER — ACETAMINOPHEN AND CODEINE PHOSPHATE 300; 30 MG/1; MG/1
TABLET ORAL
COMMUNITY
Start: 2021-03-29 | End: 2022-06-30

## 2021-04-20 NOTE — PROGRESS NOTES
Room 3    Visit Vitals  BP (!) 118/56 (BP 1 Location: Left upper arm, BP Patient Position: Sitting)   Pulse 72   Wt 177 lb (80.3 kg)   SpO2 94%   BMI 31.35 kg/m²     REVIEW RESULTS OF;  EM in CC  Echo 2-17-21    Chest pain: no  Shortness of breath: no  Edema: no  Palpitations: no  Dizziness: no    New diagnosis/Surgeries: no    ER/Hospitalizations: no    Refills: NO

## 2021-04-30 ENCOUNTER — TELEPHONE (OUTPATIENT)
Dept: CARDIOLOGY CLINIC | Age: 71
End: 2021-04-30

## 2021-04-30 NOTE — TELEPHONE ENCOUNTER
Verified patient with two patient identifiers. Per patient she would like a copy of all cardiac tests that  ordered. Mailing address confirmed.

## 2021-05-06 LAB — HBA1C MFR BLD HPLC: 5.8 %

## 2021-05-12 ENCOUNTER — DOCUMENTATION ONLY (OUTPATIENT)
Dept: CARDIOLOGY CLINIC | Age: 71
End: 2021-05-12

## 2021-05-12 NOTE — PROGRESS NOTES
Lab results received from Dr. Jesús Early office - drawn 4/22/21:  HDL 66        AST/ALT 12/13    K 5.5  Glucose 98  Na 139  BUn/Creat 32/0.92    TSH 4.910    H/H 12.2/35.6  Plat 274    HgA1c 5.8%

## 2021-07-15 RX ORDER — METOPROLOL TARTRATE 25 MG/1
TABLET, FILM COATED ORAL
Qty: 180 TABLET | Refills: 1 | Status: SHIPPED | OUTPATIENT
Start: 2021-07-15 | End: 2022-04-01

## 2021-11-02 ENCOUNTER — OFFICE VISIT (OUTPATIENT)
Dept: CARDIOLOGY CLINIC | Age: 71
End: 2021-11-02
Payer: MEDICARE

## 2021-11-02 VITALS
HEART RATE: 65 BPM | DIASTOLIC BLOOD PRESSURE: 60 MMHG | BODY MASS INDEX: 31.35 KG/M2 | OXYGEN SATURATION: 99 % | HEIGHT: 63 IN | SYSTOLIC BLOOD PRESSURE: 130 MMHG

## 2021-11-02 DIAGNOSIS — E78.5 HYPERLIPIDEMIA, UNSPECIFIED HYPERLIPIDEMIA TYPE: ICD-10-CM

## 2021-11-02 DIAGNOSIS — I73.9 PERIPHERAL VASCULAR DISEASE (HCC): ICD-10-CM

## 2021-11-02 DIAGNOSIS — I10 ESSENTIAL HYPERTENSION: Primary | ICD-10-CM

## 2021-11-02 DIAGNOSIS — Z87.898 HX OF SYNCOPE: ICD-10-CM

## 2021-11-02 PROCEDURE — G8752 SYS BP LESS 140: HCPCS | Performed by: INTERNAL MEDICINE

## 2021-11-02 PROCEDURE — G8754 DIAS BP LESS 90: HCPCS | Performed by: INTERNAL MEDICINE

## 2021-11-02 PROCEDURE — G8536 NO DOC ELDER MAL SCRN: HCPCS | Performed by: INTERNAL MEDICINE

## 2021-11-02 PROCEDURE — 99214 OFFICE O/P EST MOD 30 MIN: CPT | Performed by: INTERNAL MEDICINE

## 2021-11-02 PROCEDURE — G8417 CALC BMI ABV UP PARAM F/U: HCPCS | Performed by: INTERNAL MEDICINE

## 2021-11-02 PROCEDURE — 3017F COLORECTAL CA SCREEN DOC REV: CPT | Performed by: INTERNAL MEDICINE

## 2021-11-02 PROCEDURE — G8427 DOCREV CUR MEDS BY ELIG CLIN: HCPCS | Performed by: INTERNAL MEDICINE

## 2021-11-02 PROCEDURE — 1101F PT FALLS ASSESS-DOCD LE1/YR: CPT | Performed by: INTERNAL MEDICINE

## 2021-11-02 PROCEDURE — G8432 DEP SCR NOT DOC, RNG: HCPCS | Performed by: INTERNAL MEDICINE

## 2021-11-02 PROCEDURE — 1090F PRES/ABSN URINE INCON ASSESS: CPT | Performed by: INTERNAL MEDICINE

## 2021-11-02 PROCEDURE — G8400 PT W/DXA NO RESULTS DOC: HCPCS | Performed by: INTERNAL MEDICINE

## 2021-11-02 PROCEDURE — G0463 HOSPITAL OUTPT CLINIC VISIT: HCPCS | Performed by: INTERNAL MEDICINE

## 2021-11-02 RX ORDER — LANOLIN ALCOHOL/MO/W.PET/CERES
2 CREAM (GRAM) TOPICAL 2 TIMES DAILY WITH MEALS
COMMUNITY

## 2021-11-02 NOTE — PROGRESS NOTES
Chloe Durant MD    [id number] [facility] [place], [address] [facility]    Office [phone],VTY [phone]      Barbie Voss is a 66 y.o. female is here for f/u visit . Dear Dr. Jj Bradley,    I had the pleasure of seeing Ms. Barbie Voss in the office today. Chief complaint: As documented in EMR    Assessment:  Syncope-December 2020  HTN  PVD w hx of rosalie iliac stents/carotid artery stenosis ( Dr Susanna Dumont)   Carotid artery disease  Hx of Sinus tachycardia  HLD  Diabetes mellitus-controlled per patient    Plan:     BP nml  2021 - saw vascular surgeon - had doppler study done which was OK per patient    Patient has significant risk factors including peripheral vascular disease/carotid artery disease-coronary CTA  Cont Meds  Lipids per PCP. On statin. Target LDL less than 70. 4/2021 - . Currently on pravastatin 80 mg. On recheck if LDL is still not at goal consider switching it to a high intensity statins like atorvastatin or rosuvastatin. F/u 6 months/earlier as needed    Patient understands the plan. All questions were answered to the patient's satisfaction. Medication Side Effects and Warnings were discussed with patient: yes  Patient Labs were reviewed and or requested:  yes  Patient Past Records were reviewed and or requested: yes    I appreciate the opportunity to be involved in Ms. Joshi. See note below for details. Please do not hesitate to contact us with questions or concerns. Chloe Durant MD    Cardiac Testing/ Procedures: A. Cardiac Cath/PCI:    B.ECHO/ROYAL: 2/17/21 - LV: Estimated LVEF is 60 - 65%. Biplane method used to measure ejection fraction. Normal cavity size and systolic function (ejection fraction normal). Mild concentric hypertrophy. Wall motion: normal.  1/8/19 - EF 55-60%/Mild MR    C. StressNuclear/Stress ECHO/Stress test: 3/2019 - Nml Kadie nuc ; 78%    D.Vascular: 1/22/21 Bilateral ICA mid range 50-79%    E. EP:Event monitor 3/14//21-4/12/2021  30 events transmitted-0 symptomatic, 30 device triggered, first-degree AV block-rate 74 bpm  7368 PACs with PAC burden of less than 1%  6838 PVCs with PVC burden of less than 1%  No significant arrhythmias    F. Miscellaneous:    Subjective:  Cami Sanders is a 66 y.o. female who returns for follow up visit. Patient had an episode of syncope in December 2020. She describes it as a sudden loss of consciousness. She remembers standing in the kitchen and then when she came around she felt groggy and tired. She did have urinary incontinence. She has seen neurology and a work-up has been done including EEG/MRI brain. No prodromal symptoms of dizziness, palpitations, chest pain prior to her syncopal episode. Since then no further episodes of dizziness/presyncope/syncope. No CP/Dyspnea  Continues to have hip and knee pain        ROS:  (bold if positive, if negative)             Medications before admission:    Current Outpatient Medications   Medication Sig Dispense    calcium citrate-vitamin d3 (CITRACAL+D) 315 mg-5 mcg (200 unit) tab Take 1 Tablet by mouth daily (with breakfast).  metoprolol tartrate (LOPRESSOR) 25 mg tablet TAKE 1 TABLET BY MOUTH  EVERY 12 HOURS 180 Tablet    cranberry fruit extract (CRANBERRY EXTRACT PO) Take  by mouth.  aspirin (ASPIRIN) 325 mg tablet Take 325 mg by mouth daily.  acetaminophen (TYLENOL) 500 mg tablet Take 2 Tabs by mouth every six (6) hours. 60 Tab    ferrous sulfate 325 mg (65 mg iron) tablet Take 1 Tab by mouth daily (with breakfast). 60 Tab    umeclidinium-vilanterol (ANORO ELLIPTA) 62.5-25 mcg/actuation inhaler Take 1 Puff by inhalation every morning.  glucosamine sulfate (GLUCOSAMINE) 500 mg tablet Take 1,000 mg by mouth daily.  magnesium 200 mg tab Take 1 Tab by mouth every morning.  vitamin E (AQUA GEMS) 400 unit capsule Take 400 Units by mouth every morning.      Omega-3 Fatty Acids 60- mg cpDR Take 2 Tabs by mouth every morning.  ascorbic acid, vitamin C, (VITAMIN C) 500 mg tablet Take 500 mg by mouth daily.  docusate sodium (DOK) 250 mg capsule Take 250 mg by mouth two (2) times a day.  metFORMIN (GLUCOPHAGE) 500 mg tablet Take 500 mg by mouth two (2) times daily (with meals).  montelukast (SINGULAIR) 10 mg tablet Take 10 mg by mouth every evening.  lisinopril-hydrochlorothiazide (PRINZIDE, ZESTORETIC) 10-12.5 mg per tablet Take 1 Tab by mouth every morning.  pravastatin (PRAVACHOL) 80 mg tablet Take 80 mg by mouth nightly.  latanoprost (XALATAN) 0.005 % ophthalmic solution Administer 1 Drop to both eyes nightly.  acetaminophen-codeine (TYLENOL #3) 300-30 mg per tablet TAKE 1 TABLET BY MOUTH AS NEEDED EVERY 6 HOURS FOR 30 DAYS. (Patient not taking: Reported on 11/2/2021)      No current facility-administered medications for this visit. Family History of CAD:    Yes    Social History:  Current  Smoker  No ( Quit 2004)    Physical Exam:  Visit Vitals  /60 (BP 1 Location: Left upper arm, BP Patient Position: Sitting, BP Cuff Size: Adult)   Pulse 65   Ht 5' 3\" (1.6 m)   SpO2 99%   BMI 31.35 kg/m²          Gen: Well-developed, well-nourished, in no acute distress  Neck: Supple,No JVD, No Carotid Bruit,   Resp: No accessory muscle use, Clear breath sounds, No rales or rhonchi  Card: Regular Rate,Rythm,Normal S1, S2, No murmurs, rubs or gallop. No thrills.    Abd:  Soft, ,BS+,   MSK: No cyanosis  Skin: No rashes    Neuro: moving all four extremities , follows commands appropriately  Psych:  Good insight, oriented to person, place , alert, Nml Affect  LE: No edema    EKG:    LABS:        Lab Results   Component Value Date/Time    WBC 4.5 06/12/2019 11:04 AM    HGB 10.5 (L) 06/19/2019 06:09 AM    HCT 37.9 06/12/2019 11:04 AM    PLATELET 316 25/83/8739 11:04 AM     Lab Results   Component Value Date/Time    Sodium 135 (L) 06/12/2019 11:04 AM    Potassium 5.3 (H) 06/12/2019 11:04 AM Chloride 101 06/12/2019 11:04 AM    CO2 29 06/12/2019 11:04 AM    Anion gap 5 06/12/2019 11:04 AM    Glucose 93 06/12/2019 11:04 AM    BUN 19 06/12/2019 11:04 AM    Creatinine 0.89 06/12/2019 11:04 AM    BUN/Creatinine ratio 21 (H) 06/12/2019 11:04 AM    GFR est AA >60 06/12/2019 11:04 AM    GFR est non-AA >60 06/12/2019 11:04 AM    Calcium 9.7 06/12/2019 11:04 AM             Kell Steele MD

## 2021-11-02 NOTE — PROGRESS NOTES
Benjamin Lal is a 66 y.o. female    Visit Vitals  /60 (BP 1 Location: Left upper arm, BP Patient Position: Sitting, BP Cuff Size: Adult)   Pulse 65   Ht 5' 3\" (1.6 m)   SpO2 99%   BMI 31.35 kg/m²       Chief Complaint   Patient presents with    Hypertension    Cholesterol Problem    Other     SYNCOPE    Other     PVD       Chest pain NO  SOB NO  Dizziness NO  Swelling NO  Recent hospital visit NO  Refills NO  COVID VACCINE STATUS YES  HAD COVID?  NO

## 2021-11-02 NOTE — LETTER
11/2/2021 Patient: Barbie Voss YOB: 1950 Date of Visit: 11/2/2021 Chelly Hammonds MD 
[address] [facility] [address] Via Fax: [phone] Dear Chelly Hammonds MD, Thank you for referring Ms. Huey Childers to [facility] for evaluation. My notes for this consultation are attached. If you have questions, please do not hesitate to call me. I look forward to following your patient along with you. Sincerely, Chloe Durant MD

## 2022-03-18 PROBLEM — M47.812 OSTEOARTHRITIS OF CERVICAL SPINE: Status: ACTIVE | Noted: 2019-06-18

## 2022-03-19 PROBLEM — M19.011 OSTEOARTHRITIS OF RIGHT SHOULDER: Status: ACTIVE | Noted: 2019-01-07

## 2022-03-19 PROBLEM — M54.12 CERVICAL RADICULOPATHY: Status: ACTIVE | Noted: 2019-06-18

## 2022-05-02 NOTE — PROGRESS NOTES
Gladys Howell MD    [id number] [facility] [place], [address] [facility]    Office [phone],[facility] [phone]      Eligio German is a 66 y.o. female is here for f/u visit . Dear Dr. Chuckie Granados,    I had the pleasure of seeing Ms. Eligio German in the office today. Chief complaint: As documented in EMR    Assessment:  History of syncope-December 2020 (neuro [id number], has had event monitor 3/2021 also placed-no significant arrhythmias)  HTN  PVD w hx of rosalie iliac stents/carotid artery stenosis ( Dr Susi Conde); status post right carotid stent-March 2022; also states that she may need a stent left carotid artery  Hx of Sinus tachycardia  HLD  Diabetes mellitus-controlled per patient  Preop evaluation prior to right knee surgery    Plan:     BP nml  Kadie nuclear study  Cont Meds  Lipids per PCP. On statin. Target LDL less than 70. 4/2021 - . Currently on pravastatin 80 mg. On recheck if LDL is still not at goal consider switching it to a high intensity statins like atorvastatin or rosuvastatin. Patient stated that she will get it checked through her PCP. F/u 6 months/earlier as needed based on cardiac work-up    Patient understands the plan. All questions were answered to the patient's satisfaction. Medication Side Effects and Warnings were discussed with patient: yes  Patient Labs were reviewed and or requested:  yes  Patient Past Records were reviewed and or requested: yes    I appreciate the opportunity to be involved in Ms. Joshi. See note below for details. Please do not hesitate to contact us with questions or concerns. Gladys Howell MD    Cardiac Testing/ Procedures: A. Cardiac Cath/PCI:    B.ECHO/ROYAL: 2/17/21 - LV: Estimated LVEF is 60 - 65%. Biplane method used to measure ejection fraction. Normal cavity size and systolic function (ejection fraction normal). Mild concentric hypertrophy.  Wall motion: normal.  1/8/19 - EF 55-60%/Mild MR MASONStressNuclear/Stress ECHO/Stress test: 3/2019 - Nml Kadie nuc ; 78%    D.Vascular: 1/22/21 Bilateral ICA mid range 50-79%    E. EP:Event monitor 3/14//21-4/12/2021  30 events transmitted-0 symptomatic, 30 device triggered, first-degree AV block-rate 74 bpm  7368 PACs with PAC burden of less than 1%  6838 PVCs with PVC burden of less than 1%  No significant arrhythmias    F. Miscellaneous:  R Carotid artery stent - March 2022 - Dr Lorena Patterson ( [facility])     Subjective:  Lexa Bello is a 66 y.o. female who returns for follow up visit. No chest pain, dyspnea. Pain both lower extremity/right knee. States that right knee is \"bone-on-bone\" and she is to get surgery. Has had carotid stent placed on the right side approximately a month and a half ago. 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 Walks with help of a cane. Continues to have hip and knee pain        ROS:  (bold if positive, if negative)             Medications before admission:    Current Outpatient Medications   Medication Sig Dispense    clopidogreL (Plavix) 75 mg tab Take 75 mg by mouth. For stents in neck     albuterol (PROVENTIL HFA, VENTOLIN HFA, PROAIR HFA) 90 mcg/actuation inhaler Take 1 Puff by inhalation every six (6) hours as needed for Wheezing.  metoprolol tartrate (LOPRESSOR) 25 mg tablet TAKE 1 TABLET BY MOUTH  EVERY 12 HOURS 180 Tablet    calcium citrate-vitamin d3 (CITRACAL+D) 315 mg-5 mcg (200 unit) tab Take 1 Tablet by mouth daily (with breakfast).  cranberry fruit extract (CRANBERRY EXTRACT PO) Take  by mouth.  acetaminophen (TYLENOL) 500 mg tablet Take 2 Tabs by mouth every six (6) hours. 60 Tab    ferrous sulfate 325 mg (65 mg iron) tablet Take 1 Tab by mouth daily (with breakfast). 60 Tab    umeclidinium-vilanterol (ANORO ELLIPTA) 62.5-25 mcg/actuation inhaler Take 1 Puff by inhalation every morning.      glucosamine sulfate (GLUCOSAMINE) 500 mg tablet Take 1,000 mg by mouth daily.  magnesium 200 mg tab Take 1 Tab by mouth every morning.  vitamin E (AQUA GEMS) 400 unit capsule Take 400 Units by mouth every morning.  Omega-3 Fatty Acids 60- mg cpDR Take 2 Tabs by mouth every morning.  ascorbic acid, vitamin C, (VITAMIN C) 500 mg tablet Take 500 mg by mouth daily.  metFORMIN (GLUCOPHAGE) 500 mg tablet Take 500 mg by mouth two (2) times daily (with meals).  montelukast (SINGULAIR) 10 mg tablet Take 10 mg by mouth every evening.  lisinopril-hydrochlorothiazide (PRINZIDE, ZESTORETIC) 10-12.5 mg per tablet Take 1 Tab by mouth every morning.  pravastatin (PRAVACHOL) 80 mg tablet Take 80 mg by mouth nightly.  latanoprost (XALATAN) 0.005 % ophthalmic solution Administer 1 Drop to both eyes nightly.  acetaminophen-codeine (TYLENOL #3) 300-30 mg per tablet TAKE 1 TABLET BY MOUTH AS NEEDED EVERY 6 HOURS FOR 30 DAYS. (Patient not taking: Reported on 11/2/2021)     aspirin (ASPIRIN) 325 mg tablet Take 325 mg by mouth daily. (Patient not taking: Reported on 5/5/2022)     docusate sodium (DOK) 250 mg capsule Take 250 mg by mouth two (2) times a day. (Patient not taking: Reported on 5/5/2022)      No current facility-administered medications for this visit. Family History of CAD:    Yes    Social History:  Current  Smoker  No ( Quit 2004)    Physical Exam:  Visit Vitals  /60 (BP 1 Location: Right upper arm, BP Patient Position: Sitting, BP Cuff Size: Adult)   Pulse 68   Resp 18   Ht 5' 3\" (1.6 m)   Wt 177 lb 12.8 oz (80.6 kg)   SpO2 98%   BMI 31.50 kg/m²          Gen: Well-developed, well-nourished, in no acute distress  Neck: Supple,No JVD, No Carotid Bruit,   Resp: No accessory muscle use, Clear breath sounds, No rales or rhonchi  Card: Regular Rate,Rythm,Normal S1, S2, No murmurs, rubs or gallop. No thrills.    Abd:  Soft, ,BS+,   MSK: No cyanosis  Skin: No rashes    Neuro: moving all four extremities , follows commands appropriately  Psych:  Good insight, oriented to person, place , alert, Nml Affect  LE: No edema    EKG: Sinus rhythm, normal axis, normal intervals    LABS:        Lab Results   Component Value Date/Time    WBC 4.5 06/12/2019 11:04 AM    HGB 10.5 (L) 06/19/2019 06:09 AM    HCT 37.9 06/12/2019 11:04 AM    PLATELET 054 48/26/0364 11:04 AM     Lab Results   Component Value Date/Time    Sodium 135 (L) 06/12/2019 11:04 AM    Potassium 5.3 (H) 06/12/2019 11:04 AM    Chloride 101 06/12/2019 11:04 AM    CO2 29 06/12/2019 11:04 AM    Anion gap 5 06/12/2019 11:04 AM    Glucose 93 06/12/2019 11:04 AM    BUN 19 06/12/2019 11:04 AM    Creatinine 0.89 06/12/2019 11:04 AM    BUN/Creatinine ratio 21 (H) 06/12/2019 11:04 AM    GFR est AA >60 06/12/2019 11:04 AM    GFR est non-AA >60 06/12/2019 11:04 AM    Calcium 9.7 06/12/2019 11:04 AM             Jorge Aguilera MD

## 2022-05-05 ENCOUNTER — OFFICE VISIT (OUTPATIENT)
Dept: CARDIOLOGY CLINIC | Age: 72
End: 2022-05-05
Payer: MEDICARE

## 2022-05-05 VITALS
DIASTOLIC BLOOD PRESSURE: 60 MMHG | RESPIRATION RATE: 18 BRPM | HEART RATE: 68 BPM | OXYGEN SATURATION: 98 % | WEIGHT: 177.8 LBS | HEIGHT: 63 IN | SYSTOLIC BLOOD PRESSURE: 132 MMHG | BODY MASS INDEX: 31.5 KG/M2

## 2022-05-05 DIAGNOSIS — I73.9 PERIPHERAL VASCULAR DISEASE (HCC): ICD-10-CM

## 2022-05-05 DIAGNOSIS — I10 HYPERTENSION, UNSPECIFIED TYPE: ICD-10-CM

## 2022-05-05 DIAGNOSIS — I10 ESSENTIAL HYPERTENSION: Primary | ICD-10-CM

## 2022-05-05 DIAGNOSIS — E78.5 HYPERLIPIDEMIA, UNSPECIFIED HYPERLIPIDEMIA TYPE: ICD-10-CM

## 2022-05-05 DIAGNOSIS — Z01.810 PREOPERATIVE CARDIOVASCULAR EXAMINATION: ICD-10-CM

## 2022-05-05 PROCEDURE — 1090F PRES/ABSN URINE INCON ASSESS: CPT | Performed by: INTERNAL MEDICINE

## 2022-05-05 PROCEDURE — 99214 OFFICE O/P EST MOD 30 MIN: CPT | Performed by: INTERNAL MEDICINE

## 2022-05-05 PROCEDURE — G8536 NO DOC ELDER MAL SCRN: HCPCS | Performed by: INTERNAL MEDICINE

## 2022-05-05 PROCEDURE — G8417 CALC BMI ABV UP PARAM F/U: HCPCS | Performed by: INTERNAL MEDICINE

## 2022-05-05 PROCEDURE — G8400 PT W/DXA NO RESULTS DOC: HCPCS | Performed by: INTERNAL MEDICINE

## 2022-05-05 PROCEDURE — 93005 ELECTROCARDIOGRAM TRACING: CPT | Performed by: INTERNAL MEDICINE

## 2022-05-05 PROCEDURE — 3017F COLORECTAL CA SCREEN DOC REV: CPT | Performed by: INTERNAL MEDICINE

## 2022-05-05 PROCEDURE — G0463 HOSPITAL OUTPT CLINIC VISIT: HCPCS | Performed by: INTERNAL MEDICINE

## 2022-05-05 PROCEDURE — G8754 DIAS BP LESS 90: HCPCS | Performed by: INTERNAL MEDICINE

## 2022-05-05 PROCEDURE — G8752 SYS BP LESS 140: HCPCS | Performed by: INTERNAL MEDICINE

## 2022-05-05 PROCEDURE — G8432 DEP SCR NOT DOC, RNG: HCPCS | Performed by: INTERNAL MEDICINE

## 2022-05-05 PROCEDURE — 1101F PT FALLS ASSESS-DOCD LE1/YR: CPT | Performed by: INTERNAL MEDICINE

## 2022-05-05 PROCEDURE — G8427 DOCREV CUR MEDS BY ELIG CLIN: HCPCS | Performed by: INTERNAL MEDICINE

## 2022-05-05 PROCEDURE — 93010 ELECTROCARDIOGRAM REPORT: CPT | Performed by: INTERNAL MEDICINE

## 2022-05-05 RX ORDER — ALBUTEROL SULFATE 90 UG/1
1 AEROSOL, METERED RESPIRATORY (INHALATION)
COMMUNITY

## 2022-05-05 RX ORDER — CLOPIDOGREL BISULFATE 75 MG/1
75 TABLET ORAL
COMMUNITY

## 2022-05-05 NOTE — LETTER
5/5/2022    Patient: Jack Angel   YOB: 1950   Date of Visit: 5/5/2022     China Cornell MD  [address]  [phone]  Via Fax: [phone]    Dear China Cornell MD,      Thank you for referring Ms. Edis Esparza to [facility] for evaluation. My notes for this consultation are attached. If you have questions, please do not hesitate to call me. I look forward to following your patient along with you.       Sincerely,    Tracey Munoz MD

## 2022-05-10 ENCOUNTER — TELEPHONE (OUTPATIENT)
Dept: CARDIOLOGY CLINIC | Age: 72
End: 2022-05-10

## 2022-05-10 NOTE — TELEPHONE ENCOUNTER
ID verified per protocol.  Patient confirmed nuclear stress test appointment reminder including date, time, location, prep, and duration of test. Patient verbalized understanding and agrees with prep/test.

## 2022-05-11 ENCOUNTER — ANCILLARY PROCEDURE (OUTPATIENT)
Dept: CARDIOLOGY CLINIC | Age: 72
End: 2022-05-11
Payer: MEDICARE

## 2022-05-11 VITALS — BODY MASS INDEX: 31.36 KG/M2 | WEIGHT: 177 LBS | HEIGHT: 63 IN

## 2022-05-11 DIAGNOSIS — I10 HYPERTENSION, UNSPECIFIED TYPE: ICD-10-CM

## 2022-05-11 DIAGNOSIS — E78.5 HYPERLIPIDEMIA, UNSPECIFIED HYPERLIPIDEMIA TYPE: ICD-10-CM

## 2022-05-11 DIAGNOSIS — Z01.810 PRE-OPERATIVE CARDIOVASCULAR EXAMINATION: ICD-10-CM

## 2022-05-11 DIAGNOSIS — I73.9 PERIPHERAL VASCULAR DISEASE (HCC): ICD-10-CM

## 2022-05-11 PROCEDURE — 93017 CV STRESS TEST TRACING ONLY: CPT | Performed by: INTERNAL MEDICINE

## 2022-05-11 PROCEDURE — A9500 TC99M SESTAMIBI: HCPCS | Performed by: INTERNAL MEDICINE

## 2022-05-11 RX ORDER — TETRAKIS(2-METHOXYISOBUTYLISOCYANIDE)COPPER(I) TETRAFLUOROBORATE 1 MG/ML
24.9 INJECTION, POWDER, LYOPHILIZED, FOR SOLUTION INTRAVENOUS ONCE
Status: COMPLETED | OUTPATIENT
Start: 2022-05-11 | End: 2022-05-11

## 2022-05-11 RX ORDER — TETRAKIS(2-METHOXYISOBUTYLISOCYANIDE)COPPER(I) TETRAFLUOROBORATE 1 MG/ML
8.2 INJECTION, POWDER, LYOPHILIZED, FOR SOLUTION INTRAVENOUS ONCE
Status: COMPLETED | OUTPATIENT
Start: 2022-05-11 | End: 2022-05-11

## 2022-05-11 RX ADMIN — TECHNETIUM TC 99M SESTAMIBI 8.2 MILLICURIE: 1 INJECTION, POWDER, FOR SOLUTION INTRAVENOUS at 12:40

## 2022-05-11 RX ADMIN — TECHNETIUM TC 99M SESTAMIBI 24.9 MILLICURIE: 1 INJECTION, POWDER, FOR SOLUTION INTRAVENOUS at 14:30

## 2022-05-11 RX ADMIN — REGADENOSON 0.4 MG: 0.08 INJECTION, SOLUTION INTRAVENOUS at 14:30

## 2022-05-12 LAB
NUC STRESS EJECTION FRACTION: 79 %
STRESS BASELINE DIAS BP: 66 MMHG
STRESS BASELINE HR: 69 BPM
STRESS BASELINE ST DEPRESSION: 0 MM
STRESS BASELINE SYS BP: 134 MMHG
STRESS O2 SAT PEAK: 99 %
STRESS O2 SAT REST: 100 %
STRESS PEAK DIAS BP: 58 MMHG
STRESS PEAK SYS BP: 118 MMHG
STRESS PERCENT HR ACHIEVED: 63 %
STRESS POST PEAK HR: 93 BPM
STRESS RATE PRESSURE PRODUCT: NORMAL BPM*MMHG
STRESS ST DEPRESSION: 0 MM
STRESS TARGET HR: 148 BPM

## 2022-05-12 PROCEDURE — 93016 CV STRESS TEST SUPVJ ONLY: CPT | Performed by: INTERNAL MEDICINE

## 2022-05-12 PROCEDURE — 93018 CV STRESS TEST I&R ONLY: CPT | Performed by: INTERNAL MEDICINE

## 2022-05-12 PROCEDURE — 78452 HT MUSCLE IMAGE SPECT MULT: CPT | Performed by: INTERNAL MEDICINE

## 2022-05-13 ENCOUNTER — TELEPHONE (OUTPATIENT)
Dept: CARDIOLOGY CLINIC | Age: 72
End: 2022-05-13

## 2022-05-26 ENCOUNTER — TRANSCRIBE ORDER (OUTPATIENT)
Dept: SCHEDULING | Age: 72
End: 2022-05-26

## 2022-05-26 DIAGNOSIS — M17.11 LOCALIZED OSTEOARTHRITIS OF RIGHT KNEE: Primary | ICD-10-CM

## 2022-06-14 ENCOUNTER — HOSPITAL ENCOUNTER (OUTPATIENT)
Dept: CT IMAGING | Age: 72
Discharge: HOME | End: 2022-06-14
Attending: ORTHOPAEDIC SURGERY
Payer: MEDICARE

## 2022-06-14 DIAGNOSIS — M17.11 LOCALIZED OSTEOARTHRITIS OF RIGHT KNEE: ICD-10-CM

## 2022-06-14 PROCEDURE — 73700 CT LOWER EXTREMITY W/O DYE: CPT

## 2022-06-30 ENCOUNTER — HOSPITAL ENCOUNTER (OUTPATIENT)
Dept: PREADMISSION TESTING | Age: 72
Discharge: HOME | End: 2022-06-30
Payer: MEDICARE

## 2022-06-30 VITALS
SYSTOLIC BLOOD PRESSURE: 136 MMHG | BODY MASS INDEX: 30.49 KG/M2 | HEIGHT: 64 IN | WEIGHT: 178.6 LBS | HEART RATE: 81 BPM | DIASTOLIC BLOOD PRESSURE: 58 MMHG | TEMPERATURE: 97.5 F | OXYGEN SATURATION: 96 % | RESPIRATION RATE: 18 BRPM

## 2022-06-30 LAB
ALBUMIN SERPL-MCNC: 3.9 G/DL (ref 3.5–5)
ALBUMIN/GLOB SERPL: 1.1 {RATIO} (ref 1.1–2.2)
ALP SERPL-CCNC: 86 U/L (ref 45–117)
ALT SERPL-CCNC: 19 U/L (ref 12–78)
ANION GAP SERPL CALC-SCNC: 8 MMOL/L (ref 5–15)
APPEARANCE UR: CLEAR
AST SERPL-CCNC: 21 U/L (ref 15–37)
BACTERIA URNS QL MICRO: NEGATIVE /HPF
BASOPHILS # BLD: 0.1 K/UL (ref 0–0.1)
BASOPHILS NFR BLD: 1 % (ref 0–1)
BILIRUB SERPL-MCNC: 0.2 MG/DL (ref 0.2–1)
BILIRUB UR QL: NEGATIVE
BUN SERPL-MCNC: 27 MG/DL (ref 6–20)
BUN/CREAT SERPL: 25 (ref 12–20)
CALCIUM SERPL-MCNC: 9.4 MG/DL (ref 8.5–10.1)
CHLORIDE SERPL-SCNC: 100 MMOL/L (ref 97–108)
CO2 SERPL-SCNC: 29 MMOL/L (ref 21–32)
COLOR UR: ABNORMAL
CREAT SERPL-MCNC: 1.08 MG/DL (ref 0.55–1.02)
CRP SERPL-MCNC: <0.29 MG/DL (ref 0–0.6)
DIFFERENTIAL METHOD BLD: ABNORMAL
EOSINOPHIL # BLD: 0.1 K/UL (ref 0–0.4)
EOSINOPHIL NFR BLD: 2 % (ref 0–7)
EPITH CASTS URNS QL MICRO: ABNORMAL /LPF
ERYTHROCYTE [DISTWIDTH] IN BLOOD BY AUTOMATED COUNT: 13.6 % (ref 11.5–14.5)
ERYTHROCYTE [SEDIMENTATION RATE] IN BLOOD: 20 MM/HR (ref 0–30)
EST. AVERAGE GLUCOSE BLD GHB EST-MCNC: 114 MG/DL
GLOBULIN SER CALC-MCNC: 3.4 G/DL (ref 2–4)
GLUCOSE SERPL-MCNC: 112 MG/DL (ref 65–100)
GLUCOSE UR STRIP.AUTO-MCNC: NEGATIVE MG/DL
HBA1C MFR BLD: 5.6 % (ref 4–5.6)
HCT VFR BLD AUTO: 34.8 % (ref 35–47)
HGB BLD-MCNC: 11.8 G/DL (ref 11.5–16)
HGB UR QL STRIP: NEGATIVE
IMM GRANULOCYTES # BLD AUTO: 0 K/UL (ref 0–0.04)
IMM GRANULOCYTES NFR BLD AUTO: 0 % (ref 0–0.5)
KETONES UR QL STRIP.AUTO: ABNORMAL MG/DL
LEUKOCYTE ESTERASE UR QL STRIP.AUTO: ABNORMAL
LYMPHOCYTES # BLD: 1.4 K/UL (ref 0.8–3.5)
LYMPHOCYTES NFR BLD: 30 % (ref 12–49)
MCH RBC QN AUTO: 32.1 PG (ref 26–34)
MCHC RBC AUTO-ENTMCNC: 33.9 G/DL (ref 30–36.5)
MCV RBC AUTO: 94.6 FL (ref 80–99)
MONOCYTES # BLD: 0.8 K/UL (ref 0–1)
MONOCYTES NFR BLD: 18 % (ref 5–13)
NEUTS SEG # BLD: 2.2 K/UL (ref 1.8–8)
NEUTS SEG NFR BLD: 49 % (ref 32–75)
NITRITE UR QL STRIP.AUTO: NEGATIVE
NRBC # BLD: 0 K/UL (ref 0–0.01)
NRBC BLD-RTO: 0 PER 100 WBC
OTHER,OTHU: ABNORMAL
PH UR STRIP: 8 [PH] (ref 5–8)
PLATELET # BLD AUTO: 316 K/UL (ref 150–400)
PMV BLD AUTO: 10.8 FL (ref 8.9–12.9)
POTASSIUM SERPL-SCNC: 4.5 MMOL/L (ref 3.5–5.1)
PROT SERPL-MCNC: 7.3 G/DL (ref 6.4–8.2)
PROT UR STRIP-MCNC: NEGATIVE MG/DL
RBC # BLD AUTO: 3.68 M/UL (ref 3.8–5.2)
RBC #/AREA URNS HPF: ABNORMAL /HPF (ref 0–5)
SODIUM SERPL-SCNC: 137 MMOL/L (ref 136–145)
SP GR UR REFRACTOMETRY: 1.02 (ref 1–1.03)
UA: UC IF INDICATED,UAUC: ABNORMAL
UROBILINOGEN UR QL STRIP.AUTO: 0.2 EU/DL (ref 0.2–1)
WBC # BLD AUTO: 4.5 K/UL (ref 3.6–11)
WBC URNS QL MICRO: ABNORMAL /HPF (ref 0–4)

## 2022-06-30 PROCEDURE — 81001 URINALYSIS AUTO W/SCOPE: CPT

## 2022-06-30 PROCEDURE — 85652 RBC SED RATE AUTOMATED: CPT

## 2022-06-30 PROCEDURE — 86140 C-REACTIVE PROTEIN: CPT

## 2022-06-30 PROCEDURE — 80053 COMPREHEN METABOLIC PANEL: CPT

## 2022-06-30 PROCEDURE — 83036 HEMOGLOBIN GLYCOSYLATED A1C: CPT

## 2022-06-30 PROCEDURE — 84466 ASSAY OF TRANSFERRIN: CPT

## 2022-06-30 PROCEDURE — 87086 URINE CULTURE/COLONY COUNT: CPT

## 2022-06-30 PROCEDURE — 85025 COMPLETE CBC W/AUTO DIFF WBC: CPT

## 2022-06-30 PROCEDURE — 36415 COLL VENOUS BLD VENIPUNCTURE: CPT

## 2022-06-30 RX ORDER — LEVOTHYROXINE SODIUM 50 UG/1
50 TABLET ORAL
COMMUNITY

## 2022-06-30 RX ORDER — METOPROLOL TARTRATE 25 MG/1
25 TABLET, FILM COATED ORAL 2 TIMES DAILY
COMMUNITY

## 2022-06-30 NOTE — PERIOP NOTES
[address] , [address] [facility]   MAIN OR                                  [phone]   MAIN PRE OP                          [phone]                                                                                AMBULATORY PRE OP          [phone]  PRE-ADMISSION TESTING    [phone]   Surgery Date:  Monday 7/11/22       Is surgery arrival time given by surgeon? NO  If NO, [phone] staff will call you between 3 and 7pm the day before your surgery with your arrival time. (If your surgery is on a Monday, we will call you the Friday before.)    Call [phone] after 7pm Monday-Friday if you did not receive this call. INSTRUCTIONS BEFORE YOUR SURGERY   When You  Arrive Arrive at the 2nd [address] [facility] on the day of your surgery  Have your insurance card, photo ID, and any copayment (if needed)   Food   and   Drink NO food or drink after midnight the night before surgery    This means NO water, gum, mints, coffee, juice, etc.  No alcohol (beer, wine, liquor) 24 hours before and after surgery   Medications to   TAKE   Morning of Surgery MEDICATIONS TO TAKE THE MORNING OF SURGERY WITH A SIP OF WATER:    Levothyroxine   Metoprolol   Montelukast   Anoro Ellipta   Albuterol inhaler if needed, bring this with you morning of surgery     Medications  To  STOP      7 days before surgery  Non-Steroidal anti-inflammatory Drugs (NSAID's): for example, Ibuprofen (Advil, Motrin), Naproxen (Aleve)   Aspirin, if taking for pain    Herbal supplements, vitamins, and fish oil   Other:  (Pain medications not listed above, including Tylenol may be taken)   Blood  Thinners  If you take  Aspirin, Plavix, Coumadin, or any blood-thinning or anti-blood clot medicine, talk to the doctor who prescribed the medications for pre-operative instructions.    Hold Plavix 5 days before surgery, take last dose on 7/5/22   Bathing Clothing  Jewelry  Valuables      If you shower the morning of surgery, please do not apply anything to your skin (lotions, powders, deodorant, or makeup, especially mascara)   Follow Chlorhexidine Care Fusion body wash instructions provided to you during PAT appointment. Begin 3 days prior to surgery.  Do not shave or trim anywhere 24 hours before surgery   Wear your hair loose or down; no pony-tails, buns, or metal hair clips   Wear loose, comfortable, clean clothes   Wear glasses instead of contacts  [employer] money, valuables, and jewelry, including body piercings, at home   If you were given an [employer], bring it on day of surgery. Going Home - or Spending the Night  SAME-DAY SURGERY: You must have a responsible adult drive you home and stay with you 24 hours after surgery   ADMITS: If your doctor is keeping you in the hospital after surgery, leave personal belongings/luggage in your car until you have a hospital room number. Hospital discharge time is 12 noon  Drivers must be here before 12 noon unless you are told differently   Special Instructions 16. Special Instructions:  · Use Chlorhexidine Care Fusion wash and sponges 3 days prior to surgery as instructed. · Incentive spirometer given with instructions to practice at home and bring back to the hospital on the day of surgery. · [facility] will contact you if your Hemoglobin A1C is greater than 7.5. · Ensure/Glucerna  sample  and Ensure/Glucerna coupon given. · Pain pamphlet and Call Don't Fall reminder reviewed with patient. ·  parking is complimentary Monday - Friday 7 am - 5:30 pm  · Bring PTA Medication list day of surgery with the last doses taken documented   · Do not bring medication bottles the day of surgery       Follow all instructions so your surgery wont be cancelled. Please, be on time.                     If a situation occurs and you are delayed the day of surgery, call  [phone]. If your physical condition changes (like a fever, cold, flu, etc.) call your surgeon. Home medication(s) reviewed and verified via   LIST   VERBAL   during PAT appointment. The patient was contacted by      IN-PERSON  The patient verbalizes understanding of all instructions and  DOES NOT   need reinforcement.

## 2022-06-30 NOTE — PERIOP NOTES
Faviola Gibson NP instructed patient to stop Plavix 5 days prior to surgery and will call her if we receive different instructions from Vascular MD regarding med plan. Called Dr [facility] office and requested them to fax last office visit note, spoke to Fabrice HERNANDEZ Faxed Dr Ruelas's office and sent request for last office visit note and Plavix med plan for upcoming surgery.

## 2022-07-01 LAB
BACTERIA SPEC CULT: NORMAL
SERVICE CMNT-IMP: NORMAL
SERVICE CMNT-IMP: NORMAL
TRANSFERRIN SERPL-MCNC: 236 MG/DL (ref 192–364)

## 2022-07-05 NOTE — PERIOP NOTES
Confirmed receipt of Plavix plan by Dr. Tami Carter. Will return instructions at this time. Received Plavix plan - to hold 5 days prior to surgery. Called patient to confirm hold Plavix 5 days - verbalizes understanding.

## 2022-07-05 NOTE — H&P
History and Physical    Patient: Virgie Diana MRN: [id number]  SSN: [id number]    YOB: 1950  Age: 66 y.o. Sex: female      CC: Right knee pain    Subjective:      Virgie Diana is a 66 y.o. female referred for pre-operative evaluation by Dr. Vickie Chavez for surgery on 7/11/22. Laquita Bahena has been through [facility] several times for joint replacements. She notes only hypotension with anesthesia. She notes her right knee has been painful for over a year. She has tried conservative measures but nothing worked. She is tired of the pain impacting her life. Denies falls. Notes constant localized pain to her knee. She has weakness to the joint. She is followed by Dr. Eh Jaquez for her bilateral iliac stents and Plavix. She notes she had a carotid stent placed in March of this year. She is also followed by Dr. Nisha Adkins for her heart health. She underwent a nuclear stress test in May which was normal. There are no contraindications to proceeding with her elective surgery. She is followed by Esther Graves with pulmonary for her COPD that became worse in May of this year. She was placed on a rescue inhaler as needed. The patient was evaluated in the surgeon's office and it was determined that the most appropriate plan of care is to proceed with surgical intervention. Patient's PCP Abiodun Nascimento MD    Past Medical History:   Diagnosis Date    Anticoagulant long-term use     Arthritis     BCC (basal cell carcinoma of skin)     Claustrophobia     COPD     Degenerative disc disease, lumbar 1/6/2016    Glaucoma     Gout attack 04/2019    Big toe    H/O sinus tachycardia 01/08/2019    Following shoulder surgery    High potassium 12/2018    Chronic- Stays around 5.3    History of nuclear stress test 02/10/2019    Normal    Hypertension     Obesity (BMI 30-39. 9)     Peripheral vascular disease ([address]     stents in each iliac artery, states they are \"watching\" carotids    Prediabetes 2012    Rib fracture     Unspecified adverse effect of anesthesia     BP drops    Unspecified sleep apnea     Does not uses CPAP     Past Surgical History:   Procedure Laterality Date    HX CAROTID STENT Right 2022    HX HYSTERECTOMY  1980    HX LUMBAR FUSION      HX LUMBAR FUSION   & 2017    ALIF and then did the posterior after it didnt work    HX ORTHOPAEDIC Right     Ulnar nerve    HX ROTATOR CUFF REPAIR Left     HX SHOULDER REPLACEMENT Bilateral 2019    IR PLACE STNT ILIAC /  PTA INIT Bilateral       Family History   Problem Relation Age of Onset    Heart Disease Father     Heart Attack Father 36    No Known Problems Sister     Deep Vein Thrombosis Brother     Heart Disease Brother      Social History     Tobacco Use    Smoking status: Former Smoker     Packs/day: 2.00     Years: 35.00     Pack years: 70.00     Types: Cigarettes     Quit date: 10/2/2004     Years since quittin.7    Smokeless tobacco: Never Used   Vaping Use    Vaping Use: Never used   Substance Use Topics    Alcohol use: Yes     Alcohol/week: 4.0 standard drinks     Types: 2 Glasses of wine, 2 Cans of beer per week    Drug use: No      Prior to Admission medications    Medication Sig Start Date End Date Taking? Authorizing Provider   levothyroxine (SYNTHROID) 50 mcg tablet Take 50 mcg by mouth Daily (before breakfast). Yes Provider, Historical   metoprolol tartrate (LOPRESSOR) 25 mg tablet Take 25 mg by mouth two (2) times a day. Yes Provider, Historical   TURMERIC PO Take 1,400 mg by mouth two (2) times a day. Yes Provider, Historical   clopidogreL (Plavix) 75 mg tab Take 75 mg by mouth every morning. For stents in neck    Yes Provider, Historical   albuterol (PROVENTIL HFA, VENTOLIN HFA, PROAIR HFA) 90 mcg/actuation inhaler Take 1 Puff by inhalation every six (6) hours as needed for Wheezing.    Yes Provider, Historical   calcium citrate-vitamin d3 (CITRACAL+D) 315 mg-5 mcg (200 unit) tab Take 2 Tablets by mouth two (2) times daily (with meals). Yes Provider, Historical   cranberry fruit extract (CRANBERRY EXTRACT PO) Take 2 Tablets by mouth every morning. Yes Provider, Historical   acetaminophen (TYLENOL) 500 mg tablet Take 2 Tabs by mouth every six (6) hours. 6/19/19  Yes Xochitl Mccall NP   ferrous sulfate 325 mg (65 mg iron) tablet Take 1 Tab by mouth daily (with breakfast). 1/10/19  Yes Hemant Combs MD   umeclidinium-vilanterol [facility] ELLIPTA) 62.5-25 mcg/actuation inhaler Take 1 Puff by inhalation every morning. Yes Provider, Historical   glucosamine sulfate (GLUCOSAMINE) 500 mg tablet Take 1,000 mg by mouth every morning. Yes Provider, Historical   magnesium 200 mg tab Take 2 Tablets by mouth every morning. Yes Provider, Historical   vitamin E (AQUA GEMS) 400 unit capsule Take 400 Units by mouth every morning. Yes Provider, Historical   Omega-3 Fatty Acids 60- mg cpDR Take 2 Tabs by mouth every morning. Yes Provider, Historical   metFORMIN (GLUCOPHAGE) 500 mg tablet Take 500 mg by mouth two (2) times daily (with meals). Yes Provider, Historical   montelukast (SINGULAIR) 10 mg tablet Take 10 mg by mouth every evening. Yes Provider, Historical   lisinopril-hydrochlorothiazide (PRINZIDE, ZESTORETIC) 10-12.5 mg per tablet Take 1 Tab by mouth every morning. Yes Provider, Historical   pravastatin (PRAVACHOL) 80 mg tablet Take 80 mg by mouth nightly. Yes Provider, Historical   latanoprost (XALATAN) 0.005 % ophthalmic solution Administer 1 Drop to both eyes nightly.      Yes Provider, Historical        Allergies   Allergen Reactions    Diclofenac Anaphylaxis    Flexeril [Cyclobenzaprine] Angioedema    Medrol [Methylprednisolone] Unknown (comments)     She has forgotten    Naproxen Swelling    Tolectin [Tolmetin] Swelling     Took this with Flexeril and does not know which med caused swelling of hands,face, throat       Review of Systems:  Constitutional: Negative for chills and fever  HENT: Negative for congestion and sore throat  Eyes: negative for blurred vision and double vision  Respiratory: Negative for cough, shortness of breath and wheezing  Mouth: Negative for loose, broken or chipped teeth. Cardiovascular: Negative for chest pain and palpitations  Gastrointestinal: Negative for abdominal pain, constipation, diarrhea and nausea  Genitourinary: Negative for dysuria and hematuria  Musculoskeletal: Right knee pain  Skin: Negative for rash, open wounds.    Neurological: Negative for dizziness, tremors and headaches  Psychiatric: Negative for anxiety    Objective:     Vitals:    06/30/22 1322   BP: (!) 136/58   Pulse: 81   Resp: 18   Temp: 97.5 °F (36.4 °C)   SpO2: 96%   Weight: 81 kg (178 lb 9.6 oz)   Height: 5' 3.5\" (1.613 m)        Physical Exam:  General Appearance: Alert, Well Appearing and in no distress  Mental Status: Normal mood, behavior, speech and dress  Neck: Normal appearance externally  Ears: External canal no drainage  Nose: Normal external appearance and no drainage   Chest: Clear to auscultation, no wheezes, rales or rhonchi  Heart: Normal rate, regular rhythm, no murmurs, rubs, clicks or gallops  Skin: Normal color, no lesions externally  Abdomen: Not examined  Neuro: Not examined  Musculoskeletal: Gait antalgic    Recent Results (from the past 168 hour(s))   C REACTIVE PROTEIN, QT    Collection Time: 06/30/22  2:11 PM   Result Value Ref Range    C-Reactive protein <0.29 0.00 - 0.60 mg/dL   CBC WITH AUTOMATED DIFF    Collection Time: 06/30/22  2:11 PM   Result Value Ref Range    WBC 4.5 3.6 - 11.0 K/uL    RBC 3.68 (L) 3.80 - 5.20 M/uL    HGB 11.8 11.5 - 16.0 g/dL    HCT 34.8 (L) 35.0 - 47.0 %    MCV 94.6 80.0 - 99.0 FL    MCH 32.1 26.0 - 34.0 PG    MCHC 33.9 30.0 - 36.5 g/dL    RDW 13.6 11.5 - 14.5 %    PLATELET 644 145 - 045 K/uL    MPV 10.8 8.9 - 12.9 FL    NRBC 0.0 0  WBC    ABSOLUTE NRBC 0.00 0.00 - 0.01 K/uL    NEUTROPHILS 49 32 - 75 % LYMPHOCYTES 30 12 - 49 %    MONOCYTES 18 (H) 5 - 13 %    EOSINOPHILS 2 0 - 7 %    BASOPHILS 1 0 - 1 %    IMMATURE GRANULOCYTES 0 0.0 - 0.5 %    ABS. NEUTROPHILS 2.2 1.8 - 8.0 K/UL    ABS. LYMPHOCYTES 1.4 0.8 - 3.5 K/UL    ABS. MONOCYTES 0.8 0.0 - 1.0 K/UL    ABS. EOSINOPHILS 0.1 0.0 - 0.4 K/UL    ABS. BASOPHILS 0.1 0.0 - 0.1 K/UL    ABS. IMM. GRANS. 0.0 0.00 - 0.04 K/UL    DF AUTOMATED     METABOLIC PANEL, COMPREHENSIVE    Collection Time: 06/30/22  2:11 PM   Result Value Ref Range    Sodium 137 136 - 145 mmol/L    Potassium 4.5 3.5 - 5.1 mmol/L    Chloride 100 97 - 108 mmol/L    CO2 29 21 - 32 mmol/L    Anion gap 8 5 - 15 mmol/L    Glucose 112 (H) 65 - 100 mg/dL    BUN 27 (H) 6 - 20 MG/DL    Creatinine 1.08 (H) 0.55 - 1.02 MG/DL    BUN/Creatinine ratio 25 (H) 12 - 20      GFR est AA >60 >60 ml/min/1.73m2    GFR est non-AA 50 (L) >60 ml/min/1.73m2    Calcium 9.4 8.5 - 10.1 MG/DL    Bilirubin, total 0.2 0.2 - 1.0 MG/DL    ALT (SGPT) 19 12 - 78 U/L    AST (SGOT) 21 15 - 37 U/L    Alk. phosphatase 86 45 - 117 U/L    Protein, total 7.3 6.4 - 8.2 g/dL    Albumin 3.9 3.5 - 5.0 g/dL    Globulin 3.4 2.0 - 4.0 g/dL    A-G Ratio 1.1 1.1 - 2.2     HEMOGLOBIN A1C WITH EAG    Collection Time: 06/30/22  2:11 PM   Result Value Ref Range    Hemoglobin A1c 5.6 4.0 - 5.6 %    Est. average glucose 114 mg/dL   CULTURE, MRSA    Collection Time: 06/30/22  2:11 PM    Specimen: Nares; Nasal   Result Value Ref Range    Special Requests: NO SPECIAL REQUESTS      Culture result: MRSA NOT PRESENT      Culture result:        Screening of patient nares for MRSA is for surveillance purposes and, if positive, to facilitate isolation considerations in high risk settings. It is not intended for automatic decolonization interventions per se as regimens are not sufficiently effective to warrant routine use.    SED RATE (ESR)    Collection Time: 06/30/22  2:11 PM   Result Value Ref Range    Sed rate, automated 20 0 - 30 mm/hr   TRANSFERRIN    Collection Time: 06/30/22  2:11 PM   Result Value Ref Range    Transferrin 236 192 - 364 mg/dL   URINALYSIS W/ REFLEX CULTURE    Collection Time: 06/30/22  2:11 PM    Specimen: Urine   Result Value Ref Range    Color YELLOW/STRAW      Appearance CLEAR CLEAR      Specific gravity 1.018 1.003 - 1.030      pH (UA) 8.0 5.0 - 8.0      Protein Negative NEG mg/dL    Glucose Negative NEG mg/dL    Ketone TRACE (A) NEG mg/dL    Bilirubin Negative NEG      Blood Negative NEG      Urobilinogen 0.2 0.2 - 1.0 EU/dL    Nitrites Negative NEG      Leukocyte Esterase SMALL (A) NEG      WBC 10-20 0 - 4 /hpf    RBC 5-10 0 - 5 /hpf    Epithelial cells FEW FEW /lpf    Bacteria Negative NEG /hpf    UA:UC IF INDICATED URINE CULTURE ORDERED (A) CNI      Other: Renal Epithelial cells Present     CULTURE, URINE    Collection Time: 06/30/22  2:11 PM    Specimen: Urine   Result Value Ref Range    Special Requests: NO SPECIAL REQUESTS  Reflexed from [id number]        Culture result: No growth (<1,000 CFU/ML)           Assessment:     OA Right Knee  Pre-Operative Evaluation    Plan:     RTK  MRSA negative  Urine culture negative  Labs and EKG reviewed. Reviewed last note from cardiology in [address] [place] [address]. He has not updated note but stated that her nuclear stress test was normal.   Reviewed last note from pulmonary.    I have told Lizett Abreu to hold her Plavix 5 days prior to sx and we will reach out to Dr. Martina Nageotte for approval.       Signed By: Aida Ball NP     July 5, 2022

## 2022-07-08 ENCOUNTER — ANESTHESIA EVENT (OUTPATIENT)
Dept: SURGERY | Age: 72
End: 2022-07-08
Payer: MEDICARE

## 2022-07-11 ENCOUNTER — HOSPITAL ENCOUNTER (OUTPATIENT)
Age: 72
Setting detail: OBSERVATION
Discharge: HOME | End: 2022-07-13
Attending: ORTHOPAEDIC SURGERY | Admitting: ORTHOPAEDIC SURGERY
Payer: MEDICARE

## 2022-07-11 ENCOUNTER — APPOINTMENT (OUTPATIENT)
Dept: GENERAL RADIOLOGY | Age: 72
End: 2022-07-11
Attending: PHYSICIAN ASSISTANT
Payer: MEDICARE

## 2022-07-11 ENCOUNTER — ANESTHESIA (OUTPATIENT)
Dept: SURGERY | Age: 72
End: 2022-07-11
Payer: MEDICARE

## 2022-07-11 DIAGNOSIS — M17.11 ARTHRITIS OF RIGHT KNEE: Primary | ICD-10-CM

## 2022-07-11 LAB
GLUCOSE BLD STRIP.AUTO-MCNC: 105 MG/DL (ref 65–117)
GLUCOSE BLD STRIP.AUTO-MCNC: 150 MG/DL (ref 65–117)
GLUCOSE BLD STRIP.AUTO-MCNC: 154 MG/DL (ref 65–117)
SERVICE CMNT-IMP: ABNORMAL
SERVICE CMNT-IMP: ABNORMAL
SERVICE CMNT-IMP: NORMAL

## 2022-07-11 PROCEDURE — C1776 JOINT DEVICE (IMPLANTABLE): HCPCS | Performed by: ORTHOPAEDIC SURGERY

## 2022-07-11 PROCEDURE — 77030041305 HC PN BN MAKO STRY -B: Performed by: ORTHOPAEDIC SURGERY

## 2022-07-11 PROCEDURE — 76060000063 HC AMB SURG ANES 1.5 TO 2 HR: Performed by: ORTHOPAEDIC SURGERY

## 2022-07-11 PROCEDURE — 74011250636 HC RX REV CODE- 250/636: Performed by: ORTHOPAEDIC SURGERY

## 2022-07-11 PROCEDURE — 74011000250 HC RX REV CODE- 250: Performed by: ORTHOPAEDIC SURGERY

## 2022-07-11 PROCEDURE — 77030035236 HC SUT PDS STRATFX BARB J&J -B: Performed by: ORTHOPAEDIC SURGERY

## 2022-07-11 PROCEDURE — 74011250636 HC RX REV CODE- 250/636

## 2022-07-11 PROCEDURE — 77030040922 HC BLNKT HYPOTHRM STRY -A

## 2022-07-11 PROCEDURE — 82962 GLUCOSE BLOOD TEST: CPT

## 2022-07-11 PROCEDURE — 77030029828 HC FEM TIB CKPNT KT DISP STRY -B: Performed by: ORTHOPAEDIC SURGERY

## 2022-07-11 PROCEDURE — 94761 N-INVAS EAR/PLS OXIMETRY MLT: CPT

## 2022-07-11 PROCEDURE — 74011000258 HC RX REV CODE- 258: Performed by: ORTHOPAEDIC SURGERY

## 2022-07-11 PROCEDURE — G0378 HOSPITAL OBSERVATION PER HR: HCPCS

## 2022-07-11 PROCEDURE — 97116 GAIT TRAINING THERAPY: CPT

## 2022-07-11 PROCEDURE — 77030040393 HC DRSG OPTIFOAM GENT MDII -B: Performed by: ORTHOPAEDIC SURGERY

## 2022-07-11 PROCEDURE — 77030018723 HC ELCTRD BLD COVD -A: Performed by: ORTHOPAEDIC SURGERY

## 2022-07-11 PROCEDURE — 74011250637 HC RX REV CODE- 250/637: Performed by: ANESTHESIOLOGY

## 2022-07-11 PROCEDURE — 74011000250 HC RX REV CODE- 250: Performed by: PHYSICIAN ASSISTANT

## 2022-07-11 PROCEDURE — 77030031139 HC SUT VCRL2 J&J -A: Performed by: ORTHOPAEDIC SURGERY

## 2022-07-11 PROCEDURE — 74011250636 HC RX REV CODE- 250/636: Performed by: STUDENT IN AN ORGANIZED HEALTH CARE EDUCATION/TRAINING PROGRAM

## 2022-07-11 PROCEDURE — 73560 X-RAY EXAM OF KNEE 1 OR 2: CPT

## 2022-07-11 PROCEDURE — 77030040361 HC SLV COMPR DVT MDII -B

## 2022-07-11 PROCEDURE — 77030006835 HC BLD SAW SAG STRY -B: Performed by: ORTHOPAEDIC SURGERY

## 2022-07-11 PROCEDURE — 74011000250 HC RX REV CODE- 250

## 2022-07-11 PROCEDURE — 74011250637 HC RX REV CODE- 250/637: Performed by: ORTHOPAEDIC SURGERY

## 2022-07-11 PROCEDURE — 97161 PT EVAL LOW COMPLEX 20 MIN: CPT

## 2022-07-11 PROCEDURE — 77010033678 HC OXYGEN DAILY

## 2022-07-11 PROCEDURE — 76030000020 HC AMB SURG 1.5 TO 2 HR INTENSV-TIER 1: Performed by: ORTHOPAEDIC SURGERY

## 2022-07-11 PROCEDURE — 77030038149 HC BLD SAW SAG STRY -D: Performed by: ORTHOPAEDIC SURGERY

## 2022-07-11 PROCEDURE — 74011000258 HC RX REV CODE- 258

## 2022-07-11 PROCEDURE — 77030002933 HC SUT MCRYL J&J -A: Performed by: ORTHOPAEDIC SURGERY

## 2022-07-11 PROCEDURE — 2709999900 HC NON-CHARGEABLE SUPPLY: Performed by: ORTHOPAEDIC SURGERY

## 2022-07-11 PROCEDURE — 77030028907 HC WRP KNEE WO BGS SOLM -B

## 2022-07-11 PROCEDURE — 76210000035 HC AMBSU PH I REC 1 TO 1.5 HR: Performed by: ORTHOPAEDIC SURGERY

## 2022-07-11 PROCEDURE — 77030029820: Performed by: ORTHOPAEDIC SURGERY

## 2022-07-11 PROCEDURE — 74011250636 HC RX REV CODE- 250/636: Performed by: PHYSICIAN ASSISTANT

## 2022-07-11 PROCEDURE — 74011250637 HC RX REV CODE- 250/637: Performed by: PHYSICIAN ASSISTANT

## 2022-07-11 PROCEDURE — 77030003601 HC NDL NRV BLK BBMI -A

## 2022-07-11 DEVICE — TIBIAL COMPONENT
Type: IMPLANTABLE DEVICE | Site: KNEE | Status: FUNCTIONAL
Brand: TRIATHLON

## 2022-07-11 DEVICE — IMPLANTABLE DEVICE: Type: IMPLANTABLE DEVICE | Site: KNEE | Status: FUNCTIONAL

## 2022-07-11 DEVICE — KNEE K2 TOT HEMI ADV CMTLS -- IMPL CAPPED K2: Type: IMPLANTABLE DEVICE | Status: FUNCTIONAL

## 2022-07-11 DEVICE — CRUCIATE RETAINING FEMORAL
Type: IMPLANTABLE DEVICE | Site: KNEE | Status: FUNCTIONAL
Brand: TRIATHLON

## 2022-07-11 DEVICE — PATELLA
Type: IMPLANTABLE DEVICE | Site: KNEE | Status: FUNCTIONAL
Brand: TRIATHLON

## 2022-07-11 RX ORDER — ASPIRIN 81 MG/1
81 TABLET ORAL 2 TIMES DAILY
Status: DISCONTINUED | OUTPATIENT
Start: 2022-07-11 | End: 2022-07-13 | Stop reason: HOSPADM

## 2022-07-11 RX ORDER — DIPHENHYDRAMINE HYDROCHLORIDE 50 MG/ML
12.5 INJECTION, SOLUTION INTRAMUSCULAR; INTRAVENOUS
Status: ACTIVE | OUTPATIENT
Start: 2022-07-11 | End: 2022-07-12

## 2022-07-11 RX ORDER — PRAVASTATIN SODIUM 20 MG/1
80 TABLET ORAL
Status: DISCONTINUED | OUTPATIENT
Start: 2022-07-11 | End: 2022-07-13 | Stop reason: HOSPADM

## 2022-07-11 RX ORDER — SODIUM CHLORIDE 9 MG/ML
125 INJECTION, SOLUTION INTRAVENOUS CONTINUOUS
Status: DISPENSED | OUTPATIENT
Start: 2022-07-11 | End: 2022-07-12

## 2022-07-11 RX ORDER — ONDANSETRON 2 MG/ML
4 INJECTION INTRAMUSCULAR; INTRAVENOUS
Status: ACTIVE | OUTPATIENT
Start: 2022-07-11 | End: 2022-07-12

## 2022-07-11 RX ORDER — ALBUTEROL SULFATE 0.83 MG/ML
2.5 SOLUTION RESPIRATORY (INHALATION) AS NEEDED
Status: DISCONTINUED | OUTPATIENT
Start: 2022-07-11 | End: 2022-07-11 | Stop reason: HOSPADM

## 2022-07-11 RX ORDER — AMOXICILLIN 250 MG
1 CAPSULE ORAL 2 TIMES DAILY
Status: DISCONTINUED | OUTPATIENT
Start: 2022-07-11 | End: 2022-07-13 | Stop reason: HOSPADM

## 2022-07-11 RX ORDER — FAMOTIDINE 20 MG/1
20 TABLET, FILM COATED ORAL 2 TIMES DAILY
Status: DISCONTINUED | OUTPATIENT
Start: 2022-07-11 | End: 2022-07-13 | Stop reason: HOSPADM

## 2022-07-11 RX ORDER — HYDROMORPHONE HYDROCHLORIDE 2 MG/ML
INJECTION, SOLUTION INTRAMUSCULAR; INTRAVENOUS; SUBCUTANEOUS AS NEEDED
Status: DISCONTINUED | OUTPATIENT
Start: 2022-07-11 | End: 2022-07-11 | Stop reason: HOSPADM

## 2022-07-11 RX ORDER — DIPHENHYDRAMINE HYDROCHLORIDE 50 MG/ML
12.5 INJECTION, SOLUTION INTRAMUSCULAR; INTRAVENOUS AS NEEDED
Status: DISCONTINUED | OUTPATIENT
Start: 2022-07-11 | End: 2022-07-11 | Stop reason: HOSPADM

## 2022-07-11 RX ORDER — SODIUM CHLORIDE 0.9 % (FLUSH) 0.9 %
5-40 SYRINGE (ML) INJECTION AS NEEDED
Status: DISCONTINUED | OUTPATIENT
Start: 2022-07-11 | End: 2022-07-13 | Stop reason: HOSPADM

## 2022-07-11 RX ORDER — DEXTROSE MONOHYDRATE 100 MG/ML
0-250 INJECTION, SOLUTION INTRAVENOUS AS NEEDED
Status: DISCONTINUED | OUTPATIENT
Start: 2022-07-11 | End: 2022-07-13 | Stop reason: HOSPADM

## 2022-07-11 RX ORDER — LANOLIN ALCOHOL/MO/W.PET/CERES
325 CREAM (GRAM) TOPICAL
Status: DISCONTINUED | OUTPATIENT
Start: 2022-07-12 | End: 2022-07-13 | Stop reason: HOSPADM

## 2022-07-11 RX ORDER — ASPIRIN 81 MG/1
81 TABLET ORAL 2 TIMES DAILY
Qty: 60 TABLET | Refills: 0 | Status: SHIPPED | OUTPATIENT
Start: 2022-07-11

## 2022-07-11 RX ORDER — HYDROMORPHONE HYDROCHLORIDE 1 MG/ML
.25-1 INJECTION, SOLUTION INTRAMUSCULAR; INTRAVENOUS; SUBCUTANEOUS
Status: DISCONTINUED | OUTPATIENT
Start: 2022-07-11 | End: 2022-07-11 | Stop reason: HOSPADM

## 2022-07-11 RX ORDER — ONDANSETRON 2 MG/ML
4 INJECTION INTRAMUSCULAR; INTRAVENOUS AS NEEDED
Status: DISCONTINUED | OUTPATIENT
Start: 2022-07-11 | End: 2022-07-11 | Stop reason: HOSPADM

## 2022-07-11 RX ORDER — MONTELUKAST SODIUM 10 MG/1
10 TABLET ORAL EVERY EVENING
Status: DISCONTINUED | OUTPATIENT
Start: 2022-07-11 | End: 2022-07-13 | Stop reason: HOSPADM

## 2022-07-11 RX ORDER — SODIUM CHLORIDE 0.9 % (FLUSH) 0.9 %
5-40 SYRINGE (ML) INJECTION AS NEEDED
Status: DISCONTINUED | OUTPATIENT
Start: 2022-07-11 | End: 2022-07-11 | Stop reason: HOSPADM

## 2022-07-11 RX ORDER — NALOXONE HYDROCHLORIDE 0.4 MG/ML
0.4 INJECTION, SOLUTION INTRAMUSCULAR; INTRAVENOUS; SUBCUTANEOUS AS NEEDED
Status: DISCONTINUED | OUTPATIENT
Start: 2022-07-11 | End: 2022-07-13 | Stop reason: HOSPADM

## 2022-07-11 RX ORDER — TRAMADOL HYDROCHLORIDE 50 MG/1
50 TABLET ORAL
Qty: 28 TABLET | Refills: 0 | Status: SHIPPED | OUTPATIENT
Start: 2022-07-11 | End: 2022-07-18

## 2022-07-11 RX ORDER — ROPIVACAINE HYDROCHLORIDE 2 MG/ML
INJECTION, SOLUTION EPIDURAL; INFILTRATION; PERINEURAL AS NEEDED
Status: DISCONTINUED | OUTPATIENT
Start: 2022-07-11 | End: 2022-07-11 | Stop reason: HOSPADM

## 2022-07-11 RX ORDER — PREGABALIN 75 MG/1
75 CAPSULE ORAL ONCE
Status: COMPLETED | OUTPATIENT
Start: 2022-07-11 | End: 2022-07-11

## 2022-07-11 RX ORDER — FENTANYL CITRATE 50 UG/ML
INJECTION, SOLUTION INTRAMUSCULAR; INTRAVENOUS AS NEEDED
Status: DISCONTINUED | OUTPATIENT
Start: 2022-07-11 | End: 2022-07-11 | Stop reason: HOSPADM

## 2022-07-11 RX ORDER — POLYETHYLENE GLYCOL 3350 17 G/17G
17 POWDER, FOR SOLUTION ORAL DAILY
Status: DISCONTINUED | OUTPATIENT
Start: 2022-07-11 | End: 2022-07-13 | Stop reason: HOSPADM

## 2022-07-11 RX ORDER — POVIDONE-IODINE 10 %
SOLUTION, NON-ORAL TOPICAL AS NEEDED
Status: DISCONTINUED | OUTPATIENT
Start: 2022-07-11 | End: 2022-07-11 | Stop reason: HOSPADM

## 2022-07-11 RX ORDER — ACETAMINOPHEN 325 MG/1
650 TABLET ORAL EVERY 6 HOURS
Status: DISCONTINUED | OUTPATIENT
Start: 2022-07-11 | End: 2022-07-13 | Stop reason: HOSPADM

## 2022-07-11 RX ORDER — PROPOFOL 10 MG/ML
INJECTION, EMULSION INTRAVENOUS AS NEEDED
Status: DISCONTINUED | OUTPATIENT
Start: 2022-07-11 | End: 2022-07-11 | Stop reason: HOSPADM

## 2022-07-11 RX ORDER — LEVOTHYROXINE SODIUM 50 UG/1
50 TABLET ORAL
Status: DISCONTINUED | OUTPATIENT
Start: 2022-07-12 | End: 2022-07-13 | Stop reason: HOSPADM

## 2022-07-11 RX ORDER — INSULIN LISPRO 100 [IU]/ML
INJECTION, SOLUTION INTRAVENOUS; SUBCUTANEOUS
Status: DISCONTINUED | OUTPATIENT
Start: 2022-07-11 | End: 2022-07-13 | Stop reason: HOSPADM

## 2022-07-11 RX ORDER — OXYCODONE HYDROCHLORIDE 5 MG/1
10 TABLET ORAL
Status: DISCONTINUED | OUTPATIENT
Start: 2022-07-11 | End: 2022-07-13 | Stop reason: HOSPADM

## 2022-07-11 RX ORDER — ACETAMINOPHEN 500 MG
975 TABLET ORAL
Qty: 60 TABLET | Refills: 1 | Status: SHIPPED | OUTPATIENT
Start: 2022-07-11

## 2022-07-11 RX ORDER — CALCIUM CARBONATE/VITAMIN D3 250-3.125
2 TABLET ORAL 2 TIMES DAILY WITH MEALS
Status: DISCONTINUED | OUTPATIENT
Start: 2022-07-12 | End: 2022-07-13 | Stop reason: HOSPADM

## 2022-07-11 RX ORDER — ONDANSETRON 8 MG/1
4 TABLET, ORALLY DISINTEGRATING ORAL
Qty: 30 TABLET | Refills: 0 | Status: SHIPPED | OUTPATIENT
Start: 2022-07-11

## 2022-07-11 RX ORDER — SODIUM CHLORIDE, SODIUM LACTATE, POTASSIUM CHLORIDE, CALCIUM CHLORIDE 600; 310; 30; 20 MG/100ML; MG/100ML; MG/100ML; MG/100ML
125 INJECTION, SOLUTION INTRAVENOUS CONTINUOUS
Status: DISCONTINUED | OUTPATIENT
Start: 2022-07-11 | End: 2022-07-11 | Stop reason: HOSPADM

## 2022-07-11 RX ORDER — FACIAL-BODY WIPES
10 EACH TOPICAL DAILY PRN
Status: DISCONTINUED | OUTPATIENT
Start: 2022-07-13 | End: 2022-07-13 | Stop reason: HOSPADM

## 2022-07-11 RX ORDER — SODIUM CHLORIDE 0.9 % (FLUSH) 0.9 %
5-40 SYRINGE (ML) INJECTION EVERY 8 HOURS
Status: DISCONTINUED | OUTPATIENT
Start: 2022-07-11 | End: 2022-07-13 | Stop reason: HOSPADM

## 2022-07-11 RX ORDER — OXYCODONE HCL 10 MG/1
10 TABLET, FILM COATED, EXTENDED RELEASE ORAL ONCE
Status: COMPLETED | OUTPATIENT
Start: 2022-07-11 | End: 2022-07-11

## 2022-07-11 RX ORDER — LIDOCAINE HYDROCHLORIDE 20 MG/ML
INJECTION, SOLUTION EPIDURAL; INFILTRATION; INTRACAUDAL; PERINEURAL AS NEEDED
Status: DISCONTINUED | OUTPATIENT
Start: 2022-07-11 | End: 2022-07-11 | Stop reason: HOSPADM

## 2022-07-11 RX ORDER — SODIUM CHLORIDE 0.9 % (FLUSH) 0.9 %
5-40 SYRINGE (ML) INJECTION EVERY 8 HOURS
Status: DISCONTINUED | OUTPATIENT
Start: 2022-07-11 | End: 2022-07-11 | Stop reason: HOSPADM

## 2022-07-11 RX ORDER — EPHEDRINE SULFATE/0.9% NACL/PF 50 MG/5 ML
SYRINGE (ML) INTRAVENOUS AS NEEDED
Status: DISCONTINUED | OUTPATIENT
Start: 2022-07-11 | End: 2022-07-11 | Stop reason: HOSPADM

## 2022-07-11 RX ORDER — DEXAMETHASONE SODIUM PHOSPHATE 4 MG/ML
INJECTION, SOLUTION INTRA-ARTICULAR; INTRALESIONAL; INTRAMUSCULAR; INTRAVENOUS; SOFT TISSUE AS NEEDED
Status: DISCONTINUED | OUTPATIENT
Start: 2022-07-11 | End: 2022-07-11 | Stop reason: HOSPADM

## 2022-07-11 RX ORDER — LIDOCAINE HYDROCHLORIDE 10 MG/ML
0.1 INJECTION, SOLUTION EPIDURAL; INFILTRATION; INTRACAUDAL; PERINEURAL AS NEEDED
Status: DISCONTINUED | OUTPATIENT
Start: 2022-07-11 | End: 2022-07-11 | Stop reason: HOSPADM

## 2022-07-11 RX ORDER — FENTANYL CITRATE 50 UG/ML
25 INJECTION, SOLUTION INTRAMUSCULAR; INTRAVENOUS
Status: DISCONTINUED | OUTPATIENT
Start: 2022-07-11 | End: 2022-07-11 | Stop reason: HOSPADM

## 2022-07-11 RX ORDER — ACETAMINOPHEN 325 MG/1
975 TABLET ORAL ONCE
Status: COMPLETED | OUTPATIENT
Start: 2022-07-11 | End: 2022-07-11

## 2022-07-11 RX ORDER — OXYCODONE HYDROCHLORIDE 5 MG/1
5 TABLET ORAL
Status: DISCONTINUED | OUTPATIENT
Start: 2022-07-11 | End: 2022-07-13 | Stop reason: HOSPADM

## 2022-07-11 RX ORDER — OXYCODONE HYDROCHLORIDE 5 MG/1
5 TABLET ORAL
Status: DISCONTINUED | OUTPATIENT
Start: 2022-07-11 | End: 2022-07-11 | Stop reason: HOSPADM

## 2022-07-11 RX ORDER — LANOLIN ALCOHOL/MO/W.PET/CERES
400 CREAM (GRAM) TOPICAL DAILY
Status: DISCONTINUED | OUTPATIENT
Start: 2022-07-12 | End: 2022-07-13 | Stop reason: HOSPADM

## 2022-07-11 RX ORDER — NALOXONE HYDROCHLORIDE 0.4 MG/ML
0.04 INJECTION, SOLUTION INTRAMUSCULAR; INTRAVENOUS; SUBCUTANEOUS
Status: DISCONTINUED | OUTPATIENT
Start: 2022-07-11 | End: 2022-07-11 | Stop reason: HOSPADM

## 2022-07-11 RX ORDER — ONDANSETRON 2 MG/ML
INJECTION INTRAMUSCULAR; INTRAVENOUS AS NEEDED
Status: DISCONTINUED | OUTPATIENT
Start: 2022-07-11 | End: 2022-07-11 | Stop reason: HOSPADM

## 2022-07-11 RX ORDER — METOPROLOL TARTRATE 25 MG/1
25 TABLET, FILM COATED ORAL 2 TIMES DAILY
Status: DISCONTINUED | OUTPATIENT
Start: 2022-07-11 | End: 2022-07-13 | Stop reason: HOSPADM

## 2022-07-11 RX ORDER — ALBUTEROL SULFATE 0.83 MG/ML
2.5 SOLUTION RESPIRATORY (INHALATION)
Status: DISCONTINUED | OUTPATIENT
Start: 2022-07-11 | End: 2022-07-13 | Stop reason: HOSPADM

## 2022-07-11 RX ORDER — PREGABALIN 75 MG/1
75 CAPSULE ORAL
Status: DISCONTINUED | OUTPATIENT
Start: 2022-07-11 | End: 2022-07-13 | Stop reason: HOSPADM

## 2022-07-11 RX ORDER — OXYCODONE HYDROCHLORIDE 5 MG/1
5 TABLET ORAL
Qty: 42 TABLET | Refills: 0 | Status: SHIPPED | OUTPATIENT
Start: 2022-07-11 | End: 2022-07-18

## 2022-07-11 RX ORDER — FLUMAZENIL 0.1 MG/ML
0.2 INJECTION INTRAVENOUS
Status: DISCONTINUED | OUTPATIENT
Start: 2022-07-11 | End: 2022-07-11 | Stop reason: HOSPADM

## 2022-07-11 RX ORDER — CLOPIDOGREL BISULFATE 75 MG/1
75 TABLET ORAL DAILY
Status: DISCONTINUED | OUTPATIENT
Start: 2022-07-12 | End: 2022-07-13 | Stop reason: HOSPADM

## 2022-07-11 RX ORDER — LATANOPROST 50 UG/ML
1 SOLUTION/ DROPS OPHTHALMIC
Status: DISCONTINUED | OUTPATIENT
Start: 2022-07-11 | End: 2022-07-13 | Stop reason: HOSPADM

## 2022-07-11 RX ORDER — HYDROMORPHONE HYDROCHLORIDE 1 MG/ML
0.5 INJECTION, SOLUTION INTRAMUSCULAR; INTRAVENOUS; SUBCUTANEOUS
Status: DISPENSED | OUTPATIENT
Start: 2022-07-11 | End: 2022-07-12

## 2022-07-11 RX ORDER — METFORMIN HYDROCHLORIDE 500 MG/1
500 TABLET ORAL 2 TIMES DAILY WITH MEALS
Status: DISCONTINUED | OUTPATIENT
Start: 2022-07-12 | End: 2022-07-13 | Stop reason: HOSPADM

## 2022-07-11 RX ORDER — MIDAZOLAM HYDROCHLORIDE 1 MG/ML
INJECTION, SOLUTION INTRAMUSCULAR; INTRAVENOUS AS NEEDED
Status: DISCONTINUED | OUTPATIENT
Start: 2022-07-11 | End: 2022-07-11 | Stop reason: HOSPADM

## 2022-07-11 RX ORDER — MAGNESIUM SULFATE 100 %
4 CRYSTALS MISCELLANEOUS AS NEEDED
Status: DISCONTINUED | OUTPATIENT
Start: 2022-07-11 | End: 2022-07-13 | Stop reason: HOSPADM

## 2022-07-11 RX ADMIN — SODIUM CHLORIDE 125 ML/HR: 9 INJECTION, SOLUTION INTRAVENOUS at 12:36

## 2022-07-11 RX ADMIN — LIDOCAINE HYDROCHLORIDE 100 MG: 20 INJECTION, SOLUTION INTRAVENOUS at 08:40

## 2022-07-11 RX ADMIN — PREGABALIN 75 MG: 75 CAPSULE ORAL at 07:54

## 2022-07-11 RX ADMIN — ACETAMINOPHEN 650 MG: 325 TABLET ORAL at 23:57

## 2022-07-11 RX ADMIN — ACETAMINOPHEN 650 MG: 325 TABLET ORAL at 18:47

## 2022-07-11 RX ADMIN — MONTELUKAST 10 MG: 10 TABLET, FILM COATED ORAL at 23:57

## 2022-07-11 RX ADMIN — SODIUM CHLORIDE 200 MCG: 9 INJECTION, SOLUTION INTRAVENOUS at 10:11

## 2022-07-11 RX ADMIN — METOPROLOL TARTRATE 25 MG: 25 TABLET, FILM COATED ORAL at 23:58

## 2022-07-11 RX ADMIN — CEFAZOLIN SODIUM 2 G: 1 POWDER, FOR SOLUTION INTRAMUSCULAR; INTRAVENOUS at 08:48

## 2022-07-11 RX ADMIN — HYDROMORPHONE HYDROCHLORIDE 0.2 MG: 2 INJECTION, SOLUTION INTRAMUSCULAR; INTRAVENOUS; SUBCUTANEOUS at 09:01

## 2022-07-11 RX ADMIN — SODIUM CHLORIDE 100 MCG: 9 INJECTION, SOLUTION INTRAVENOUS at 09:52

## 2022-07-11 RX ADMIN — PRAVASTATIN SODIUM 80 MG: 20 TABLET ORAL at 23:57

## 2022-07-11 RX ADMIN — Medication 5 MG: at 10:02

## 2022-07-11 RX ADMIN — SODIUM CHLORIDE 100 MCG: 9 INJECTION, SOLUTION INTRAVENOUS at 08:49

## 2022-07-11 RX ADMIN — SODIUM CHLORIDE 100 MCG: 9 INJECTION, SOLUTION INTRAVENOUS at 08:44

## 2022-07-11 RX ADMIN — SODIUM CHLORIDE 100 MCG: 9 INJECTION, SOLUTION INTRAVENOUS at 08:52

## 2022-07-11 RX ADMIN — Medication 10 MG: at 08:56

## 2022-07-11 RX ADMIN — FAMOTIDINE 20 MG: 20 TABLET, FILM COATED ORAL at 12:36

## 2022-07-11 RX ADMIN — HYDROMORPHONE HYDROCHLORIDE 0.2 MG: 2 INJECTION, SOLUTION INTRAMUSCULAR; INTRAVENOUS; SUBCUTANEOUS at 09:25

## 2022-07-11 RX ADMIN — ASPIRIN 81 MG: 81 TABLET, COATED ORAL at 18:47

## 2022-07-11 RX ADMIN — POLYETHYLENE GLYCOL 3350 17 G: 17 POWDER, FOR SOLUTION ORAL at 12:36

## 2022-07-11 RX ADMIN — SODIUM CHLORIDE 100 MCG: 9 INJECTION, SOLUTION INTRAVENOUS at 09:44

## 2022-07-11 RX ADMIN — ACETAMINOPHEN 975 MG: 325 TABLET ORAL at 07:53

## 2022-07-11 RX ADMIN — ROPIVACAINE HYDROCHLORIDE 20 ML: 2 INJECTION, SOLUTION EPIDURAL; INFILTRATION at 08:26

## 2022-07-11 RX ADMIN — FAMOTIDINE 20 MG: 20 TABLET, FILM COATED ORAL at 18:47

## 2022-07-11 RX ADMIN — DOCUSATE SODIUM 50MG AND SENNOSIDES 8.6MG 1 TABLET: 8.6; 5 TABLET, FILM COATED ORAL at 12:36

## 2022-07-11 RX ADMIN — HYDROMORPHONE HYDROCHLORIDE 0.2 MG: 2 INJECTION, SOLUTION INTRAMUSCULAR; INTRAVENOUS; SUBCUTANEOUS at 09:06

## 2022-07-11 RX ADMIN — CEFAZOLIN 2 G: 1 INJECTION, POWDER, FOR SOLUTION INTRAMUSCULAR; INTRAVENOUS at 19:11

## 2022-07-11 RX ADMIN — FENTANYL CITRATE 50 MCG: 50 INJECTION, SOLUTION INTRAMUSCULAR; INTRAVENOUS at 08:18

## 2022-07-11 RX ADMIN — PROPOFOL 120 MG: 10 INJECTION, EMULSION INTRAVENOUS at 08:40

## 2022-07-11 RX ADMIN — FENTANYL CITRATE 50 MCG: 50 INJECTION, SOLUTION INTRAMUSCULAR; INTRAVENOUS at 08:16

## 2022-07-11 RX ADMIN — DEXAMETHASONE SODIUM PHOSPHATE 4 MG: 4 INJECTION, SOLUTION INTRAMUSCULAR; INTRAVENOUS at 08:49

## 2022-07-11 RX ADMIN — MIDAZOLAM HYDROCHLORIDE 2 MG: 1 INJECTION, SOLUTION INTRAMUSCULAR; INTRAVENOUS at 08:16

## 2022-07-11 RX ADMIN — SODIUM CHLORIDE 100 MCG: 9 INJECTION, SOLUTION INTRAVENOUS at 10:02

## 2022-07-11 RX ADMIN — SODIUM CHLORIDE 200 MCG: 9 INJECTION, SOLUTION INTRAVENOUS at 10:22

## 2022-07-11 RX ADMIN — PREGABALIN 75 MG: 75 CAPSULE ORAL at 22:00

## 2022-07-11 RX ADMIN — OXYCODONE HYDROCHLORIDE 10 MG: 10 TABLET, FILM COATED, EXTENDED RELEASE ORAL at 07:54

## 2022-07-11 RX ADMIN — ONDANSETRON HYDROCHLORIDE 4 MG: 2 SOLUTION INTRAMUSCULAR; INTRAVENOUS at 08:49

## 2022-07-11 RX ADMIN — SODIUM CHLORIDE 125 ML/HR: 9 INJECTION, SOLUTION INTRAVENOUS at 18:50

## 2022-07-11 RX ADMIN — Medication 5 MG: at 10:11

## 2022-07-11 RX ADMIN — DOCUSATE SODIUM 50MG AND SENNOSIDES 8.6MG 1 TABLET: 8.6; 5 TABLET, FILM COATED ORAL at 18:47

## 2022-07-11 RX ADMIN — SODIUM CHLORIDE 200 MCG: 9 INJECTION, SOLUTION INTRAVENOUS at 08:56

## 2022-07-11 RX ADMIN — HYDROMORPHONE HYDROCHLORIDE 0.2 MG: 2 INJECTION, SOLUTION INTRAMUSCULAR; INTRAVENOUS; SUBCUTANEOUS at 09:08

## 2022-07-11 NOTE — ANESTHESIA PROCEDURE NOTES
Peripheral Block    Start time: 7/11/2022 8:16 AM  End time: 7/11/2022 8:26 AM  Performed by: Luís Payne DO  Authorized by: Luís Payne DO       Pre-procedure:    Indications: at surgeon's request and post-op pain management    Preanesthetic Checklist: patient identified, risks and benefits discussed, site marked, timeout performed, anesthesia consent given, patient being monitored and fire risk safety assessment completed and verbalized    Timeout Time: 08:16 EDT          Block Type:   Block Type:  IPACK  Laterality:  Right  Monitoring:  Continuous pulse ox, frequent vital sign checks, heart rate and responsive to questions  Injection Technique:  Single shot  Procedures: ultrasound guided    Patient Position: supine  Prep: chlorhexidine    Location:  Lower thigh  Needle Type:  Stimuplex  Needle Gauge:  21 G  Needle Localization:  Ultrasound guidance    Assessment:  Number of attempts:  1  Injection Assessment:  Incremental injection every 5 mL, local visualized surrounding nerve on ultrasound, negative aspiration for blood, no paresthesia and no intravascular symptoms  Patient tolerance:  Patient tolerated the procedure well with no immediate complications

## 2022-07-11 NOTE — PERIOP NOTES
4th floor Charge RN notified of admission/pending return and awaiting receiving ( tba  RN) call-back for assignment to/return to room 408.

## 2022-07-11 NOTE — DISCHARGE INSTRUCTIONS
TOTAL KNEE DISCHARGE INSTRUCTIONS      Patient: Cherrie Gale MRN: [id number]  SSN: [id number]              Please take the time to review the following instructions before you leave the hospital and use them as guidelines during your recovery from surgery. If you have any questions you may contact my office at [phone]  After business hours or during the weekend you can contact me through [address] or text / call at [phone] (cell phone) for emergency's. Please use the office number during regular business hours. SPECIAL INSTRUCTIONS :   1. Full extension at the knee is the most important aspect of your range of motion. Avoid placing a pillow or bump behind the knee. Rather, place the heel up on a bump or pillow and allow gravity to help straighten the knee. 2. You may weight bear as tolerated on the knee and during the day you should bend the knee as much as possible. 3. Drainage from the incision more than 4 days from surgery is concerning. Contact my office if there is any question [phone].   4. You may contact me directly through [employer] if there are specific questions or text / call using my cell number [phone]. DRESSING :     Post-op Dressings : This should be removed by physical therapy or you may remove this yourself 7 days after the date of your surgery. If there is no drainage, then a simple dressing may be used or no dressing at all. Other dressing options can be purchased over the counter at a local pharmacy or medical supply vendor. A porous adhesive dressing such as pictured above can be purchased online ([address] [facility] [address]) or at your local [facility] or [employer]. You only need to keep the incision covered for 7 days after showers. A dressing may be used for longer if there are issues with clothing clinging to the incision. Showering/ Bathing: You may shower with the Post-op dressing in place.  This is left in place for 7 days following discharge from the hospital. If your incision is dry without drainage you may shower following your discharge home. After 7 days your dressing should be removed for showering. It is fine to have water run over the incision. Do not vigorously scrub your incision. Apply a clean, dry dressing after you have dried your incision. Do not take a bath or get into a swimming pool / [place] until you follow up with Dr. Luis Cantu. Do not soak your incision under water. If there is continued drainage or you are concerned contact Dr Ted Aguilar office prior to showering [phone] ext [phone] . Diet:  You may advance to your regular diet as tolerated. Increase your clear liquid intake for the next 2-3 days. Medication:      1. You will be given prescriptions for pain medication when you are discharged from the hospital. The side effects of these medications can be substantial and the narcotic medications are not mandatory. You may substitute these medications with Tylenol or Alleve / Motrin. 2. Please use the medications as prescribed. Pain medications may cause constipation- Colace twice daily and Miralax one scoop daily while taking the narcotic medication should help prevent constipation. Please discuss with your local pharmacist regarding increasing this dosage if constipation persists. Other possible side effects of pain medication are dizziness, headache, nausea, vomiting, and urinary retention. Discontinue the pain medication if you develop itching, rash, shortness of breath, or difficulties swallowing. If these symptoms become severe or are not relieved by discontinuing the medication, you should seek immediate medical attention. 3. Refills of pain medication are authorized during office hours only (8 AM- 5 PM  Monday thru Friday). Many of these medication will require you or a family member to pick-up a physical prescription at the office.    4. Medications other than antiinflammatories will not be called into the pharmacy after business hours. 5. You may resume the medication(s) you were taking prior to your surgery. Narcotics may change the effects of some antidepressant medication(s). If you have any questions about possible interactions between your regular medications and the pain medication, you should ask the pharmacist or contact the prescribing physician. 6. If you have constipation which is not improved by oral stool softeners then a Ducolax suppository should be purchased over the counter. 7. Continue the blood thinner (Aspirin or Lovenox) for a total of 30 days following surgery. Follow up appointment:    Please call our office at [phone] for your follow up appointment. This should be scheduled 14 days following the date of surgery. You should also have a scheduled appointment for physical therapy following surgery. Physical Therapy / Nursing:    Physical Therapy following surgery will be arranged as an outpatient or at home. They have specific instructions for rehab and wound care. It is fine to have physical therapy remove your dressing at 7 days following surgery. Returning to work:    Normal return to work is 6-12 weeks following surgery. Depending on your progression following surgery and specific job duties you may take longer for a full return to work. DRIVING    You should not return to driving until you are off all opioid pain medications and able to safely and quickly apply the brakes. This is normally 2-6 weeks for left sided surgery and 2-8 weeks for right sided surgery. Important Signs and Symptoms:    If any of the following signs or symptoms occur, you should contact Dr. Roosevelt Prasad office.   Please be advised if a problem arises which you feel requires immediate medical attention or you are unable to contact Dr. Roosevelt Prasad office you should seek immediate medical attention at the ER or other health care facility you have access to.    1. A sudden increase in swelling and/or redness or warmth at the area your surgery was performed which isnt relieved by rest, ice, and elevation. 2. Oral temperature greater than 101 degrees for 12 hours or more which isnt relieved by an increase in fluid intake and taking 2 Tylenol every 4-6 hours. 3. Excessive drainage from your incisions, or drainage which hasnt stopped by 72 hours after your surgery. 4. Fever, chills, shortness of breath, chest pain, nausea, vomiting or other signs and symptoms which are of concern to you. YOUR TOTAL JOINT REPLACEMENT  FREQUENTLY ASKED QUESTIONS   What should I take for pain?  o You will be discharged with four medications for pain (Oxycodone, Tramadol, Ibuprofen and Tylenol). These may vary slightly depending on what you were taking in the hospital.   - 1st Line - Tylenol Arthritis Strength - 325-650 mg every 4 hours (scheduled for the 1st 24 hours)  - 2nd Line -  Ibuprofen 400 (2 tabs) - 800 (4 tabs) mg every 8 hours  (scheduled for the 1st 24 hours)  - 3rd Line - Oxycodone 5 mg (1-2 tablets every 4-6 hrs)  - 4th Line - Tramadol 50 mg (1-2 tablets every 4-6 hours) - take these between Oxycodone doses if your pain is not alleviated.  When should I call for advice regarding my pain?  o If your pain is still uncontrolled after being on the regimen above for at least 12 hours, please call the office [phone] or text / call my cell after hours [phone].  Can I get refills?  o Opioid refills are provided for the first 2-6 weeks following surgery. o Use Tylenol 500 mg along with Aleve 220mg twice daily or Motrin 200-800mg every 4-6 hours during the daytime hours after two weeks. - After two weeks, I suggest the opioid pain medications be used only 1 hour prior to your physical therapy appointment and 1 hour before sleeping at night. Use Tylenol and Ibuprofen at other times during the day.    - Keep in mind that you will need to discontinue opioids before you resume driving.  Is swelling normal?  o Almost everyone has some degree of swelling following surgery. o Following hip and knee replacement surgery, swelling can be normal below the incision for the first few weeks. - This swelling peaks around 5-7 days after surgery. - It is not unusual to have some bruising about the back of the thigh, calf, ankle, and foot.  What should I do for the swelling?  o Keep the limb elevated above the level of your heart - 'Toes above Nose'. o Apply compression socks (knee high for total knees and up to the mid-thigh for total hips). o Use the ice packs that you are discharged home with several times a day for the first several weeks.  How long should I remain on blood thinners following surgery? o 30 days   Which blood thinners will I be on? Can I take them with Tylenol?  o  Aspirin 81 mg twice daily - these should be taken with meals and can be used with Tylenol. In certain instances, you may be sent home on Lovenox for 30 days. o For short periods of time (30 days), aspirin and anti-inflammatories (i.e. Aleve, Motrin / Advil / ibuprofen, diclofenac, etc. can be taken together).  When can I drive?  o Once you have stopped using regular narcotic pain medications (Oxycodone, Percocet, Lortab, Norco etc.) and can safely apply the brakes without hesitation, (emergency braking).  When can I shower?  o You may shower immediately if your Optifoam bandage is dry and without discharge. The Optifoam dressing should be removed 7 days following surgery, after which you may continue to shower.  o No submersion of the incision, bathing or swimming for 14 days following surgery or until cleared by Dr Moon Sahu.  Can I remove this dressing?  o Yes, this is removed just like removing a band aid.  o If you are concerned, this can be removed by your therapist.   Heriberto Solis What do I do with the dressing when I shower?  o The Optifoam dressing is waterproof and you may shower with it. o The incision is sealed with Dermabond, a biologic skin glue, which also serves as a watertight seal. If your incision is draining, it is no longer considered to be watertight - you should contact our office prior to showering if you experience any drainage.  Which dressing should I purchase after I remove my Optifoam?  o An occlusive dressing which covers your entire incision. This does not have to be waterproof, but will need to be removed when you shower and then replaced. (Example Only)   How active should I be following surgery? o Progress activities in moderation and at your own pace.   o Walking room to room in your house is encouraged. o Walk each day and set progressive goals with small increments (1st week - ?block of walking, 2nd week - 1 block, 3rd week - 2 blocks, etc.)   Will I need help at home?  o You will likely need a caretaker who should be available for the first week following surgery. It is fine for family members to work during the day, as long as they are available by phone. o Planning ahead makes coming home from the hospital a much easier transition.  How long will my surgery take?  o On average, total joint replacement takes approximately 1-2 hour.   o The entire process, including pre-op and post-op care can last as long as 4- 5 hours before you are transferred to your room. o stroke, pulmonary embolism (a clot going from the legs to the lungs), and even death with surgery.  Will I be given antibiotics? Will I need antibiotics at discharge?  o Antibiotics will be given to you both before and after your procedure. To further minimize the risk of infection, we have streamlined the surgical procedure to take less time in the operating room.    o You do not require antibiotics following surgery.       Please do not hesitate to contact me through [employer] or by text / call me at [phone] (cell phone) for questions following surgery - MD Pat Fang, MD  Cell [phone]  Jen Lynch PA-C  Cell [phone]  Medical Assistants: Segundo Griffin & Yamil Young [phone]

## 2022-07-11 NOTE — OP NOTES
OPERATIVE REPORT     Admit Date: 7/11/2022  Admit Diagnosis: Primary osteoarthritis of right knee [M17.11]    Date of Procedure: 7/11/2022   Preoperative Diagnosis: DEGENERATIVE JOINT DISEASE RIGHT KNEE  Postoperative Diagnosis: DEGENERATIVE JOINT DISEASE RIGHT KNEE    Procedure: Procedure(s):  RIGHT TOTAL KNEE ARTHROPLASTY, MAKOPLASTY  Surgeon: Jona Pearce MD  Assistant(s): Wes Hodgkins, PA-C  Anesthesia: Spinal   Estimated Blood Loss: 300cc  Specimens: * No specimens in log *   Complications: None       INDICATIONS:   The patient is a 66 y.o., female who has complained of a long history of knee pain. The patient  has failed conservative treatment and presents for definitive operative care. Informed consent obtained including a discussion of the risks and benefits, which include, but are not limited to, bleeding, infection, neurovascular damage, wound complications, pain and stiffness in the knee, periprosthetic loosening, fracture dislocation and DVT, the patient consented for the procedure. DESCRIPTION OF PROCEDURE:        The patient was seen in the preoperative holding area. The patient was positively identified. The limb was initialed,  questions were answered. The patient was given Ancef preop for an antibiotic. The patient was subsequently taken to the operating room. The patient underwent spinal anesthesia. The patient was positioned in the supine position. All bony prominences were well padded. The limb was prepped and draped in a sterile fashion. The appropriate pause for safety was performed. A mid-line incision was created. Utilizing an incision from above the superior pole of the patella distally to the tibial tubercle. The incision was taken down through the skin and subcutaneous tissue until the retinaculum could be identified. This was sharply incised utilizing a medial parapatellar incision. The femoral array was placed at the superior portion of the patella.  The patellar was everted, a planar resurfacing was performed and the drill guide was placed with the appropriate rotation. The medial-based soft tissue was then retracted as well as well as the lateral tissue. Kaci pins were placed within the incision for thetibial array (one hand breadth proximal to the superior pole of the patella). The femoral and tibial trackers were placed. The hip center or rotation was established. Registration was performed on the femur and tibia. Osteophytes were removed. The knee was balanced in both flexion and extension with force applied. The computer model of implants and position was verified. Once this was complete the MAKOplasty robot was positioned. Standard cuts were made for the tibia first. The tibial cut was removed. The remaining femoral cuts were made with the robot. The blade was changed and the medial meniscus was removed. The tibial preparation was completed. Including removal of residual medial and lateral mensci. Tibial baseplate placement and keel preparation were performed along with drill holes for the tibial baseplate. Final bony osteophytes were removed and the meniscal rim was removed. The knee was injected with 60cc of Morphine / Ketoralac and Lidocaine. Trial implants were placed and range of motion along with overall fit was established with very good integrity of the MCL noted. The kaci pins and trackers were removed and accounted for prior to closure. All the bony surfaces were irrigated. The tibia was placed first, then the poly, residual osteophytes were removed and then the femur. Finally, the patella was placed and press-fit with the clamp / impaction. There was normal tracking of the patella at the conclusion of the case. The knee was very stable after it was taken through a full range of motion. The wound was closed with 0 Vicryl and then number 2 Quill proximally.  Once this had been completed, the skin was closed with 2-0 Vicryl 4-0 monocryl suture and Dermabond. A sterile dressing was applied. The patient was taken from the operating room in stable condition. OPERATIVE FINDINGS : Severe varus OA of the knee, Final ROM 0-130    IMPLANTS :   Implant Name Type Inv. Item Serial No.  Lot No. LRB No. Used Action   BASEPLATE TIB SZ 4 [id number] ML70MM KNEE TRITANIUM 4 CRUCFRM - SN/A  BASEPLATE TIB SZ 4 [id number] ML70MM KNEE TRITANIUM 4 CRUCFRM N/A [email or web address] [id number] 1 Implanted   COMPONENT FEM SZ 4 R KNEE CRUCE RET CEMENTLESS BEAD W/ NIRMAL - SN/A  COMPONENT FEM SZ 4 R KNEE CRUCE RET CEMENTLESS BEAD W/ NIRMAL N/A [email or web address] [id number] Right 1 Implanted   COMPONENT PAT [id number] THK9MM SUP INFERIOR KNEE TRITANIUM - SN/A  COMPONENT PAT [id number] THK9MM SUP INFERIOR KNEE TRITANIUM N/A [email or web address] [id number] Right 1 Implanted   INSERT TIB BEAR SZ 4 THK9MM KNEE X3 CRUCE RET TRIATHLON - SN/A  INSERT TIB BEAR SZ 4 THK9MM KNEE X3 CRUCE RET TRIATHLON N/A [email or web address] [id number] Right 1 Implanted       POST OPERATIVE CONSIDERATIONS :  WBAT    JUSTIFICATION FOR SURGICAL ASSISTANT:   Surgical Assistant, was requried and necessary in this case, to help with soft tissue retraction, extremity positioning, equiment management, implant management, and wound closure.     Blanka Tomas MD

## 2022-07-11 NOTE — ANESTHESIA POSTPROCEDURE EVALUATION
Procedure(s):  RIGHT TOTAL KNEE ARTHROPLASTY, MAKOPLASTY. regional, general    Anesthesia Post Evaluation      Multimodal analgesia: multimodal analgesia used between 6 hours prior to anesthesia start to PACU discharge  Patient location during evaluation: PACU  Patient participation: complete - patient participated  Level of consciousness: awake and alert  Pain management: adequate  Airway patency: patent  Anesthetic complications: no  Cardiovascular status: acceptable  Respiratory status: acceptable  Hydration status: acceptable  Post anesthesia nausea and vomiting:  none  Final Post Anesthesia Temperature Assessment:  Normothermia (36.0-37.5 degrees C)      INITIAL Post-op Vital signs:   Vitals Value Taken Time   /63 07/11/22 1040   Temp 36 °C (96.8 °F) 07/11/22 1036   Pulse 83 07/11/22 1047   Resp 8 07/11/22 1047   SpO2 96 % 07/11/22 1047   Vitals shown include unvalidated device data.

## 2022-07-11 NOTE — PERIOP NOTES
TRANSFER - OUT REPORT:    Verbal report given to Lenard Mace RN (name) on Adriana Martines  being transferred to 4th floor 408 (unit) for routine post - op       Report consisted of patients Situation, Background, Assessment and   Recommendations(SBAR). Information from the following report(s) SBAR, Kardex, Intake/Output and Cardiac Rhythm SR was reviewed with the receiving nurse. Lines:   Peripheral IV 07/11/22 Distal;Left;Posterior Wrist (Active)   Site Assessment Clean, dry, & intact 07/11/22 1305   Phlebitis Assessment 0 07/11/22 1305   Infiltration Assessment 0 07/11/22 1305   Dressing Status Clean, dry, & intact 07/11/22 1305   Dressing Type Transparent;Tape 07/11/22 1305   Hub Color/Line Status Pink; Infusing 07/11/22 1305   Action Taken Open ports on tubing capped 07/11/22 1305   Alcohol Cap Used Yes 07/11/22 1305        Opportunity for questions and clarification was provided.       Patient transported with:   Registered Nurse

## 2022-07-11 NOTE — ANESTHESIA PREPROCEDURE EVALUATION
Anesthetic History   No history of anesthetic complications            Review of Systems / Medical History  Patient summary reviewed, nursing notes reviewed and pertinent labs reviewed    Pulmonary    COPD: mild    Sleep apnea: No treatment      Pertinent negatives: No shortness of breath and recent URI     Neuro/Psych   Within defined limits           Cardiovascular    Hypertension            Pertinent negatives: PVD: iliac artery stents, carotids being followed.   Exercise tolerance: <4 METS     GI/Hepatic/Renal  Within defined limits              Endo/Other    Diabetes    Arthritis     Other Findings   Comments: claustrrophobia  Glaucoma  Gout           Physical Exam    Airway  Mallampati: II    Neck ROM: normal range of motion   Mouth opening: Normal     Cardiovascular    Rhythm: regular  Rate: normal         Dental  No notable dental hx       Pulmonary  Breath sounds clear to auscultation               Abdominal  GI exam deferred       Other Findings            Anesthetic Plan    ASA: 3  Anesthesia type: regional and general - IPACK block          Induction: Intravenous  Anesthetic plan and risks discussed with: Patient      Given multiple lower back surgeries with hardware placed, will proceed with block + GA

## 2022-07-11 NOTE — PROGRESS NOTES
Medicare Outpatient Observation Notice (MOON)/ [place] Outpatient Observation Notice (Hao Kilpatrick) provided to patient/representative with verbal explanation of the notice. Time allotted for questions regarding the notice. Patient /representative provided a completed copy of the MOON/VOON notice. Copy placed on bedside chart.   Manjinder Yadav CMS

## 2022-07-11 NOTE — PROGRESS NOTES
Problem: Mobility Impaired (Adult and Pediatric)  Goal: *Acute Goals and Plan of Care (Insert Text)  Description: FUNCTIONAL STATUS PRIOR TO ADMISSION: Patient was modified independent using a single point cane for functional mobility. HOME SUPPORT PRIOR TO ADMISSION: The patient lived with  but did not require assist. Le Rodriguez will come to assist upon discharge. Physical Therapy Goals  Initiated 7/11/2022    1. Patient will move from supine to sit and sit to supine  in bed with independence within 4 days. 2. Patient will perform sit to stand with modified independence within 4 days. 3. Patient will ambulate with modified independence for 100 feet with the least restrictive device within 4 days. 4. Patient will perform home exercise program per protocol with independence within 4 days. 5. Patient will demonstrate AROM 0-90 degrees in operative joint within 4 days. Outcome: Progressing Towards Goal   PHYSICAL THERAPY EVALUATION  Patient: Brooks Grant (68 y.o. female)  Date: 7/11/2022  Primary Diagnosis: Primary osteoarthritis of right knee [M17.11]  Procedure(s) (LRB):  RIGHT TOTAL KNEE ARTHROPLASTY, MAKOPLASTY (Right) Day of Surgery   Precautions:   Fall,WBAT (limit flexion to 90 degrees on operative knee)    ASSESSMENT  Based on the objective data described below, the patient presents with decreased ROM and strength to RLE, decreased bed mobility, transfers and gait following admission for RTKA, makoplasty. Patient has DME and will need OP PT upon discharge home. Current Level of Function Impacting Discharge (mobility/balance): Patient seen in PACU while waiting for bed assignment. She was able to perform bed exercises with min assist.  She has good SLR and dorsiflexion on operative leg. Handout provided and educated her not to exceed 90 degrees on operative knee.   Patient stood and ambulated 6 feet with RW to [facility] and back with +2 min assist.  BP machine malfunctioning but patient not dizzy or light headed. Functional Outcome Measure: The patient scored 50/100 on the Barthel outcome measure. Other factors to consider for discharge: none     Patient will benefit from skilled therapy intervention to address the above noted impairments. PLAN :  Recommendations and Planned Interventions: bed mobility training, transfer training, gait training, and therapeutic exercises      Frequency/Duration: Patient will be followed by physical therapy:  twice daily to address goals. Recommendation for discharge: (in order for the patient to meet his/her long term goals)  Outpatient physical therapy follow up recommended for TKA    This discharge recommendation:  Has not yet been discussed the attending provider and/or case management    IF patient discharges home will need the following DME: patient owns DME required for discharge         SUBJECTIVE:   Patient stated I am surprised that I have no pain! Bhanu Deleon    OBJECTIVE DATA SUMMARY:   HISTORY:    Past Medical History:   Diagnosis Date    Anticoagulant long-term use     Arthritis     BCC (basal cell carcinoma of skin)     Claustrophobia     COPD     Degenerative disc disease, lumbar 1/6/2016    Glaucoma     Gout attack 04/2019    Big toe    H/O sinus tachycardia 01/08/2019    Following shoulder surgery    High potassium 12/2018    Chronic- Stays around 5.3    History of nuclear stress test 02/10/2019    Normal    Hypertension     Obesity (BMI 30-39. 9)     Peripheral vascular disease ([address]     stents in each iliac artery, states they are \"watching\" carotids    Prediabetes 2012    Rib fracture 2019    Unspecified adverse effect of anesthesia     BP drops    Unspecified sleep apnea     Does not uses CPAP     Past Surgical History:   Procedure Laterality Date    HX CAROTID STENT Right 03/22/2022    HX HYSTERECTOMY  1980    HX LUMBAR FUSION  2006    HX LUMBAR FUSION  2016 & 2017    ALIF and then did the posterior after it didnt work    HX ORTHOPAEDIC Right     Ulnar nerve    HX ROTATOR CUFF REPAIR Left 2013    HX SHOULDER REPLACEMENT Bilateral 01/07/2019    IR PLACE STNT ILIAC /  PTA INIT Bilateral 2010       Personal factors and/or comorbidities impacting plan of care: none    Home Situation  Patient Expects to be Discharged to[de-identified] Home with outpatient services  Current DME Used/Available at Home: Herlene Brianna, straight,Walker, [employer], rollator,Safety frame toliet,Shower chair,Grab bars  Tub or Shower Type: Shower    EXAMINATION/PRESENTATION/DECISION MAKING:   Critical Behavior:  Neurologic State: Alert  Orientation Level: Oriented to person,Oriented to place,Oriented to situation,Oriented to time  Cognition: Appropriate decision making,Appropriate for age attention/concentration,Appropriate safety awareness,Follows commands  Safety/Judgement: Good awareness of safety precautions       Skin:  not fully observed    Range Of Motion:  AROM: Within functional limits              RLE AROM  R Knee Flexion: 75  R Knee Extension: 7        Strength:    Strength: Within functional limits                    Tone & Sensation:   Tone: Normal              Sensation: Intact                    Functional Mobility:  Bed Mobility:     Supine to Sit: Additional time;Contact guard assistance  Sit to Supine: Contact guard assistance  Scooting: Stand-by assistance  Transfers:  Sit to Stand: Assist x2;Minimum assistance  Stand to Sit: Assist x2;Contact guard assistance                       Balance:   Sitting: Intact  Standing: Intact; With support  Ambulation/Gait Training:  Distance (ft): 6 Feet (ft)  Assistive Device: Gait belt;Walker, rolling  Ambulation - Level of Assistance: Assist x2;Contact guard assistance        Gait Abnormalities: Step to gait  Right Side Weight Bearing: As tolerated                                              Therapeutic Exercises:    Ankle pumps, quad and heel sets, heel slides, SLR    Functional Measure:  Barthel Index:    Bathing: 0  Bladder: 10  Bowels: 10  Groomin  Dressin  Feeding: 10  Mobility: 0  Stairs: 0  Toilet Use: 5  Transfer (Bed to Chair and Back): 5  Total: 50/100       The Barthel ADL Index: Guidelines  1. The index should be used as a record of what a patient does, not as a record of what a patient could do. 2. The main aim is to establish degree of independence from any help, physical or verbal, however minor and for whatever reason. 3. The need for supervision renders the patient not independent. 4. A patient's performance should be established using the best available evidence. Asking the patient, friends/relatives and nurses are the usual sources, but direct observation and common sense are also important. However direct testing is not needed. 5. Usually the patient's performance over the preceding 24-48 hours is important, but occasionally longer periods will be relevant. 6. Middle categories imply that the patient supplies over 50 per cent of the effort. 7. Use of aids to be independent is allowed. Score Interpretation (from [address] [facility])    Independent   60-79 Minimally independent   40-59 Partially dependent   20-39 Very dependent   <20 Totally dependent     -Joshua Ruelas., Barthel, DDeepW. (1965). Functional evaluation: the Barthel Index. [address] [facility] ([address] (). The Barthel activities of daily living index: self-reporting versus actual performance in the old (> or = 75 years). Journal of [address] [place] [address] 45(7), [facility], J.FLORENTINOF, Josie James, Manpreet Jaquez. (). Measuring the change in disability after inpatient rehabilitation; comparison of the responsiveness of the Barthel Index and Functional [place] Measure. Journal of Neurology, Neurosurgery, and Psychiatry, 66(4), 227-948.   Stevie Fulton, N.J.A, SHAY Mcleod, & Juan Graham, M.A. (2004) Assessment of post-stroke quality of life in cost-effectiveness studies: The usefulness of the Barthel Index and the EuroQoL-5D. Quality of Life Research, 15, 127-80           Physical Therapy Evaluation Charge Determination   History Examination Presentation Decision-Making   LOW Complexity : Zero comorbidities / personal factors that will impact the outcome / POC LOW Complexity : 1-2 Standardized tests and measures addressing body structure, function, activity limitation and / or participation in recreation  LOW Complexity : Stable, uncomplicated  Other outcome measures Barthel  LOW       Based on the above components, the patient evaluation is determined to be of the following complexity level: LOW     Pain Rating:  None at this time    Activity Tolerance:   Good    After treatment patient left in no apparent distress:   Supine in bed and Side rails x 3    COMMUNICATION/EDUCATION:   The patients plan of care was discussed with: Registered nurse. Fall prevention education was provided and the patient/caregiver indicated understanding. and Patient/family agree to work toward stated goals and plan of care.     Thank you for this referral.  Kallie Everett, PT   Time Calculation: 30 mins

## 2022-07-12 LAB
ANION GAP SERPL CALC-SCNC: 8 MMOL/L (ref 5–15)
BUN SERPL-MCNC: 24 MG/DL (ref 6–20)
BUN/CREAT SERPL: 30 (ref 12–20)
CALCIUM SERPL-MCNC: 8.3 MG/DL (ref 8.5–10.1)
CHLORIDE SERPL-SCNC: 109 MMOL/L (ref 97–108)
CO2 SERPL-SCNC: 22 MMOL/L (ref 21–32)
CREAT SERPL-MCNC: 0.81 MG/DL (ref 0.55–1.02)
GLUCOSE BLD STRIP.AUTO-MCNC: 104 MG/DL (ref 65–117)
GLUCOSE BLD STRIP.AUTO-MCNC: 145 MG/DL (ref 65–117)
GLUCOSE BLD STRIP.AUTO-MCNC: 148 MG/DL (ref 65–117)
GLUCOSE BLD STRIP.AUTO-MCNC: 153 MG/DL (ref 65–117)
GLUCOSE BLD STRIP.AUTO-MCNC: 165 MG/DL (ref 65–117)
GLUCOSE BLD STRIP.AUTO-MCNC: 62 MG/DL (ref 65–117)
GLUCOSE BLD STRIP.AUTO-MCNC: 67 MG/DL (ref 65–117)
GLUCOSE SERPL-MCNC: 104 MG/DL (ref 65–100)
HGB BLD-MCNC: 9.5 G/DL (ref 11.5–16)
POTASSIUM SERPL-SCNC: 4 MMOL/L (ref 3.5–5.1)
SERVICE CMNT-IMP: ABNORMAL
SERVICE CMNT-IMP: NORMAL
SERVICE CMNT-IMP: NORMAL
SODIUM SERPL-SCNC: 139 MMOL/L (ref 136–145)

## 2022-07-12 PROCEDURE — 97530 THERAPEUTIC ACTIVITIES: CPT

## 2022-07-12 PROCEDURE — 82962 GLUCOSE BLOOD TEST: CPT

## 2022-07-12 PROCEDURE — 85018 HEMOGLOBIN: CPT

## 2022-07-12 PROCEDURE — 74011250636 HC RX REV CODE- 250/636: Performed by: PHYSICIAN ASSISTANT

## 2022-07-12 PROCEDURE — 74011636637 HC RX REV CODE- 636/637: Performed by: PHYSICIAN ASSISTANT

## 2022-07-12 PROCEDURE — 74011250637 HC RX REV CODE- 250/637: Performed by: PHYSICIAN ASSISTANT

## 2022-07-12 PROCEDURE — 94640 AIRWAY INHALATION TREATMENT: CPT

## 2022-07-12 PROCEDURE — 94664 DEMO&/EVAL PT USE INHALER: CPT

## 2022-07-12 PROCEDURE — 97116 GAIT TRAINING THERAPY: CPT

## 2022-07-12 PROCEDURE — 74011000250 HC RX REV CODE- 250: Performed by: PHYSICIAN ASSISTANT

## 2022-07-12 PROCEDURE — 97110 THERAPEUTIC EXERCISES: CPT

## 2022-07-12 PROCEDURE — G0378 HOSPITAL OBSERVATION PER HR: HCPCS

## 2022-07-12 PROCEDURE — 94761 N-INVAS EAR/PLS OXIMETRY MLT: CPT

## 2022-07-12 PROCEDURE — 2709999900 HC NON-CHARGEABLE SUPPLY

## 2022-07-12 PROCEDURE — 36415 COLL VENOUS BLD VENIPUNCTURE: CPT

## 2022-07-12 PROCEDURE — 80048 BASIC METABOLIC PNL TOTAL CA: CPT

## 2022-07-12 RX ADMIN — OXYCODONE 5 MG: 5 TABLET ORAL at 08:37

## 2022-07-12 RX ADMIN — ARFORMOTEROL TARTRATE: 15 SOLUTION RESPIRATORY (INHALATION) at 19:28

## 2022-07-12 RX ADMIN — METOPROLOL TARTRATE 25 MG: 25 TABLET, FILM COATED ORAL at 08:35

## 2022-07-12 RX ADMIN — FAMOTIDINE 20 MG: 20 TABLET, FILM COATED ORAL at 16:23

## 2022-07-12 RX ADMIN — FERROUS SULFATE TAB 325 MG (65 MG ELEMENTAL FE) 325 MG: 325 (65 FE) TAB at 08:38

## 2022-07-12 RX ADMIN — ARFORMOTEROL TARTRATE: 15 SOLUTION RESPIRATORY (INHALATION) at 07:18

## 2022-07-12 RX ADMIN — METFORMIN HYDROCHLORIDE 500 MG: 500 TABLET ORAL at 08:33

## 2022-07-12 RX ADMIN — MONTELUKAST 10 MG: 10 TABLET, FILM COATED ORAL at 21:12

## 2022-07-12 RX ADMIN — INSULIN LISPRO 2 UNITS: 100 INJECTION, SOLUTION INTRAVENOUS; SUBCUTANEOUS at 16:20

## 2022-07-12 RX ADMIN — PRAVASTATIN SODIUM 80 MG: 20 TABLET ORAL at 21:12

## 2022-07-12 RX ADMIN — ACETAMINOPHEN 650 MG: 325 TABLET ORAL at 13:51

## 2022-07-12 RX ADMIN — OXYCODONE 10 MG: 5 TABLET ORAL at 14:41

## 2022-07-12 RX ADMIN — PREGABALIN 75 MG: 75 CAPSULE ORAL at 21:12

## 2022-07-12 RX ADMIN — DOCUSATE SODIUM 50MG AND SENNOSIDES 8.6MG 1 TABLET: 8.6; 5 TABLET, FILM COATED ORAL at 16:23

## 2022-07-12 RX ADMIN — SODIUM CHLORIDE 125 ML/HR: 9 INJECTION, SOLUTION INTRAVENOUS at 03:16

## 2022-07-12 RX ADMIN — METOPROLOL TARTRATE 25 MG: 25 TABLET, FILM COATED ORAL at 21:12

## 2022-07-12 RX ADMIN — Medication 400 MG: at 08:36

## 2022-07-12 RX ADMIN — ASPIRIN 81 MG: 81 TABLET, COATED ORAL at 16:22

## 2022-07-12 RX ADMIN — OXYCODONE 10 MG: 5 TABLET ORAL at 21:11

## 2022-07-12 RX ADMIN — ASPIRIN 81 MG: 81 TABLET, COATED ORAL at 08:38

## 2022-07-12 RX ADMIN — CLOPIDOGREL BISULFATE 75 MG: 75 TABLET ORAL at 08:36

## 2022-07-12 RX ADMIN — ACETAMINOPHEN 650 MG: 325 TABLET ORAL at 08:32

## 2022-07-12 RX ADMIN — CEFAZOLIN 2 G: 1 INJECTION, POWDER, FOR SOLUTION INTRAMUSCULAR; INTRAVENOUS at 00:00

## 2022-07-12 RX ADMIN — Medication 2 TABLET: at 16:21

## 2022-07-12 RX ADMIN — SODIUM CHLORIDE, PRESERVATIVE FREE 10 ML: 5 INJECTION INTRAVENOUS at 21:13

## 2022-07-12 RX ADMIN — LATANOPROST 1 DROP: 50 SOLUTION OPHTHALMIC at 22:23

## 2022-07-12 RX ADMIN — ACETAMINOPHEN 650 MG: 325 TABLET ORAL at 19:47

## 2022-07-12 RX ADMIN — DOCUSATE SODIUM 50MG AND SENNOSIDES 8.6MG 1 TABLET: 8.6; 5 TABLET, FILM COATED ORAL at 08:33

## 2022-07-12 RX ADMIN — OXYCODONE 10 MG: 5 TABLET ORAL at 11:40

## 2022-07-12 RX ADMIN — FAMOTIDINE 20 MG: 20 TABLET, FILM COATED ORAL at 08:38

## 2022-07-12 RX ADMIN — LEVOTHYROXINE SODIUM 50 MCG: 0.11 TABLET ORAL at 08:36

## 2022-07-12 RX ADMIN — METFORMIN HYDROCHLORIDE 500 MG: 500 TABLET ORAL at 16:21

## 2022-07-12 RX ADMIN — Medication 2 TABLET: at 08:33

## 2022-07-12 RX ADMIN — HYDROCHLOROTHIAZIDE: 25 TABLET ORAL at 08:34

## 2022-07-12 NOTE — PROGRESS NOTES
TOTAL KNEE ARTHROPLASTY DAILY NOTE     ASSESSMENT / PLAN :   1. Pain Control : Acceptable - Some pain at times, but the patient does not wish to increase their medications  2. Overnight Issues : none  3. Wound or incisional issue : Healing incision with no visible drainage  4. Therapy / Weight Bearing Recommendations : Weight bear as tolerated with use of a walker and two person assist while mobilizing  5. DVT Prophylaxis : Mechanical and Aspirin and mechanical lower extremity compression device  6. Disposition : Home - outpatient PT  7. Medical Concerns : stable  8. Comments : Discharge home today if cleared by PT     POD  1 Day Post-Op s/p Procedure(s):  RIGHT TOTAL KNEE ARTHROPLASTY, MAKOPLASTY     SUBJECTIVE :     Overnight complaints : none - pain is just starting this morning. OBJECTIVE :     Vitals:    07/11/22 2327 07/12/22 0321 07/12/22 0719 07/12/22 0800   BP: (!) 153/69 (!) 149/69  (!) 164/64   Pulse: (!) 101 87  96   Resp: 16   16   Temp: 97.4 °F (36.3 °C) 97.4 °F (36.3 °C)  98.1 °F (36.7 °C)   SpO2: 95% 99% 98% 98%   Weight:       Height:           Alert and oriented x3. Examination of the right knee reveals that the dressing is clean, dry and intact. Motor Exam : Pt is able to perform a straight leg raise. Sensation is intact to light touch. No calf pain. MEDS / LABS :     Key Anti-Platelet Anticoagulant Meds             aspirin delayed-release 81 mg tablet (Taking) Take 1 Tablet by mouth two (2) times a day. clopidogreL (Plavix) 75 mg tab Take 75 mg by mouth every morning. For stents in neck     aspirin (ASPIRIN) 325 mg tablet (Discontinued) Take 325 mg by mouth daily.            Labs:  Recent Labs     07/12/22  0510   HGB 9.5*      K 4.0   *   CO2 22   BUN 24*   CREA 0.81   *      Patient mobility  Gait  Gait Abnormalities: Step to gait  Ambulation - Level of Assistance: Assist x2,Contact guard assistance  Distance (ft): 6 Feet (ft)  Assistive Device: Robb Brand, Maco Jung MD  Cell [phone]  Korin Tidwell PA-C  Cell [phone]  Medical Assistants: Joaquín Dixon [phone]                 Surgery Scheduler: [address] [phone] ext [phone]

## 2022-07-12 NOTE — PROGRESS NOTES
Problem: Mobility Impaired (Adult and Pediatric)  Goal: *Acute Goals and Plan of Care (Insert Text)  Description: FUNCTIONAL STATUS PRIOR TO ADMISSION: Patient was modified independent using a single point cane for functional mobility. HOME SUPPORT PRIOR TO ADMISSION: The patient lived with  but did not require assist. Christian Rendon will come to assist upon discharge. Physical Therapy Goals  Initiated 7/11/2022    1. Patient will move from supine to sit and sit to supine  in bed with independence within 4 days. 2. Patient will perform sit to stand with modified independence within 4 days. 3. Patient will ambulate with modified independence for 100 feet with the least restrictive device within 4 days. 4. Patient will perform home exercise program per protocol with independence within 4 days. 5. Patient will demonstrate AROM 0-90 degrees in operative joint within 4 days. 7/12/2022 1518 by Alejandra Lora PT  Outcome: Progressing Towards Goal     PHYSICAL THERAPY TREATMENT  Patient: Lorena Holm (56 y.o. female)  Date: 7/12/2022  Diagnosis: Primary osteoarthritis of right knee [M17.11] <principal problem not specified>  Procedure(s) (LRB):  RIGHT TOTAL KNEE ARTHROPLASTY, MAKOPLASTY (Right) 1 Day Post-Op  Precautions: Fall,WBAT (limit flexion to 90 degrees on operative knee)  Chart, physical therapy assessment, plan of care and goals were reviewed. ASSESSMENT  Patient continues with skilled PT services and is overall progressing towards goals but with decline in function this session. Recently medicated but more limited by pain this session. Had episode of hypoglycemia and passing out in chair earlier per patient and nursing and patient is fatigued. Patient with decreased BP initially sitting edge of bed that improved with time and somewhat decreased BP in standing as well. Unable to progress mobility further in setting of patient's pain and fatigue.  Will need further Acute PT prior to discharge home. Patient plans for Outpatient PT for R TKA. Current Level of Function Impacting Discharge (mobility/balance): mod A sit to stand from bed    Other factors to consider for discharge: hypoglycemia episode, pain         PLAN :  Patient continues to benefit from skilled intervention to address the above impairments. Continue treatment per established plan of care. to address goals. Recommendation for discharge: (in order for the patient to meet his/her long term goals)  Outpatient physical therapy follow up recommended for R TKA, assist at home as needed with functional mobility    This discharge recommendation:  Has been made in collaboration with the attending provider and/or case management    IF patient discharges home will need the following DME: patient owns DME required for discharge       SUBJECTIVE:   Patient stated I am so tired.     OBJECTIVE DATA SUMMARY:   Critical Behavior:  Neurologic State: (P) Alert,Eyes open spontaneously  Orientation Level: (P) Oriented X4  Cognition: (P) Follows commands,Appropriate decision making  Safety/Judgement: Good awareness of safety precautions    Functional Mobility Training:  Bed Mobility:  Supine to Sit: Stand-by assistance; Additional time;Bed Modified (has adjustable bed with rails at home)  Scooting: Stand-by assistance  Transfers:  Sit to Stand: Minimum assistance; Moderate assistance (mod A from bed, min A from [facility] with RW and cues)  Stand to Sit: Minimum assistance; Adaptive equipment  Bed to Chair: Minimum assistance; Moderate assistance; Adaptive equipment; Additional time (assist and cues for RW management, min assist for steps)  Balance:  Sitting: Intact  Standing: Impaired; Intact; With support  Standing - Static: Constant support;Good  Standing - Dynamic : Constant support;Good;Fair  Ambulation/Gait Training:  Distance (ft): 5 Feet (ft) (lateral steps)  Assistive Device: Gait belt;Walker, rolling  Ambulation - Level of Assistance: Minimal assistance (assist and cues for RW management)  Gait Abnormalities: Antalgic  Right Side Weight Bearing: As tolerated      Therapeutic Exercises:     EXERCISE   Sets   Reps   Active Active Assist   Passive Self ROM   Comments   Ankle Pumps 1 10 [x]                                        []                                        []                                        []                                           Quad Sets 1 19 [x]                                        []                                        []                                        []                                           Hamstring Sets   []                                        []                                        []                                        []                                           Short Arc Quads   []                                        []                                        []                                        []                                           Knee Extension Stretch     []                                          []                                          []                                          []                                           Heel Slides 1 6 []                                        [x]                                        []                                        []                                           Long Arc Quads   []                                        []                                        []                                        []                                           Knee Flexion Stretch   []                                        []                                        []                                        []                                           Straight Leg Raises   []                                        []                                        []                                        [] Pain Rating:  Pre-medicated prior to session. Pain worse than AM session per patient    Activity Tolerance:   Fair and requires rest breaks  Limited by pain     07/12/22 1452 07/12/22 1455 07/12/22 1458   Vital Signs   Pulse (Heart Rate) 89 94 89   Heart Rate Source  --   --   --    Level of Consciousness  --   --   --    BP (!) 160/70 123/78 (!) 157/68   MAP (Calculated) 100 93 98   BP 1 Location  --   --   --    BP 1 Method  --   --   --    BP Patient Position Supine Sitting Sitting  (after exercise)      07/12/22 1502 07/12/22 1506   Vital Signs   Pulse (Heart Rate) 94 96   Heart Rate Source Monitor  --    Level of Consciousness Alert (0)  --    /60 (!) 144/63   MAP (Calculated) 86 90   BP 1 Location Right arm  --    BP 1 Method Automatic  --    BP Patient Position Standing Sitting  (on BSC)   Nursing tech bedside for session    After treatment patient left in no apparent distress:   Supine in bed, Call bell within reach, Bed / chair alarm activated, and Side rails x 3    COMMUNICATION/COLLABORATION:   The patients plan of care was discussed with: Occupational therapist, Registered nurse, and Certified nursing assistant/patient care technician.      Christo Pollock, PT   Time Calculation: 27 mins

## 2022-07-12 NOTE — PROGRESS NOTES
7/12/2022  2:23 PM  Care Management Assessment      Reason for Admission: Elective - Surgery required      ICD-10-CM ICD-9-CM    1. Arthritis of right knee  M17.11 716.96 oxyCODONE IR (ROXICODONE) 5 mg immediate release tablet      traMADoL (ULTRAM) 50 mg tablet       Assessment:   []In person with pt   []Via p/c with pt   []With family member in person. Who/Relation:     []With family member via p/c. Who/Relation:   [x]Chart Review    RUR:  NA - OBS   Risk Level: [x]Low []Moderate []High  Value-based purchasing: [] Yes [x] No  Bundle patient: [] Yes [x] No   Specify:     Advance Directive: Full Code.    [] No AD on file. [x] AD on file. [] Current AD not on file. Copy requested. [] Requests AD, and referral submitted to [facility]. Healthcare Decision Maker: Eliot Shell         Assessment:    Age:  66    Sex: [] Male [x]Female     Residency: [x]Private residence []Apartment []Assisted Living []LTC []Other:   Exterior Steps: 0  Interior Steps: 0    Lives With: []With spouse [x]Other family members []Underage children []Alone []Care provider []Other:    Prior functioning:  [x]Independent with ADLs and iADLS []Dependent with ADLs and iADLs []Partial dependence, Specify:     DME available: []None [x]RW [x]Cane []Crutches []Bedside commode []CPAP []Home O2 (Liter/Provider: ) []Nebulizer   []Shower Chair []Wheelchair []Hospital Bed [][place] []Stair lift []Rollator []Other:    Rehab history: [x]None []Outpatient PT []Home Health (Provider/Date: ) []SNF (Provider/Date: ) []IPR (Provider/Date: ) []LTC (Provider/Date: ) []Hospice (Provider/Date: )  []Other:     Discharge Concerns: []Yes [x]No []Unknown   Describe:    Comments:      Insurer:   Insurance Information                [facility] [address] MEDICARE PART A & B Phone: [phone]    Subscriber: Javi Boyle Subscriber#: [id number]    Group#: -- Precert#: --        [employer] [place] Phone: --    Subscriber: Javi Boyle Subscriber#: [id number]    Group#: [id number] Precert#: --          PCP: John Chaudhari   Address: [address] / [address] [address]   Phone number: [phone]   Current patient: [x]Yes []No    Pharmacy: [facility] Transport:  Family       Transition of care plan:    []Unable to determine at this time. Awaiting clinical progress, and disposition recommendations. [] Home with outpatient follow-up    [x] Home with Outpatient PT and outpatient follow-up   Pt aware of OP appt? [x]Yes, Provider: Connie Hill   []Not scheduled   Transport provider: Family    [] Home with family assistance as needed and outpatient follow-up   Family able to assist:    Schedule:  Transport provider:      [] Home with Home Health   - Provider:     []SNF/IPR   -[]Preferences given:   []Listing provided and preferences requested   -Status: []Pending []Accepted:    -Auth required: []Yes []No    -Auth initiated date:   -3 midnight stay required: []Yes []No  Date satisfied:     [] Home with Hospice   -Provider:     [] [facility] information provided. [] Other:     Corrie Jeffrey MA    Care Management Interventions  PCP Verified by CM: Yes  Mode of Transport at Discharge:  Other (see comment)  Transition of Care Consult (CM Consult): Discharge Planning  MyChart Signup: No  Discharge Durable Medical Equipment: No  Physical Therapy Consult: Yes  Occupational Therapy Consult: Yes  Speech Therapy Consult: No  Support Systems: Spouse/Significant Other  Confirm Follow Up Transport: Family  Discharge Location  Patient Expects to be Discharged to[de-identified] Home with outpatient services

## 2022-07-12 NOTE — PROGRESS NOTES
Problem: Mobility Impaired (Adult and Pediatric)  Goal: *Acute Goals and Plan of Care (Insert Text)  Description: FUNCTIONAL STATUS PRIOR TO ADMISSION: Patient was modified independent using a single point cane for functional mobility. HOME SUPPORT PRIOR TO ADMISSION: The patient lived with  but did not require assist. Kit Fay will come to assist upon discharge. Physical Therapy Goals  Initiated 7/11/2022    1. Patient will move from supine to sit and sit to supine  in bed with independence within 4 days. 2. Patient will perform sit to stand with modified independence within 4 days. 3. Patient will ambulate with modified independence for 100 feet with the least restrictive device within 4 days. 4. Patient will perform home exercise program per protocol with independence within 4 days. 5. Patient will demonstrate AROM 0-90 degrees in operative joint within 4 days. Outcome: Progressing Towards Goal     PHYSICAL THERAPY TREATMENT  Patient: Ryan Tan (11 y.o. female)  Date: 7/12/2022  Diagnosis: Primary osteoarthritis of right knee [M17.11] <principal problem not specified>  Procedure(s) (LRB):  RIGHT TOTAL KNEE ARTHROPLASTY, MAKOPLASTY (Right) 1 Day Post-Op  Precautions: Fall,WBAT (limit flexion to 90 degrees on operative knee)  Chart, physical therapy assessment, plan of care and goals were reviewed. ASSESSMENT  Patient continues with skilled PT services and is progressing towards goals. Able to progress gait distance somewhat but with difficulty standing up from bed in setting of pain. Motivated to participate and progress mobility. Anticipate she will progress to be able to discharge home with family/friends and plans for Outpatient PT upon discharge. Will benefit from at least 1 more PT session prior to discharge.      Current Level of Function Impacting Discharge (mobility/balance): minimal assist sit to stand with rolling walker    Other factors to consider for discharge: pain limiting this date         PLAN :  Patient continues to benefit from skilled intervention to address the above impairments. Continue treatment per established plan of care. to address goals. Recommendation for discharge: (in order for the patient to meet his/her long term goals)  Outpatient physical therapy follow up recommended for TKA    This discharge recommendation:  Has been made in collaboration with the attending provider and/or case management    IF patient discharges home will need the following DME: patient owns DME required for discharge       SUBJECTIVE:   Patient stated It was easier earlier to stand.     OBJECTIVE DATA SUMMARY:   Critical Behavior:  Neurologic State: Alert  Orientation Level: Oriented X4  Cognition: Appropriate decision making,Appropriate for age attention/concentration,Appropriate safety awareness,Follows commands  Safety/Judgement: Good awareness of safety precautions    Range of Motion:    R knee flexion ~5-80  Functional Mobility Training:  Bed Mobility:  Supine to Sit: Stand-by assistance; Additional time;Bed Modified (has adjustable bed with rails at home)  Scooting: Stand-by assistance  Transfers:  Sit to Stand: Minimum assistance; Adaptive equipment; Additional time (multiple attempts prior to standing)  Stand to Sit: Contact guard assistance; Adaptive equipment; Additional time  Balance:  Sitting: Intact  Standing: Impaired; Intact; With support  Standing - Static: Constant support;Good  Standing - Dynamic : Constant support;Good;Fair  Ambulation/Gait Training:  Distance (ft): 30 Feet (ft)  Assistive Device: Gait belt;Walker, rolling  Ambulation - Level of Assistance: Contact guard assistance  Gait Abnormalities: Antalgic; Step to gait  Right Side Weight Bearing: As tolerated     Therapeutic Exercises:     EXERCISE   Sets   Reps   Active Active Assist   Passive Self ROM   Comments   Ankle Pumps 1 10 [x]                                        [] []                                        []                                           Quad Sets 1 6 [x]                                        []                                        []                                        []                                           Hamstring Sets 1 6 [x]                                        []                                        []                                        []                                           Short Arc Quads 1 6 [x]                                        []                                        []                                        []                                           Knee Extension Stretch     []                                          []                                          []                                          []                                           Heel Slides 1 6 []                                        [x]                                        []                                        []                                           Long Arc Quads 1 6 [x]                                        []                                        []                                        []                                           Knee Flexion Stretch 1 6 [x]                                        []                                        []                                        []                                           Straight Leg Raises 1 7 [x]                                        []                                        []                                        []                                             Pain Rating:  Pre-medicated prior to session. Pain did interfere with session.     Activity Tolerance:   Good and Fair    After treatment patient left in no apparent distress:   Sitting in chair, Call bell within reach, Bed / chair alarm activated and Caregiver / family present    COMMUNICATION/COLLABORATION: The patients plan of care was discussed with: Occupational therapist and Registered nurse.      Peg Maki PT   Time Calculation: 45 mins

## 2022-07-12 NOTE — PROGRESS NOTES
Occupational Therapy Note:  Chart reviewed and spoke with nursing. RN recommending holding OT evaluation this afternoon as patient had an unresponsive episode (from bed level) after returning to bed, BS was low, and suspected BP dropping with return transfer. Additionally, patient with increased pain this afternoon and RN currently working on medication management. Spoke with patient to educate on the role of OT and agreeable to OT follow-up next tx day.     Brenda Vázquez, OTR/L

## 2022-07-12 NOTE — PROGRESS NOTES
Problem: Pressure Injury - Risk of  Goal: *Prevention of pressure injury  Description: Document Cliff Scale and appropriate interventions in the flowsheet. Outcome: Progressing Towards Goal  Note: Pressure Injury Interventions: Activity Interventions: Assess need for specialty bed,Pressure redistribution bed/mattress(bed type),PT/OT evaluation    Mobility Interventions: Assess need for specialty bed,Float heels,HOB 30 degrees or less,PT/OT evaluation,Turn and reposition approx. every two hours(pillow and wedges)    Nutrition Interventions: Document food/fluid/supplement intake                     Problem: Patient Education: Go to Patient Education Activity  Goal: Patient/Family Education  Outcome: Progressing Towards Goal     Problem: Patient Education: Go to Patient Education Activity  Goal: Patient/Family Education  Outcome: Progressing Towards Goal     Problem: Falls - Risk of  Goal: *Absence of Falls  Description: Document Sabillon Reason Fall Risk and appropriate interventions in the flowsheet.   Outcome: Progressing Towards Goal  Note: Fall Risk Interventions:  Mobility Interventions: Bed/chair exit alarm,Communicate number of staff needed for ambulation/transfer,OT consult for ADLs,Patient to call before getting OOB,PT Consult for mobility concerns,PT Consult for assist device competence,Strengthening exercises (ROM-active/passive),Utilize walker, cane, or other assistive device,Utilize gait belt for transfers/ambulation         Medication Interventions: Assess postural VS orthostatic hypotension,Teach patient to arise slowly    Elimination Interventions: Bed/chair exit alarm,Call light in reach,Elevated toilet seat,Patient to call for help with toileting needs,Stay With Me (per policy),Toilet paper/wipes in reach,Toileting schedule/hourly rounds              Problem: Knee Replacement: Day of Surgery/Unit  Goal: Off Pathway (Use only if patient is Off Pathway)  Outcome: Progressing Towards Goal  Goal: Activity/Safety  Outcome: Progressing Towards Goal  Goal: Consults, if ordered  Outcome: Progressing Towards Goal  Goal: Diagnostic Test/Procedures  Outcome: Progressing Towards Goal  Goal: Nutrition/Diet  Outcome: Progressing Towards Goal  Goal: Medications  Outcome: Progressing Towards Goal  Goal: Respiratory  Outcome: Progressing Towards Goal  Goal: Treatments/Interventions/Procedures  Outcome: Progressing Towards Goal  Goal: Psychosocial  Outcome: Progressing Towards Goal  Goal: *Initiate mobility  Outcome: Progressing Towards Goal  Goal: *Optimal pain control at patient's stated goal  Outcome: Progressing Towards Goal  Goal: *Hemodynamically stable  Outcome: Progressing Towards Goal     Problem: Hypertension  Goal: *Blood pressure within specified parameters  Outcome: Progressing Towards Goal  Goal: *Fluid volume balance  Outcome: Progressing Towards Goal  Goal: *Labs within defined limits  Outcome: Progressing Towards Goal     Problem: Diabetes Self-Management  Goal: *Disease process and treatment process  Description: Define diabetes and identify own type of diabetes; list 3 options for treating diabetes. Outcome: Progressing Towards Goal  Goal: *Incorporating nutritional management into lifestyle  Description: Describe effect of type, amount and timing of food on blood glucose; list 3 methods for planning meals. Outcome: Progressing Towards Goal  Goal: *Incorporating physical activity into lifestyle  Description: State effect of exercise on blood glucose levels. Outcome: Progressing Towards Goal  Goal: *Developing strategies to promote health/change behavior  Description: Define the ABC's of diabetes; identify appropriate screenings, schedule and personal plan for screenings. Outcome: Progressing Towards Goal  Goal: *Using medications safely  Description: State effect of diabetes medications on diabetes; name diabetes medication taking, action and side effects.   Outcome: Progressing Towards Goal  Goal: *Monitoring blood glucose, interpreting and using results  Description: Identify recommended blood glucose targets  and personal targets. Outcome: Progressing Towards Goal  Goal: *Prevention, detection, treatment of acute complications  Description: List symptoms of hyper- and hypoglycemia; describe how to treat low blood sugar and actions for lowering  high blood glucose level. Outcome: Progressing Towards Goal  Goal: *Prevention, detection and treatment of chronic complications  Description: Define the natural course of diabetes and describe the relationship of blood glucose levels to long term complications of diabetes.   Outcome: Progressing Towards Goal  Goal: *Developing strategies to address psychosocial issues  Description: Describe feelings about living with diabetes; identify support needed and support network  Outcome: Progressing Towards Goal  Goal: *Insulin pump training  Outcome: Progressing Towards Goal  Goal: *Sick day guidelines  Outcome: Progressing Towards Goal  Goal: *Patient Specific Goal (EDIT GOAL, INSERT TEXT)  Outcome: Progressing Towards Goal

## 2022-07-12 NOTE — PROGRESS NOTES
A Spiritual Care Partner Volunteer visited patient in [place] [id number] on 7/12/2022  Documented by:  Chaplain Hidalgo MDiv, MS, [facility]

## 2022-07-13 VITALS
HEART RATE: 106 BPM | SYSTOLIC BLOOD PRESSURE: 165 MMHG | WEIGHT: 175.71 LBS | OXYGEN SATURATION: 93 % | TEMPERATURE: 98.8 F | HEIGHT: 64 IN | RESPIRATION RATE: 18 BRPM | BODY MASS INDEX: 30 KG/M2 | DIASTOLIC BLOOD PRESSURE: 73 MMHG

## 2022-07-13 LAB
GLUCOSE BLD STRIP.AUTO-MCNC: 110 MG/DL (ref 65–117)
GLUCOSE BLD STRIP.AUTO-MCNC: 116 MG/DL (ref 65–117)
SERVICE CMNT-IMP: NORMAL
SERVICE CMNT-IMP: NORMAL

## 2022-07-13 PROCEDURE — 74011000250 HC RX REV CODE- 250: Performed by: PHYSICIAN ASSISTANT

## 2022-07-13 PROCEDURE — 97116 GAIT TRAINING THERAPY: CPT

## 2022-07-13 PROCEDURE — 97110 THERAPEUTIC EXERCISES: CPT

## 2022-07-13 PROCEDURE — 97535 SELF CARE MNGMENT TRAINING: CPT

## 2022-07-13 PROCEDURE — 94640 AIRWAY INHALATION TREATMENT: CPT

## 2022-07-13 PROCEDURE — 94664 DEMO&/EVAL PT USE INHALER: CPT

## 2022-07-13 PROCEDURE — 97165 OT EVAL LOW COMPLEX 30 MIN: CPT

## 2022-07-13 PROCEDURE — 94761 N-INVAS EAR/PLS OXIMETRY MLT: CPT

## 2022-07-13 PROCEDURE — 82962 GLUCOSE BLOOD TEST: CPT

## 2022-07-13 PROCEDURE — G0378 HOSPITAL OBSERVATION PER HR: HCPCS

## 2022-07-13 PROCEDURE — 74011250637 HC RX REV CODE- 250/637: Performed by: PHYSICIAN ASSISTANT

## 2022-07-13 RX ADMIN — FERROUS SULFATE TAB 325 MG (65 MG ELEMENTAL FE) 325 MG: 325 (65 FE) TAB at 08:28

## 2022-07-13 RX ADMIN — DOCUSATE SODIUM 50MG AND SENNOSIDES 8.6MG 1 TABLET: 8.6; 5 TABLET, FILM COATED ORAL at 08:27

## 2022-07-13 RX ADMIN — ACETAMINOPHEN 650 MG: 325 TABLET ORAL at 08:27

## 2022-07-13 RX ADMIN — SODIUM CHLORIDE, PRESERVATIVE FREE 10 ML: 5 INJECTION INTRAVENOUS at 06:30

## 2022-07-13 RX ADMIN — OXYCODONE 10 MG: 5 TABLET ORAL at 12:30

## 2022-07-13 RX ADMIN — CLOPIDOGREL BISULFATE 75 MG: 75 TABLET ORAL at 08:28

## 2022-07-13 RX ADMIN — ARFORMOTEROL TARTRATE: 15 SOLUTION RESPIRATORY (INHALATION) at 07:09

## 2022-07-13 RX ADMIN — FAMOTIDINE 20 MG: 20 TABLET, FILM COATED ORAL at 08:27

## 2022-07-13 RX ADMIN — POLYETHYLENE GLYCOL 3350 17 G: 17 POWDER, FOR SOLUTION ORAL at 08:28

## 2022-07-13 RX ADMIN — OXYCODONE 10 MG: 5 TABLET ORAL at 04:21

## 2022-07-13 RX ADMIN — OXYCODONE 10 MG: 5 TABLET ORAL at 08:28

## 2022-07-13 RX ADMIN — Medication 400 MG: at 08:28

## 2022-07-13 RX ADMIN — METFORMIN HYDROCHLORIDE 500 MG: 500 TABLET ORAL at 08:27

## 2022-07-13 RX ADMIN — METOPROLOL TARTRATE 25 MG: 25 TABLET, FILM COATED ORAL at 08:28

## 2022-07-13 RX ADMIN — ACETAMINOPHEN 650 MG: 325 TABLET ORAL at 01:01

## 2022-07-13 RX ADMIN — LEVOTHYROXINE SODIUM 50 MCG: 0.11 TABLET ORAL at 06:30

## 2022-07-13 RX ADMIN — ASPIRIN 81 MG: 81 TABLET, COATED ORAL at 08:27

## 2022-07-13 RX ADMIN — HYDROCHLOROTHIAZIDE: 25 TABLET ORAL at 08:28

## 2022-07-13 RX ADMIN — Medication 2 TABLET: at 08:28

## 2022-07-13 NOTE — PROGRESS NOTES
Patient discussed with Dr. Giovani Rodriguez, who has seen and evaluated patient this AM. Patient doing well-planning for discharge home today pending PT clearance. Will continue to follow.     Yuniel Gleason NP

## 2022-07-13 NOTE — PROGRESS NOTES
Problem: Patient Education: Go to Patient Education Activity  Goal: Patient/Family Education  Outcome: Progressing Towards Goal     Problem: Pressure Injury - Risk of  Goal: *Prevention of pressure injury  Description: Document Cliff Scale and appropriate interventions in the flowsheet. Outcome: Progressing Towards Goal  Note: Pressure Injury Interventions: Activity Interventions: Increase time out of bed    Mobility Interventions: Float heels,HOB 30 degrees or less    Nutrition Interventions: Document food/fluid/supplement intake,Offer support with meals,snacks and hydration                     Problem: Falls - Risk of  Goal: *Absence of Falls  Description: Document Karmen Fall Risk and appropriate interventions in the flowsheet.   Outcome: Progressing Towards Goal  Note: Fall Risk Interventions:  Mobility Interventions: Bed/chair exit alarm         Medication Interventions: Bed/chair exit alarm    Elimination Interventions: Bed/chair exit alarm,Call light in reach              Problem: Patient Education: Go to Patient Education Activity  Goal: Patient/Family Education  Outcome: Progressing Towards Goal     Problem: Knee Replacement: Post-Op Day 1  Goal: Activity/Safety  Outcome: Progressing Towards Goal  Goal: Diagnostic Test/Procedures  Outcome: Progressing Towards Goal  Goal: Nutrition/Diet  Outcome: Progressing Towards Goal  Goal: Medications  Outcome: Progressing Towards Goal  Goal: Respiratory  Outcome: Progressing Towards Goal  Goal: Treatments/Interventions/Procedures  Outcome: Progressing Towards Goal  Goal: Psychosocial  Outcome: Progressing Towards Goal  Goal: Discharge Planning  Outcome: Progressing Towards Goal  Goal: *Demonstrates progressive activity  Outcome: Progressing Towards Goal  Goal: *Optimal pain control at patient's stated goal  Outcome: Progressing Towards Goal  Goal: *Hemodynamically stable  Outcome: Progressing Towards Goal  Goal: *Discharge plan identified  Outcome: Progressing Towards Goal     Problem: Hypertension  Goal: *Blood pressure within specified parameters  Outcome: Progressing Towards Goal  Goal: *Fluid volume balance  Outcome: Progressing Towards Goal  Goal: *Labs within defined limits  Outcome: Progressing Towards Goal     Problem: Diabetes Self-Management  Goal: *Disease process and treatment process  Description: Define diabetes and identify own type of diabetes; list 3 options for treating diabetes. Outcome: Progressing Towards Goal  Goal: *Incorporating nutritional management into lifestyle  Description: Describe effect of type, amount and timing of food on blood glucose; list 3 methods for planning meals. Outcome: Progressing Towards Goal  Goal: *Incorporating physical activity into lifestyle  Description: State effect of exercise on blood glucose levels. Outcome: Progressing Towards Goal  Goal: *Developing strategies to promote health/change behavior  Description: Define the ABC's of diabetes; identify appropriate screenings, schedule and personal plan for screenings. Outcome: Progressing Towards Goal  Goal: *Using medications safely  Description: State effect of diabetes medications on diabetes; name diabetes medication taking, action and side effects. Outcome: Progressing Towards Goal  Goal: *Monitoring blood glucose, interpreting and using results  Description: Identify recommended blood glucose targets  and personal targets. Outcome: Progressing Towards Goal  Goal: *Prevention, detection, treatment of acute complications  Description: List symptoms of hyper- and hypoglycemia; describe how to treat low blood sugar and actions for lowering  high blood glucose level. Outcome: Progressing Towards Goal  Goal: *Prevention, detection and treatment of chronic complications  Description: Define the natural course of diabetes and describe the relationship of blood glucose levels to long term complications of diabetes.   Outcome: Progressing Towards Goal  Goal: *Developing strategies to address psychosocial issues  Description: Describe feelings about living with diabetes; identify support needed and support network  Outcome: Progressing Towards Goal  Goal: *Insulin pump training  Outcome: Progressing Towards Goal  Goal: *Sick day guidelines  Outcome: Progressing Towards Goal  Goal: *Patient Specific Goal (EDIT GOAL, INSERT TEXT)  Outcome: Progressing Towards Goal

## 2022-07-13 NOTE — PROGRESS NOTES
Problem: Pressure Injury - Risk of  Goal: *Prevention of pressure injury  Description: Document Cliff Scale and appropriate interventions in the flowsheet. Outcome: Progressing Towards Goal  Note: Pressure Injury Interventions:             Activity Interventions: Increase time out of bed,PT/OT evaluation    Mobility Interventions: Float heels,PT/OT evaluation    Nutrition Interventions: Document food/fluid/supplement intake                     Problem: Patient Education: Go to Patient Education Activity  Goal: Patient/Family Education  Outcome: Progressing Towards Goal

## 2022-07-13 NOTE — PROGRESS NOTES
Problem: Self Care Deficits Care Plan (Adult)  Goal: *Acute Goals and Plan of Care (Insert Text)  Description: FUNCTIONAL STATUS PRIOR TO ADMISSION: Patient was independent and active without use of DME.    HOME SUPPORT: The patient lived with spouse/children/grandchildren but did not require assist.    Occupational Therapy Goals  Initiated 7/13/2022    1. Patient will perform lower body dressing at supervision/set-up within 7 days. 2. Patient will perform toilet transfers at supervision/set-up using Walkers, Type: Rolling Walker within 7 days. 3. Patient will perform all aspects of toileting at supervision/set-up within 7 days. 4. Patient will tolerate greater than 10 minutes of standing without a rest break at modified independence using Walkers, Type: Rolling Walker during ADL's within 7 days. Outcome: Progressing Towards Goal   OCCUPATIONAL THERAPY EVALUATION  Patient: Jarrell Bush (87 y.o. female)  Date: 7/13/2022  Primary Diagnosis: Primary osteoarthritis of right knee [M17.11]  Procedure(s) (LRB):  RIGHT TOTAL KNEE ARTHROPLASTY, MAKOPLASTY (Right) 2 Days Post-Op   Precautions: Fall,WBAT (limit flexion to 90 degrees on operative knee)    ASSESSMENT  Based on the objective data described below, the patient presents with decreased ROM and strength to RLE, decreased endurance, decreased bed mobility, transfers and functional mobility following admission for RTKA, makoplasty  Patient received supine in bed stating she feels much better than yesterday and agreeable to OT evaluation today. Patient with increased BP initially (153/94) sitting edge of bed that improved with time BP increased post standing and transfer to seated in chair as well (173/90). Patient Min A with bed mobility to transfer supine>sit, able to don underwear Mod I with dressing stick EOB. CGA sit>stand from bed with RW, patient took 2 lateral steps to chair and was noticeably out of breath.  Unable to progress functional mobility or ADL activity further in setting patient's fatigue/lack of endurance/elevated BP. Current Level of Function Impacting Discharge (ADLs/self-care): Min A for bed mobility today, CGA for functional mobility and transfers, Mod I with LB dressing today using dressing stick    Functional Outcome Measure: The patient scored Total: 70/100 on the Barthel Index outcome measure      Other factors to consider for discharge: PLOF     Patient will benefit from skilled therapy intervention to address the above noted impairments. PLAN :  Recommendations and Planned Interventions: self care training, functional mobility training, therapeutic exercise, therapeutic activities, endurance activities, patient education, and home safety training    Frequency/Duration: Patient will be followed by occupational therapy 5 times a week to address goals. Recommendation for discharge: (in order for the patient to meet his/her long term goals)  No skilled occupational therapy/ follow up rehabilitation needs identified at this time. This discharge recommendation:  Has not yet been discussed the attending provider and/or case management    IF patient discharges home will need the following DME: walker: rolling       SUBJECTIVE:   Patient stated I'm doing much better than yesterday.     OBJECTIVE DATA SUMMARY:   HISTORY:   Past Medical History:   Diagnosis Date    Anticoagulant long-term use     Arthritis     BCC (basal cell carcinoma of skin)     Claustrophobia     COPD     Degenerative disc disease, lumbar 1/6/2016    Glaucoma     Gout attack 04/2019    Big toe    H/O sinus tachycardia 01/08/2019    Following shoulder surgery    High potassium 12/2018    Chronic- Stays around 5.3    History of nuclear stress test 02/10/2019    Normal    Hypertension     Obesity (BMI 30-39. 9)     Peripheral vascular disease ([address]     stents in each iliac artery, states they are \"watching\" carotids    Prediabetes 2012    Rib fracture 2019    Unspecified adverse effect of anesthesia     BP drops    Unspecified sleep apnea     Does not uses CPAP     Past Surgical History:   Procedure Laterality Date    HX CAROTID STENT Right 03/22/2022    HX HYSTERECTOMY  1980    HX LUMBAR FUSION  2006    HX LUMBAR FUSION  2016 & 2017    ALIF and then did the posterior after it didnt work    HX ORTHOPAEDIC Right     Ulnar nerve    HX ROTATOR CUFF REPAIR Left 2013    HX SHOULDER REPLACEMENT Bilateral 01/07/2019    IR PLACE STNT ILIAC /  PTA INIT Bilateral 2010       Expanded or extensive additional review of patient history:     Home Situation  Home Environment: Private residence  # Steps to Enter: 0  One/Two Story Residence: Two story, live on 1st floor  # of Interior Steps: 13  Lift Chair Available: No  Living Alone: Yes  Support Systems: Spouse/Significant Other  Patient Expects to be Discharged to[de-identified] Home with outpatient services  Current DME Used/Available at Home: Candance Hint, straight,Grab bars,Safety frame toliet,Adaptive dressing aides  Tub or Shower Type: Shower    Hand dominance: Right    EXAMINATION OF PERFORMANCE DEFICITS:  Cognitive/Behavioral Status:  Neurologic State: Alert  Orientation Level: Appropriate for age;Oriented X4  Cognition: Appropriate decision making; Appropriate for age attention/concentration; Appropriate safety awareness; Follows commands  Perception: Appears intact  Perseveration: No perseveration noted  Safety/Judgement: Awareness of environment; Fall prevention;Good awareness of safety precautions; Home safety; Insight into deficits    Hearing:   Auditory  Auditory Impairment: None    Vision/Perceptual:    Tracking: Able to track stimulus in all quadrants w/o difficulty       Acuity: Within Defined Limits    Corrective Lenses: Glasses    Range of Motion:  AROM: Within functional limits  PROM: Within functional limits       Strength:  Strength: Generally decreased, functional    Coordination:  Coordination: Within functional limits  Fine Motor Skills-Upper: Left Intact; Right Intact    Gross Motor Skills-Upper: Left Intact; Right Intact    Tone & Sensation:  Tone: Normal  Sensation: Intact       Balance:  Sitting: Intact  Standing: Intact; With support  Standing - Static: Good;Constant support  Standing - Dynamic : Fair;Constant support    Functional Mobility and Transfers for ADLs:  Bed Mobility:  Supine to Sit: Minimum assistance  Scooting: Modified independent; Additional time    Transfers:  Sit to Stand: Contact guard assistance  Stand to Sit: Contact guard assistance  Bed to Chair: Contact guard assistance    ADL Assessment:  Feeding: Independent    Oral Facial Hygiene/Grooming: Modified Independent    Bathing: Minimum assistance    Type of Bath: Chlorhexidine (CHG)    Upper Body Dressing: Stand-by assistance;Supervision;Setup    Lower Body Dressing: Contact guard assistance; Additional time; Adaptive equipment    Toileting: Contact guard assistance; Adaptive equipment; Additional time      ADL Intervention and task modifications:      Lower Body Dressing Assistance  Underpants: Modified independent (sitting EOB with dressing stick)  Position Performed: Seated edge of bed  Adaptive Equipment Used: Dressing stick         Cognitive Retraining  Safety/Judgement: Awareness of environment; Fall prevention;Good awareness of safety precautions; Home safety; Insight into deficits     Bathing: Patient instructed when bathing to not submerge wound in water, stand to shower or sponge bathe, cover wound with plastic and tape to ensure no water reaches bandage/wound without cues. Patient indicated understanding. Dressing joint: Patient instructed and demonstrated to don/doff Right LE first/last minimal cues.   Patient instructed and demonstrated to don all clothing while sitting prior to standing, doff all clothing to knees while standing, then sit to doff clothing off from knees to feet to facilitate fall prevention, pain management, and energy conservation with Modified independent. Home safety: Patient instructed on home modifications and safety (raise height of ADL objects, appropriate height of chair surfaces, recliner safety, change of floor surfaces, clear pathways) to increase independence and fall prevention. Patient indicated understanding. Functional Measure:    Barthel Index:  Bathin  Bladder: 10  Bowels: 10  Groomin  Dressin  Feeding: 10  Mobility: 10  Stairs: 0  Toilet Use: 10  Transfer (Bed to Chair and Back): 10  Total: 70/100      The Barthel ADL Index: Guidelines  1. The index should be used as a record of what a patient does, not as a record of what a patient could do. 2. The main aim is to establish degree of independence from any help, physical or verbal, however minor and for whatever reason. 3. The need for supervision renders the patient not independent. 4. A patient's performance should be established using the best available evidence. Asking the patient, friends/relatives and nurses are the usual sources, but direct observation and common sense are also important. However direct testing is not needed. 5. Usually the patient's performance over the preceding 24-48 hours is important, but occasionally longer periods will be relevant. 6. Middle categories imply that the patient supplies over 50 per cent of the effort. 7. Use of aids to be independent is allowed. Score Interpretation (from [address] [facility])    Independent   60-79 Minimally independent   40-59 Partially dependent   20-39 Very dependent   <20 Totally dependent     -Joshua Ruelas., Barthel, D.W. (1965). Functional evaluation: the Barthel Index. [address] [facility] ([address] (). The Barthel activities of daily living index: self-reporting versus actual performance in the old (> or = 75 years). Journal of [address] [place] [address] 45(7), 14 [facility], JRUKHSANA, Kindra Shen., Elizabeth Ruiz. (1999). Measuring the change in disability after inpatient rehabilitation; comparison of the responsiveness of the Barthel Index and Functional [place] Measure. Journal of Neurology, Neurosurgery, and Psychiatry, 66(4), 074-063. JAY Workman, SHAY Mcleod, & Judd Flores M.A. (2004) Assessment of post-stroke quality of life in cost-effectiveness studies: The usefulness of the Barthel Index and the EuroQoL-5D. Quality of Life Research, 15, 106-84     Occupational Therapy Evaluation Charge Determination   History Examination Decision-Making   LOW Complexity : Brief history review  LOW Complexity : 1-3 performance deficits relating to physical, cognitive , or psychosocial skils that result in activity limitations and / or participation restrictions  LOW Complexity : No comorbidities that affect functional and no verbal or physical assistance needed to complete eval tasks       Based on the above components, the patient evaluation is determined to be of the following complexity level: LOW   Pain Ratin/10 at rest, 10/10 with transfers    Activity Tolerance:   Poor, requires frequent rest breaks, and observed SOB with activity    After treatment patient left in no apparent distress:    Sitting in chair, Call bell within reach, and Bed / chair alarm activated    COMMUNICATION/EDUCATION:   The patients plan of care was discussed with: Physical therapist and Registered nurse. Home safety education was provided and the patient/caregiver indicated understanding., Patient/family have participated as able in goal setting and plan of care. , and Patient/family agree to work toward stated goals and plan of care. This patients plan of care is appropriate for delegation to \[facility] [place] [facility]\"".     Thank you for this referral.  Jairon Aleman OT  Time Calculation: 30 mins

## 2022-07-13 NOTE — PROGRESS NOTES
Discharge instructions presented to patient. ALL of her questions and concerns answered appropriately. IV removed. Patient awaiting transport via wheelchair by volunteer services to main entrance.

## 2022-07-13 NOTE — PROGRESS NOTES
Problem: Mobility Impaired (Adult and Pediatric)  Goal: *Acute Goals and Plan of Care (Insert Text)  Description: FUNCTIONAL STATUS PRIOR TO ADMISSION: Patient was modified independent using a single point cane for functional mobility. HOME SUPPORT PRIOR TO ADMISSION: The patient lived with  but did not require assist. Mahsa Mcgrath will come to assist upon discharge. Physical Therapy Goals  Initiated 7/11/2022    1. Patient will move from supine to sit and sit to supine  in bed with independence within 4 days. 2. Patient will perform sit to stand with modified independence within 4 days. 3. Patient will ambulate with modified independence for 100 feet with the least restrictive device within 4 days. 4. Patient will perform home exercise program per protocol with independence within 4 days. 5. Patient will demonstrate AROM 0-90 degrees in operative joint within 4 days. Outcome: Progressing Towards Goal   PHYSICAL THERAPY TREATMENT  Patient: Elisabeth Phalen (39 y.o. female)  Date: 7/13/2022  Diagnosis: Primary osteoarthritis of right knee [M17.11] <principal problem not specified>  Procedure(s) (LRB):  RIGHT TOTAL KNEE ARTHROPLASTY, MAKOPLASTY (Right) 2 Days Post-Op  Precautions: Fall,WBAT (limit flexion to 90 degrees on operative knee)  Chart, physical therapy assessment, plan of care and goals were reviewed. ASSESSMENT  Patient continues with skilled PT services and is progressing towards goals. Progressing overall towards goals but with significant post-op gait deviations. . She is receptive to feedback for improved gait quality and demonstrates improvement over a distance. Noted considerable MORALES but SpO2 stable.  Pending continued progress, anticipate clearance for discharge home with family in the PM.    Current Level of Function Impacting Discharge (mobility/balance): up to minimal assist for sit to stand             PLAN :  Patient continues to benefit from skilled intervention to address the above impairments. Continue treatment per established plan of care. to address goals. Recommendation for discharge: (in order for the patient to meet his/her long term goals)  Per MD recommendations. OP PT transition as planned      IF patient discharges home will need the following DME: patient owns DME required for discharge       SUBJECTIVE:   Patient stated It feels stiff.     OBJECTIVE DATA SUMMARY:   Critical Behavior:  Neurologic State: Alert  Orientation Level: Oriented X4  Cognition: Follows commands,Appropriate for age attention/concentration  Safety/Judgement: Good awareness of safety precautions    Range of Motion:                            Functional Mobility Training:  Bed Mobility:     Supine to Sit: Stand-by assistance; Additional time (has bed rails at home)     Scooting: Stand-by assistance        Transfers:  Sit to Stand: Contact guard assistance;Minimum assistance; Additional time  Stand to Sit: Contact guard assistance; Additional time                             Balance:  Sitting: Intact  Standing: Impaired; With support  Standing - Static: Constant support;Good  Standing - Dynamic : Constant support;Fair;Good  Ambulation/Gait Training:  Distance (ft): 60 Feet (ft)  Assistive Device: Gait belt;Walker, rolling  Ambulation - Level of Assistance: Contact guard assistance        Gait Abnormalities: Antalgic;Decreased step clearance; Step to gait  Right Side Weight Bearing: As tolerated             Therapeutic Exercises:     EXERCISE   Sets   Reps   Active Active Assist   Passive Self ROM   Comments   Ankle Pumps 1 10 [x]                                        []                                        []                                        []                                           Quad Sets 1 10 [x]                                        []                                        []                                        []                                           Hamstring Sets 1 10 [x]                                        []                                        []                                        []                                           Short Arc Quads   []                                        []                                        []                                        []                                           Knee Extension Stretch     []                                          []                                          []                                          []                                           Heel Slides   []                                        []                                        []                                        []                                           Long Arc Quads   []                                        []                                        []                                        []                                           Knee Flexion Stretch   []                                        []                                        []                                        []                                           Straight Leg Raises   []                                        []                                        []                                        []                                             Pain Ratin/10 resting  \"10/10\" with movement of right knee    Activity Tolerance:   Fair    After treatment patient left in no apparent distress:   Sitting in chair and Call bell within reach    COMMUNICATION/COLLABORATION:   The patients plan of care was discussed with: Occupational therapist and Registered nurse.      Samir Padilla, PT, DPT   Time Calculation: 28 mins

## 2022-07-13 NOTE — PROGRESS NOTES
7/13/2022  12:34 PM  Care Management Progress Note      ICD-10-CM ICD-9-CM    1. Arthritis of right knee  M17.11 716.96 oxyCODONE IR (ROXICODONE) 5 mg immediate release tablet      traMADoL (ULTRAM) 50 mg tablet       RUR:  NA - OBS  Risk Level: [x]Low []Moderate []High  Value-based purchasing: [] Yes [x] No  Bundle patient: [] Yes [x] No   Specify:     Transition of care plan:  1. Discharge pending PT clearance. 2. Home with OP PT.   3. Outpatient follow-up. 4. Pt's family to transport. Care Management Interventions  PCP Verified by CM: Yes  Mode of Transport at Discharge:  Other (see comment)  Transition of Care Consult (CM Consult): Discharge Planning  MyChart Signup: No  Discharge Durable Medical Equipment: No  Physical Therapy Consult: Yes  Occupational Therapy Consult: Yes  Speech Therapy Consult: No  Support Systems: Spouse/Significant Other  Confirm Follow Up Transport: Family  Discharge Location  Patient Expects to be Discharged to[de-identified] Home with outpatient services

## 2022-07-13 NOTE — PROGRESS NOTES
Problem: Mobility Impaired (Adult and Pediatric)  Goal: *Acute Goals and Plan of Care (Insert Text)  Description: FUNCTIONAL STATUS PRIOR TO ADMISSION: Patient was modified independent using a single point cane for functional mobility. HOME SUPPORT PRIOR TO ADMISSION: The patient lived with  but did not require assist. Mike Jimenez will come to assist upon discharge. Physical Therapy Goals  Initiated 7/11/2022    1. Patient will move from supine to sit and sit to supine  in bed with independence within 4 days. 2. Patient will perform sit to stand with modified independence within 4 days. 3. Patient will ambulate with modified independence for 100 feet with the least restrictive device within 4 days. 4. Patient will perform home exercise program per protocol with independence within 4 days. 5. Patient will demonstrate AROM 0-90 degrees in operative joint within 4 days. 7/13/2022 1427 by Francy Augustine, PT, DPT  Outcome: Progressing Towards Goal  7/13/2022 0926 by Francy Augustine, PT, DPT  Outcome: Progressing Towards Goal   PHYSICAL THERAPY TREATMENT/DISCHARGE  Patient: Yisel Fried (66 y.o. female)  Date: 7/13/2022  Diagnosis: Primary osteoarthritis of right knee [M17.11] <principal problem not specified>  Procedure(s) (LRB):  RIGHT TOTAL KNEE ARTHROPLASTY, MAKOPLASTY (Right) 2 Days Post-Op  Precautions: Fall,WBAT (limit flexion to 90 degrees on operative knee)  Chart, physical therapy assessment, plan of care and goals were reviewed. ASSESSMENT  Patient continues with skilled PT services and has progressed towards goals. Continues to make good progress towards goals when given additional time d/t MORALES. Her sister in-law was present for PM session for education and feedback. Cues required for improvement in gait quality and transitioned to equal step length. Gait was discontinuous d/t pain and MORALES but SpO2 still stable. She reports baseline COPD and MORALES is not unusual for her. Reviewed post-op exercises and encouraged early ROM (within 90 degree limit) to improve knee outcomes. Noted severe HTN with activity and discussed with nursing. She is otherwise clear for discharge with family support. PLAN :  Patient is cleared for discharge by physical therapy at this time. Rationale for discharge:  Goals achieved    Recommendation for discharge: (in order for the patient to meet his/her long term goals)  Per MD recommendations        IF patient discharges home will need the following DME: patient owns DME required for discharge       SUBJECTIVE:   Patient stated I just can't stay awake.     OBJECTIVE DATA SUMMARY:   Critical Behavior:  Neurologic State: Alert  Orientation Level: Appropriate for age,Oriented X4  Cognition: Appropriate decision making,Appropriate for age attention/concentration,Appropriate safety awareness,Follows commands  Safety/Judgement: Awareness of environment,Fall prevention,Good awareness of safety precautions,Home safety,Insight into deficits    Range of Motion:  AROM: Within functional limits  PROM: Within functional limits          Functional Mobility Training:  Bed Mobility:     Supine to Sit: Stand-by assistance     Scooting: Modified independent; Additional time        Transfers:  Sit to Stand: Contact guard assistance; Additional time  Stand to Sit: Contact guard assistance        Bed to Chair: Contact guard assistance                    Balance:  Sitting: Intact  Standing: Intact; With support  Standing - Static: Good;Constant support  Standing - Dynamic : Fair;Constant support  Ambulation/Gait Training:  Distance (ft): 75 Feet (ft)  Assistive Device: Gait belt;Walker, rolling  Ambulation - Level of Assistance: Contact guard assistance        Gait Abnormalities: Antalgic;Decreased step clearance; Step to gait  Right Side Weight Bearing: As tolerated               Large right step length and abbreviated left.  Improved to equal step length but cues required for scapular depression and walker placement. Stairs:               Therapeutic Exercises:     EXERCISE   Sets   Reps   Active Active Assist   Passive Self ROM   Comments   Ankle Pumps 1 10 [x]                                           []                                           []                                           []                                              Quad Sets 1 10 [x]                                           []                                           []                                           []                                              Hamstring Sets 1 10 [x]                                           []                                           []                                           []                                              Short Arc Quads 1 5 []                                           [x]                                           []                                           []                                           Encouraged to start with to progress to SLR   Knee Extension Stretch     []                                             []                                             []                                             []                                              Heel Slides 1 3 []                                           []                                           []                                           []                                           Difficult for patient   Long Arc Quads   []                                           []                                           []                                           []                                              Knee Flexion Stretch   []                                           []                                           []                                           []                                              Straight Leg Raises 1 5 []                                           [x] []                                           []                                           Quite difficult for patient         Pain Ratin/10 right knee during gait    Activity Tolerance:   Fair and observed SOB with activity      After treatment patient left in no apparent distress:   Supine in bed and Call bell within reach; /67 mmHg prior to activity to 171/74 mmHg post gait    COMMUNICATION/COLLABORATION:   The patients plan of care was discussed with: Ortho NP.      Atul Quijano, PT, DPT   Time Calculation: 36 mins

## 2022-07-13 NOTE — PROGRESS NOTES
DAILY NOTE     ASSESSMENT / PLAN :   1. Disposition : Home discharge today. 2. Comments : Stable and doing well, home today if she clears therapy. POD  2 Days Post-Op s/p Procedure(s):  RIGHT TOTAL KNEE ARTHROPLASTY, MAKOPLASTY     SUBJECTIVE :     Concerns : None - doing well. OBJECTIVE :     Vitals:    07/13/22 1408 07/13/22 1413 07/13/22 1501 07/13/22 1533   BP: (!) 155/67 (!) 171/74 (!) 165/73    Pulse:    (!) 106   Resp:    18   Temp:    98.8 °F (37.1 °C)   SpO2:    93%   Weight:       Height:           Alert and oriented x3. Dressing C/D/I  Sensation is intact to light touch. No calf pain. ANTICOAGULANTS / LABS :       Key Anti-Platelet Anticoagulant Meds             aspirin delayed-release 81 mg tablet (Taking) Take 1 Tablet by mouth two (2) times a day. clopidogreL (Plavix) 75 mg tab Take 75 mg by mouth every morning. For stents in neck     aspirin (ASPIRIN) 325 mg tablet (Discontinued) Take 325 mg by mouth daily.           Labs:  Recent Labs     07/12/22  0510   HGB 9.5*      K 4.0   *   CO2 22   BUN 24*   CREA 0.81   *        Patient mobility  Gait  Gait Abnormalities: Antalgic,Decreased step clearance,Step to gait  Ambulation - Level of Assistance: Contact guard assistance  Distance (ft): 75 Feet (ft)  Assistive Device: Gait belt,WalkerMaurilio MD  Cell [phone]  Jen Lynch PA-C  Cell [phone]  Medical Assistants: [address] [phone]                 Surgery Scheduler: TONYA Key [phone] ext [phone]

## 2022-07-21 ENCOUNTER — HOSPITAL ENCOUNTER (INPATIENT)
Age: 72
LOS: 2 days | Discharge: HOME | DRG: 178 | End: 2022-07-23
Attending: EMERGENCY MEDICINE | Admitting: INTERNAL MEDICINE
Payer: MEDICARE

## 2022-07-21 ENCOUNTER — APPOINTMENT (OUTPATIENT)
Dept: CT IMAGING | Age: 72
DRG: 178 | End: 2022-07-21
Attending: EMERGENCY MEDICINE
Payer: MEDICARE

## 2022-07-21 DIAGNOSIS — J69.0 ASPIRATION PNEUMONIA OF BOTH LOWER LOBES DUE TO VOMIT (HCC): ICD-10-CM

## 2022-07-21 DIAGNOSIS — J69.0 ASPIRATION PNEUMONIA OF BOTH LUNGS, UNSPECIFIED ASPIRATION PNEUMONIA TYPE, UNSPECIFIED PART OF LUNG (HCC): Primary | ICD-10-CM

## 2022-07-21 PROBLEM — J18.9 PNEUMONIA: Status: ACTIVE | Noted: 2022-07-21

## 2022-07-21 PROBLEM — E87.1 HYPONATREMIA: Status: ACTIVE | Noted: 2022-07-21

## 2022-07-21 PROBLEM — R11.2 NAUSEA AND VOMITING: Status: ACTIVE | Noted: 2022-07-21

## 2022-07-21 PROBLEM — J18.9 BILATERAL PNEUMONIA: Status: ACTIVE | Noted: 2022-07-21

## 2022-07-21 LAB
ALBUMIN SERPL-MCNC: 2.6 G/DL (ref 3.5–5)
ALBUMIN/GLOB SERPL: 0.7 {RATIO} (ref 1.1–2.2)
ALP SERPL-CCNC: 357 U/L (ref 45–117)
ALT SERPL-CCNC: 42 U/L (ref 12–78)
ANION GAP SERPL CALC-SCNC: 6 MMOL/L (ref 5–15)
APPEARANCE UR: CLEAR
AST SERPL-CCNC: 49 U/L (ref 15–37)
ATRIAL RATE: 93 BPM
BACTERIA URNS QL MICRO: ABNORMAL /HPF
BASOPHILS # BLD: 0 K/UL (ref 0–0.1)
BASOPHILS NFR BLD: 0 % (ref 0–1)
BILIRUB SERPL-MCNC: 0.7 MG/DL (ref 0.2–1)
BILIRUB UR QL: NEGATIVE
BUN SERPL-MCNC: 12 MG/DL (ref 6–20)
BUN/CREAT SERPL: 15 (ref 12–20)
CALCIUM SERPL-MCNC: 8.9 MG/DL (ref 8.5–10.1)
CALCULATED P AXIS, ECG09: 52 DEGREES
CALCULATED R AXIS, ECG10: 38 DEGREES
CALCULATED T AXIS, ECG11: 65 DEGREES
CHLORIDE SERPL-SCNC: 96 MMOL/L (ref 97–108)
CO2 SERPL-SCNC: 28 MMOL/L (ref 21–32)
COLOR UR: ABNORMAL
CREAT SERPL-MCNC: 0.79 MG/DL (ref 0.55–1.02)
DIAGNOSIS, 93000: NORMAL
DIFFERENTIAL METHOD BLD: ABNORMAL
EOSINOPHIL # BLD: 0 K/UL (ref 0–0.4)
EOSINOPHIL NFR BLD: 0 % (ref 0–7)
EPITH CASTS URNS QL MICRO: ABNORMAL /LPF
ERYTHROCYTE [DISTWIDTH] IN BLOOD BY AUTOMATED COUNT: 13.6 % (ref 11.5–14.5)
GLOBULIN SER CALC-MCNC: 4 G/DL (ref 2–4)
GLUCOSE BLD STRIP.AUTO-MCNC: 142 MG/DL (ref 65–117)
GLUCOSE SERPL-MCNC: 122 MG/DL (ref 65–100)
GLUCOSE UR STRIP.AUTO-MCNC: NEGATIVE MG/DL
HCT VFR BLD AUTO: 24.9 % (ref 35–47)
HGB BLD-MCNC: 8.4 G/DL (ref 11.5–16)
HGB UR QL STRIP: NEGATIVE
IMM GRANULOCYTES # BLD AUTO: 0.1 K/UL (ref 0–0.04)
IMM GRANULOCYTES NFR BLD AUTO: 1 % (ref 0–0.5)
KETONES UR QL STRIP.AUTO: NEGATIVE MG/DL
LEUKOCYTE ESTERASE UR QL STRIP.AUTO: ABNORMAL
LIPASE SERPL-CCNC: 165 U/L (ref 73–393)
LYMPHOCYTES # BLD: 0.9 K/UL (ref 0.8–3.5)
LYMPHOCYTES NFR BLD: 8 % (ref 12–49)
MAGNESIUM SERPL-MCNC: 2.1 MG/DL (ref 1.6–2.4)
MCH RBC QN AUTO: 31.8 PG (ref 26–34)
MCHC RBC AUTO-ENTMCNC: 33.7 G/DL (ref 30–36.5)
MCV RBC AUTO: 94.3 FL (ref 80–99)
MONOCYTES # BLD: 1 K/UL (ref 0–1)
MONOCYTES NFR BLD: 9 % (ref 5–13)
NEUTS SEG # BLD: 9.5 K/UL (ref 1.8–8)
NEUTS SEG NFR BLD: 82 % (ref 32–75)
NITRITE UR QL STRIP.AUTO: NEGATIVE
NRBC # BLD: 0 K/UL (ref 0–0.01)
NRBC BLD-RTO: 0 PER 100 WBC
P-R INTERVAL, ECG05: 188 MS
PH UR STRIP: 8 [PH] (ref 5–8)
PLATELET # BLD AUTO: 590 K/UL (ref 150–400)
PMV BLD AUTO: 9.2 FL (ref 8.9–12.9)
POTASSIUM SERPL-SCNC: 5.4 MMOL/L (ref 3.5–5.1)
PROT SERPL-MCNC: 6.6 G/DL (ref 6.4–8.2)
PROT UR STRIP-MCNC: 30 MG/DL
Q-T INTERVAL, ECG07: 348 MS
QRS DURATION, ECG06: 72 MS
QTC CALCULATION (BEZET), ECG08: 432 MS
RBC # BLD AUTO: 2.64 M/UL (ref 3.8–5.2)
RBC #/AREA URNS HPF: ABNORMAL /HPF (ref 0–5)
SERVICE CMNT-IMP: ABNORMAL
SODIUM SERPL-SCNC: 130 MMOL/L (ref 136–145)
SP GR UR REFRACTOMETRY: 1.01 (ref 1–1.03)
TROPONIN-HIGH SENSITIVITY: 12 NG/L (ref 0–51)
UR CULT HOLD, URHOLD: NORMAL
UROBILINOGEN UR QL STRIP.AUTO: 1 EU/DL (ref 0.2–1)
VENTRICULAR RATE, ECG03: 93 BPM
WBC # BLD AUTO: 11.5 K/UL (ref 3.6–11)
WBC URNS QL MICRO: ABNORMAL /HPF (ref 0–4)

## 2022-07-21 PROCEDURE — 93005 ELECTROCARDIOGRAM TRACING: CPT

## 2022-07-21 PROCEDURE — 74011250636 HC RX REV CODE- 250/636: Performed by: INTERNAL MEDICINE

## 2022-07-21 PROCEDURE — 74011250636 HC RX REV CODE- 250/636: Performed by: EMERGENCY MEDICINE

## 2022-07-21 PROCEDURE — 74011000250 HC RX REV CODE- 250: Performed by: INTERNAL MEDICINE

## 2022-07-21 PROCEDURE — 96375 TX/PRO/DX INJ NEW DRUG ADDON: CPT

## 2022-07-21 PROCEDURE — 65270000029 HC RM PRIVATE

## 2022-07-21 PROCEDURE — 85025 COMPLETE CBC W/AUTO DIFF WBC: CPT

## 2022-07-21 PROCEDURE — 71275 CT ANGIOGRAPHY CHEST: CPT

## 2022-07-21 PROCEDURE — 96374 THER/PROPH/DIAG INJ IV PUSH: CPT

## 2022-07-21 PROCEDURE — 83690 ASSAY OF LIPASE: CPT

## 2022-07-21 PROCEDURE — 74011000258 HC RX REV CODE- 258: Performed by: EMERGENCY MEDICINE

## 2022-07-21 PROCEDURE — 80053 COMPREHEN METABOLIC PANEL: CPT

## 2022-07-21 PROCEDURE — 84484 ASSAY OF TROPONIN QUANT: CPT

## 2022-07-21 PROCEDURE — 82962 GLUCOSE BLOOD TEST: CPT

## 2022-07-21 PROCEDURE — 99285 EMERGENCY DEPT VISIT HI MDM: CPT

## 2022-07-21 PROCEDURE — 83735 ASSAY OF MAGNESIUM: CPT

## 2022-07-21 PROCEDURE — 36415 COLL VENOUS BLD VENIPUNCTURE: CPT

## 2022-07-21 PROCEDURE — 81001 URINALYSIS AUTO W/SCOPE: CPT

## 2022-07-21 PROCEDURE — 74011000636 HC RX REV CODE- 636: Performed by: EMERGENCY MEDICINE

## 2022-07-21 PROCEDURE — 74011000258 HC RX REV CODE- 258: Performed by: INTERNAL MEDICINE

## 2022-07-21 RX ORDER — ONDANSETRON 4 MG/1
4 TABLET, ORALLY DISINTEGRATING ORAL
Status: DISCONTINUED | OUTPATIENT
Start: 2022-07-21 | End: 2022-07-23 | Stop reason: HOSPADM

## 2022-07-21 RX ORDER — ENOXAPARIN SODIUM 100 MG/ML
40 INJECTION SUBCUTANEOUS DAILY
Status: DISCONTINUED | OUTPATIENT
Start: 2022-07-22 | End: 2022-07-23 | Stop reason: HOSPADM

## 2022-07-21 RX ORDER — ONDANSETRON 2 MG/ML
4 INJECTION INTRAMUSCULAR; INTRAVENOUS
Status: COMPLETED | OUTPATIENT
Start: 2022-07-21 | End: 2022-07-21

## 2022-07-21 RX ORDER — ACETAMINOPHEN 650 MG/1
650 SUPPOSITORY RECTAL
Status: DISCONTINUED | OUTPATIENT
Start: 2022-07-21 | End: 2022-07-22

## 2022-07-21 RX ORDER — POLYETHYLENE GLYCOL 3350 17 G/17G
17 POWDER, FOR SOLUTION ORAL DAILY PRN
Status: DISCONTINUED | OUTPATIENT
Start: 2022-07-21 | End: 2022-07-23 | Stop reason: HOSPADM

## 2022-07-21 RX ORDER — ONDANSETRON 2 MG/ML
4 INJECTION INTRAMUSCULAR; INTRAVENOUS
Status: DISCONTINUED | OUTPATIENT
Start: 2022-07-21 | End: 2022-07-23 | Stop reason: HOSPADM

## 2022-07-21 RX ORDER — ACETAMINOPHEN 325 MG/1
650 TABLET ORAL
Status: DISCONTINUED | OUTPATIENT
Start: 2022-07-21 | End: 2022-07-22

## 2022-07-21 RX ORDER — SODIUM CHLORIDE 0.9 % (FLUSH) 0.9 %
5-40 SYRINGE (ML) INJECTION AS NEEDED
Status: DISCONTINUED | OUTPATIENT
Start: 2022-07-21 | End: 2022-07-23 | Stop reason: HOSPADM

## 2022-07-21 RX ORDER — SODIUM CHLORIDE 0.9 % (FLUSH) 0.9 %
5-40 SYRINGE (ML) INJECTION EVERY 8 HOURS
Status: DISCONTINUED | OUTPATIENT
Start: 2022-07-21 | End: 2022-07-23 | Stop reason: HOSPADM

## 2022-07-21 RX ADMIN — SODIUM CHLORIDE 1000 ML: 9 INJECTION, SOLUTION INTRAVENOUS at 12:39

## 2022-07-21 RX ADMIN — PIPERACILLIN AND TAZOBACTAM 4.5 G: 4; .5 INJECTION, POWDER, FOR SOLUTION INTRAVENOUS at 15:11

## 2022-07-21 RX ADMIN — PIPERACILLIN AND TAZOBACTAM 3.38 G: 3; .375 INJECTION, POWDER, FOR SOLUTION INTRAVENOUS at 20:45

## 2022-07-21 RX ADMIN — ONDANSETRON 4 MG: 2 INJECTION INTRAMUSCULAR; INTRAVENOUS at 16:24

## 2022-07-21 RX ADMIN — ONDANSETRON 4 MG: 2 INJECTION INTRAMUSCULAR; INTRAVENOUS at 12:38

## 2022-07-21 RX ADMIN — IOPAMIDOL 80 ML: 755 INJECTION, SOLUTION INTRAVENOUS at 14:04

## 2022-07-21 RX ADMIN — Medication 10 ML: at 16:06

## 2022-07-21 NOTE — ED TRIAGE NOTES
Reports being up all night coughing, also reports nausea and some vomiting. Knee pain from knee replace last Monday. (7/11/22) States she cannot take the pain medication due to nausea.

## 2022-07-21 NOTE — H&P
[facility]  [address] [facility], [facility] [address]  [phone]    Admission History and Physical      NAME:  Tony Arceo   :   1950   MRN:  [id number]     PCP:  Zoe Cao MD     Date of service:  2022         Subjective:     CHIEF COMPLAINT: shortness of breath, Nausea and vomiting    HISTORY OF PRESENT ILLNESS:     Ms. Sneha Odonnell is a 66 y.o.  female with past medical history of recent right knee replacement surgery, peripheral arterial disease, hypertension, COPD, sleep apnea, glaucoma, gout, prediabetes and obesity is admitted with a diagnosis of aspiration pneumonia. Ms. Sneha Odonnell presented to the Emergency Department today complaining of shortness of breath associated with cough, nausea and vomiting. She was discharged on 2022 from this facility after right knee replacement surgery. On arrival to ER patient was tachycardic and tachypneic and CTA chest with or without contrast showed multilobular airspace disease with scattered masslike nodularity as well as bronchial wall thickening overall compatible with bronchopneumonia. There are also debris present in the segmental airways in the bilateral lower lobes. Past Medical History:   Diagnosis Date    Anticoagulant long-term use     Arthritis     BCC (basal cell carcinoma of skin)     Claustrophobia     COPD     Degenerative disc disease, lumbar 2016    Glaucoma     Gout attack 2019    Big toe    H/O sinus tachycardia 2019    Following shoulder surgery    High potassium 2018    Chronic- Stays around 5.3    History of nuclear stress test 02/10/2019    Normal    Hypertension     Obesity (BMI 30-39. 9)     Peripheral vascular disease ([address]     stents in each iliac artery, states they are \"watching\" carotids    Prediabetes 2012    Rib fracture     Unspecified adverse effect of anesthesia     BP drops    Unspecified sleep apnea     Does not uses CPAP        Past Surgical History:   Procedure Laterality Date    HX CAROTID STENT Right 2022    HX HYSTERECTOMY  1980    HX LUMBAR FUSION  2006    HX LUMBAR FUSION  2016 & 2017    ALIF and then did the posterior after it didnt work    HX ORTHOPAEDIC Right     Ulnar nerve    HX ROTATOR CUFF REPAIR Left     HX SHOULDER REPLACEMENT Bilateral 2019    IR PLACE STNT ILIAC /  PTA INIT Bilateral        Social History     Tobacco Use    Smoking status: Former     Packs/day: 2.00     Years: 35.00     Pack years: 70.00     Types: Cigarettes     Quit date: 10/2/2004     Years since quittin.8    Smokeless tobacco: Never   Substance Use Topics    Alcohol use: Yes     Alcohol/week: 4.0 standard drinks     Types: 2 Glasses of wine, 2 Cans of beer per week        Family History   Problem Relation Age of Onset    Heart Disease Father     Heart Attack Father 36    No Known Problems Sister     Deep Vein Thrombosis Brother     Heart Disease Brother         Allergies   Allergen Reactions    Diclofenac Anaphylaxis    Flexeril [Cyclobenzaprine] Angioedema    Medrol [Methylprednisolone] Unknown (comments)     She has forgotten    Naproxen Swelling    Tolectin [Tolmetin] Swelling     Took this with Flexeril and does not know which med caused swelling of hands,face, throat        Prior to Admission medications    Medication Sig Start Date End Date Taking? Authorizing Provider   aspirin delayed-release 81 mg tablet Take 1 Tablet by mouth two (2) times a day. 22   Jerica Santiago MD   acetaminophen (Acetaminophen Pain Relief) 500 mg tablet Take 2 Tablets by mouth every six (6) hours as needed for Pain. 22   Jerica Santiago MD   ondansetron (ZOFRAN ODT) 8 mg disintegrating tablet Take 0.5 Tablets by mouth every eight (8) hours as needed for Nausea. 22   Jerica Santiago MD   levothyroxine (SYNTHROID) 50 mcg tablet Take 50 mcg by mouth Daily (before breakfast).     Provider, Historical   metoprolol tartrate (LOPRESSOR) 25 mg tablet Take 25 mg by mouth two (2) times a day. Provider, Historical   TURMERIC PO Take 1,400 mg by mouth two (2) times a day. Provider, Historical   clopidogreL (Plavix) 75 mg tab Take 75 mg by mouth every morning. For stents in neck     Provider, Historical   albuterol (PROVENTIL HFA, VENTOLIN HFA, PROAIR HFA) 90 mcg/actuation inhaler Take 1 Puff by inhalation every six (6) hours as needed for Wheezing. Provider, Historical   calcium citrate-vitamin d3 (CITRACAL+D) 315 mg-5 mcg (200 unit) tab Take 2 Tablets by mouth two (2) times daily (with meals). Provider, Historical   cranberry fruit extract (CRANBERRY EXTRACT PO) Take 2 Tablets by mouth every morning. Provider, Historical   ferrous sulfate 325 mg (65 mg iron) tablet Take 1 Tab by mouth daily (with breakfast). 1/10/19   Adelita Miller MD   umeclidinium-vilanterol [facility] ELLIPTA) 62.5-25 mcg/actuation inhaler Take 1 Puff by inhalation every morning. Provider, Historical   glucosamine sulfate (GLUCOSAMINE) 500 mg tablet Take 1,000 mg by mouth every morning. Provider, Historical   magnesium 200 mg tab Take 2 Tablets by mouth every morning. Provider, Historical   vitamin E (AQUA GEMS) 400 unit capsule Take 400 Units by mouth every morning. Provider, Historical   Omega-3 Fatty Acids 60- mg cpDR Take 2 Tabs by mouth every morning. Provider, Historical   metFORMIN (GLUCOPHAGE) 500 mg tablet Take 500 mg by mouth two (2) times daily (with meals). Provider, Historical   montelukast (SINGULAIR) 10 mg tablet Take 10 mg by mouth every evening. Provider, Historical   lisinopril-hydrochlorothiazide (PRINZIDE, ZESTORETIC) 10-12.5 mg per tablet Take 1 Tab by mouth every morning. Provider, Historical   pravastatin (PRAVACHOL) 80 mg tablet Take 80 mg by mouth nightly. Provider, Historical   latanoprost (XALATAN) 0.005 % ophthalmic solution Administer 1 Drop to both eyes nightly.       Provider, Historical         Review of Systems:  (bold if positive, if negative)    Gen:  fatigue  Eyes:  ENT:  CVS:  Pulm:  Cough, dyspneaGI:  :  MS:  Skin:  Psych:  Endo:  Hem:  Renal:  Neuro:            Objective:      VITALS:    Vital signs reviewed; most recent are:    Visit Vitals  BP (!) 165/54   Pulse (!) 101   Temp 98.6 °F (37 °C)   Resp 17   Ht 5' 3\" (1.6 m)   Wt 81.6 kg (180 lb)   SpO2 91%   BMI 31.89 kg/m²     SpO2 Readings from Last 6 Encounters:   07/21/22 91%   07/13/22 93%   06/30/22 96%   05/05/22 98%   11/02/21 99%   04/20/21 94%        No intake or output data in the 24 hours ending 07/1950         Exam:     Physical Exam:    Gen:  Well-developed, well-nourished, in no acute distress  HEENT:  Pink conjunctivae, PERRL, hearing intact to voice, moist mucous membranes  Neck:  Supple, without masses, thyroid non-tender  Resp:  No accessory muscle use, clear breath sounds without wheezes rales or rhonchi  Card:  No murmurs, normal S1, S2 without thrills, bruits or peripheral edema  Abd:  Soft, non-tender, non-distended, normoactive bowel sounds are present  Lymph:  No cervical or inguinal adenopathy  MKS:  No cyanosis or clubbing  Skin:  No rashes or ulcers, skin turgor is good  Neuro:  Cranial nerves are grossly intact, no focal motor weakness, follows commands appropriately  Psych:  Good insight, oriented to person, place and time, alert       Labs:    Recent Labs     07/21/22  1235   WBC 11.5*   HGB 8.4*   HCT 24.9*   *     Recent Labs     07/21/22  1235   *   K 5.4*   CL 96*   CO2 28   *   BUN 12   CREA 0.79   CA 8.9   MG 2.1   ALB 2.6*   TBILI 0.7   ALT 42     Lab Results   Component Value Date/Time    Glucose (POC) 110 07/13/2022 11:38 AM    Glucose (POC) 116 07/13/2022 06:06 AM     No results for input(s): PH, PCO2, PO2, HCO3, FIO2 in the last 72 hours. No results for input(s): INR, INREXT in the last 72 hours.     Telemetry reviewed:   normal sinus rhythm       Assessment/Plan:    Active Problems:  Aspiration pneumonia (HCC) (7/21/2022)/Bilateral pneumonia (7/21/2022)likely due to aspiration, see CTA chest. Zosyn    Nausea and vomiting (7/21/2022): Zofran as needed    Hyponatremia (7/21/2022): IV fluid with NS    COPD: Cinda Plants    Glaucoma: resume meds    Uncontrolled Hypertension: monitor BP, resume home medicaitions                 Previous medical records reviewed     Risk of deterioration: high      Total time spent with patient: 48 [phone] [address] discussed with: Patient    Discussed:  Code Status    Prophylaxis:  Lovenox    Probable Disposition:  Home w/Family           ___________________________________________________    Attending Physician: Carlota Crooks MD

## 2022-07-21 NOTE — ED PROVIDER NOTES
Patient is a 76-year-old female with past medical history significant for recent right knee replacement, hypertension who presents emerged department for evaluation of shortness of breath, cough and nausea vomiting. She states she was having some nausea after surgery but however has had significant vomiting since being home. She states her breathing is gotten worse and she has been coughing up thick purulent sputum. States that she has COPD as well. She states she feels severely unwell and is actually syncopized twice. Notes that she has not been able to take her medicines due to the nausea. Denies knowledge of being prescribed Zofran. Past Medical History:   Diagnosis Date    Anticoagulant long-term use     Arthritis     BCC (basal cell carcinoma of skin)     Claustrophobia     COPD     Degenerative disc disease, lumbar 1/6/2016    Glaucoma     Gout attack 04/2019    Big toe    H/O sinus tachycardia 01/08/2019    Following shoulder surgery    High potassium 12/2018    Chronic- Stays around 5.3    History of nuclear stress test 02/10/2019    Normal    Hypertension     Obesity (BMI 30-39. 9)     Peripheral vascular disease ([address]     stents in each iliac artery, states they are \"watching\" carotids    Prediabetes 2012    Rib fracture 2019    Unspecified adverse effect of anesthesia     BP drops    Unspecified sleep apnea     Does not uses CPAP       Past Surgical History:   Procedure Laterality Date    HX CAROTID STENT Right 03/22/2022    HX HYSTERECTOMY  1980    HX LUMBAR FUSION  2006    HX LUMBAR FUSION  2016 & 2017    ALIF and then did the posterior after it didnt work    HX ORTHOPAEDIC Right     Ulnar nerve    HX ROTATOR CUFF REPAIR Left 2013    HX SHOULDER REPLACEMENT Bilateral 01/07/2019    IR PLACE STNT ILIAC /  PTA INIT Bilateral 2010         Family History:   Problem Relation Age of Onset    Heart Disease Father     Heart Attack Father 36    No Known Problems Sister     Deep Vein Thrombosis Brother Heart Disease Brother        Social History     Socioeconomic History    Marital status:      Spouse name: Not on file    Number of children: Not on file    Years of education: Not on file    Highest education level: Not on file   Occupational History    Not on file   Tobacco Use    Smoking status: Former     Packs/day: 2.00     Years: 35.00     Pack years: 70.00     Types: Cigarettes     Quit date: 10/2/2004     Years since quittin.8    Smokeless tobacco: Never   Vaping Use    Vaping Use: Never used   Substance and Sexual Activity    Alcohol use: Yes     Alcohol/week: 4.0 standard drinks     Types: 2 Glasses of wine, 2 Cans of beer per week    Drug use: No    Sexual activity: Not on file   Other Topics Concern    Not on file   Social History Narrative    Not on file     Social Determinants of Health     Financial Resource Strain: Not on file   Food Insecurity: Not on file   Transportation Needs: Not on file   Physical Activity: Not on file   Stress: Not on file   Social Connections: Not on file   Intimate Partner Violence: Not on file   Housing Stability: Not on file         ALLERGIES: Diclofenac, Flexeril [cyclobenzaprine], Medrol [methylprednisolone], Naproxen, and Tolectin [tolmetin]    Review of Systems   Constitutional:  Positive for chills and fatigue. Negative for fever. Respiratory:  Positive for cough, shortness of breath and wheezing. Cardiovascular:  Negative for chest pain. Gastrointestinal:  Positive for nausea and vomiting. Musculoskeletal:  Positive for myalgias. Skin:  Negative for rash. Neurological:  Positive for syncope. Negative for seizures. Hematological:  Does not bruise/bleed easily. Psychiatric/Behavioral: Negative.        Vitals:    22 1330 22 1415 22 1430 22 1445   BP:    (!) 155/63   Pulse: 98 (!) 103 (!) 101 (!) 105   Resp:    Temp:       SpO2: 96% 97% 94% 95%   Weight:       Height:                Physical Exam  Vitals and nursing note reviewed. Constitutional:       Appearance: She is obese. She is ill-appearing. She is not toxic-appearing. HENT:      Head: Normocephalic and atraumatic. Right Ear: External ear normal.      Left Ear: External ear normal.      Nose: Nose normal.   Eyes:      General: No scleral icterus. Cardiovascular:      Rate and Rhythm: Tachycardia present. Heart sounds: No friction rub. Pulmonary:      Breath sounds: Wheezing, rhonchi and rales present. Abdominal:      Palpations: Abdomen is soft. Musculoskeletal:      Cervical back: Neck supple. Comments: Dressing clean dry and intact anterior right knee   Skin:     General: Skin is warm and dry. Neurological:      Mental Status: She is alert and oriented to person, place, and time. Psychiatric:         Mood and Affect: Mood normal.         Behavior: Behavior normal.        MDM     Amount and/or Complexity of Data Reviewed  Clinical lab tests: reviewed  Decide to obtain previous medical records or to obtain history from someone other than the patient: yes      ED Course as of 07/21/22 1659   Thu Jul 21, 2022   1538 EKG obtained at 1141 showing sinus rhythm, rate 95, normal intervals, normal axis, no acute appearing findings. [JE]      ED Course User Index  [JE] Corinne Dias MD       Procedures        Perfect Serve Consult for Admission  3:06 PM    ED Room Number: YV74/14  Patient Name and age:  Arjun Florence 66 y.o.  female  Working Diagnosis:   1.  Aspiration pneumonia of both lungs, unspecified aspiration pneumonia type, unspecified part of lung ([facility] [address]        COVID-19 Suspicion:  no  Sepsis present:  no  Reassessment needed: no  Code Status:  Full Code  Readmission: yes  Isolation Requirements:  no  Recommended Level of Care:  telemetry  Department:[facility] ED - [phone]  Other: Patient is a 50-year-old female who had recent right knee replacement here at [facility] and had had nausea and vomiting as a complication in her postop phase. States that she was nauseated while in the hospital but not vomiting and cannot tell me if she tried any nausea medicines however does appear that she has significant aspiration pneumonia. Has had increasing cough and shortness of breath and had 2 syncopal episodes at home. No evidence of PE on exam.  Ortho notified of admission.

## 2022-07-22 PROBLEM — J44.9 COPD (CHRONIC OBSTRUCTIVE PULMONARY DISEASE) (HCC): Status: ACTIVE | Noted: 2022-07-22

## 2022-07-22 LAB
ANION GAP SERPL CALC-SCNC: 10 MMOL/L (ref 5–15)
ATRIAL RATE: 95 BPM
BUN SERPL-MCNC: 10 MG/DL (ref 6–20)
BUN/CREAT SERPL: 11 (ref 12–20)
CALCIUM SERPL-MCNC: 9 MG/DL (ref 8.5–10.1)
CALCULATED P AXIS, ECG09: 40 DEGREES
CALCULATED R AXIS, ECG10: 28 DEGREES
CALCULATED T AXIS, ECG11: 57 DEGREES
CHLORIDE SERPL-SCNC: 97 MMOL/L (ref 97–108)
CO2 SERPL-SCNC: 27 MMOL/L (ref 21–32)
COVID-19 RAPID TEST, COVR: NOT DETECTED
CREAT SERPL-MCNC: 0.88 MG/DL (ref 0.55–1.02)
DIAGNOSIS, 93000: NORMAL
ERYTHROCYTE [DISTWIDTH] IN BLOOD BY AUTOMATED COUNT: 14.2 % (ref 11.5–14.5)
GLUCOSE SERPL-MCNC: 133 MG/DL (ref 65–100)
HCT VFR BLD AUTO: 24.7 % (ref 35–47)
HGB BLD-MCNC: 8.2 G/DL (ref 11.5–16)
MAGNESIUM SERPL-MCNC: 2.2 MG/DL (ref 1.6–2.4)
MCH RBC QN AUTO: 31.7 PG (ref 26–34)
MCHC RBC AUTO-ENTMCNC: 33.2 G/DL (ref 30–36.5)
MCV RBC AUTO: 95.4 FL (ref 80–99)
NRBC # BLD: 0 K/UL (ref 0–0.01)
NRBC BLD-RTO: 0 PER 100 WBC
P-R INTERVAL, ECG05: 198 MS
PLATELET # BLD AUTO: 618 K/UL (ref 150–400)
PMV BLD AUTO: 9.4 FL (ref 8.9–12.9)
POTASSIUM SERPL-SCNC: 4 MMOL/L (ref 3.5–5.1)
Q-T INTERVAL, ECG07: 342 MS
QRS DURATION, ECG06: 70 MS
QTC CALCULATION (BEZET), ECG08: 429 MS
RBC # BLD AUTO: 2.59 M/UL (ref 3.8–5.2)
SODIUM SERPL-SCNC: 134 MMOL/L (ref 136–145)
SOURCE, COVRS: NORMAL
VENTRICULAR RATE, ECG03: 95 BPM
WBC # BLD AUTO: 10.2 K/UL (ref 3.6–11)

## 2022-07-22 PROCEDURE — 74011250637 HC RX REV CODE- 250/637: Performed by: INTERNAL MEDICINE

## 2022-07-22 PROCEDURE — 94640 AIRWAY INHALATION TREATMENT: CPT

## 2022-07-22 PROCEDURE — 74011250636 HC RX REV CODE- 250/636: Performed by: INTERNAL MEDICINE

## 2022-07-22 PROCEDURE — 74011000250 HC RX REV CODE- 250: Performed by: INTERNAL MEDICINE

## 2022-07-22 PROCEDURE — 36415 COLL VENOUS BLD VENIPUNCTURE: CPT

## 2022-07-22 PROCEDURE — 94761 N-INVAS EAR/PLS OXIMETRY MLT: CPT

## 2022-07-22 PROCEDURE — 85027 COMPLETE CBC AUTOMATED: CPT

## 2022-07-22 PROCEDURE — 77030013140 HC MSK NEB VYRM -A

## 2022-07-22 PROCEDURE — 65270000029 HC RM PRIVATE

## 2022-07-22 PROCEDURE — 80048 BASIC METABOLIC PNL TOTAL CA: CPT

## 2022-07-22 PROCEDURE — 87635 SARS-COV-2 COVID-19 AMP PRB: CPT

## 2022-07-22 PROCEDURE — 94664 DEMO&/EVAL PT USE INHALER: CPT

## 2022-07-22 PROCEDURE — 83735 ASSAY OF MAGNESIUM: CPT

## 2022-07-22 PROCEDURE — 97161 PT EVAL LOW COMPLEX 20 MIN: CPT

## 2022-07-22 PROCEDURE — 74011000258 HC RX REV CODE- 258: Performed by: INTERNAL MEDICINE

## 2022-07-22 RX ORDER — IPRATROPIUM BROMIDE AND ALBUTEROL SULFATE 2.5; .5 MG/3ML; MG/3ML
3 SOLUTION RESPIRATORY (INHALATION)
Status: DISCONTINUED | OUTPATIENT
Start: 2022-07-22 | End: 2022-07-23 | Stop reason: HOSPADM

## 2022-07-22 RX ORDER — PRAVASTATIN SODIUM 20 MG/1
80 TABLET ORAL
Status: DISCONTINUED | OUTPATIENT
Start: 2022-07-22 | End: 2022-07-23 | Stop reason: HOSPADM

## 2022-07-22 RX ORDER — SODIUM CHLORIDE, SODIUM LACTATE, POTASSIUM CHLORIDE, CALCIUM CHLORIDE 600; 310; 30; 20 MG/100ML; MG/100ML; MG/100ML; MG/100ML
75 INJECTION, SOLUTION INTRAVENOUS CONTINUOUS
Status: DISCONTINUED | OUTPATIENT
Start: 2022-07-22 | End: 2022-07-23 | Stop reason: HOSPADM

## 2022-07-22 RX ORDER — ALBUTEROL SULFATE 1.25 MG/3ML
1.25 SOLUTION RESPIRATORY (INHALATION)
Status: DISCONTINUED | OUTPATIENT
Start: 2022-07-22 | End: 2022-07-23 | Stop reason: HOSPADM

## 2022-07-22 RX ORDER — BENZONATATE 100 MG/1
100 CAPSULE ORAL
Status: DISCONTINUED | OUTPATIENT
Start: 2022-07-22 | End: 2022-07-23

## 2022-07-22 RX ORDER — MORPHINE SULFATE 2 MG/ML
2 INJECTION, SOLUTION INTRAMUSCULAR; INTRAVENOUS
Status: DISCONTINUED | OUTPATIENT
Start: 2022-07-22 | End: 2022-07-23 | Stop reason: HOSPADM

## 2022-07-22 RX ORDER — HYDRALAZINE HYDROCHLORIDE 20 MG/ML
10 INJECTION INTRAMUSCULAR; INTRAVENOUS
Status: DISCONTINUED | OUTPATIENT
Start: 2022-07-22 | End: 2022-07-23 | Stop reason: HOSPADM

## 2022-07-22 RX ORDER — ASPIRIN 81 MG/1
81 TABLET ORAL 2 TIMES DAILY
Status: DISCONTINUED | OUTPATIENT
Start: 2022-07-22 | End: 2022-07-23 | Stop reason: HOSPADM

## 2022-07-22 RX ORDER — LEVOTHYROXINE SODIUM 50 UG/1
50 TABLET ORAL
Status: DISCONTINUED | OUTPATIENT
Start: 2022-07-22 | End: 2022-07-23 | Stop reason: HOSPADM

## 2022-07-22 RX ORDER — LANOLIN ALCOHOL/MO/W.PET/CERES
400 CREAM (GRAM) TOPICAL
Status: DISCONTINUED | OUTPATIENT
Start: 2022-07-23 | End: 2022-07-23 | Stop reason: HOSPADM

## 2022-07-22 RX ORDER — CLOPIDOGREL BISULFATE 75 MG/1
75 TABLET ORAL
Status: DISCONTINUED | OUTPATIENT
Start: 2022-07-22 | End: 2022-07-23 | Stop reason: HOSPADM

## 2022-07-22 RX ORDER — METOPROLOL TARTRATE 25 MG/1
25 TABLET, FILM COATED ORAL 2 TIMES DAILY
Status: DISCONTINUED | OUTPATIENT
Start: 2022-07-22 | End: 2022-07-23 | Stop reason: HOSPADM

## 2022-07-22 RX ORDER — LANOLIN ALCOHOL/MO/W.PET/CERES
325 CREAM (GRAM) TOPICAL
Status: DISCONTINUED | OUTPATIENT
Start: 2022-07-22 | End: 2022-07-23 | Stop reason: HOSPADM

## 2022-07-22 RX ORDER — LATANOPROST 50 UG/ML
1 SOLUTION/ DROPS OPHTHALMIC
Status: DISCONTINUED | OUTPATIENT
Start: 2022-07-22 | End: 2022-07-23 | Stop reason: HOSPADM

## 2022-07-22 RX ORDER — MONTELUKAST SODIUM 10 MG/1
10 TABLET ORAL EVERY EVENING
Status: DISCONTINUED | OUTPATIENT
Start: 2022-07-22 | End: 2022-07-23 | Stop reason: HOSPADM

## 2022-07-22 RX ORDER — IPRATROPIUM BROMIDE AND ALBUTEROL SULFATE 2.5; .5 MG/3ML; MG/3ML
3 SOLUTION RESPIRATORY (INHALATION)
Status: DISCONTINUED | OUTPATIENT
Start: 2022-07-22 | End: 2022-07-22

## 2022-07-22 RX ORDER — ACETAMINOPHEN 325 MG/1
650 TABLET ORAL EVERY 6 HOURS
Status: DISCONTINUED | OUTPATIENT
Start: 2022-07-22 | End: 2022-07-23 | Stop reason: HOSPADM

## 2022-07-22 RX ADMIN — CLOPIDOGREL BISULFATE 75 MG: 75 TABLET ORAL at 08:51

## 2022-07-22 RX ADMIN — IPRATROPIUM BROMIDE AND ALBUTEROL SULFATE 3 ML: .5; 3 SOLUTION RESPIRATORY (INHALATION) at 07:04

## 2022-07-22 RX ADMIN — PIPERACILLIN AND TAZOBACTAM 3.38 G: 3; .375 INJECTION, POWDER, FOR SOLUTION INTRAVENOUS at 14:00

## 2022-07-22 RX ADMIN — ASPIRIN 81 MG: 81 TABLET, COATED ORAL at 08:51

## 2022-07-22 RX ADMIN — ONDANSETRON 4 MG: 2 INJECTION INTRAMUSCULAR; INTRAVENOUS at 17:39

## 2022-07-22 RX ADMIN — Medication 10 ML: at 21:38

## 2022-07-22 RX ADMIN — PRAVASTATIN SODIUM 80 MG: 20 TABLET ORAL at 21:38

## 2022-07-22 RX ADMIN — Medication 10 ML: at 14:00

## 2022-07-22 RX ADMIN — PIPERACILLIN AND TAZOBACTAM 3.38 G: 3; .375 INJECTION, POWDER, FOR SOLUTION INTRAVENOUS at 06:39

## 2022-07-22 RX ADMIN — METOPROLOL TARTRATE 25 MG: 25 TABLET, FILM COATED ORAL at 08:51

## 2022-07-22 RX ADMIN — MORPHINE SULFATE 2 MG: 2 INJECTION, SOLUTION INTRAMUSCULAR; INTRAVENOUS at 17:15

## 2022-07-22 RX ADMIN — FERROUS SULFATE TAB 325 MG (65 MG ELEMENTAL FE) 325 MG: 325 (65 FE) TAB at 08:52

## 2022-07-22 RX ADMIN — MONTELUKAST 10 MG: 10 TABLET, FILM COATED ORAL at 17:39

## 2022-07-22 RX ADMIN — ACETAMINOPHEN 650 MG: 325 TABLET ORAL at 10:53

## 2022-07-22 RX ADMIN — IPRATROPIUM BROMIDE AND ALBUTEROL SULFATE 3 ML: .5; 3 SOLUTION RESPIRATORY (INHALATION) at 13:59

## 2022-07-22 RX ADMIN — ENOXAPARIN SODIUM 40 MG: 100 INJECTION SUBCUTANEOUS at 08:55

## 2022-07-22 RX ADMIN — Medication 10 ML: at 08:52

## 2022-07-22 RX ADMIN — PIPERACILLIN AND TAZOBACTAM 3.38 G: 3; .375 INJECTION, POWDER, FOR SOLUTION INTRAVENOUS at 21:38

## 2022-07-22 RX ADMIN — ACETAMINOPHEN 650 MG: 325 TABLET ORAL at 17:39

## 2022-07-22 RX ADMIN — LEVOTHYROXINE SODIUM 50 MCG: 0.05 TABLET ORAL at 09:11

## 2022-07-22 RX ADMIN — ASPIRIN 81 MG: 81 TABLET, COATED ORAL at 17:39

## 2022-07-22 RX ADMIN — SODIUM CHLORIDE, POTASSIUM CHLORIDE, SODIUM LACTATE AND CALCIUM CHLORIDE 75 ML/HR: 600; 310; 30; 20 INJECTION, SOLUTION INTRAVENOUS at 17:00

## 2022-07-22 RX ADMIN — METOPROLOL TARTRATE 25 MG: 25 TABLET, FILM COATED ORAL at 17:39

## 2022-07-22 NOTE — CONSULTS
ORTHOPEDIC SURGERY CONSULT    Subjective:     Date of Consultation:  2022    Referring Physician:  Dr. Angie Lenz is a 66 y.o. female who is being seen for her right total knee arthroplasty. She has a past medical history of PVD, HTN, and recent TKA. She is . 10 days s/p right total knee arthroplasty with Dr. Pradeep Espinal. She reports episodic vomiting, but did not take her zofran. She noticed today that she was coughing and more short of breath. She presented to the ER where a chest CTA showed debris in both lungs consistent with aspiration pneumonia. She has been admitted for the above. She denies increasing knee pain. She was to start outpatient therapy today. Patient Active Problem List    Diagnosis Date Noted    Bilateral pneumonia 2022    Nausea and vomiting 2022    Hyponatremia 2022    Aspiration pneumonia ([address] 2022    Primary osteoarthritis of right knee 2022    Obesity (BMI 30-39. 9)     Hypertension     Peripheral vascular disease (HCC)     Hx of syncope     Vitamin D deficiency     Osteoarthritis of cervical spine 2019    Cervical radiculopathy 2019    Osteoarthritis of right shoulder 2019    Degenerative disc disease, lumbar 2016    Other affections of shoulder region, not elsewhere classified 10/12/2012    Primary localized osteoarthrosis, shoulder region 10/12/2012    Rotator cuff tear 10/12/2012    Biceps tendinopathy 10/12/2012     Family History   Problem Relation Age of Onset    Heart Disease Father     Heart Attack Father 36    No Known Problems Sister     Deep Vein Thrombosis Brother     Heart Disease Brother       Social History     Tobacco Use    Smoking status: Former     Packs/day: 2.00     Years: 35.00     Pack years: 70.00     Types: Cigarettes     Quit date: 10/2/2004     Years since quittin.8    Smokeless tobacco: Never   Substance Use Topics    Alcohol use:  Yes     Alcohol/week: 4.0 standard drinks Types: 2 Glasses of wine, 2 Cans of beer per week     Past Medical History:   Diagnosis Date    Anticoagulant long-term use     Arthritis     BCC (basal cell carcinoma of skin)     Claustrophobia     COPD     Degenerative disc disease, lumbar 1/6/2016    Glaucoma     Gout attack 04/2019    Big toe    H/O sinus tachycardia 01/08/2019    Following shoulder surgery    High potassium 12/2018    Chronic- Stays around 5.3    History of nuclear stress test 02/10/2019    Normal    Hypertension     Obesity (BMI 30-39. 9)     Peripheral vascular disease ([facility] [address]     stents in each iliac artery, states they are \"watching\" carotids    Prediabetes 2012    Rib fracture 2019    Unspecified adverse effect of anesthesia     BP drops    Unspecified sleep apnea     Does not uses CPAP      Past Surgical History:   Procedure Laterality Date    HX CAROTID STENT Right 03/22/2022    HX HYSTERECTOMY  1980    HX LUMBAR FUSION  2006    HX LUMBAR FUSION  2016 & 2017    ALIF and then did the posterior after it didnt work    HX ORTHOPAEDIC Right     Ulnar nerve    HX ROTATOR CUFF REPAIR Left 2013    HX SHOULDER REPLACEMENT Bilateral 01/07/2019    IR PLACE STNT ILIAC /  PTA INIT Bilateral 2010      Prior to Admission medications    Medication Sig Start Date End Date Taking? Authorizing Provider   aspirin delayed-release 81 mg tablet Take 1 Tablet by mouth two (2) times a day. 7/11/22   Natividad Chung MD   acetaminophen (Acetaminophen Pain Relief) 500 mg tablet Take 2 Tablets by mouth every six (6) hours as needed for Pain. 7/11/22   Natividad Chung MD   ondansetron (ZOFRAN ODT) 8 mg disintegrating tablet Take 0.5 Tablets by mouth every eight (8) hours as needed for Nausea. 7/11/22   Natividad Chung MD   levothyroxine (SYNTHROID) 50 mcg tablet Take 50 mcg by mouth Daily (before breakfast). Provider, Historical   metoprolol tartrate (LOPRESSOR) 25 mg tablet Take 25 mg by mouth two (2) times a day.     Provider, Historical   TURMERIC PO Take 1,400 mg by mouth two (2) times a day. Provider, Historical   clopidogreL (Plavix) 75 mg tab Take 75 mg by mouth every morning. For stents in neck     Provider, Historical   albuterol (PROVENTIL HFA, VENTOLIN HFA, PROAIR HFA) 90 mcg/actuation inhaler Take 1 Puff by inhalation every six (6) hours as needed for Wheezing. Provider, Historical   calcium citrate-vitamin d3 (CITRACAL+D) 315 mg-5 mcg (200 unit) tab Take 2 Tablets by mouth two (2) times daily (with meals). Provider, Historical   cranberry fruit extract (CRANBERRY EXTRACT PO) Take 2 Tablets by mouth every morning. Provider, Historical   ferrous sulfate 325 mg (65 mg iron) tablet Take 1 Tab by mouth daily (with breakfast). 1/10/19   Julieta Caceres MD   umeclidinium-vilanterol [facility] ELLIPTA) 62.5-25 mcg/actuation inhaler Take 1 Puff by inhalation every morning. Provider, Historical   glucosamine sulfate (GLUCOSAMINE) 500 mg tablet Take 1,000 mg by mouth every morning. Provider, Historical   magnesium 200 mg tab Take 2 Tablets by mouth every morning. Provider, Historical   vitamin E (AQUA GEMS) 400 unit capsule Take 400 Units by mouth every morning. Provider, Historical   Omega-3 Fatty Acids 60- mg cpDR Take 2 Tabs by mouth every morning. Provider, Historical   metFORMIN (GLUCOPHAGE) 500 mg tablet Take 500 mg by mouth two (2) times daily (with meals). Provider, Historical   montelukast (SINGULAIR) 10 mg tablet Take 10 mg by mouth every evening. Provider, Historical   lisinopril-hydrochlorothiazide (PRINZIDE, ZESTORETIC) 10-12.5 mg per tablet Take 1 Tab by mouth every morning. Provider, Historical   pravastatin (PRAVACHOL) 80 mg tablet Take 80 mg by mouth nightly. Provider, Historical   latanoprost (XALATAN) 0.005 % ophthalmic solution Administer 1 Drop to both eyes nightly.       Provider, Historical     Current Facility-Administered Medications   Medication Dose Route Frequency    piperacillin-tazobactam (ZOSYN) 3.375 g in 0.9% sodium chloride (MBP/ADV) 100 mL MBP  3.375 g IntraVENous Q8H    sodium chloride (NS) flush 5-40 mL  5-40 mL IntraVENous Q8H    sodium chloride (NS) flush 5-40 mL  5-40 mL IntraVENous PRN    acetaminophen (TYLENOL) tablet 650 mg  650 mg Oral Q6H PRN    Or    acetaminophen (TYLENOL) suppository 650 mg  650 mg Rectal Q6H PRN    polyethylene glycol (MIRALAX) packet 17 g  17 g Oral DAILY PRN    ondansetron (ZOFRAN ODT) tablet 4 mg  4 mg Oral Q8H PRN    Or    ondansetron (ZOFRAN) injection 4 mg  4 mg IntraVENous Q6H PRN    [START ON 7/22/2022] enoxaparin (LOVENOX) injection 40 mg  40 mg SubCUTAneous DAILY     Current Outpatient Medications   Medication Sig    aspirin delayed-release 81 mg tablet Take 1 Tablet by mouth two (2) times a day. acetaminophen (Acetaminophen Pain Relief) 500 mg tablet Take 2 Tablets by mouth every six (6) hours as needed for Pain. ondansetron (ZOFRAN ODT) 8 mg disintegrating tablet Take 0.5 Tablets by mouth every eight (8) hours as needed for Nausea. levothyroxine (SYNTHROID) 50 mcg tablet Take 50 mcg by mouth Daily (before breakfast). metoprolol tartrate (LOPRESSOR) 25 mg tablet Take 25 mg by mouth two (2) times a day. TURMERIC PO Take 1,400 mg by mouth two (2) times a day. clopidogreL (Plavix) 75 mg tab Take 75 mg by mouth every morning. For stents in neck     albuterol (PROVENTIL HFA, VENTOLIN HFA, PROAIR HFA) 90 mcg/actuation inhaler Take 1 Puff by inhalation every six (6) hours as needed for Wheezing. calcium citrate-vitamin d3 (CITRACAL+D) 315 mg-5 mcg (200 unit) tab Take 2 Tablets by mouth two (2) times daily (with meals). cranberry fruit extract (CRANBERRY EXTRACT PO) Take 2 Tablets by mouth every morning. ferrous sulfate 325 mg (65 mg iron) tablet Take 1 Tab by mouth daily (with breakfast). umeclidinium-vilanterol (ANORO ELLIPTA) 62.5-25 mcg/actuation inhaler Take 1 Puff by inhalation every morning.     glucosamine sulfate (GLUCOSAMINE) 500 mg tablet Take 1,000 mg by mouth every morning.    magnesium 200 mg tab Take 2 Tablets by mouth every morning. vitamin E (AQUA GEMS) 400 unit capsule Take 400 Units by mouth every morning. Omega-3 Fatty Acids 60- mg cpDR Take 2 Tabs by mouth every morning. metFORMIN (GLUCOPHAGE) 500 mg tablet Take 500 mg by mouth two (2) times daily (with meals). montelukast (SINGULAIR) 10 mg tablet Take 10 mg by mouth every evening. lisinopril-hydrochlorothiazide (PRINZIDE, ZESTORETIC) 10-12.5 mg per tablet Take 1 Tab by mouth every morning. pravastatin (PRAVACHOL) 80 mg tablet Take 80 mg by mouth nightly. latanoprost (XALATAN) 0.005 % ophthalmic solution Administer 1 Drop to both eyes nightly.        Allergies   Allergen Reactions    Diclofenac Anaphylaxis    Flexeril [Cyclobenzaprine] Angioedema    Medrol [Methylprednisolone] Unknown (comments)     She has forgotten    Naproxen Swelling    Tolectin [Tolmetin] Swelling     Took this with Flexeril and does not know which med caused swelling of hands,face, throat        Review of Systems:  A comprehensive review of systems was negative except for: Respiratory: positive for cough or pneumonia    Objective:     Patient Vitals for the past 8 hrs:   BP Pulse Resp SpO2   07/21/22 1930 (!) 165/54 (!) 101 17 91 %   07/21/22 1915 (!) 166/51 (!) 101 20 90 %   07/21/22 1900 (!) 170/54 (!) 101 19 93 %   07/21/22 1845 (!) 173/51 99 18 93 %   07/21/22 1830 (!) 168/51 (!) 102 21 --   07/21/22 1815 (!) 163/45 (!) 101 25 --   07/21/22 1800 (!) 167/46 100 17 --   07/21/22 1745 (!) 161/53 (!) 101 23 --   07/21/22 1730 (!) 166/46 98 28 (!) 89 %   07/21/22 1715 (!) 169/51 100 21 94 %   07/21/22 1700 (!) 170/60 (!) 105 19 95 %   07/21/22 1645 (!) 151/90 (!) 117 21 97 %   07/21/22 1644 -- -- -- 96 %   07/21/22 1630 (!) 160/43 (!) 102 17 92 %   07/21/22 1615 (!) 157/47 99 15 92 %   07/21/22 1600 (!) 167/50 (!) 102 19 93 %   07/21/22 1545 (!) 154/42 100 19 92 %   07/21/22 1530 (!) 160/48 (!) 101 22 96 %   22 1515 (!) 158/44 (!) 101 18 96 %   22 1500 (!) 165/62 (!) 104 19 96 %   22 1445 (!) 155/63 (!) 105 22 95 %   22 1430 -- (!) 101 21 94 %   22 1415 -- (!) 103 18 97 %     Temp (24hrs), Av.7 °F (37.1 °C), Min:98.6 °F (37 °C), Max:98.7 °F (37.1 °C)        EXAM: GEN: Well developed and nurtured in NAD  PSYCH: AAO x 4  MUSC: Right knee with moderate effusion. 0-25 degrees of flexion passively. Negative valgus and varus stress test at 0 degrees. The postoperative dressing is intact. There is no erythema. Calf is soft and nontender with a negative Homans sign. +DP and PT pulses with BCR of all digits.       IMAGING:  Deferred for now    Data Review   Recent Results (from the past 24 hour(s))   EKG, 12 LEAD, INITIAL    Collection Time: 22 11:20 AM   Result Value Ref Range    Ventricular Rate 93 BPM    Atrial Rate 93 BPM    P-R Interval 188 ms    QRS Duration 72 ms    Q-T Interval 348 ms    QTC Calculation (Bezet) 432 ms    Calculated P Axis 52 degrees    Calculated R Axis 38 degrees    Calculated T Axis 65 degrees    Diagnosis       Normal sinus rhythm  Nonspecific ST abnormality  Abnormal ECG  When compared with ECG of 2019 11:39,  No significant change was found  Confirmed by Melissa Sinha MD, JANEEN ([id number]) on 2022 1:14:48 PM     CBC WITH AUTOMATED DIFF    Collection Time: 22 12:35 PM   Result Value Ref Range    WBC 11.5 (H) 3.6 - 11.0 K/uL    RBC 2.64 (L) 3.80 - 5.20 M/uL    HGB 8.4 (L) 11.5 - 16.0 g/dL    HCT 24.9 (L) 35.0 - 47.0 %    MCV 94.3 80.0 - 99.0 FL    MCH 31.8 26.0 - 34.0 PG    MCHC 33.7 30.0 - 36.5 g/dL    RDW 13.6 11.5 - 14.5 %    PLATELET 483 (H) 515 - 400 K/uL    MPV 9.2 8.9 - 12.9 FL    NRBC 0.0 0  WBC    ABSOLUTE NRBC 0.00 0.00 - 0.01 K/uL    NEUTROPHILS 82 (H) 32 - 75 %    LYMPHOCYTES 8 (L) 12 - 49 %    MONOCYTES 9 5 - 13 %    EOSINOPHILS 0 0 - 7 %    BASOPHILS 0 0 - 1 %    IMMATURE GRANULOCYTES 1 (H) 0.0 - 0.5 %    ABS. NEUTROPHILS 9.5 (H) 1.8 - 8.0 K/UL    ABS. LYMPHOCYTES 0.9 0.8 - 3.5 K/UL    ABS. MONOCYTES 1.0 0.0 - 1.0 K/UL    ABS. EOSINOPHILS 0.0 0.0 - 0.4 K/UL    ABS. BASOPHILS 0.0 0.0 - 0.1 K/UL    ABS. IMM. GRANS. 0.1 (H) 0.00 - 0.04 K/UL    DF AUTOMATED     METABOLIC PANEL, COMPREHENSIVE    Collection Time: 07/21/22 12:35 PM   Result Value Ref Range    Sodium 130 (L) 136 - 145 mmol/L    Potassium 5.4 (H) 3.5 - 5.1 mmol/L    Chloride 96 (L) 97 - 108 mmol/L    CO2 28 21 - 32 mmol/L    Anion gap 6 5 - 15 mmol/L    Glucose 122 (H) 65 - 100 mg/dL    BUN 12 6 - 20 MG/DL    Creatinine 0.79 0.55 - 1.02 MG/DL    BUN/Creatinine ratio 15 12 - 20      GFR est AA >60 >60 ml/min/1.73m2    GFR est non-AA >60 >60 ml/min/1.73m2    Calcium 8.9 8.5 - 10.1 MG/DL    Bilirubin, total 0.7 0.2 - 1.0 MG/DL    ALT (SGPT) 42 12 - 78 U/L    AST (SGOT) 49 (H) 15 - 37 U/L    Alk.  phosphatase 357 (H) 45 - 117 U/L    Protein, total 6.6 6.4 - 8.2 g/dL    Albumin 2.6 (L) 3.5 - 5.0 g/dL    Globulin 4.0 2.0 - 4.0 g/dL    A-G Ratio 0.7 (L) 1.1 - 2.2     TROPONIN-HIGH SENSITIVITY    Collection Time: 07/21/22 12:35 PM   Result Value Ref Range    Troponin-High Sensitivity 12 0 - 51 ng/L   LIPASE    Collection Time: 07/21/22 12:35 PM   Result Value Ref Range    Lipase 165 73 - 393 U/L   MAGNESIUM    Collection Time: 07/21/22 12:35 PM   Result Value Ref Range    Magnesium 2.1 1.6 - 2.4 mg/dL   URINALYSIS W/ RFLX MICROSCOPIC    Collection Time: 07/21/22  8:21 PM   Result Value Ref Range    Color YELLOW/STRAW      Appearance CLEAR CLEAR      Specific gravity 1.010 1.003 - 1.030      pH (UA) 8.0 5.0 - 8.0      Protein 30 (A) NEG mg/dL    Glucose Negative NEG mg/dL    Ketone Negative NEG mg/dL    Bilirubin Negative NEG      Blood Negative NEG      Urobilinogen 1.0 0.2 - 1.0 EU/dL    Nitrites Negative NEG      Leukocyte Esterase SMALL (A) NEG     URINE MICROSCOPIC ONLY    Collection Time: 07/21/22  8:21 PM   Result Value Ref Range    WBC  0 - 4 /hpf RBC 0-5 0 - 5 /hpf    Epithelial cells MODERATE (A) FEW /lpf    Bacteria 1+ (A) NEG /hpf   URINE CULTURE HOLD SAMPLE    Collection Time: 07/21/22  8:21 PM    Specimen: Serum   Result Value Ref Range    Urine culture hold        Urine on hold in Microbiology dept for 2 days. If unpreserved urine is submitted, it cannot be used for addtional testing after 24 hours, recollection will be required. GLUCOSE, POC    Collection Time: 07/21/22  8:44 PM   Result Value Ref Range    Glucose (POC) 142 (H) 65 - 117 mg/dL    Performed by Kit Boyle          Assessment/Plan:   A: 1. S/P right total knee arthroplasty   2. Aspiration PNA    P: 1. Appreciate hospitalist admission and medical management. 2. Right knee: WBAT. ROM as tolerated. Ok to resume DVT ppx. - Lovenox ok inpatient and transition to ASA outpatient. 3.  Orthopedics is available for questions but will follow peripherally. Discussed case with Dr. Berlin Lim who agrees with plan.         Michael Espinoza, [place]  [facility]  [address] [facility]

## 2022-07-22 NOTE — ED NOTES
Verbal shift change report given to Porsche Sethi (oncoming nurse) by Yoni Fay (offgoing nurse). Report included the following information SBAR, Kardex, ED Summary, Intake/Output, MAR and Recent Results.

## 2022-07-22 NOTE — PROGRESS NOTES
PHYSICAL THERAPY EVALUATION/DISCHARGE  Patient: Mauricio Roach (68 y.o. female)  Date: 7/22/2022  Primary Diagnosis: Pneumonia [J18.9]       Precautions:   WBAT      ASSESSMENT  Based on the objective data described below, the patient presents with functional mobility at a Mod Indep level following admission for aspiration pneumonia from home. Pt underwent R TKA 10 days ago and was doing well until the last two days with nausea and coughing. Has attended one OP PT appointment, Pt mobilizes 140ft with SUP level and use of RW. Maintains reciprocal gait pattern without antalgic gait. Given handout for new knee HEP for pt to complete while inpatient. Recommended up to chair for meals and ambulating 3x/day in hallway. Pt is cleared to be up ad elida with RW. No additional acute therapy needs. SpO2 94% on RA with activity. Functional Outcome Measure: The patient scored 24/28 on the Tinetti outcome measure which is indicative of moderate fall risk d/t use of RW. Other factors to consider for discharge: none     Further skilled acute physical therapy is not indicated at this time. PLAN :  Recommendation for discharge: (in order for the patient to meet his/her long term goals)  Outpatient physical therapy follow up recommended for strengthening as per prior set up    This discharge recommendation:  Has been made in collaboration with the attending provider and/or case management    IF patient discharges home will need the following DME: patient owns DME required for discharge       SUBJECTIVE:   Patient stated I was kind of doing my exercises.     OBJECTIVE DATA SUMMARY:   HISTORY:    Past Medical History:   Diagnosis Date    Anticoagulant long-term use     Arthritis     BCC (basal cell carcinoma of skin)     Claustrophobia     COPD     Degenerative disc disease, lumbar 1/6/2016    Glaucoma     Gout attack 04/2019    Big toe    H/O sinus tachycardia 01/08/2019    Following shoulder surgery    High potassium 12/2018    Chronic- Stays around 5.3    History of nuclear stress test 02/10/2019    Normal    Hypertension     Obesity (BMI 30-39. 9)     Peripheral vascular disease ([facility] [address]     stents in each iliac artery, states they are \"watching\" carotids    Prediabetes 2012    Rib fracture 2019    Unspecified adverse effect of anesthesia     BP drops    Unspecified sleep apnea     Does not uses CPAP     Past Surgical History:   Procedure Laterality Date    HX CAROTID STENT Right 03/22/2022    HX HYSTERECTOMY  1980    HX LUMBAR FUSION  2006    HX LUMBAR FUSION  2016 & 2017    ALIF and then did the posterior after it didnt work    HX ORTHOPAEDIC Right     Ulnar nerve    HX ROTATOR CUFF REPAIR Left 2013    HX SHOULDER REPLACEMENT Bilateral 01/07/2019    IR PLACE STNT ILIAC /  PTA INIT Bilateral 2010       Prior level of function: Mod Indep with RW, recent TKA  Personal factors and/or comorbidities impacting plan of care: COPD, arthritis, obesity, PVD, recent surgical history    Home Situation  Home Environment: Private residence  # Steps to Enter: 0  One/Two Story Residence: One story  Living Alone: No  Support Systems: Spouse/Significant Other, Other Family Member(s)  Patient Expects to be Discharged to[de-identified] Home with outpatient services  Current DME Used/Available at Home: Cane, straight, Cane, quad, Walker, rolling, Grab bars  Tub or Shower Type: Shower    EXAMINATION/PRESENTATION/DECISION MAKING:   Critical Behavior:  Neurologic State: Alert  Orientation Level: Oriented X4        Hearing:       Functional Mobility:  Bed Mobility:  Rolling: Independent  Supine to Sit: Independent  Sit to Supine: Independent  Scooting: Independent  Transfers:  Sit to Stand: Modified independent  Stand to Sit: Modified independent  Stand Pivot Transfers: Modified independent     Bed to Chair: Modified independent              Balance:   Sitting: Intact  Standing: Intact; With support  Ambulation/Gait Training:  Distance (ft): 140 Feet (ft)  Assistive Device: Gait belt;Walker, rolling  Ambulation - Level of Assistance: Supervision     Gait Description (WDL): Exceptions to WDL                                          Stairs: Therapeutic Exercises:   Knee HEP    Functional Measure:  Tinetti test:    Sitting Balance: 1  Arises: 1  Attempts to Rise: 2  Immediate Standing Balance: 2  Standing Balance: 2  Nudged: 2  Eyes Closed: 1  Turn 360 Degrees - Continuous/Discontinuous: 1  Turn 360 Degrees - Steady/Unsteady: 1  Sitting Down: 2  Balance Score: 15 Balance total score  Indication of Gait: 1  R Step Length/Height: 1  L Step Length/Height: 1  R Foot Clearance: 1  L Foot Clearance: 1  Step Symmetry: 1  Step Continuity: 1  Path: 1  Trunk: 0  Walking Time: 1  Gait Score: 9 Gait total score  Total Score: 24/28 Overall total score         Tinetti Tool Score Risk of Falls  <19 = High Fall Risk  19-24 = Moderate Fall Risk  25-28 = Low Fall Risk  Tinetti ME. Performance-Oriented Assessment of Mobility Problems in Elderly Patients. Damon 66; [id number].  (Scoring Description: PT Bulletin Feb. 10, 1993)    Older adults: Ethan Trevizo et al, 2009; n = 1000 [facility] elderly evaluated with ABC, SURENDRA, ADL, and IADL)  · Mean SURENDRA score for males aged 69-68 years = 26.21(3.40)  · Mean SURENDRA score for females age 69-68 years = 25.16(4.30)  · Mean SURENDRA score for males over 80 years = 23.29(6.02)  · Mean SURENDRA score for females over 80 years = 17.20(8.32)            Physical Therapy Evaluation Charge Determination   History Examination Presentation Decision-Making   MEDIUM  Complexity : 1-2 comorbidities / personal factors will impact the outcome/ POC  MEDIUM Complexity : 3 Standardized tests and measures addressing body structure, function, activity limitation and / or participation in recreation  LOW Complexity : Stable, uncomplicated  Other outcome measures Tinetti  LOW       Based on the above components, the patient evaluation is determined to be of the following complexity level: LOW     Pain Rating:  none    Activity Tolerance:   Good and requires rest breaks      After treatment patient left in no apparent distress:   Supine in bed and Call bell within reach    COMMUNICATION/EDUCATION:   The patients plan of care was discussed with: Registered nurse. Fall prevention education was provided and the patient/caregiver indicated understanding., Patient/family have participated as able in goal setting and plan of care. , and Patient/family agree to work toward stated goals and plan of care.     Thank you for this referral.  Honorio Aviles, PT   Time Calculation: 14 mins

## 2022-07-22 NOTE — ED NOTES
TRANSFER - OUT REPORT:    Verbal report given to Siobhan Cruz RN (name) on Yisel Fried  being transferred to 5th floor (unit) for routine progression of care       Report consisted of patients Situation, Background, Assessment and   Recommendations(SBAR). Information from the following report(s) SBAR, Kardex, ED Summary, Intake/Output, MAR and Recent Results was reviewed with the receiving nurse. Lines:   Peripheral IV 07/21/22 Right Antecubital (Active)   Site Assessment Clean, dry, & intact 07/22/22 0400   Phlebitis Assessment 0 07/22/22 0400   Infiltration Assessment 0 07/22/22 0400   Dressing Status Clean, dry, & intact 07/22/22 0400   Dressing Type Transparent;Tape 07/22/22 0400   Hub Color/Line Status Pink 07/22/22 0400        Opportunity for questions and clarification was provided.       Patient transported with:   [employer]

## 2022-07-22 NOTE — PROGRESS NOTES
7/22/2022  2:35 PM    Reason for Admission:  S/P right total knee arthroplasty  2. Aspiration PNA                     RUR Score:    13%, low risk               Plan for utilizing home health:    Does not anticipate needs       PCP: First and Last name:  Benny Chaudhry MD     Name of Practice:    Are you a current patient: Yes/No: YES   Approximate date of last visit: January 2022   Can you participate in a virtual visit with your PCP: YES                    Current Advanced Directive/Advance Care Plan: Full Code  Pt has AD and has spouse: Destini Phillips. Listed with phone # [phone]  Healthcare Decision Maker:   Click here to [address] including selection of the Healthcare Decision Maker Relationship (ie \"Primary\")                      Transition of Care Plan:                    CM spoke with pt to complete initial assessment and begin discharge planning. Pt confirmed and verified demographics. Pt lives with spouse. Pt reports she is independent with iADLs, driving. Pt is AOx4. Pt reports having surgery for her knee and had outpt therapy scheduled. Pt currently had DME: walker available. Pt has Rx coverage and uses [facility] in [place]. Pt confirmed family will provide transportation at discharge. Plan of Care:  Medical management  PT/OT to follow up with pt  Family will provide transport at discharge    Care Management Interventions  PCP Verified by CM: Yes (dr. Flavia Milan)  Mode of Transport at Discharge:  Other (see comment)  Transition of Care Consult (CM Consult): Discharge Planning  Support Systems: Spouse/Significant Other, Other Family Member(s)  Confirm Follow Up Transport: Family  The Plan for Transition of Care is Related to the Following Treatment Goals : S/P right total knee arthroplast, Aspiration PNA  Discharge Location  Patient Expects to be Discharged to[de-identified] Home with outpatient services  Faviola Orta

## 2022-07-22 NOTE — PROGRESS NOTES
Papi Gavin [facility]  [address] [facility], [id number] [place] [address]  [phone]      Medical Progress Note      NAME: Barbie Voss   :  1950  MRM:  [id number]    Date/Time: 2022  4:05 PM         Subjective:     Chief Complaint: \"I'm okay\"     Pt seen and examined. No complaints. Off O2. No fevers. Does have a cough. Also with pain in her R knee (prior surgery). Otherwise doing well     ROS:  (bold if positive, if negative)    Cough        Objective:       Vitals:          Last 24hrs VS reviewed since prior progress note.  Most recent are:    Visit Vitals  BP (!) 156/81 (BP 1 Location: Left lower arm, BP Patient Position: At rest;Lying)   Pulse 84   Temp 97.8 °F (36.6 °C)   Resp 20   Ht 5' 3\" (1.6 m)   Wt 81.6 kg (180 lb)   SpO2 92%   BMI 31.89 kg/m²     SpO2 Readings from Last 6 Encounters:   22 92%   22 93%   22 96%   22 98%   21 99%   21 94%          Intake/Output Summary (Last 24 hours) at 2022 1605  Last data filed at 2022 1039  Gross per 24 hour   Intake 200 ml   Output --   Net 200 ml          Exam:     Physical Exam:    Gen:  Well-developed, well-nourished, in no acute distress  HEENT:  Pink conjunctivae, PERRL, hearing intact to voice, moist mucous membranes  Neck:  Supple, without masses, thyroid non-tender  Resp:  No accessory muscle use, clear breath sounds without wheezes rales or rhonchi  Card:  No murmurs, normal S1, S2 without thrills, bruits or peripheral edema  Abd:  Soft, non-tender, non-distended, normoactive bowel sounds are present  Musc:  No cyanosis or clubbing  Skin:  No rashes or ulcers, skin turgor is good  Neuro:  Cranial nerves 3-12 are grossly intact,  strength is 5/5 bilaterally and dorsi / plantarflexion is 5/5 bilaterally, follows commands appropriately  Psych:  Good insight, oriented to person, place and time, alert  R knee dressing C/D/I     Medications Reviewed: (see below)    Lab Data Reviewed: (see below)    ______________________________________________________________________    Medications:     Current Facility-Administered Medications   Medication Dose Route Frequency    albuterol-ipratropium (DUO-NEB) 2.5 MG-0.5 MG/3 ML  3 mL Nebulization Q6H RT    aspirin delayed-release tablet 81 mg  81 mg Oral BID    clopidogreL (PLAVIX) tablet 75 mg  75 mg Oral 7am    ferrous sulfate tablet 325 mg  325 mg Oral DAILY WITH BREAKFAST    latanoprost (XALATAN) 0.005 % ophthalmic solution 1 Drop  1 Drop Both Eyes QHS    levothyroxine (SYNTHROID) tablet 50 mcg  50 mcg Oral ACB    metoprolol tartrate (LOPRESSOR) tablet 25 mg  25 mg Oral BID    montelukast (SINGULAIR) tablet 10 mg  10 mg Oral QPM    pravastatin (PRAVACHOL) tablet 80 mg  80 mg Oral QHS    [START ON 7/23/2022] vitamin E acetate capsule 400 Units  400 Units Oral 7am    albuterol (ACCUNEB) nebulizer solution 1.25 mg  1.25 mg Nebulization Q6H PRN    acetaminophen (TYLENOL) tablet 650 mg  650 mg Oral Q6H    morphine injection 2 mg  2 mg IntraVENous Q4H PRN    benzonatate (TESSALON) capsule 100 mg  100 mg Oral TID PRN    piperacillin-tazobactam (ZOSYN) 3.375 g in 0.9% sodium chloride (MBP/ADV) 100 mL MBP  3.375 g IntraVENous Q8H    sodium chloride (NS) flush 5-40 mL  5-40 mL IntraVENous Q8H    sodium chloride (NS) flush 5-40 mL  5-40 mL IntraVENous PRN    polyethylene glycol (MIRALAX) packet 17 g  17 g Oral DAILY PRN    ondansetron (ZOFRAN ODT) tablet 4 mg  4 mg Oral Q8H PRN    Or    ondansetron (ZOFRAN) injection 4 mg  4 mg IntraVENous Q6H PRN    enoxaparin (LOVENOX) injection 40 mg  40 mg SubCUTAneous DAILY            Lab Review:     Recent Labs     07/22/22  0640 07/21/22  1235   WBC 10.2 11.5*   HGB 8.2* 8.4*   HCT 24.7* 24.9*   * 590*     Recent Labs     07/22/22  0640 07/21/22  1235   * 130*   K 4.0 5.4*   CL 97 96*   CO2 27 28   * 122*   BUN 10 12   CREA 0.88 0.79   CA 9.0 8.9   MG 2.2 2.1   ALB  --  2.6*   ALT  --  42     No components found for: [place]         Assessment / Plan:   Bilateral pneumonia (7/21/2022) - with debris on imaging suspicious for aspiration (likely occurred while vomiting)   -on zosyn   -speech eval       Nausea and vomiting (7/21/2022) - ?etiology. ?2/2 PNA ? COVID ?narcotics. Benign abdomen. No constipation   -zofran PRN   -IVF's   -lipase WNL       Hyponatremia (7/21/2022) - resolved.  ?IVVD   -continue IVF's       COPD (chronic obstructive pulmonary disease) ([facility] [address] (7/22/2022) - NOT wheezing   -change nebs to PRN       Hypertension ()  -on metoprolol   -hydralazine PRN       Primary osteoarthritis of right knee (7/11/2022) - looks good  -scheduled tylenol  -PRN morphine   -PT/OT   -Lovenox for DVT prophylaxis     Anemia - likely 2/2 recent surgery   -check ferritin, B12/folate  -check stool blood     H/o carotid stent   -on ASA/plavix     Total time spent with patient: [phone] [facility] discussed with: Patient    Discussed:  Care Plan    Prophylaxis:  Lovenox    Disposition:  Home w/Family           ___________________________________________________    Attending Physician: Sheila Blackmon MD

## 2022-07-23 VITALS
RESPIRATION RATE: 16 BRPM | TEMPERATURE: 97.7 F | WEIGHT: 180 LBS | DIASTOLIC BLOOD PRESSURE: 70 MMHG | OXYGEN SATURATION: 90 % | HEIGHT: 63 IN | BODY MASS INDEX: 31.89 KG/M2 | SYSTOLIC BLOOD PRESSURE: 161 MMHG | HEART RATE: 80 BPM

## 2022-07-23 PROCEDURE — 74011250636 HC RX REV CODE- 250/636: Performed by: INTERNAL MEDICINE

## 2022-07-23 PROCEDURE — 74011000250 HC RX REV CODE- 250: Performed by: INTERNAL MEDICINE

## 2022-07-23 PROCEDURE — 74011000258 HC RX REV CODE- 258: Performed by: INTERNAL MEDICINE

## 2022-07-23 PROCEDURE — 74011250637 HC RX REV CODE- 250/637: Performed by: INTERNAL MEDICINE

## 2022-07-23 RX ORDER — LISINOPRIL 5 MG/1
10 TABLET ORAL DAILY
Status: DISCONTINUED | OUTPATIENT
Start: 2022-07-23 | End: 2022-07-23 | Stop reason: HOSPADM

## 2022-07-23 RX ORDER — BENZONATATE 100 MG/1
100 CAPSULE ORAL 3 TIMES DAILY
Status: DISCONTINUED | OUTPATIENT
Start: 2022-07-23 | End: 2022-07-23 | Stop reason: HOSPADM

## 2022-07-23 RX ORDER — HYDROCHLOROTHIAZIDE 25 MG/1
12.5 TABLET ORAL DAILY
Status: DISCONTINUED | OUTPATIENT
Start: 2022-07-23 | End: 2022-07-23 | Stop reason: HOSPADM

## 2022-07-23 RX ORDER — DOXYCYCLINE 100 MG/1
100 TABLET ORAL 2 TIMES DAILY
Qty: 14 TABLET | Refills: 0 | Status: SHIPPED | OUTPATIENT
Start: 2022-07-23 | End: 2022-07-30

## 2022-07-23 RX ORDER — HYDROCODONE POLISTIREX AND CHLORPHENIRAMINE POLISTIREX 10; 8 MG/5ML; MG/5ML
5 SUSPENSION, EXTENDED RELEASE ORAL
Qty: 70 ML | Refills: 0 | Status: SHIPPED | OUTPATIENT
Start: 2022-07-23 | End: 2022-07-26

## 2022-07-23 RX ORDER — BENZONATATE 100 MG/1
100 CAPSULE ORAL 3 TIMES DAILY
Qty: 21 CAPSULE | Refills: 0 | Status: SHIPPED | OUTPATIENT
Start: 2022-07-23 | End: 2022-07-30

## 2022-07-23 RX ORDER — AMOXICILLIN AND CLAVULANATE POTASSIUM 875; 125 MG/1; MG/1
1 TABLET, FILM COATED ORAL EVERY 12 HOURS
Qty: 14 TABLET | Refills: 0 | Status: SHIPPED | OUTPATIENT
Start: 2022-07-23 | End: 2022-07-30

## 2022-07-23 RX ADMIN — LEVOTHYROXINE SODIUM 50 MCG: 0.05 TABLET ORAL at 08:58

## 2022-07-23 RX ADMIN — Medication 10 ML: at 06:00

## 2022-07-23 RX ADMIN — ACETAMINOPHEN 650 MG: 325 TABLET ORAL at 00:24

## 2022-07-23 RX ADMIN — ACETAMINOPHEN 650 MG: 325 TABLET ORAL at 05:53

## 2022-07-23 RX ADMIN — ACETAMINOPHEN 650 MG: 325 TABLET ORAL at 12:22

## 2022-07-23 RX ADMIN — ASPIRIN 81 MG: 81 TABLET, COATED ORAL at 08:57

## 2022-07-23 RX ADMIN — METOPROLOL TARTRATE 25 MG: 25 TABLET, FILM COATED ORAL at 08:57

## 2022-07-23 RX ADMIN — CLOPIDOGREL BISULFATE 75 MG: 75 TABLET ORAL at 06:15

## 2022-07-23 RX ADMIN — FERROUS SULFATE TAB 325 MG (65 MG ELEMENTAL FE) 325 MG: 325 (65 FE) TAB at 08:57

## 2022-07-23 RX ADMIN — ENOXAPARIN SODIUM 40 MG: 100 INJECTION SUBCUTANEOUS at 08:57

## 2022-07-23 RX ADMIN — BENZONATATE 100 MG: 100 CAPSULE ORAL at 12:22

## 2022-07-23 RX ADMIN — PIPERACILLIN AND TAZOBACTAM 3.38 G: 3; .375 INJECTION, POWDER, FOR SOLUTION INTRAVENOUS at 05:53

## 2022-07-23 RX ADMIN — LISINOPRIL 10 MG: 5 TABLET ORAL at 12:22

## 2022-07-23 RX ADMIN — Medication 400 UNITS: at 08:57

## 2022-07-23 RX ADMIN — HYDROCHLOROTHIAZIDE 12.5 MG: 25 TABLET ORAL at 12:21

## 2022-07-23 NOTE — PROGRESS NOTES
Orthopaedic Progress Note    2022 10:54 AM     Patient: Calvin Tadeo MRN: [id number]  SSN: [id number]    YOB: 1950  Age: 66 y.o. Sex: female      Admit date:  2022  Admitting Physician:  Dora Lozada MD   Consulting Physician(s): Treatment Team: Attending Provider: Swapnil Harrington MD; Consulting Provider: KRZYSZTOF Bustos; Surgeon: Chari Steward MD; Utilization Review: Benita Aguilar RN; Care Manager: Thu Alcantar; Primary Nurse: Alfredo Gresham RN    SUBJECTIVE:     Calvin Tadeo is a 66 y.o. female is resting in bed. She has complaints of right knee pain today. She has a cough today, but no SOB. No complaints of nausea, vomiting, dizziness, lightheadedness, or chest pain. OBJECTIVE:       Physical Exam:  General: Alert, cooperative, no distress. Respiratory: Respirations unlabored  Neurological:  Neurovascular exam within normal limits. Motor: + DF/PF. Musculoskeletal: Calves soft, supple, non-tender upon palpation. PROM: 5-70. Mild effusion. Knee is ligamentously stable with valgus and varus stress test at 0 and 30 degrees. Dressing/Wound:  Clean, dry and intact. No significant erythema or swelling.       Vital Signs:        Patient Vitals for the past 8 hrs:   BP Temp Pulse Resp SpO2   22 136/66 98.3 °F (36.8 °C) 78 20 90 %   22 1502 (!) 156/81 97.8 °F (36.6 °C) 84 20 92 %   22 1453 -- -- 86 -- --   22 1359 -- -- -- -- 93 %                                          Temp (24hrs), Av.2 °F (36.8 °C), Min:97.4 °F (36.3 °C), Max:98.8 °F (37.1 °C)      Labs:        Recent Labs     22  0640   HCT 24.7*   HGB 8.2*     Lab Results   Component Value Date/Time    Sodium 134 (L) 2022 06:40 AM    Potassium 4.0 2022 06:40 AM    Chloride 97 2022 06:40 AM    CO2 27 2022 06:40 AM    Glucose 133 (H) 2022 06:40 AM    BUN 10 2022 06:40 AM    Creatinine 0.88 2022 06:40 AM    Calcium 9.0 07/22/2022 06:40 AM       PT/OT:        Gait  Ambulation - Level of Assistance: Supervision  Distance (ft): 140 Feet (ft)  Assistive Device: Gait belt, Walker, rolling       Patient mobility  Bed Mobility Training  Rolling: Independent  Supine to Sit: Independent  Sit to Supine: Independent  Scooting: Independent  Transfer Training  Sit to Stand: Modified independent  Stand to Sit: Modified independent  Bed to Chair: Modified independent      Gait Training  Assistive Device: Gait belt, Walker, rolling  Ambulation - Level of Assistance: Supervision  Distance (ft): 140 Feet (ft)            ASSESSMENT / PLAN:   Active Problems:    Hypertension ()      Primary osteoarthritis of right knee (7/11/2022)      Bilateral pneumonia (7/21/2022)      Nausea and vomiting (7/21/2022)      Hyponatremia (7/21/2022)      Aspiration pneumonia ([facility] [address] (7/21/2022)      COPD (chronic obstructive pulmonary disease) ([facility] [address] (7/22/2022)          A: 1. S/P right total knee arthroplasty  2. Aspiration PNA     P: 1. Appreciate hospitalist admission and medical management. Continue Zosyn per hospitalist.  2. Right knee: WBAT. ROM as tolerated. Ok to resume DVT ppx. - Lovenox ok inpatient and transition to ASA outpatient. 3.  Orthopedics is available for questions but will follow peripherally.       Signed By:  Marian Ya, [address] [place] [address]

## 2022-07-23 NOTE — PROGRESS NOTES
Care Plan reviewed and all goals met adequately for discharge. Problem: Falls - Risk of  Goal: *Absence of Falls  Description: Document [place] Fall Risk and appropriate interventions in the flowsheet.   Outcome: Resolved/Met  Note: Fall Risk Interventions:  Mobility Interventions: Patient to call before getting OOB, PT Consult for mobility concerns, PT Consult for assist device competence, Utilize walker, cane, or other assistive device         Medication Interventions: Patient to call before getting OOB, Teach patient to arise slowly    Elimination Interventions: Call light in reach              Problem: Patient Education: Go to Patient Education Activity  Goal: Patient/Family Education  Outcome: Resolved/Met

## 2022-07-23 NOTE — PROGRESS NOTES
1410: I have reviewed discharge instructions with the patient. The patient verbalized understanding. Pt discharged to home in [facility]. Pt left in stable condition. Pt's son-in-law drove her home.

## 2022-07-23 NOTE — DISCHARGE SUMMARY
Physician Discharge Summary     Patient ID:  Sami Hinton  [id number]  72 y.o.  1950    Admit date: 7/21/2022    Discharge date and time: 7/23/2022    Admission Diagnoses: Pneumonia [J18.9]    Discharge Diagnoses/Hospital Course   Ms. Uziel Downey is a 67 yo female with PMH of HTN, COPD, h/o carotid stent admitted for bilateral PNA     Bilateral pneumonia (7/21/2022) - with debris on imaging suspicious for aspiration (likely occurred while vomiting). NOT hypoxic or dyspneic. No fevers. WBC downtrended with antibiotics and pt was discharged to home on PO antibiotics    Nausea and vomiting (7/21/2022) - ?etiology. ?2/2 PNA  ? narcotics. Benign abdomen. No constipation. Resolved      Hyponatremia (7/21/2022) - resolved with IVF's      COPD (chronic obstructive pulmonary disease) ([facility] [address] (7/22/2022) - NOT wheezing      Hypertension () - on metoprolol      Primary osteoarthritis of right knee (7/11/2022) - dressing C/D/I. To resume outpt PT/OT      Anemia - likely 2/2 recent surgery/blood loss during surgery.  Stable in house at low 8 range     H/o carotid stent - on ASA/plavix      PCP: Andria Diehl MD     Consults:  Orthopedics    Discharge Exam:  Visit Vitals  BP (!) 161/70 (BP 1 Location: Left upper arm, BP Patient Position: At rest)   Pulse 80   Temp 97.7 °F (36.5 °C)   Resp 16   Ht 5' 3\" (1.6 m)   Wt 81.6 kg (180 lb)   SpO2 90%   BMI 31.89 kg/m²      Gen:  Well-developed, well-nourished, in no acute distress  HEENT:  Pink conjunctivae, PERRL, hearing intact to voice, moist mucous membranes  Neck:  Supple, without masses, thyroid non-tender  Resp:  No accessory muscle use, clear breath sounds without wheezes rales or rhonchi  Card:  No murmurs, normal S1, S2 without thrills, bruits or peripheral edema  Abd:  Soft, non-tender, non-distended, normoactive bowel sounds are present  Musc:  No cyanosis or clubbing  Skin:  No rashes or ulcers, skin turgor is good  Neuro:  Cranial nerves 3-12 are grossly intact,  strength is 5/5 bilaterally and dorsi / plantarflexion is 5/5 bilaterally, follows commands appropriately  Psych:  Good insight, oriented to person, place and time, alert  R knee dressing C/D/I     Disposition: home    Patient Instructions:   Discharge Medication List as of 7/23/2022  1:08 PM        START taking these medications    Details   benzonatate (TESSALON) 100 mg capsule Take 1 Capsule by mouth three (3) times daily for 7 days. , Normal, Disp-21 Capsule, R-0      amoxicillin-clavulanate (Augmentin) 875-125 mg per tablet Take 1 Tablet by mouth every twelve (12) hours for 7 days. , Normal, Disp-14 Tablet, R-0      doxycycline (ADOXA) 100 mg tablet Take 1 Tablet by mouth two (2) times a day for 7 days. , Normal, Disp-14 Tablet, R-0      HYDROcodone-chlorpheniramine (Tussionex Pennkinetic ER) 10-8 mg/5 mL suspension Take 5 mL by mouth every twelve (12) hours as needed for Cough for up to 3 days. Max Daily Amount: 10 mL., Normal, Disp-70 mL, R-0           CONTINUE these medications which have NOT CHANGED    Details   aspirin delayed-release 81 mg tablet Take 1 Tablet by mouth two (2) times a day., Normal, Disp-60 Tablet, R-0      acetaminophen (Acetaminophen Pain Relief) 500 mg tablet Take 2 Tablets by mouth every six (6) hours as needed for Pain., Normal, Disp-60 Tablet, R-1      ondansetron (ZOFRAN ODT) 8 mg disintegrating tablet Take 0.5 Tablets by mouth every eight (8) hours as needed for Nausea., Normal, Disp-30 Tablet, R-0      levothyroxine (SYNTHROID) 50 mcg tablet Take 50 mcg by mouth Daily (before breakfast). , Historical Med      metoprolol tartrate (LOPRESSOR) 25 mg tablet Take 25 mg by mouth two (2) times a day., Historical Med      TURMERIC PO Take 1,400 mg by mouth two (2) times a day., Historical Med      clopidogreL (Plavix) 75 mg tab Take 75 mg by mouth every morning.  For stents in neck , Historical Med      albuterol (PROVENTIL HFA, VENTOLIN HFA, PROAIR HFA) 90 mcg/actuation inhaler Take 1 Puff by inhalation every six (6) hours as needed for Wheezing., Historical Med      calcium citrate-vitamin d3 (CITRACAL+D) 315 mg-5 mcg (200 unit) tab Take 2 Tablets by mouth two (2) times daily (with meals). , Historical Med      cranberry fruit extract (CRANBERRY EXTRACT PO) Take 2 Tablets by mouth every morning., Historical Med      ferrous sulfate 325 mg (65 mg iron) tablet Take 1 Tab by mouth daily (with breakfast). , Print, Disp-60 Tab, R-0      umeclidinium-vilanterol (ANORO ELLIPTA) 62.5-25 mcg/actuation inhaler Take 1 Puff by inhalation every morning., Historical Med      glucosamine sulfate (GLUCOSAMINE) 500 mg tablet Take 1,000 mg by mouth every morning., Historical Med      magnesium 200 mg tab Take 2 Tablets by mouth every morning., Historical Med      vitamin E (AQUA GEMS) 400 unit capsule Take 400 Units by mouth every morning., Historical Med      Omega-3 Fatty Acids 60- mg cpDR Take 2 Tabs by mouth every morning., Historical Med      metFORMIN (GLUCOPHAGE) 500 mg tablet Take 500 mg by mouth two (2) times daily (with meals). , Historical Med      montelukast (SINGULAIR) 10 mg tablet Take 10 mg by mouth every evening., Historical Med      lisinopril-hydrochlorothiazide (PRINZIDE, ZESTORETIC) 10-12.5 mg per tablet Take 1 Tab by mouth every morning., Historical Med      pravastatin (PRAVACHOL) 80 mg tablet Take 80 mg by mouth nightly.  , Historical Med      latanoprost (XALATAN) 0.005 % ophthalmic solution Administer 1 Drop to both eyes nightly.  , Historical Med              Activity: Activity as tolerated  Diet: Regular Diet  Wound Care: None needed    Follow-up with Tevin Lewis MD     Approximate time spent in patient care on day of discharge: 35 minutes     Signed:  Malika Adams MD  7/23/2022  5:09 PM

## 2022-07-23 NOTE — PROGRESS NOTES
Occupational Therapy    Acknowledge OT order and discussed patient with nursing. Patient preparing for dc home. She had recent TKA and admitted for aspiration PNA. Received EOB, dressed, and with personal RW with basket on it. She denies any concerns with ADL tasks. Ed on incentive spirometer which she rpeorts she has one at home and multiple other providers have told her to use that at home. No acute OT needs at this time.      Thank you,    Rahul Delgado, OT

## 2022-07-23 NOTE — DISCHARGE INSTRUCTIONS
HOSPITALIST DISCHARGE INSTRUCTIONS  NAME: Julisa Calderon   :  1950   MRN:  [id number]     Date/Time:  2022 10:21 AM    ADMIT DATE: 2022     DISCHARGE DATE: 2022     ADMITTING DIAGNOSIS:  Aspiration pneumonia     DISCHARGE DIAGNOSIS:  As above     MEDICATIONS:     It is important that you take the medication exactly as they are prescribed. Keep your medication in the bottles provided by the pharmacist and keep a list of the medication names, dosages, and times to be taken in your wallet. Do not take other medications without consulting your doctor. Pain Management: per above medications    What to do at Home    Recommended diet:  Regular Diet    Recommended activity: Activity as tolerated    If you experience any of the following symptoms then please call your primary care physician or return to the emergency room if you cannot get hold of your doctor:  Fever, chills, nausea, vomiting, diarrhea, change in mentation, falling, bleeding, shortness of breath, chest pain     Follow Up:  Please follow-up with PCP as needed     Information obtained by :  I understand that if any problems occur once I am at home I am to contact my physician. I understand and acknowledge receipt of the instructions indicated above.                                                                                                                                            Physician's or R.N.'s Signature                                                                  Date/Time                                                                                                                                              Patient or Representative Signature                                                          Date/Time

## 2022-07-23 NOTE — PROGRESS NOTES
Problem: Falls - Risk of  Goal: *Absence of Falls  Description: Document Danelle Mazariegos Fall Risk and appropriate interventions in the flowsheet.   Outcome: Resolved/Met  Note: Fall Risk Interventions:  Mobility Interventions: Patient to call before getting OOB, PT Consult for mobility concerns, PT Consult for assist device competence, Utilize walker, cane, or other assistive device         Medication Interventions: Patient to call before getting OOB, Teach patient to arise slowly    Elimination Interventions: Call light in reach              Problem: Patient Education: Go to Patient Education Activity  Goal: Patient/Family Education  Outcome: Resolved/Met

## 2022-07-23 NOTE — PROGRESS NOTES
Patient is safe. Vitals stable. No acute events overnight. Will continue to monitor patient and progress towards discharge. Report  given to oncoming nurse    Problem: Falls - Risk of  Goal: *Absence of Falls  Description: Document Clay Blackmon Fall Risk and appropriate interventions in the flowsheet.   Outcome: Progressing Towards Goal  Note: Fall Risk Interventions:  Mobility Interventions: OT consult for ADLs         Medication Interventions: Evaluate medications/consider consulting pharmacy                   Problem: Patient Education: Go to Patient Education Activity  Goal: Patient/Family Education  Outcome: Progressing Towards Goal

## 2022-07-25 ENCOUNTER — PATIENT OUTREACH (OUTPATIENT)
Dept: CASE MANAGEMENT | Age: 72
End: 2022-07-25

## 2022-07-25 NOTE — PROGRESS NOTES
Care Transitions Initial Call    Call within 2 business days of discharge: Yes     Patient: Julisa Calderon Patient : 1950 MRN: [id number]    Last Discharge  Street       Date Complaint Diagnosis Description Type Department Provider    22 Cough; Vomiting Aspiration pneumonia of both lungs, unspecified aspiration pneumonia type, unspecified part of lung ([facility] [address] . .. ED to Hosp-Admission (Discharged) (ADMIT) Pilo Madison MD; Alberta Duverney. .. Was this an external facility discharge? No     Challenges to be reviewed by the provider   Additional needs identified to be addressed with provider: yes     22 14:11 22 05:10 22 12:35 22 06:40   HGB 11.8 9.5 (L) 8.4 (L) 8.2 (L)   (L): Data is abnormally low       Method of communication with provider : none    Discussed COVID-19 related testing which was available at this time. Test results were negative. Patient informed of results, if available? no     Advance Care Planning:   Does patient have an Advance Directive: yes, reviewed and current. Inpatient Readmission Risk score: Unplanned Readmit Risk Score: 13.1    Was this a readmission? yes   Patient stated reason for the admission:     Patients top risk factors for readmission: medical condition-Post op (R) replacement, PNA, COPD, HTN, anemia    Interventions to address risk factors: Scheduled appointment with PCP-Patient states she has an appt with PCP on , told them it was for [facility] and that was first available, Scheduled appointment with [facility] [address] Eldridge Meckel 22 at 1:20pm, Education of patient/family/caregiver/guardian to support self-management-PNA, COPD, HTN, anemia  , and Assessment and support for treatment adherence and medication management-PNA, COPD, HTN, anemia      Care Transition Nurse (CTN) contacted the patient by telephone to perform post hospital discharge assessment. Verified name and  with patient as identifiers.  Provided introduction to self, and explanation of the CTN role. CTN reviewed discharge instructions, medical action plan and red flags with patient who verbalized understanding. Were discharge instructions available to patient? yes. Reviewed appropriate site of care based on symptoms and resources available to patient including: PCP and Specialist. Patient given an opportunity to ask questions and does not have any further questions or concerns at this time. The patient agrees to contact the PCP office for questions related to their healthcare. Medication reconciliation was performed with patient, who verbalizes understanding of administration of home medications. Referral to Pharm D needed: no     Home Health/Outpatient orders at discharge: none  Durable Medical Equipment ordered at discharge: None    Was patient discharged with a pulse oximeter? no    Discussed follow-up appointments. If no appointment was previously scheduled, appointment scheduling offered: yes. Is follow up appointment scheduled within 7 days of discharge? yes. [facility] follow up appointment(s):   Future Appointments   Date Time Provider Latha Barrera   12/1/2022  9:40 AM Erika Pozo MD [facility]     Non-[facility] follow up appointment(s): Ortho JOSE Godinez 7/26/22 at 1:20pm  PCP Dr Marek Snyder on 8/23/22 first available  Plan for follow-up call in 5-7 days based on severity of symptoms and risk factors. CTN provided contact information for future needs. Goals Addressed                   This Visit's Progress     Attends follow-up appointments as directed. 07/25/22   Patient had (R) knee replacement on 7/11. Patient has an appt with Ortho on 7/26 at 1:20 pm.  Patient has called PCP office and first available for [facility] F/U is Aug 23r d. Monitor status of these appt on next call. Marysol Rojas MSN, RN, [facility] / Care Transition Nurse / [phone]        Prevent complications post hospitalization.         07/25/22   Patient states she has SOB on exertion and a productive cough of brown mucus. Patient is up with use of walker, continues with OP PT, went today. Patient is on Augmentin and Doxycycline for 5 more days.   Patient states she is tired, not getting much sleep at HS until last night when she took the

## 2022-08-01 ENCOUNTER — HOSPITAL ENCOUNTER (OUTPATIENT)
Dept: GENERAL RADIOLOGY | Age: 72
Discharge: HOME | End: 2022-08-01
Payer: MEDICARE

## 2022-08-01 ENCOUNTER — TRANSCRIBE ORDER (OUTPATIENT)
Dept: GENERAL RADIOLOGY | Age: 72
End: 2022-08-01

## 2022-08-01 DIAGNOSIS — J69.0 PNEUMONITIS DUE TO INHALATION OF FOOD OR VOMITUS (HCC): ICD-10-CM

## 2022-08-01 DIAGNOSIS — J69.0 PNEUMONITIS DUE TO INHALATION OF FOOD OR VOMITUS (HCC): Primary | ICD-10-CM

## 2022-08-01 PROCEDURE — 71046 X-RAY EXAM CHEST 2 VIEWS: CPT

## 2022-08-03 ENCOUNTER — PATIENT OUTREACH (OUTPATIENT)
Dept: CASE MANAGEMENT | Age: 72
End: 2022-08-03

## 2022-08-03 NOTE — PROGRESS NOTES
Care Transitions Follow Up Call    Challenges to be reviewed by the provider   Additional needs identified to be addressed with provider: no  none           Method of communication with provider : none    Care Transition Nurse (CTN) contacted the patient by telephone to follow up . Verified name and  with patient as identifiers. Addressed changes since last contact: none  Follow up appointment completed? yes. Was follow up appointment scheduled within 7 days of discharge? yes. Advance Care Planning:   Does patient have an Advance Directive:  yes; reviewed and current     CTN reviewed discharge instructions, medical action plan and red flags with patient and discussed any barriers to care and/or understanding of plan of care after discharge. Discussed appropriate site of care based on symptoms and resources available to patient including: PCP and Specialist. The patient agrees to contact the PCP office for questions related to their healthcare. [facility] follow up appointment(s):   Future Appointments   Date Time Provider Latha Barrera   2022  9:40 AM Kisha Espinoza MD [facility]     Non-[facility] follow up appointment(s): PCP- 22    CTN provided contact information for future needs. Plan for follow-up call in 7-10 days based on severity of symptoms and risk factors. Goals Addressed                   This Visit's Progress     Attends follow-up appointments as directed. On track     22   Patient had (R) knee replacement on . Patient has an appt with Ortho on  at 1:20 pm.  Patient has called PCP office and first available for [facility] F/U is Aug 23rd. Monitor status of these appt on next call. Meghan Cervantes MSN, RN, CCM / Care Transition Nurse / [phone]     22   Patient was seen by Ortho on  as scheduled. Patient was seen by PCP on  and has another F/U on 22. Monitor status of PCP appt on next call.   Meghan Cervantes MSN, RN, CCM / Care Transition Nurse / [phone]        Prevent complications post hospitalization. On track     07/25/22   Patient states she has SOB on exertion and a productive cough of brown mucus. Patient is up with use of walker, continues with OP PT, went today. Patient is on Augmentin and Doxycycline for 5 more days. Patient states she is tired, not getting much sleep at HS until last night when she took the  HS cough medication. Lela JADE, RN, CCM / Care Transition Nurse / [phone]        08/03/22   Patient denies any SOB or cough, has 1 more ABX to finish. Patient had another episode of vomiting and PCP started her on Omeprazole for 14 days. So food make her nauseated-Triston sauce. Continues with OP PT and amb with cane. Monitor for cough, activity tolerance, PO intake, N/V and cough on next call.     Lela Vidal MSN, RN, CCM / Care Transition Nurse / [phone]

## 2022-08-17 ENCOUNTER — PATIENT OUTREACH (OUTPATIENT)
Dept: CASE MANAGEMENT | Age: 72
End: 2022-08-17

## 2022-08-17 NOTE — PROGRESS NOTES
Care Transitions Follow Up Call    Challenges to be reviewed by the provider   Additional needs identified to be addressed with provider: yes  Continues knee pain-calling Ortho to see if she can be seen sooner than 22. Method of communication with provider : none    Care Transition Nurse (CTN) contacted the patient by telephone to follow up. Verified name and  with patient as identifiers. Addressed changes since last contact: none  Follow up appointment completed? yes. Was follow up appointment scheduled within 7 days of discharge? yes. Advance Care Planning:   Does patient have an Advance Directive:  yes; reviewed and current     CTN reviewed discharge instructions, medical action plan and red flags with patient and discussed any barriers to care and/or understanding of plan of care after discharge. Discussed appropriate site of care based on symptoms and resources available to patient including: PCP and Specialist. The patient agrees to contact the PCP office for questions related to their healthcare. [facility] follow up appointment(s):   Future Appointments   Date Time Provider Latha Holly   2022  9:40 AM Mell Russ MD [facility]     Non-[facility] follow up appointment(s): PCP -22, Ortho 22    CTN provided contact information for future needs. Plan for follow-up call in 5-7 days based on severity of symptoms and risk factors. Goals Addressed                   This Visit's Progress     Attends follow-up appointments as directed. On track     22   Patient had (R) knee replacement on . Patient has an appt with Ortho on  at 1:20 pm.  Patient has called PCP office and first available for [facility] F/U is Aug 23rd. Monitor status of these appt on next call. Valerio Landon MSN, RN, [facility] / Care Transition Nurse / [phone]     22   Patient was seen by Ortho on  as scheduled.   Patient was seen by PCP on  and has another F/U on 8/30/22. Monitor status of PCP appt on next call. Casey JADE, RN, CCM / Care Transition Nurse / [phone]     08/17/22   Patient has her PCP appt on 8/30/22. Patient has an appt with Ortho on 9/7/22  Monitor status of these bhakti on next call. Casey JADE, RN, CCM / Care Transition Nurse / [phone]          Prevent complications post hospitalization. On track     07/25/22   Patient states she has SOB on exertion and a productive cough of brown mucus. Patient is up with use of walker, continues with OP PT, went today. Patient is on Augmentin and Doxycycline for 5 more days. Patient states she is tired, not getting much sleep at HS until last night when she took the  HS cough medication. Casey JADE, RN, CCM / Care Transition Nurse / [phone]        08/03/22   Patient denies any SOB or cough, has 1 more ABX to finish. Patient had another episode of vomiting and PCP started her on Omeprazole for 14 days. Some  food make her nauseated-Triston sauce. Continues with OP PT and amb with cane. Monitor for cough, activity tolerance, PO intake, N/V and cough on next call. Casey JADE, RN, CCM / Care Transition Nurse / [phone]       08/17/22   Patient denies any SOB or cough, continues to have knee pain suellen at HS. Taking Tylenol during the day and Oxycodone at HS and then wakes up at 3 am and takes more Tylenol. Nausea is better with the Omeprazole. Continues with OP PT. Monitor pain level, activity tolerance, nausea and vomiting.   Casey JADE, RN, CCM / Care Transition Nurse / [phone]

## 2022-08-24 ENCOUNTER — PATIENT OUTREACH (OUTPATIENT)
Dept: CASE MANAGEMENT | Age: 72
End: 2022-08-24

## 2022-08-24 NOTE — PROGRESS NOTES
Patient has graduated from the Transitions of Care Coordination  program on 8/24/22. Patient/family has the ability to self-manage at this time Care management goals have been completed. Patient was not referred to the [facility] team for further management. Goals Addressed                   This Visit's Progress     COMPLETED: Attends follow-up appointments as directed. 07/25/22   Patient had (R) knee replacement on 7/11. Patient has an appt with Ortho on 7/26 at 1:20 pm.  Patient has called PCP office and first available for [facility] F/U is Aug 23rd. Monitor status of these appt on next call. Casey JADE, RN, CCM / Care Transition Nurse / [phone]     08/03/22   Patient was seen by Ortho on 7/26 as scheduled. Patient was seen by PCP on 7/29 and has another F/U on 8/30/22. Monitor status of PCP appt on next call. Casey JADE, RN, CCM / Care Transition Nurse / [phone]     08/17/22   Patient has her PCP appt on 8/30/22. Patient has an appt with Ortho on 9/7/22  Monitor status of these bhakti on next call. Casey JADE, RN, CCM / Care Transition Nurse / [phone]     08/24/22   Patient still has her PCP appt on 8/30/22  Patient still has her Ortho appt on 9/7/22 scheduled. h  Casey JADE, RN, CCM / Care Transition Nurse / [phone]            COMPLETED: Prevent complications post hospitalization. 07/25/22   Patient states she has SOB on exertion and a productive cough of brown mucus. Patient is up with use of walker, continues with OP PT, went today. Patient is on Augmentin and Doxycycline for 5 more days. Patient states she is tired, not getting much sleep at HS until last night when she took the  HS cough medication. Casey JADE, RN, [facility] / Care Transition Nurse / [phone]        08/03/22   Patient denies any SOB or cough, has 1 more ABX to finish. Patient had another episode of vomiting and PCP started her on Omeprazole for 14 days.   Some  food make her nauseated-Triston sauce. Continues with OP PT and amb with cane. Monitor for cough, activity tolerance, PO intake, N/V and cough on next call. Tika JADE, RN, CCM / Care Transition Nurse / [phone]       08/17/22   Patient denies any SOB or cough, continues to have knee pain suellen at HS. Taking Tylenol during the day and Oxycodone at HS and then wakes up at 3 am and takes more Tylenol. Nausea is better with the Omeprazole. Continues with OP PT. Monitor pain level, activity tolerance, nausea and vomiting. Tika JADE, RN, CCM / Care Transition Nurse / [phone]     08/24/22   Patient states her nausea is better, is eating more. Continues with OT PT, is sleeping better, taking Oxycodone when she first goes to bed and  then when she wakes up takes Tramadol. Pain is about the same. Up without walker or cane. Tika JADE, RN, CCM / Care Transition Nurse / [phone]               Patient has Care Transition Nurse's contact information for any further questions, concerns, or needs.   Patients upcoming visits:    Future Appointments   Date Time Provider Latha Barrera   12/1/2022  9:40 AM Joshua Espinoza MD [facility]

## 2022-09-27 NOTE — PROGRESS NOTES
LVM for patient to give a call back. Patient needs colonoscopy per Dr. Torres. First available apt. Is 12/20.   Problem: Self Care Deficits Care Plan (Adult) Goal: *Acute Goals and Plan of Care (Insert Text) Occupational Therapy Goals Initiated 1/8/2019 1. Patient will don/doff arm sling at supervision/set-up level within 7 days. 2.  Patient will perform all R UE exercises per physician order with supervision/set-up within 7 days. 3.  Patient will perform upper body ADLs with supervision/set-up  within 7 days. 4. Patient will perform lower body ADLs with supervision/set-up within 7 days. 5.  Patient will perform toilet transfers with supervision/set-up utilizing best technique within 7 days. 6.  Patient will verbalize/demonstrate shoulder precautions during ADLs with 100% accuracy within 7 days. Occupational Therapy TREATMENT Patient: True Page (89 y.o. female) Date: 1/9/2019 Diagnosis: RIGHT SHOULDER OSTEO ARTHRITIS Osteoarthritis of right shoulder <principal problem not specified> Procedure(s) (LRB): 
RIGHT  REVERSE SHOULDER ARTHROPLASTY  WITH BICEPS TENODESIS AND AXILLARY NERVE DESSECTION(GENERAL WITH BLOCK) (Right) 2 Days Post-Op Precautions: Fall, shoulder/NWB + sling on at all times- R UE Chart, occupational therapy assessment, plan of care, and goals were reviewed. ASSESSMENT: 
Ms. Maikel Grier was received in bed agreeable to activity. HR stable and improved today; Ranging from  bpm before, during, and after activity. Re/Education provided regarding shoulder precautions, sling application, ice pack application + wear schedule, ADL retraining/adaptive ADL techniques, and safe transfer techniques. Patient required supervision to come to sitting EOB and all OOB transfers. Patient able to transfer into the bathroom for toileting; Supervision needed for transfer, hygiene, and clothing management. Patient returned to the chair for dressing. Following education, min A needed for UB dressing and supervision needed for LB dressing.   Patient educated on R UE exercises per MD order and tolerated well with c/o minimal discomfort (see below). Patient has made good progress and now cleared for discharge home from OT perspective. Will continue to see while at the hospital for continued progression of exercise program.   
 
Progression toward goals: 
[x]       Improving appropriately and progressing toward goals 
[]       Improving slowly and progressing toward goals 
[]       Not making progress toward goals and plan of care will be adjusted PLAN: 
Patient continues to benefit from skilled intervention to address the above impairments. Continue treatment per established plan of care. Discharge Recommendations:  Per MD recommendation Further Equipment Recommendations for Discharge:  None at this time SUBJECTIVE:  
Patient stated I am feeling so much better today; I'm not sure why I felt strange yesterday.  OBJECTIVE DATA SUMMARY:  
Cognitive/Behavioral Status: 
Neurologic State: Alert Orientation Level: Oriented X4 Cognition: Appropriate decision making; Appropriate for age attention/concentration; Appropriate safety awareness; Follows commands Perception: Appears intact Perseveration: No perseveration noted Safety/Judgement: Awareness of environment;Good awareness of safety precautions Functional Mobility and Transfers for ADLs:Bed Mobility: 
Rolling: Supervision Supine to Sit: Supervision Sit to Supine: (pt remained up at end of tx) Scooting: Supervision Transfers: 
Sit to Stand: Supervision Functional Transfers Toilet Transfer : Supervision Bed to Chair: Supervision Balance: 
Sitting: Intact Standing: Intact ADL Intervention: 
Upper Body Dressing Assistance Dressing Assistance: Minimum assistance Orthotics(Brace): Minimum assistance Front Opened Shirt: Minimum assistance Cues: Verbal cues provided Patient educated on adaptive technique for upper body dressing.   Patient instructed to dress R upper extremity, pull shirt around back and work L upper extremity through sleeve. Patient demonstrated understanding and required min assist to complete task. Lower Body Dressing Assistance Dressing Assistance: Supervision/set up Underpants: Supervision/set-up Pants With Elastic Waist: Supervision/set-up Leg Crossed Method Used: Yes Position Performed: Seated in chair Cues: Verbal cues provided Patient instructed to don all clothing while sitting prior to standing, doff all clothing to knees while standing, then sit to doff clothing off from knees to feet in order to facilitate fall prevention, pain management, and energy conservation with ADLS. Patient indicated understanding/recalled strategies with min instruction. Toileting Toileting Assistance: Supervision/set up Bladder Hygiene: Supervision/set-up Bowel Hygiene: Supervision/set-up Clothing Management: Supervision/set-up Cues: Verbal cues provided Adaptive Equipment: Grab bars Cognitive Retraining Safety/Judgement: Awareness of environment;Good awareness of safety precautions Therapeutic Exercises: (Days 1 to 3) Exercises: per MD order 
  EXERCISE Sets Reps Active Active Assist  
Passive Comments Elbow Flexion/ extesnsion 3 10 []           [x]           []            3x day Wrist flexion/ extension 3 10 [x]           []           []            3x day Hand flexion/ extension 3 10 [x]           []           []            3x day Supine passive forward elevation  1 5 []           []           [x]            2x day; plane of scapula (PFE) to 
90 degrees only; Supine PFE by family member or using opposite arm; discomfort limited tolerance 
   
Codman's pendulum 1 10 []           []           [x]            clockwise/counter clockwise C-spine AROM 1 10 [x]           []           []            rotation, flexion, + lateral flexion  
  
Cautions: - Assure normal neurovascular status - No lifting of involved arm - Shoulder extension is limited; Elbow not to go behind midline of body - Protect the subscapularis repair 
  
Goals: 
-Maintain stable prosthesis -Minimize pain and inflammatory response 
-Achieve staged ROM goals 
-Establish stable scapula 
-Initiate pain free rotator cuff and deltoid strengthening Pain: 
Pain Scale 1: Numeric (0 - 10) Pain Intensity 1: 0 Activity Tolerance:  
Patient tolerated tx well. Please refer to the flowsheet for vital signs taken during this treatment. After treatment:  
[x] Patient left in no apparent distress sitting up in chair 
[] Patient left in no apparent distress in bed 
[x] Call bell left within reach [x] Nursing notified 
[x] Caregiver present 
[] Bed alarm activated COMMUNICATION/COLLABORATION:  
The patients plan of care was discussed with: Physical Therapist, Registered Nurse and patient. JULIAN Toscano/L Time Calculation: 40 mins

## 2022-11-28 ENCOUNTER — DOCUMENTATION ONLY (OUTPATIENT)
Dept: CARDIOLOGY CLINIC | Age: 72
End: 2022-11-28

## 2022-11-28 NOTE — PROGRESS NOTES
Lipids Ordered by ANDREA Lantigua 8/31/2022:      Cholesterol            154  Triglycerides           162  HDL                         51  VLDL                        28  LDL                           75

## 2022-11-30 NOTE — PROGRESS NOTES
Velasquez Duncan MD    [id number] [facility] [place], [address] [facility]    Office [phone],BTF [phone]      Sandro Valdivia is a 67 y.o. female is here for f/u visit . Dear Dr. Patsey Dancer,    I had the pleasure of seeing Ms. Sandro Valdivia in the office today. Chief complaint: As documented in EMR    Assessment:  History of syncope-December 2020 (neuro [id number]-[id number], has had event monitor 3/2021 also placed-no significant arrhythmias)  HTN  PVD w hx of rosalie iliac stents/carotid artery stenosis ( Dr Lyssa Hernandez); status post right carotid stent-March 2022; also states that she may need a stent left carotid artery - sees vascular yearly  Hx of Sinus tachycardia  HLD  Diabetes mellitus-controlled per patient - off Metformin  [facility] admission 7/21/2022-7/23/2022-bilateral pneumonia    Plan:     BP nml  Kadie nuclear study 5/11/22 - Nml  Cont Meds  Lipids per PCP. On statin. Target LDL less than 70. 4/2021 - . Has had it checked recently and was told it was slightly high. We will make a switch of the pravastatin 80 mg to Crestor 40 mg. She will take half a tablet daily for a few days to make sure she adjust to the new medications and then go up to 1 tablet daily. She will get a repeat lipid panel by her PCP when she sees her next time. F/u 6 months/earlier as needed based on cardiac work-up    Patient understands the plan. All questions were answered to the patient's satisfaction. Medication Side Effects and Warnings were discussed with patient: yes  Patient Labs were reviewed and or requested:  yes  Patient Past Records were reviewed and or requested: yes    I appreciate the opportunity to be involved in Ms. Joshi. See note below for details. Please do not hesitate to contact us with questions or concerns. Velasquez Duncan MD    Cardiac Testing/ Procedures: A. Cardiac Cath/PCI:    B.ECHO/ROYAL: 2/17/21 - LV: Estimated LVEF is 60 - 65%.  Biplane method used to measure ejection fraction. Normal cavity size and systolic function (ejection fraction normal). Mild concentric hypertrophy. Wall motion: normal.  1/8/19 - EF 55-60%/Mild MR    C. StressNuclear/Stress ECHO/Stress test: 5/11/22 - Nml kadie nuc study  3/2019 - Nml Kadie nuc ; 78%    D.Vascular: 1/22/21 Bilateral ICA mid range 50-79%    E. EP:Event monitor 3/14//21-4/12/2021  30 events transmitted-0 symptomatic, 30 device triggered, first-degree AV block-rate 74 bpm  7368 PACs with PAC burden of less than 1%  6838 PVCs with PVC burden of less than 1%  No significant arrhythmias    F. Miscellaneous:  R Carotid artery stent - March 2022 - Dr Bebe Abdullahi ( [facility])     Subjective:  Bernadine Pearson is a 67 y.o. female who returns for follow up visit. No chest pain, dyspnea. No hx of syncope/palpitations  Has had right TKR-pain improved but still has some swelling in her knee. ROS:  (bold if positive, if negative)           Medications before admission:    Current Outpatient Medications   Medication Sig Dispense    acetaminophen (Acetaminophen Pain Relief) 500 mg tablet Take 2 Tablets by mouth every six (6) hours as needed for Pain. 60 Tablet    levothyroxine (SYNTHROID) 50 mcg tablet Take 50 mcg by mouth Daily (before breakfast). metoprolol tartrate (LOPRESSOR) 25 mg tablet Take 25 mg by mouth two (2) times a day. TURMERIC PO Take 1,400 mg by mouth two (2) times a day. clopidogreL (PLAVIX) 75 mg tab Take 75 mg by mouth every morning. For stents in neck      albuterol (PROVENTIL HFA, VENTOLIN HFA, PROAIR HFA) 90 mcg/actuation inhaler Take 1 Puff by inhalation every six (6) hours as needed for Wheezing. calcium citrate-vitamin d3 (CITRACAL+D) 315 mg-5 mcg (200 unit) tab Take 2 Tablets by mouth two (2) times daily (with meals). cranberry fruit extract (CRANBERRY EXTRACT PO) Take 2 Tablets by mouth every morning. ferrous sulfate 325 mg (65 mg iron) tablet Take 1 Tab by mouth daily (with breakfast).  60 Tab umeclidinium-vilanteroL (ANORO ELLIPTA) 62.5-25 mcg/actuation inhaler Take 1 Puff by inhalation every morning. glucosamine sulfate (GLUCOSAMINE) 500 mg tablet Take 1,000 mg by mouth every morning.     magnesium 200 mg tab Take 2 Tablets by mouth every morning. vitamin E (AQUA GEMS) 400 unit capsule Take 400 Units by mouth every morning. Omega-3 Fatty Acids 60- mg cpDR Take 2 Tabs by mouth every morning.     montelukast (SINGULAIR) 10 mg tablet Take 10 mg by mouth every evening. lisinopril-hydrochlorothiazide (PRINZIDE, ZESTORETIC) 10-12.5 mg per tablet Take 1 Tab by mouth every morning. pravastatin (PRAVACHOL) 80 mg tablet Take 80 mg by mouth nightly. latanoprost (XALATAN) 0.005 % ophthalmic solution Administer 1 Drop to both eyes nightly. No current facility-administered medications for this visit. Family History of CAD:    Yes    Social History:  Current  Smoker  No ( Quit 2004)    Physical Exam:  Visit Vitals  /68 (BP 1 Location: Left upper arm, BP Patient Position: Sitting)   Pulse 78   Ht 5' 3\" (1.6 m)   Wt 165 lb (74.8 kg)   BMI 29.23 kg/m²            Gen: Well-developed, well-nourished, in no acute distress  Neck: Supple,No JVD, No Carotid Bruit,   Resp: No accessory muscle use, Clear breath sounds, No rales or rhonchi  Card: Regular Rate,Rythm,Normal S1, S2, No murmurs, rubs or gallop. No thrills.    Abd:  Soft, ,BS+,   MSK: No cyanosis  Skin: No rashes    Neuro: moving all four extremities , follows commands appropriately  Psych:  Good insight, oriented to person, place , alert, Nml Affect  LE: No edema    EKG:     LABS:        Lab Results   Component Value Date/Time    WBC 10.2 07/22/2022 06:40 AM    HGB 8.2 (L) 07/22/2022 06:40 AM    HCT 24.7 (L) 07/22/2022 06:40 AM    PLATELET 624 (H) 87/19/0728 06:40 AM     Lab Results   Component Value Date/Time    Sodium 134 (L) 07/22/2022 06:40 AM    Potassium 4.0 07/22/2022 06:40 AM    Chloride 97 07/22/2022 06:40 AM CO2 27 07/22/2022 06:40 AM    Anion gap 10 07/22/2022 06:40 AM    Glucose 133 (H) 07/22/2022 06:40 AM    BUN 10 07/22/2022 06:40 AM    Creatinine 0.88 07/22/2022 06:40 AM    BUN/Creatinine ratio 11 (L) 07/22/2022 06:40 AM    GFR est AA >60 07/22/2022 06:40 AM    GFR est non-AA >60 07/22/2022 06:40 AM    Calcium 9.0 07/22/2022 06:40 AM             Alysia Jacques MD

## 2022-12-01 ENCOUNTER — OFFICE VISIT (OUTPATIENT)
Dept: CARDIOLOGY CLINIC | Age: 72
End: 2022-12-01
Payer: MEDICARE

## 2022-12-01 VITALS
BODY MASS INDEX: 29.23 KG/M2 | WEIGHT: 165 LBS | HEART RATE: 78 BPM | SYSTOLIC BLOOD PRESSURE: 130 MMHG | HEIGHT: 63 IN | DIASTOLIC BLOOD PRESSURE: 68 MMHG

## 2022-12-01 DIAGNOSIS — I10 ESSENTIAL HYPERTENSION: Primary | ICD-10-CM

## 2022-12-01 DIAGNOSIS — E78.5 HYPERLIPIDEMIA, UNSPECIFIED HYPERLIPIDEMIA TYPE: ICD-10-CM

## 2022-12-01 DIAGNOSIS — Z87.898 HX OF SYNCOPE: ICD-10-CM

## 2022-12-01 DIAGNOSIS — I73.9 PERIPHERAL VASCULAR DISEASE (HCC): ICD-10-CM

## 2022-12-01 PROCEDURE — G0463 HOSPITAL OUTPT CLINIC VISIT: HCPCS | Performed by: INTERNAL MEDICINE

## 2022-12-01 RX ORDER — ROSUVASTATIN CALCIUM 40 MG/1
40 TABLET, COATED ORAL
Qty: 90 TABLET | Refills: 3 | Status: SHIPPED | OUTPATIENT
Start: 2022-12-01

## 2022-12-01 NOTE — PROGRESS NOTES
Bernadine Pearson is a 67 y.o. female    Chief Complaint   Patient presents with    Follow-up     6 month - peripheral vascular disease    Hypertension    Cholesterol Problem       Vitals:    12/01/22 0918   BP: 130/68   BP 1 Location: Left upper arm   BP Patient Position: Sitting   Pulse: 78   Height: 5' 3\" (1.6 m)   Weight: 165 lb (74.8 kg)       Chest pain no    SOB no    Dizziness no    Swelling no    Refills no      1. Have you been to the ER, urgent care clinic since your last visit? Hospitalized since your last visit? No    2. Have you seen or consulted any other health care providers outside of the [address] [place] [address] since your last visit? Include any pap smears or colon screening.  No

## 2022-12-01 NOTE — LETTER
12/1/2022    Patient: Leroy Campbell   YOB: 1950   Date of Visit: 12/1/2022     Marizol Smith MD  [address]  [place] [phone]  Via Fax: [phone]    Dear Marizol Smith MD,      Thank you for referring Ms. Deshawn Reid to [facility] for evaluation. My notes for this consultation are attached. If you have questions, please do not hesitate to call me. I look forward to following your patient along with you.       Sincerely,    Gray De MD

## 2023-02-15 DIAGNOSIS — E78.5 HYPERLIPIDEMIA, UNSPECIFIED HYPERLIPIDEMIA TYPE: Primary | ICD-10-CM

## 2023-02-15 DIAGNOSIS — I10 ESSENTIAL HYPERTENSION, BENIGN: ICD-10-CM

## 2023-02-15 RX ORDER — ROSUVASTATIN CALCIUM 40 MG/1
40 TABLET, COATED ORAL
Qty: 90 TABLET | Refills: 3 | Status: SHIPPED | OUTPATIENT
Start: 2023-02-15

## 2023-02-15 NOTE — TELEPHONE ENCOUNTER
Refill per VO of Dr. Antonio Carlisle  Last appt: 12/1/2022  Future Appointments   Date Time Provider Latha Holly   6/6/2023 11:00 AM Hitesh Espinoza MD [facility] AMB       Requested Prescriptions     Signed Prescriptions Disp Refills    rosuvastatin (CRESTOR) 40 mg tablet 90 Tablet 3     Sig: Take 1 Tablet by mouth nightly.      Authorizing Provider: Bernadine Naranjo     Ordering User: Rosey Miner

## 2023-06-06 ENCOUNTER — OFFICE VISIT (OUTPATIENT)
Age: 73
End: 2023-06-06
Payer: MEDICARE

## 2023-06-06 VITALS
OXYGEN SATURATION: 96 % | DIASTOLIC BLOOD PRESSURE: 62 MMHG | HEART RATE: 62 BPM | BODY MASS INDEX: 31.01 KG/M2 | HEIGHT: 63 IN | WEIGHT: 175 LBS | SYSTOLIC BLOOD PRESSURE: 162 MMHG | RESPIRATION RATE: 18 BRPM

## 2023-06-06 DIAGNOSIS — Z87.898 HISTORY OF SYNCOPE: ICD-10-CM

## 2023-06-06 DIAGNOSIS — I10 ESSENTIAL (PRIMARY) HYPERTENSION: Primary | ICD-10-CM

## 2023-06-06 DIAGNOSIS — E78.2 MIXED HYPERLIPIDEMIA: ICD-10-CM

## 2023-06-06 DIAGNOSIS — I73.9 PERIPHERAL VASCULAR DISEASE, UNSPECIFIED (HCC): ICD-10-CM

## 2023-06-06 PROCEDURE — 3017F COLORECTAL CA SCREEN DOC REV: CPT | Performed by: INTERNAL MEDICINE

## 2023-06-06 PROCEDURE — 1123F ACP DISCUSS/DSCN MKR DOCD: CPT | Performed by: INTERNAL MEDICINE

## 2023-06-06 PROCEDURE — G8428 CUR MEDS NOT DOCUMENT: HCPCS | Performed by: INTERNAL MEDICINE

## 2023-06-06 PROCEDURE — G8417 CALC BMI ABV UP PARAM F/U: HCPCS | Performed by: INTERNAL MEDICINE

## 2023-06-06 PROCEDURE — 4004F PT TOBACCO SCREEN RCVD TLK: CPT | Performed by: INTERNAL MEDICINE

## 2023-06-06 PROCEDURE — 3077F SYST BP >= 140 MM HG: CPT | Performed by: INTERNAL MEDICINE

## 2023-06-06 PROCEDURE — G8400 PT W/DXA NO RESULTS DOC: HCPCS | Performed by: INTERNAL MEDICINE

## 2023-06-06 PROCEDURE — 3078F DIAST BP <80 MM HG: CPT | Performed by: INTERNAL MEDICINE

## 2023-06-06 PROCEDURE — 1090F PRES/ABSN URINE INCON ASSESS: CPT | Performed by: INTERNAL MEDICINE

## 2023-06-06 PROCEDURE — 99214 OFFICE O/P EST MOD 30 MIN: CPT | Performed by: INTERNAL MEDICINE

## 2023-06-06 RX ORDER — SERTRALINE HYDROCHLORIDE 25 MG/1
25 TABLET, FILM COATED ORAL DAILY
COMMUNITY

## 2023-06-06 RX ORDER — LORAZEPAM 1 MG/1
1 TABLET ORAL
COMMUNITY

## 2023-06-06 NOTE — PROGRESS NOTES
Chief Complaint   Patient presents with    Hyperlipidemia    Hypertension    Follow-up    Other     Peripheral Vascular Disease         Chest Pain  No    Palpitations  No    SOB  with activity COPD    Dizziness No    Swelling  No    Last Lipids  3/20/23    Refills  NO      /72  160/62    Pulse 62   Resp 18   Ht 5' 3\" (1.6 m)   Wt 175 lb (79.4 kg)   SpO2 96%   BMI 31.00 kg/m²       Have you been to the ER, urgent care clinic since your last visit? no  Hospitalized since your last visit? NO    2.  Have you seen or consulted with any other health care providers outside of the [address] [place] [address] since your last visit?  no
tartrate (LOPRESSOR) 25 mg, Oral, 2 TIMES DAILY    montelukast (SINGULAIR) 10 mg, Oral, EVERY EVENING    rosuvastatin (CRESTOR) 40 mg, Oral    sertraline (ZOLOFT) 25 mg, Oral, DAILY    TURMERIC PO 1,400 mg, Oral, 2 TIMES DAILY    umeclidinium-vilanterol (ANORO ELLIPTA) 62.5-25 MCG/ACT inhaler 1 puff, Inhalation              Family History of CAD:    Yes      Social History:  Current  Smoker  No ( Quit 2004)      Physical Exam:       Blood pressure (!) 162/62, pulse 62, resp. rate 18, height 5' 3\" (1.6 m), weight 175 lb (79.4 kg), SpO2 96 %. Gen: Well-developed, well-nourished, in no acute distress   Neck: Supple,No JVD, No Carotid Bruit,    Resp: No accessory muscle use, Clear breath sounds, No rales or rhonchi   Card: Regular Rate,Rythm,Normal S1, S2, No murmurs, rubs or gallop. No thrills.     Abd:  Soft, ,BS+,    MSK: No cyanosis   Skin: No rashes     Neuro: moving all four extremities , follows commands appropriately   Psych:  Good insight, oriented to person, place , alert, Nml Affect   LE: No edema      EKG:                    Primitivo Sanchez MD

## 2023-07-21 ENCOUNTER — TELEPHONE (OUTPATIENT)
Age: 73
End: 2023-07-21

## 2023-07-21 NOTE — TELEPHONE ENCOUNTER
Per Dr. Kerry Breaux, hold Plavix 5 days prior to procedure. Restart post procedure when safe to do so. Patient notified. Understanding expressed.

## 2023-07-28 NOTE — PROGRESS NOTES
[address] [facility]   Office [phone],LPN [phone]     Primary Cardiologist: Katie Bonilla MD  Last Office Visit: 6/6/23     Darcy Cisse is a 67 y.o.  female is here for f/u visit . Dear Dr. So Faulkner,      I had the pleasure of seeing Ms. Darcy Cisse in the office today. Chief complaint: As documented in EMR      Assessment:  History of syncope-December 2020 (neuro [id number]-bb-[id number], has had event monitor 3/2021 also placed-no significant arrhythmias)   HTN   PVD w hx of krystina iliac stents/carotid artery stenosis ( Dr Sumit Rios); status post right carotid stent-March 2022; also states that she may need  a stent left carotid artery - sees vascular yearly   Hx of Sinus tachycardia   HLD   Diabetes mellitus-controlled per patient - off Metformin   [facility] admission 7/21/2022-7/23/2022-bilateral pneumonia  Second degree type 1 - noted after surgery per patient no EKG to verify       Plan:      BP elevated in the office today. Did come down after what was checked then 15 minutes later. Advised to monitor blood pressure and maintain log. If elevated persistently, will change medications. Currently on metoprolol 25 twice daily and lisinopril/hydrochlorothiazide 10/12.5 mg daily. Erin nuclear study 5/11/22 - Nml   Cont Meds   Lipids per PCP. On statin. Target LDL less than 70. 4/2021 - . Patient was switched to Crestor last visit. Will check 30 day monitor to assess for arrhythmia, recent stress test negative for ischemia   F/u 6 months/earlier as needed based on cardiac work-up      Patient understands the plan. All questions were answered to the patient's satisfaction. Medication Side Effects and Warnings were discussed with patient: yes   Patient Labs were reviewed and or requested:  yes   Patient Past Records were reviewed and or requested: yes      I appreciate the opportunity to be involved in Ms. Middleton.  See note below

## 2023-07-31 ENCOUNTER — OFFICE VISIT (OUTPATIENT)
Age: 73
End: 2023-07-31
Payer: MEDICARE

## 2023-07-31 VITALS
OXYGEN SATURATION: 97 % | DIASTOLIC BLOOD PRESSURE: 60 MMHG | HEIGHT: 63 IN | WEIGHT: 168 LBS | SYSTOLIC BLOOD PRESSURE: 138 MMHG | BODY MASS INDEX: 29.77 KG/M2 | HEART RATE: 77 BPM

## 2023-07-31 DIAGNOSIS — I73.9 PERIPHERAL VASCULAR DISEASE, UNSPECIFIED (HCC): ICD-10-CM

## 2023-07-31 DIAGNOSIS — Z87.898 HISTORY OF SYNCOPE: ICD-10-CM

## 2023-07-31 DIAGNOSIS — E78.2 MIXED HYPERLIPIDEMIA: ICD-10-CM

## 2023-07-31 DIAGNOSIS — I49.9 CARDIAC ARRHYTHMIA, UNSPECIFIED CARDIAC ARRHYTHMIA TYPE: ICD-10-CM

## 2023-07-31 DIAGNOSIS — I10 ESSENTIAL (PRIMARY) HYPERTENSION: Primary | ICD-10-CM

## 2023-07-31 PROCEDURE — 99214 OFFICE O/P EST MOD 30 MIN: CPT

## 2023-07-31 PROCEDURE — 1123F ACP DISCUSS/DSCN MKR DOCD: CPT

## 2023-07-31 PROCEDURE — 93010 ELECTROCARDIOGRAM REPORT: CPT

## 2023-07-31 PROCEDURE — 3017F COLORECTAL CA SCREEN DOC REV: CPT

## 2023-07-31 PROCEDURE — G8427 DOCREV CUR MEDS BY ELIG CLIN: HCPCS

## 2023-07-31 PROCEDURE — 93005 ELECTROCARDIOGRAM TRACING: CPT

## 2023-07-31 PROCEDURE — G8400 PT W/DXA NO RESULTS DOC: HCPCS

## 2023-07-31 PROCEDURE — 3078F DIAST BP <80 MM HG: CPT

## 2023-07-31 PROCEDURE — 1036F TOBACCO NON-USER: CPT

## 2023-07-31 PROCEDURE — 3075F SYST BP GE 130 - 139MM HG: CPT

## 2023-07-31 PROCEDURE — G8417 CALC BMI ABV UP PARAM F/U: HCPCS

## 2023-07-31 PROCEDURE — 1090F PRES/ABSN URINE INCON ASSESS: CPT

## 2023-07-31 ASSESSMENT — PATIENT HEALTH QUESTIONNAIRE - PHQ9
SUM OF ALL RESPONSES TO PHQ QUESTIONS 1-9: 0
SUM OF ALL RESPONSES TO PHQ QUESTIONS 1-9: 0
1. LITTLE INTEREST OR PLEASURE IN DOING THINGS: 0
SUM OF ALL RESPONSES TO PHQ QUESTIONS 1-9: 0
SUM OF ALL RESPONSES TO PHQ QUESTIONS 1-9: 0
2. FEELING DOWN, DEPRESSED OR HOPELESS: 0
SUM OF ALL RESPONSES TO PHQ9 QUESTIONS 1 & 2: 0

## 2023-07-31 NOTE — PROGRESS NOTES
Chief Complaint   Patient presents with    Follow-up     1 mo     Hypertension     Vitals:    07/31/23 0940   BP: 138/60   Site: Left Upper Arm   Position: Sitting   Pulse: 77   SpO2: 97%   Weight: 168 lb (76.2 kg)   Height: 5' 3\" (1.6 m)         Chest pain denied     SOB denied     Palpitations denied     Swelling in hands/feet denied     Dizziness denied     Recent hospital stays denied     Refills denied

## 2023-08-01 ENCOUNTER — ANCILLARY PROCEDURE (OUTPATIENT)
Age: 73
End: 2023-08-01
Payer: MEDICARE

## 2023-08-01 ENCOUNTER — TELEPHONE (OUTPATIENT)
Age: 73
End: 2023-08-01

## 2023-08-01 DIAGNOSIS — R00.9 ABNORMAL HEART RATE: ICD-10-CM

## 2023-08-01 PROCEDURE — 93228 REMOTE 30 DAY ECG REV/REPORT: CPT | Performed by: INTERNAL MEDICINE

## 2023-08-01 PROCEDURE — 93229 REMOTE 30 DAY ECG TECH SUPP: CPT

## 2023-08-01 NOTE — TELEPHONE ENCOUNTER
Enrolled with [employer] - Ordered and being shipped to patient's home address on file. ETA within 5-7 business days. Message  Received: JENNIFER Samson NP  Can you please send patient a 30 day loop monitor for abnormal heart rhythm. Thank you!    Boris Carolina patient

## 2023-08-26 ENCOUNTER — HOSPITAL ENCOUNTER (INPATIENT)
Facility: HOSPITAL | Age: 73
LOS: 3 days | Discharge: HOME | DRG: 244 | End: 2023-08-29
Attending: STUDENT IN AN ORGANIZED HEALTH CARE EDUCATION/TRAINING PROGRAM | Admitting: INTERNAL MEDICINE
Payer: MEDICARE

## 2023-08-26 ENCOUNTER — APPOINTMENT (OUTPATIENT)
Facility: HOSPITAL | Age: 73
DRG: 244 | End: 2023-08-26
Payer: MEDICARE

## 2023-08-26 ENCOUNTER — PATIENT MESSAGE (OUTPATIENT)
Age: 73
End: 2023-08-26

## 2023-08-26 DIAGNOSIS — I45.9 HEART BLOCK: ICD-10-CM

## 2023-08-26 DIAGNOSIS — R00.1 BRADYCARDIA: ICD-10-CM

## 2023-08-26 DIAGNOSIS — I44.2 THIRD DEGREE AV BLOCK (HCC): ICD-10-CM

## 2023-08-26 DIAGNOSIS — I49.9 CARDIAC ARRHYTHMIA, UNSPECIFIED CARDIAC ARRHYTHMIA TYPE: ICD-10-CM

## 2023-08-26 DIAGNOSIS — F41.9 ANXIETY: Primary | ICD-10-CM

## 2023-08-26 LAB
ALBUMIN SERPL-MCNC: 3.8 G/DL (ref 3.5–5)
ALBUMIN/GLOB SERPL: 1.1 (ref 1.1–2.2)
ALP SERPL-CCNC: 116 U/L (ref 45–117)
ALT SERPL-CCNC: 20 U/L (ref 12–78)
ANION GAP SERPL CALC-SCNC: 6 MMOL/L (ref 5–15)
AST SERPL-CCNC: 20 U/L (ref 15–37)
BASOPHILS # BLD: 0 K/UL (ref 0–0.1)
BASOPHILS NFR BLD: 1 % (ref 0–1)
BILIRUB SERPL-MCNC: 0.3 MG/DL (ref 0.2–1)
BUN SERPL-MCNC: 23 MG/DL (ref 6–20)
BUN/CREAT SERPL: 24 (ref 12–20)
CALCIUM SERPL-MCNC: 8.6 MG/DL (ref 8.5–10.1)
CHLORIDE SERPL-SCNC: 105 MMOL/L (ref 97–108)
CO2 SERPL-SCNC: 25 MMOL/L (ref 21–32)
CREAT SERPL-MCNC: 0.94 MG/DL (ref 0.55–1.02)
DIFFERENTIAL METHOD BLD: ABNORMAL
EOSINOPHIL # BLD: 0.2 K/UL (ref 0–0.4)
EOSINOPHIL NFR BLD: 5 % (ref 0–7)
ERYTHROCYTE [DISTWIDTH] IN BLOOD BY AUTOMATED COUNT: 14.7 % (ref 11.5–14.5)
GLOBULIN SER CALC-MCNC: 3.6 G/DL (ref 2–4)
GLUCOSE SERPL-MCNC: 101 MG/DL (ref 65–100)
HCT VFR BLD AUTO: 32.2 % (ref 35–47)
HGB BLD-MCNC: 10.6 G/DL (ref 11.5–16)
IMM GRANULOCYTES # BLD AUTO: 0 K/UL (ref 0–0.04)
IMM GRANULOCYTES NFR BLD AUTO: 0 % (ref 0–0.5)
LYMPHOCYTES # BLD: 1.6 K/UL (ref 0.8–3.5)
LYMPHOCYTES NFR BLD: 32 % (ref 12–49)
MAGNESIUM SERPL-MCNC: 2.2 MG/DL (ref 1.6–2.4)
MCH RBC QN AUTO: 29.7 PG (ref 26–34)
MCHC RBC AUTO-ENTMCNC: 32.9 G/DL (ref 30–36.5)
MCV RBC AUTO: 90.2 FL (ref 80–99)
MONOCYTES # BLD: 0.7 K/UL (ref 0–1)
MONOCYTES NFR BLD: 14 % (ref 5–13)
NEUTS SEG # BLD: 2.5 K/UL (ref 1.8–8)
NEUTS SEG NFR BLD: 48 % (ref 32–75)
NRBC # BLD: 0 K/UL (ref 0–0.01)
NRBC BLD-RTO: 0 PER 100 WBC
PLATELET # BLD AUTO: 284 K/UL (ref 150–400)
PMV BLD AUTO: 10.8 FL (ref 8.9–12.9)
POTASSIUM SERPL-SCNC: 4.6 MMOL/L (ref 3.5–5.1)
PROT SERPL-MCNC: 7.4 G/DL (ref 6.4–8.2)
RBC # BLD AUTO: 3.57 M/UL (ref 3.8–5.2)
SODIUM SERPL-SCNC: 136 MMOL/L (ref 136–145)
TROPONIN I SERPL HS-MCNC: 6 NG/L (ref 0–51)
WBC # BLD AUTO: 5.1 K/UL (ref 3.6–11)

## 2023-08-26 PROCEDURE — 93005 ELECTROCARDIOGRAM TRACING: CPT | Performed by: FAMILY MEDICINE

## 2023-08-26 PROCEDURE — 99285 EMERGENCY DEPT VISIT HI MDM: CPT

## 2023-08-26 PROCEDURE — 85025 COMPLETE CBC W/AUTO DIFF WBC: CPT

## 2023-08-26 PROCEDURE — 71046 X-RAY EXAM CHEST 2 VIEWS: CPT

## 2023-08-26 PROCEDURE — 94640 AIRWAY INHALATION TREATMENT: CPT

## 2023-08-26 PROCEDURE — 84484 ASSAY OF TROPONIN QUANT: CPT

## 2023-08-26 PROCEDURE — 6370000000 HC RX 637 (ALT 250 FOR IP): Performed by: INTERNAL MEDICINE

## 2023-08-26 PROCEDURE — 6360000002 HC RX W HCPCS: Performed by: INTERNAL MEDICINE

## 2023-08-26 PROCEDURE — 36415 COLL VENOUS BLD VENIPUNCTURE: CPT

## 2023-08-26 PROCEDURE — 83735 ASSAY OF MAGNESIUM: CPT

## 2023-08-26 PROCEDURE — 2060000000 HC ICU INTERMEDIATE R&B

## 2023-08-26 PROCEDURE — 80053 COMPREHEN METABOLIC PANEL: CPT

## 2023-08-26 PROCEDURE — 2580000003 HC RX 258: Performed by: INTERNAL MEDICINE

## 2023-08-26 RX ORDER — CLOPIDOGREL BISULFATE 75 MG/1
75 TABLET ORAL DAILY
Status: DISCONTINUED | OUTPATIENT
Start: 2023-08-27 | End: 2023-08-29 | Stop reason: HOSPADM

## 2023-08-26 RX ORDER — IPRATROPIUM BROMIDE AND ALBUTEROL SULFATE 2.5; .5 MG/3ML; MG/3ML
1 SOLUTION RESPIRATORY (INHALATION)
Status: DISCONTINUED | OUTPATIENT
Start: 2023-08-26 | End: 2023-08-29 | Stop reason: HOSPADM

## 2023-08-26 RX ORDER — LEVOTHYROXINE SODIUM 0.05 MG/1
50 TABLET ORAL
Status: DISCONTINUED | OUTPATIENT
Start: 2023-08-27 | End: 2023-08-29 | Stop reason: HOSPADM

## 2023-08-26 RX ORDER — SODIUM CHLORIDE 0.9 % (FLUSH) 0.9 %
5-40 SYRINGE (ML) INJECTION PRN
Status: DISCONTINUED | OUTPATIENT
Start: 2023-08-26 | End: 2023-08-29 | Stop reason: HOSPADM

## 2023-08-26 RX ORDER — ROSUVASTATIN CALCIUM 10 MG/1
40 TABLET, COATED ORAL NIGHTLY
Status: DISCONTINUED | OUTPATIENT
Start: 2023-08-26 | End: 2023-08-29 | Stop reason: HOSPADM

## 2023-08-26 RX ORDER — ACETAMINOPHEN 325 MG/1
650 TABLET ORAL EVERY 6 HOURS PRN
Status: DISCONTINUED | OUTPATIENT
Start: 2023-08-26 | End: 2023-08-29 | Stop reason: HOSPADM

## 2023-08-26 RX ORDER — LORAZEPAM 1 MG/1
1 TABLET ORAL NIGHTLY PRN
Status: DISCONTINUED | OUTPATIENT
Start: 2023-08-26 | End: 2023-08-29 | Stop reason: HOSPADM

## 2023-08-26 RX ORDER — ONDANSETRON 2 MG/ML
4 INJECTION INTRAMUSCULAR; INTRAVENOUS EVERY 6 HOURS PRN
Status: DISCONTINUED | OUTPATIENT
Start: 2023-08-26 | End: 2023-08-29 | Stop reason: HOSPADM

## 2023-08-26 RX ORDER — ACETAMINOPHEN 650 MG/1
650 SUPPOSITORY RECTAL EVERY 6 HOURS PRN
Status: DISCONTINUED | OUTPATIENT
Start: 2023-08-26 | End: 2023-08-29 | Stop reason: HOSPADM

## 2023-08-26 RX ORDER — ONDANSETRON 4 MG/1
4 TABLET, ORALLY DISINTEGRATING ORAL EVERY 8 HOURS PRN
Status: DISCONTINUED | OUTPATIENT
Start: 2023-08-26 | End: 2023-08-29 | Stop reason: HOSPADM

## 2023-08-26 RX ORDER — LATANOPROST 50 UG/ML
1 SOLUTION/ DROPS OPHTHALMIC NIGHTLY
Status: DISCONTINUED | OUTPATIENT
Start: 2023-08-26 | End: 2023-08-29 | Stop reason: HOSPADM

## 2023-08-26 RX ORDER — ENOXAPARIN SODIUM 100 MG/ML
40 INJECTION SUBCUTANEOUS DAILY
Status: DISCONTINUED | OUTPATIENT
Start: 2023-08-26 | End: 2023-08-28

## 2023-08-26 RX ORDER — LISINOPRIL AND HYDROCHLOROTHIAZIDE 12.5; 1 MG/1; MG/1
1 TABLET ORAL DAILY
Status: DISCONTINUED | OUTPATIENT
Start: 2023-08-26 | End: 2023-08-27

## 2023-08-26 RX ORDER — SODIUM CHLORIDE 9 MG/ML
INJECTION, SOLUTION INTRAVENOUS PRN
Status: DISCONTINUED | OUTPATIENT
Start: 2023-08-26 | End: 2023-08-29 | Stop reason: HOSPADM

## 2023-08-26 RX ORDER — SODIUM CHLORIDE 0.9 % (FLUSH) 0.9 %
5-40 SYRINGE (ML) INJECTION EVERY 12 HOURS SCHEDULED
Status: DISCONTINUED | OUTPATIENT
Start: 2023-08-26 | End: 2023-08-29 | Stop reason: HOSPADM

## 2023-08-26 RX ORDER — FERROUS SULFATE 325(65) MG
325 TABLET ORAL
Status: DISCONTINUED | OUTPATIENT
Start: 2023-08-27 | End: 2023-08-29 | Stop reason: HOSPADM

## 2023-08-26 RX ORDER — POLYETHYLENE GLYCOL 3350 17 G/17G
17 POWDER, FOR SOLUTION ORAL DAILY PRN
Status: DISCONTINUED | OUTPATIENT
Start: 2023-08-26 | End: 2023-08-29 | Stop reason: HOSPADM

## 2023-08-26 RX ORDER — ACETAMINOPHEN 500 MG
1000 TABLET ORAL EVERY 6 HOURS PRN
Status: DISCONTINUED | OUTPATIENT
Start: 2023-08-26 | End: 2023-08-29 | Stop reason: HOSPADM

## 2023-08-26 RX ADMIN — LORAZEPAM 1 MG: 1 TABLET ORAL at 21:34

## 2023-08-26 RX ADMIN — IPRATROPIUM BROMIDE AND ALBUTEROL SULFATE 1 DOSE: 2.5; .5 SOLUTION RESPIRATORY (INHALATION) at 21:49

## 2023-08-26 RX ADMIN — ROSUVASTATIN CALCIUM 40 MG: 10 TABLET, COATED ORAL at 21:35

## 2023-08-26 RX ADMIN — LATANOPROST 1 DROP: 50 SOLUTION OPHTHALMIC at 22:40

## 2023-08-26 RX ADMIN — ENOXAPARIN SODIUM 40 MG: 100 INJECTION SUBCUTANEOUS at 18:46

## 2023-08-26 RX ADMIN — SODIUM CHLORIDE, PRESERVATIVE FREE 10 ML: 5 INJECTION INTRAVENOUS at 21:39

## 2023-08-26 ASSESSMENT — ENCOUNTER SYMPTOMS
VOMITING: 0
COUGH: 0
SHORTNESS OF BREATH: 0
BACK PAIN: 0
NAUSEA: 0
ABDOMINAL PAIN: 0

## 2023-08-26 ASSESSMENT — PAIN - FUNCTIONAL ASSESSMENT: PAIN_FUNCTIONAL_ASSESSMENT: NONE - DENIES PAIN

## 2023-08-26 ASSESSMENT — PAIN SCALES - GENERAL
PAINLEVEL_OUTOF10: 0
PAINLEVEL_OUTOF10: 0

## 2023-08-26 NOTE — H&P
Hospitalist Admission Note    NAME:  Chayo Aguilar   :  1950   MRN:  [id number]     Date/Time:  2023 7:23 PM    Patient PCP: Anni Hoover MD  ________________________________________________________________________    Given the patient's current clinical presentation, I have a high level of concern for decompensation if discharged from the emergency department. Complex decision making was performed, which includes reviewing the patient's available past medical records, laboratory results, and x-ray films. My assessment of this patient's clinical condition and my plan of care is as follows. Assessment / Plan:    72F hx PVD, HTN, second degree heart block presents after CHB noted on holter monitor    #Heart Block: Appreciate Dr. Ramires President context. Pt noted to have second-degree type II AV block with transition to 2-1 AV block, with subsequent development of intermittent complete heart block with pauses noted for 3.3 seconds on holter. Asymptomatic at the time. Plan to stop metoprolol, monitor on tele, and decide on PPM    #HTN: On lisin/hctz 10/12.5mg; metop. Will stop the latter. May need to increase lisin to 20mg    #PVD: S/p iliac artery stenting. Known carotid artery stenosis as well for which she follows with vascular. - Cont clopidogrel, statin    #COPD: Cont LABA/LAMA    #Anxiety: Sertraline, home loraz          I have personally reviewed the radiographs, laboratory data in Epic and decisions and statements above are based partially on this personal interpretation. Code Status: Full Code  DVT Prophylaxis: Lovenox  GI Prophylaxis: not indicated       Subjective:   CHIEF COMPLAINT: \"heart block\"    HISTORY OF PRESENT ILLNESS:     Maykel Cheney is a 67 y.o.  F hx PVD, HTN, second degree heart block, type 1 who has been wearing a holter monitor presents to ER after her Cardiologist noted an episode of complete heart block with pauses noted for 3.3 seconds.  Pt was asymptomatic at that time and has

## 2023-08-26 NOTE — TELEPHONE ENCOUNTER
Alerted by [employer] regarding abnormal monitor. I independently review the [employer] tracings in which on 8/26/2023 at 7:57 AM patient had initially sinus rhythm followed by second-degree type II AV block with transition to 2-1 AV block, with subsequent development of intermittent complete heart block with pauses noted for 3.3 seconds. Patient had subsequent resumption of one-to-one conduction. I called and spoke to Ms. Trudy Valencia who reports that she was awake during that time and was walking to her car. She did not have dizziness or near syncope. However she does have a prior history of syncope which prompted placement of this monitor. I advised that she stop taking her metoprolol. Given the unstable nature of her rhythm with evidence of 2-1 Mobitz type II AV block and development of intermittent complete heart block during waking hours and prior history of syncope, I recommended that she come into the emergency room ASAP. She will need to be monitored on telemetry over the weekend with high likelihood of pacemaker implantation on Monday. Patient reports full understanding of recommendations and will proceed to the ER at [address] [facility]. I explained that if she develops any dizziness lightheadedness to immediately call EMS.

## 2023-08-26 NOTE — ED PROVIDER NOTES
possible pacemaker placement. Amount and/or Complexity of Data Reviewed  Labs: ordered. Radiology: ordered. ECG/medicine tests: ordered. Risk  Decision regarding hospitalization. Perfect Serve Consult for Admission  5:06 PM    ED Room Number: ER03/03  Patient Name and age:  Keisha Salmon 67 y.o.  female  Working Diagnosis:   1. Cardiac arrhythmia, unspecified cardiac arrhythmia type    2. Heart block        COVID-19 Suspicion: No  Sepsis present:  No  Reassessment needed: No  Code Status:  Full Code  Readmission: No  Isolation Requirements: no  Recommended Level of Care: telemetry  Department: [facility] ED - [phone]    Other: 26-year-old female sent by cardiology for admission. Has Holter monitor on at home. Noted to have several episodes of second-degree heart block, progressed into complete heart block for 3 seconds. Sent for telemetry monitoring and likely pacemaker on Monday. REASSESSMENT     ED Course as of 08/26/23 1615   Sat Aug 26, 2023   1612 EKG interpretation:   Rhythm: Normal Sinus Rhythm; and regular . Rate (approx.): 71;  Axis: normal; Intervals: normal ; ST/T wave: normal; EKG documented and interpreted by Kari Simental MD, Emergency Medicine.     [AL]      ED Course User Index  [AL] Kari Simental MD         (Please note that portions of this note were completed with a voice recognition program.  Efforts were made to edit the dictations but occasionally words are mis-transcribed.)    JENNIFER Santana NP (electronically signed)  Nurse Practitioner      JENNIFER Santana NP  08/26/23 [phone]

## 2023-08-26 NOTE — ED TRIAGE NOTES
Patient has a heart monitor on which she states has been on for about a month. States they called her today and told her that her heart stopped for three seconds and she has had irregular rhythm since.  was called and told to come in for evaluation. Patient denies any symptoms.

## 2023-08-26 NOTE — PROGRESS NOTES
Received sitting up in bed, talking on phone. Denies any complaints. See admission assessment and interventions.

## 2023-08-27 LAB
ANION GAP SERPL CALC-SCNC: 6 MMOL/L (ref 5–15)
BUN SERPL-MCNC: 20 MG/DL (ref 6–20)
BUN/CREAT SERPL: 25 (ref 12–20)
CALCIUM SERPL-MCNC: 8.7 MG/DL (ref 8.5–10.1)
CHLORIDE SERPL-SCNC: 109 MMOL/L (ref 97–108)
CO2 SERPL-SCNC: 23 MMOL/L (ref 21–32)
CREAT SERPL-MCNC: 0.8 MG/DL (ref 0.55–1.02)
GLUCOSE SERPL-MCNC: 115 MG/DL (ref 65–100)
MAGNESIUM SERPL-MCNC: 1.9 MG/DL (ref 1.6–2.4)
POTASSIUM SERPL-SCNC: 4 MMOL/L (ref 3.5–5.1)
SODIUM SERPL-SCNC: 138 MMOL/L (ref 136–145)

## 2023-08-27 PROCEDURE — 2060000000 HC ICU INTERMEDIATE R&B

## 2023-08-27 PROCEDURE — 99223 1ST HOSP IP/OBS HIGH 75: CPT | Performed by: INTERNAL MEDICINE

## 2023-08-27 PROCEDURE — 6370000000 HC RX 637 (ALT 250 FOR IP): Performed by: INTERNAL MEDICINE

## 2023-08-27 PROCEDURE — 2580000003 HC RX 258: Performed by: INTERNAL MEDICINE

## 2023-08-27 PROCEDURE — 6360000002 HC RX W HCPCS: Performed by: INTERNAL MEDICINE

## 2023-08-27 PROCEDURE — 80048 BASIC METABOLIC PNL TOTAL CA: CPT

## 2023-08-27 PROCEDURE — 83735 ASSAY OF MAGNESIUM: CPT

## 2023-08-27 PROCEDURE — 94640 AIRWAY INHALATION TREATMENT: CPT

## 2023-08-27 PROCEDURE — 36415 COLL VENOUS BLD VENIPUNCTURE: CPT

## 2023-08-27 RX ORDER — HYDROCHLOROTHIAZIDE 25 MG/1
12.5 TABLET ORAL DAILY
Status: DISCONTINUED | OUTPATIENT
Start: 2023-08-27 | End: 2023-08-29 | Stop reason: HOSPADM

## 2023-08-27 RX ORDER — LISINOPRIL 5 MG/1
10 TABLET ORAL DAILY
Status: DISCONTINUED | OUTPATIENT
Start: 2023-08-27 | End: 2023-08-29 | Stop reason: HOSPADM

## 2023-08-27 RX ADMIN — LORAZEPAM 1 MG: 1 TABLET ORAL at 21:07

## 2023-08-27 RX ADMIN — ROSUVASTATIN CALCIUM 40 MG: 10 TABLET, COATED ORAL at 21:07

## 2023-08-27 RX ADMIN — IPRATROPIUM BROMIDE AND ALBUTEROL SULFATE 1 DOSE: 2.5; .5 SOLUTION RESPIRATORY (INHALATION) at 07:25

## 2023-08-27 RX ADMIN — IPRATROPIUM BROMIDE AND ALBUTEROL SULFATE 1 DOSE: 2.5; .5 SOLUTION RESPIRATORY (INHALATION) at 20:29

## 2023-08-27 RX ADMIN — ENOXAPARIN SODIUM 40 MG: 100 INJECTION SUBCUTANEOUS at 18:07

## 2023-08-27 RX ADMIN — LATANOPROST 1 DROP: 50 SOLUTION OPHTHALMIC at 22:00

## 2023-08-27 RX ADMIN — SERTRALINE 25 MG: 50 TABLET, FILM COATED ORAL at 08:56

## 2023-08-27 RX ADMIN — ACETAMINOPHEN 650 MG: 325 TABLET ORAL at 21:07

## 2023-08-27 RX ADMIN — SODIUM CHLORIDE, PRESERVATIVE FREE 10 ML: 5 INJECTION INTRAVENOUS at 22:00

## 2023-08-27 RX ADMIN — IPRATROPIUM BROMIDE AND ALBUTEROL SULFATE 1 DOSE: 2.5; .5 SOLUTION RESPIRATORY (INHALATION) at 02:47

## 2023-08-27 RX ADMIN — SODIUM CHLORIDE, PRESERVATIVE FREE 10 ML: 5 INJECTION INTRAVENOUS at 08:57

## 2023-08-27 RX ADMIN — CLOPIDOGREL BISULFATE 75 MG: 75 TABLET ORAL at 08:56

## 2023-08-27 RX ADMIN — IPRATROPIUM BROMIDE AND ALBUTEROL SULFATE 1 DOSE: 2.5; .5 SOLUTION RESPIRATORY (INHALATION) at 14:30

## 2023-08-27 RX ADMIN — FERROUS SULFATE TAB 325 MG (65 MG ELEMENTAL FE) 325 MG: 325 (65 FE) TAB at 11:41

## 2023-08-27 ASSESSMENT — PAIN DESCRIPTION - PAIN TYPE: TYPE: ACUTE PAIN

## 2023-08-27 ASSESSMENT — PAIN DESCRIPTION - ORIENTATION: ORIENTATION: ANTERIOR

## 2023-08-27 ASSESSMENT — PAIN SCALES - GENERAL
PAINLEVEL_OUTOF10: 3
PAINLEVEL_OUTOF10: 0

## 2023-08-27 ASSESSMENT — PAIN DESCRIPTION - DESCRIPTORS: DESCRIPTORS: ACHING

## 2023-08-27 ASSESSMENT — PAIN DESCRIPTION - LOCATION: LOCATION: HEAD

## 2023-08-27 NOTE — PROGRESS NOTES
0700: Bedside shift change report given to Anel Lantigua RN (oncoming nurse) by Luis Alberto Mar RN (offgoing nurse). Report included the following information Nurse Handoff Report, Intake/Output, MAR, Recent Results, and Med Rec Status. 1139: Verbal order per Dr. Stephanie Lynn to hold BP medications due to BP.

## 2023-08-27 NOTE — PLAN OF CARE
Problem: Safety - Adult  Goal: Free from fall injury  Outcome: Progressing     Problem: Cardiovascular - Adult  Goal: Maintains optimal cardiac output and hemodynamic stability  Outcome: Progressing  Goal: Absence of cardiac dysrhythmias or at baseline  Outcome: Progressing

## 2023-08-27 NOTE — CONSULTS
[facility] CARDIOLOGY  Cardiac Electrophysiology Note     [x]Initial Encounter     []Follow-up    Patient Name: Laura Wahl RZN:[birth year] - ACK:[id number]  Primary Cardiologist: Jennifer Mcdonnell MD  Consulting Cardiologist: Massiel Hanley MD, [facility]       Reason for encounter:   Intermittent Heart Block      HPI:  70-year-old woman with a history of hypertension, PVD with history of bilateral iliac stent, carotid artery stenosis status post right carotid stent March 2022, history of diabetes, history of syncope, prior second-degree type I AV block on prior EKG, referred to the ER after being found to have intermittent complete heart block on outpatient monitor. Patient was seen by cardiology NP on 7/31/2023. Given her prior history of syncope she was prescribed a 30-day event monitor to assess for any arrhythmias. On 8/26/2023 at 7:57 AM patient had initially sinus rhythm followed by second-degree type II AV block with transition to 2-1 AV block, with subsequent development of intermittent complete heart block with pauses noted for 3.3 seconds. Patient had subsequent resumption of one-to-one conduction. At the time patient did not have any dizziness or lightheadedness. She was walking to her car. Telemetry since she has been hospitalized demonstrated sinus tachycardia since holding her beta-blocker. No chest pain or shortness of breath. SUBJECTIVE:  No chest pain, palpitations, shortness of breath     Assessment and Plan     Intermittent complete heart block  EKG demonstrated sinus rhythm 71 bpm, intervals within normal limits.  ms, QRS 68 ms, QT interval 425 ms. On 8/26/2023 at 7:57 AM patient had initially sinus rhythm followed by second-degree type II AV block with transition to 2-1 AV block, with subsequent development of intermittent complete heart block with pauses noted for 3.3 seconds.   There was no evidence of progressive PP

## 2023-08-27 NOTE — PROGRESS NOTES
Ativan 1mg po for patient request to help her sleep. Ambulated to BRP, voiding without difficulty. No c/o pain, palpitations, sob.

## 2023-08-27 NOTE — PROGRESS NOTES
Hospitalist Progress Note      NAME:  Jenny Ibrahim   :  1950  MRM:  [id number]    Date/Time: 2023  11:18 AM           Assessment / Plan:     67years old female with past medical history of hypertension and peripheral vascular disease, history of bilateral iliac stents carotid artery stenosis history of syncope prior second-degree type I AV block on EKG was referred to ER yesterday after spotting complete heart block x3    #Heart Block: Second-degree, with intermittent episodes of complete heart block  Appreciate Dr. Hari Krishnamurthy context. Pt noted to have second-degree type II AV block with transition to 2-1 AV block, with subsequent development of intermittent complete heart block with pauses noted for 3.3 seconds on holter. Patient continues to be asymptomatic at the time. Cardiology has evaluated the patient  And recommended the following  - N.p.o. for dual-chamber pacer implantation in the morning  - Can resume beta-blocker post device implantation       #HTN:   On lisin/hctz 10/12.5mg;   Continue metoprolol after procedure    #PVD: S/p iliac artery stenting. Known carotid artery stenosis as well for which she follows with vascular. Plan   cont clopidogrel, statin     #Hypothyroidism  -Check TSH  -Continue patient home medication levothyroxine 50 mcg    #COPD: Cont DuoNebs every 4     #Anxiety: Sertraline, home sam MCKEON have personally reviewed the radiographs, laboratory data in Epic and decisions and statements above are based partially on this personal interpretation.                  Care Plan discussed with: Patient    Discussed:  Care Plan    Prophylaxis:  Lovenox    Disposition:  Home w/Family           ___________________________________________________    Attending Physician: Carol Ochoa MD        Subjective:     Chief Complaint: Patient is asymptomatic, no complaints, was found to have complete heart block on Holter monitor    ROS:  (bold if positive, if negative)    Tolerating

## 2023-08-28 ENCOUNTER — APPOINTMENT (OUTPATIENT)
Facility: HOSPITAL | Age: 73
DRG: 244 | End: 2023-08-28
Payer: MEDICARE

## 2023-08-28 LAB
ALBUMIN SERPL-MCNC: 3.5 G/DL (ref 3.5–5)
ALBUMIN/GLOB SERPL: 0.9 (ref 1.1–2.2)
ALP SERPL-CCNC: 105 U/L (ref 45–117)
ALT SERPL-CCNC: 19 U/L (ref 12–78)
ANION GAP SERPL CALC-SCNC: 5 MMOL/L (ref 5–15)
AST SERPL-CCNC: 17 U/L (ref 15–37)
BILIRUB SERPL-MCNC: 0.4 MG/DL (ref 0.2–1)
BUN SERPL-MCNC: 22 MG/DL (ref 6–20)
BUN/CREAT SERPL: 26 (ref 12–20)
CALCIUM SERPL-MCNC: 9.2 MG/DL (ref 8.5–10.1)
CHLORIDE SERPL-SCNC: 107 MMOL/L (ref 97–108)
CO2 SERPL-SCNC: 25 MMOL/L (ref 21–32)
CREAT SERPL-MCNC: 0.84 MG/DL (ref 0.55–1.02)
ECHO BSA: 1.82 M2
EKG ATRIAL RATE: 71 BPM
EKG DIAGNOSIS: NORMAL
EKG P AXIS: 36 DEGREES
EKG P-R INTERVAL: 198 MS
EKG Q-T INTERVAL: 392 MS
EKG QRS DURATION: 68 MS
EKG QTC CALCULATION (BAZETT): 425 MS
EKG R AXIS: 44 DEGREES
EKG T AXIS: 58 DEGREES
EKG VENTRICULAR RATE: 71 BPM
ERYTHROCYTE [DISTWIDTH] IN BLOOD BY AUTOMATED COUNT: 15.1 % (ref 11.5–14.5)
GLOBULIN SER CALC-MCNC: 3.7 G/DL (ref 2–4)
GLUCOSE SERPL-MCNC: 114 MG/DL (ref 65–100)
HCT VFR BLD AUTO: 31.5 % (ref 35–47)
HGB BLD-MCNC: 10.3 G/DL (ref 11.5–16)
MCH RBC QN AUTO: 30.1 PG (ref 26–34)
MCHC RBC AUTO-ENTMCNC: 32.7 G/DL (ref 30–36.5)
MCV RBC AUTO: 92.1 FL (ref 80–99)
NRBC # BLD: 0 K/UL (ref 0–0.01)
NRBC BLD-RTO: 0 PER 100 WBC
PLATELET # BLD AUTO: 247 K/UL (ref 150–400)
PMV BLD AUTO: 11.4 FL (ref 8.9–12.9)
POTASSIUM SERPL-SCNC: 4 MMOL/L (ref 3.5–5.1)
PROT SERPL-MCNC: 7.2 G/DL (ref 6.4–8.2)
RBC # BLD AUTO: 3.42 M/UL (ref 3.8–5.2)
SODIUM SERPL-SCNC: 137 MMOL/L (ref 136–145)
TSH SERPL DL<=0.05 MIU/L-ACNC: 2.78 UIU/ML (ref 0.36–3.74)
WBC # BLD AUTO: 5.1 K/UL (ref 3.6–11)

## 2023-08-28 PROCEDURE — 2709999900 HC NON-CHARGEABLE SUPPLY: Performed by: INTERNAL MEDICINE

## 2023-08-28 PROCEDURE — 2720000010 HC SURG SUPPLY STERILE: Performed by: INTERNAL MEDICINE

## 2023-08-28 PROCEDURE — 0JH606Z INSERTION OF PACEMAKER, DUAL CHAMBER INTO CHEST SUBCUTANEOUS TISSUE AND FASCIA, OPEN APPROACH: ICD-10-PCS | Performed by: INTERNAL MEDICINE

## 2023-08-28 PROCEDURE — 85027 COMPLETE CBC AUTOMATED: CPT

## 2023-08-28 PROCEDURE — 6370000000 HC RX 637 (ALT 250 FOR IP): Performed by: INTERNAL MEDICINE

## 2023-08-28 PROCEDURE — 33208 INSRT HEART PM ATRIAL & VENT: CPT | Performed by: INTERNAL MEDICINE

## 2023-08-28 PROCEDURE — 02H63JZ INSERTION OF PACEMAKER LEAD INTO RIGHT ATRIUM, PERCUTANEOUS APPROACH: ICD-10-PCS | Performed by: INTERNAL MEDICINE

## 2023-08-28 PROCEDURE — 94664 DEMO&/EVAL PT USE INHALER: CPT

## 2023-08-28 PROCEDURE — 99152 MOD SED SAME PHYS/QHP 5/>YRS: CPT | Performed by: INTERNAL MEDICINE

## 2023-08-28 PROCEDURE — C1898 LEAD, PMKR, OTHER THAN TRANS: HCPCS | Performed by: INTERNAL MEDICINE

## 2023-08-28 PROCEDURE — C1769 GUIDE WIRE: HCPCS | Performed by: INTERNAL MEDICINE

## 2023-08-28 PROCEDURE — C1894 INTRO/SHEATH, NON-LASER: HCPCS | Performed by: INTERNAL MEDICINE

## 2023-08-28 PROCEDURE — 99233 SBSQ HOSP IP/OBS HIGH 50: CPT | Performed by: INTERNAL MEDICINE

## 2023-08-28 PROCEDURE — 80053 COMPREHEN METABOLIC PANEL: CPT

## 2023-08-28 PROCEDURE — 71045 X-RAY EXAM CHEST 1 VIEW: CPT

## 2023-08-28 PROCEDURE — 02HK3JZ INSERTION OF PACEMAKER LEAD INTO RIGHT VENTRICLE, PERCUTANEOUS APPROACH: ICD-10-PCS | Performed by: INTERNAL MEDICINE

## 2023-08-28 PROCEDURE — 2580000003 HC RX 258: Performed by: INTERNAL MEDICINE

## 2023-08-28 PROCEDURE — 2060000000 HC ICU INTERMEDIATE R&B

## 2023-08-28 PROCEDURE — 6360000002 HC RX W HCPCS: Performed by: INTERNAL MEDICINE

## 2023-08-28 PROCEDURE — 6360000004 HC RX CONTRAST MEDICATION: Performed by: INTERNAL MEDICINE

## 2023-08-28 PROCEDURE — 94761 N-INVAS EAR/PLS OXIMETRY MLT: CPT

## 2023-08-28 PROCEDURE — C1785 PMKR, DUAL, RATE-RESP: HCPCS | Performed by: INTERNAL MEDICINE

## 2023-08-28 PROCEDURE — 84443 ASSAY THYROID STIM HORMONE: CPT

## 2023-08-28 PROCEDURE — 2500000003 HC RX 250 WO HCPCS: Performed by: INTERNAL MEDICINE

## 2023-08-28 PROCEDURE — 99153 MOD SED SAME PHYS/QHP EA: CPT | Performed by: INTERNAL MEDICINE

## 2023-08-28 PROCEDURE — 98960 EDU&TRN PT SELF-MGMT NQHP 1: CPT

## 2023-08-28 PROCEDURE — 94640 AIRWAY INHALATION TREATMENT: CPT

## 2023-08-28 PROCEDURE — 6370000000 HC RX 637 (ALT 250 FOR IP): Performed by: STUDENT IN AN ORGANIZED HEALTH CARE EDUCATION/TRAINING PROGRAM

## 2023-08-28 PROCEDURE — C1889 IMPLANT/INSERT DEVICE, NOC: HCPCS | Performed by: INTERNAL MEDICINE

## 2023-08-28 PROCEDURE — C1892 INTRO/SHEATH,FIXED,PEEL-AWAY: HCPCS | Performed by: INTERNAL MEDICINE

## 2023-08-28 PROCEDURE — 36415 COLL VENOUS BLD VENIPUNCTURE: CPT

## 2023-08-28 DEVICE — LEAD 5076-52 MRI US RCMCRD
Type: IMPLANTABLE DEVICE | Status: FUNCTIONAL
Brand: CAPSUREFIX NOVUS MRI™ SURESCAN®

## 2023-08-28 DEVICE — LEAD 5076-45 MRI US RCMCRD
Type: IMPLANTABLE DEVICE | Status: FUNCTIONAL
Brand: CAPSUREFIX NOVUS MRI™ SURESCAN®

## 2023-08-28 DEVICE — IPG W1DR01 AZURE XT DR MRI USA
Type: IMPLANTABLE DEVICE | Status: FUNCTIONAL
Brand: AZURE™ XT DR MRI SURESCAN™

## 2023-08-28 DEVICE — ENVELOPE CMRM6133 ABSORB LRG MR
Type: IMPLANTABLE DEVICE | Status: FUNCTIONAL
Brand: TYRX™

## 2023-08-28 RX ORDER — FENTANYL CITRATE 50 UG/ML
INJECTION, SOLUTION INTRAMUSCULAR; INTRAVENOUS PRN
Status: DISCONTINUED | OUTPATIENT
Start: 2023-08-28 | End: 2023-08-28 | Stop reason: HOSPADM

## 2023-08-28 RX ORDER — ACETAMINOPHEN 325 MG/1
650 TABLET ORAL EVERY 4 HOURS PRN
Status: DISCONTINUED | OUTPATIENT
Start: 2023-08-28 | End: 2023-08-29 | Stop reason: HOSPADM

## 2023-08-28 RX ORDER — MIDAZOLAM HYDROCHLORIDE 1 MG/ML
INJECTION INTRAMUSCULAR; INTRAVENOUS PRN
Status: DISCONTINUED | OUTPATIENT
Start: 2023-08-28 | End: 2023-08-28 | Stop reason: HOSPADM

## 2023-08-28 RX ORDER — LIDOCAINE HYDROCHLORIDE 10 MG/ML
INJECTION, SOLUTION INFILTRATION; PERINEURAL PRN
Status: DISCONTINUED | OUTPATIENT
Start: 2023-08-28 | End: 2023-08-28 | Stop reason: HOSPADM

## 2023-08-28 RX ORDER — ONDANSETRON 2 MG/ML
4 INJECTION INTRAMUSCULAR; INTRAVENOUS EVERY 6 HOURS PRN
Status: DISCONTINUED | OUTPATIENT
Start: 2023-08-28 | End: 2023-08-29 | Stop reason: HOSPADM

## 2023-08-28 RX ORDER — HYDROCODONE BITARTRATE AND ACETAMINOPHEN 5; 325 MG/1; MG/1
1 TABLET ORAL EVERY 6 HOURS PRN
Status: DISCONTINUED | OUTPATIENT
Start: 2023-08-28 | End: 2023-08-29 | Stop reason: HOSPADM

## 2023-08-28 RX ORDER — SODIUM CHLORIDE 9 MG/ML
INJECTION, SOLUTION INTRAVENOUS CONTINUOUS PRN
Status: COMPLETED | OUTPATIENT
Start: 2023-08-28 | End: 2023-08-28

## 2023-08-28 RX ORDER — SODIUM CHLORIDE 0.9 % (FLUSH) 0.9 %
5-40 SYRINGE (ML) INJECTION PRN
Status: DISCONTINUED | OUTPATIENT
Start: 2023-08-28 | End: 2023-08-29 | Stop reason: HOSPADM

## 2023-08-28 RX ORDER — DIPHENHYDRAMINE HYDROCHLORIDE 50 MG/ML
INJECTION INTRAMUSCULAR; INTRAVENOUS PRN
Status: DISCONTINUED | OUTPATIENT
Start: 2023-08-28 | End: 2023-08-28 | Stop reason: HOSPADM

## 2023-08-28 RX ADMIN — ACETAMINOPHEN 1000 MG: 500 TABLET ORAL at 21:10

## 2023-08-28 RX ADMIN — IPRATROPIUM BROMIDE AND ALBUTEROL SULFATE 1 DOSE: 2.5; .5 SOLUTION RESPIRATORY (INHALATION) at 13:16

## 2023-08-28 RX ADMIN — FERROUS SULFATE TAB 325 MG (65 MG ELEMENTAL FE) 325 MG: 325 (65 FE) TAB at 11:03

## 2023-08-28 RX ADMIN — METOPROLOL TARTRATE 25 MG: 25 TABLET, FILM COATED ORAL at 21:10

## 2023-08-28 RX ADMIN — LEVOTHYROXINE SODIUM 50 MCG: 0.05 TABLET ORAL at 08:02

## 2023-08-28 RX ADMIN — SERTRALINE 25 MG: 50 TABLET, FILM COATED ORAL at 08:02

## 2023-08-28 RX ADMIN — ROSUVASTATIN CALCIUM 40 MG: 10 TABLET, COATED ORAL at 21:10

## 2023-08-28 RX ADMIN — LISINOPRIL 10 MG: 5 TABLET ORAL at 08:03

## 2023-08-28 RX ADMIN — LATANOPROST 1 DROP: 50 SOLUTION OPHTHALMIC at 21:13

## 2023-08-28 RX ADMIN — LORAZEPAM 1 MG: 1 TABLET ORAL at 21:10

## 2023-08-28 RX ADMIN — IPRATROPIUM BROMIDE AND ALBUTEROL SULFATE 1 DOSE: 2.5; .5 SOLUTION RESPIRATORY (INHALATION) at 07:13

## 2023-08-28 RX ADMIN — SODIUM CHLORIDE, PRESERVATIVE FREE 10 ML: 5 INJECTION INTRAVENOUS at 08:04

## 2023-08-28 RX ADMIN — IPRATROPIUM BROMIDE AND ALBUTEROL SULFATE 1 DOSE: 2.5; .5 SOLUTION RESPIRATORY (INHALATION) at 20:21

## 2023-08-28 ASSESSMENT — PAIN SCALES - GENERAL
PAINLEVEL_OUTOF10: 0
PAINLEVEL_OUTOF10: 0
PAINLEVEL_OUTOF10: 4
PAINLEVEL_OUTOF10: 0

## 2023-08-28 NOTE — PROGRESS NOTES
RRT RN Iris Bolds in to re-site IV for pre-op. NPO maintained. OOB to BRP, 's. MEWS 4 due to continued HTN and new tachycardia.

## 2023-08-28 NOTE — PROGRESS NOTES
Hospitalist Progress Note      NAME:  Jed Weeks   :  1950  MRM:  [id number]    Date/Time: 2023  11:21 AM           Assessment / Plan:     67years old female with past medical history of hypertension and peripheral vascular disease, history of bilateral iliac stents carotid artery stenosis history of syncope prior second-degree type I AV block on EKG was referred to ER yesterday after spotting complete heart block x3    #Heart Block:   Second-degree, with intermittent episodes of complete heart block  Appreciate Dr. Umana [place] context. Pt noted to have second-degree type II AV block with transition to 2-1 AV block, with subsequent development of intermittent complete heart block with pauses noted for 3.3 seconds on holter. Patient continues to be asymptomatic at the time. Cardiology has evaluated the patient  Plan  - dual-chamber pacer implantation at 3:30 pm today  - Will resume beta-blocker post device implantation       #HTN:   On lisin/hctz 10/12.5mg;   Continue metoprolol after procedure    #PVD: S/p iliac artery stenting. Known carotid artery stenosis as well for which she follows with vascular. Plan   cont clopidogrel, statin     #Hypothyroidism  TSH WNL  -Continue patient home medication levothyroxine 50 mcg    #COPD: Cont DuoNebs every 4     #Anxiety: Sertraline, home sam      I have personally reviewed the radiographs, laboratory data in Epic and decisions and statements above are based partially on this personal interpretation.                  Care Plan discussed with: Patient    Discussed:  Care Plan    Prophylaxis:  Lovenox    Disposition:  Home w/Family           ___________________________________________________    Attending Physician: Antony Hall MD        Subjective:     Chief Complaint: Patient is asymptomatic, no complaints, was found to have complete heart block on Holter monitor    ROS:  (bold if positive, if negative)    Tolerating PT  Tolerating Diet

## 2023-08-28 NOTE — PROGRESS NOTES
Ativan 1 mg po requested and given for sleep. Pt is requesting not to be disturbed, stating her headache is from lack of sleep.

## 2023-08-28 NOTE — CONSULTS
[facility]  Cardiac Electrophysiology Hospital Note     []Initial Encounter     [x]Follow-up    Patient Name: Nella Danielle - PXP:[birth year] - IO  Primary Cardiologist: Figueroa Guzman MD  Consulting Cardiologist: Massiel Garay MD, [facility]       Reason for encounter:   Intermittent Heart Block      HPI:  65-year-old woman with a history of hypertension, PVD with history of bilateral iliac stent, carotid artery stenosis status post right carotid stent 2022, history of diabetes, history of syncope, prior second-degree type I AV block on prior EKG, referred to the ER after being found to have intermittent complete heart block on outpatient monitor. Patient was seen by cardiology NP on 2023. Given her prior history of syncope she was prescribed a 30-day event monitor to assess for any arrhythmias. On 2023 at 7:57 AM patient had initially sinus rhythm followed by second-degree type II AV block with transition to 2-1 AV block, with subsequent development of intermittent complete heart block with pauses noted for 3.3 seconds. Patient had subsequent resumption of one-to-one conduction. At the time patient did not have any dizziness or lightheadedness. She was walking to her car. Telemetry since she has been hospitalized demonstrated sinus tachycardia since holding her beta-blocker. No chest pain or shortness of breath.     SUBJECTIVE:  No chest pain, palpitations, shortness of breath    This morning she has sinus tachycardia 120 bpm when she is not  on metoprolol   Assessment and Plan     Intermittent complete heart block  She agrees with dual-chamber pacer implantation.     - Can resume beta-blocker post device implantation    History of PVD  history of bilateral iliac stent, carotid artery stenosis status post right carotid stent 2022  - Continue Plavix and statin therapy    Hypertension  Continue home

## 2023-08-28 NOTE — DISCHARGE INSTRUCTIONS
Patient Instructions Post-Pacemaker      1. You may shower with your Aquacel chest dressing in place. 2.  You may remove your Aquacel chest dressing in 1 week, or it can be removed in clinic at follow up if you prefer. 3.  Do not rub/massage the site. 4.  Do not drive for 3 days. 5.  Do not raise affected arm above shoulder level & avoid pushing/pulling with affected arm. You may use the sling as provided as a reminder to limit range of motion, but please do not completely immobilize your arm. No lifting anything >5 lbs with affected arm. 6.  Call Dr. Flor Masterson at [phone] if you experience any of the following symptoms:  Redness at the PM site  Swelling at or around the PM or in the RIGHT arm  Pain around the PM  Dizziness, lightheadedness, fainting spells  Lack of energy  Shortness of breath  Rapid heart rate  Chest or muscle twitches    7. Follow-up with in clinic for wound & pacemaker function check on 09/11/2023. Future Appointments   Date Time Provider [address] [place] [address]   9/11/2023 10:00 AM PACEMAKER, STFRANCES [facility] BS AMB   12/6/2023  8:40 AM PACEMAKER, STFRANCES [facility] BS AMB   12/6/2023  9:00 AM Sybil Valdez MD [facility] BS AMB   12/7/2023 11:00 AM Mylene Trotter MD [facility] BS Valdez Mejias M.D.  [facility]  Electrophysiology/Cardiology  [facility]  [address] [facility], [facility] [address] [place] [address],[facility] Floor  [address] [facility], [address] [place] [address] St                             [facility] [facility]  [phone]

## 2023-08-28 NOTE — PROCEDURES
Cardiac Procedure Note   Patient: Dolly Rankin  MRN: [id number]  SSN: [id number]   YOB: 1950 Age: 67 y.o.   Sex: female    Date of Procedure: 8/28/2023   Pre-procedure Diagnosis: syncope and intermittent third degree av block on cardiac mobile monitor  Post-procedure Diagnosis: same  Procedure: Permanent dual chamber Pacemaker Insertion  :  Dr. Shona Zuluaga MD    Assistant(s):  None  Anesthesia: Moderate Sedation   Estimated Blood Loss: Less than 10 mL   Specimens Removed: None  Findings: right vein venogram  Right subclavian vein access  RA latera lead  RV mid septum  Complications: None   Implants: dual chamber Med[employer]onic pacemaker  Signed by:  Shona Zuluaga MD  8/28/2023  3:21 PM

## 2023-08-28 NOTE — PROGRESS NOTES
4:20 PM  TRANSFER - IN REPORT:    Verbal report received from Dory Gruber on Sameera Vickers  being received from  for ordered procedure      Report consisted of patient's Situation, Background, Assessment and   Recommendations(SBAR). Information from the following report(s) Nurse Handoff Report was reviewed with the receiving nurse. Opportunity for questions and clarification was provided. Assessment completed upon patient's arrival to unit and care assumed. 4:53 PM  TRANSFER - OUT REPORT:    Verbal report given to Vonnie Jamison on Sameera Vickers  being transferred to EP for routine post-op       Report consisted of patient's Situation, Background, Assessment and   Recommendations(SBAR). Information from the following report(s) Nurse Handoff Report was reviewed with the receiving nurse. Lines:   Peripheral IV 08/28/23 Left; Anterior Forearm (Active)   Site Assessment Clean, dry & intact 08/28/23 1445   Line Status Flushed;Capped 08/28/23 1445   Line Care Connections checked and tightened; Chlorhexidine wipes 08/28/23 1445   Phlebitis Assessment No symptoms 08/28/23 1445   Infiltration Assessment 0 08/28/23 1445   Alcohol Cap Used Yes 08/28/23 1445   Dressing Status Clean, dry & intact 08/28/23 1445   Dressing Type Transparent 08/28/23 1445       Peripheral IV 75/38/37 Right Basilic (Active)   Site Assessment Clean, dry & intact 08/28/23 1520   Line Status Blood return noted 08/28/23 1520        Opportunity for questions and clarification was provided. Patient transported with:  Registered Nurse    FABIAN and [facility] nurse at patient bedside to assess patient and site.

## 2023-08-28 NOTE — PROGRESS NOTES
0710 Bedside and Verbal shift change report given to [address] and Aaliyah RN (oncoming nurse) by Nigel Wayne (offgoing nurse). Report included the following information Nurse Handoff Report, Recent Results, and Cardiac Rhythm Sinus Tachy . 5967 Dr Lia Diamond at bedside. Verbal order received to advance diet to regular to breakfast , patient npo at 11 am for pending procedure.

## 2023-08-28 NOTE — CARE COORDINATION
08/28/23 1632   Service Assessment   Patient Orientation Alert and Oriented   Cognition Alert   History Provided By Patient   Primary Caregiver Self   Support Systems Spouse/Significant Other;Family Members   Patient's Healthcare Decision Maker is: Named in Misty E Jory Durbin   PCP Verified by CM Yes   Last Visit to PCP Within last 3 months   Prior Functional Level Independent in ADLs/IADLs   Current Functional Level Independent in ADLs/IADLs   Can patient return to prior living arrangement Yes   Ability to make needs known: Good   Family able to assist with home care needs: Yes   Would you like for me to discuss the discharge plan with any other family members/significant others, and if so, who? No   Financial Resources Medicare   Community Resources None   Social/Functional History   Lives With Spouse   Type of [facility]  Two level   Home Access Level entry   Bathroom Shower/Tub Walk-in shower   Bathroom Toilet Handicap height   [address] bars in shower;Built-in shower seat;Grab bars around toilet   [address] [facility] Help From Other (comment)  (independent)   ADL Assistance Independent   Ambulation Assistance Independent   Transfer Assistance Independent   Active  Yes   Mode of Transportation Car   Occupation Retired   Discharge Planning   [facility] of [facility] Prior To Admission None   Potential Assistance Needed N/A   DME Ordered? No   Potential Assistance Purchasing Medications No   Type of Home Care Services None   Patient expects to be discharged to: House   One/Two Story Residence Two story, live on first floor   Lift Chair Available No   History of falls?  0   Services At/After Discharge   Transition of Care Consult (CM Consult) N/A   Services At/After Discharge None   Mode of Transport at Discharge Other (see comment)  (sister will drive)   Confirm

## 2023-08-29 VITALS
TEMPERATURE: 98.4 F | DIASTOLIC BLOOD PRESSURE: 41 MMHG | SYSTOLIC BLOOD PRESSURE: 126 MMHG | OXYGEN SATURATION: 98 % | WEIGHT: 165.34 LBS | BODY MASS INDEX: 29.3 KG/M2 | RESPIRATION RATE: 14 BRPM | HEIGHT: 63 IN | HEART RATE: 87 BPM

## 2023-08-29 PROCEDURE — 94640 AIRWAY INHALATION TREATMENT: CPT

## 2023-08-29 PROCEDURE — 6370000000 HC RX 637 (ALT 250 FOR IP): Performed by: INTERNAL MEDICINE

## 2023-08-29 PROCEDURE — 2580000003 HC RX 258: Performed by: INTERNAL MEDICINE

## 2023-08-29 PROCEDURE — 6370000000 HC RX 637 (ALT 250 FOR IP): Performed by: STUDENT IN AN ORGANIZED HEALTH CARE EDUCATION/TRAINING PROGRAM

## 2023-08-29 PROCEDURE — 98960 EDU&TRN PT SELF-MGMT NQHP 1: CPT

## 2023-08-29 PROCEDURE — 94761 N-INVAS EAR/PLS OXIMETRY MLT: CPT

## 2023-08-29 RX ORDER — LISINOPRIL AND HYDROCHLOROTHIAZIDE 12.5; 1 MG/1; MG/1
1 TABLET ORAL DAILY
Qty: 30 TABLET | Refills: 0 | Status: SHIPPED | OUTPATIENT
Start: 2023-08-29 | End: 2023-09-28

## 2023-08-29 RX ORDER — ROSUVASTATIN CALCIUM 40 MG/1
40 TABLET, COATED ORAL EVERY EVENING
Qty: 30 TABLET | Refills: 0 | Status: SHIPPED | OUTPATIENT
Start: 2023-08-29 | End: 2023-09-28

## 2023-08-29 RX ORDER — LORAZEPAM 1 MG/1
1 TABLET ORAL NIGHTLY PRN
Qty: 3 TABLET | Refills: 0 | Status: SHIPPED | OUTPATIENT
Start: 2023-08-29 | End: 2023-09-01

## 2023-08-29 RX ORDER — SERTRALINE HYDROCHLORIDE 25 MG/1
25 TABLET, FILM COATED ORAL DAILY
Qty: 30 TABLET | Refills: 3 | Status: SHIPPED | OUTPATIENT
Start: 2023-08-29

## 2023-08-29 RX ORDER — LEVOTHYROXINE SODIUM 0.05 MG/1
50 TABLET ORAL
Qty: 30 TABLET | Refills: 0 | Status: SHIPPED | OUTPATIENT
Start: 2023-08-29 | End: 2023-09-28

## 2023-08-29 RX ORDER — CLOPIDOGREL BISULFATE 75 MG/1
75 TABLET ORAL DAILY
Qty: 30 TABLET | Refills: 0 | Status: SHIPPED | OUTPATIENT
Start: 2023-08-29 | End: 2023-09-28

## 2023-08-29 RX ADMIN — CLOPIDOGREL BISULFATE 75 MG: 75 TABLET ORAL at 08:19

## 2023-08-29 RX ADMIN — METOPROLOL TARTRATE 25 MG: 25 TABLET, FILM COATED ORAL at 08:19

## 2023-08-29 RX ADMIN — IPRATROPIUM BROMIDE AND ALBUTEROL SULFATE 1 DOSE: 2.5; .5 SOLUTION RESPIRATORY (INHALATION) at 07:24

## 2023-08-29 RX ADMIN — HYDROCHLOROTHIAZIDE 12.5 MG: 25 TABLET ORAL at 08:18

## 2023-08-29 RX ADMIN — LISINOPRIL 10 MG: 5 TABLET ORAL at 08:19

## 2023-08-29 RX ADMIN — SERTRALINE 25 MG: 50 TABLET, FILM COATED ORAL at 08:19

## 2023-08-29 RX ADMIN — LEVOTHYROXINE SODIUM 50 MCG: 0.05 TABLET ORAL at 07:19

## 2023-08-29 RX ADMIN — ACETAMINOPHEN 1000 MG: 500 TABLET ORAL at 08:18

## 2023-08-29 RX ADMIN — SODIUM CHLORIDE, PRESERVATIVE FREE 10 ML: 5 INJECTION INTRAVENOUS at 08:20

## 2023-08-29 ASSESSMENT — PAIN DESCRIPTION - ORIENTATION: ORIENTATION: RIGHT;UPPER

## 2023-08-29 ASSESSMENT — PAIN DESCRIPTION - PAIN TYPE
TYPE: SURGICAL PAIN
TYPE: SURGICAL PAIN

## 2023-08-29 ASSESSMENT — PAIN SCALES - GENERAL
PAINLEVEL_OUTOF10: 5
PAINLEVEL_OUTOF10: 5
PAINLEVEL_OUTOF10: 4
PAINLEVEL_OUTOF10: 0

## 2023-08-29 ASSESSMENT — PAIN DESCRIPTION - LOCATION
LOCATION: SHOULDER

## 2023-08-29 ASSESSMENT — PAIN DESCRIPTION - FREQUENCY
FREQUENCY: INTERMITTENT
FREQUENCY: INTERMITTENT

## 2023-08-29 ASSESSMENT — PAIN DESCRIPTION - ONSET
ONSET: ON-GOING
ONSET: ON-GOING

## 2023-08-29 ASSESSMENT — PAIN - FUNCTIONAL ASSESSMENT: PAIN_FUNCTIONAL_ASSESSMENT: PREVENTS OR INTERFERES SOME ACTIVE ACTIVITIES AND ADLS

## 2023-08-29 ASSESSMENT — PAIN DESCRIPTION - DESCRIPTORS
DESCRIPTORS: SORE
DESCRIPTORS: SORE

## 2023-08-29 NOTE — CARE COORDINATION
08/29/23 1148   Services At/After Discharge   Transition of Care Consult (CM Consult) N/A   Services At/After Discharge None   Mode of Transport at Discharge Other (see comment)  (sister to transport)   Confirm Follow Up Transport Family     Care Management Discharge Note:  Patient with d/c orders for today. Patient to d/c home with no CM needs, sister to provide transportation home.

## 2023-08-29 NOTE — PROGRESS NOTES
Discharge instructions, including post pacemaker care, were reviewed with patient. All questions were answered. IV and telemetry monitoring were removed and Care Plans and Education were completed. Patient will be discharged home with her sister. Primary nurse updated.

## 2023-08-29 NOTE — DISCHARGE SUMMARY
Hospitalist Discharge Summary     Patient ID:  Rafi Garzon  [id number]  24 y.o.  1950    Admit date: 8/26/2023    Discharge date and time: 8/29/2023    Admission Diagnoses: Heart block [I45.9]  Cardiac arrhythmia, unspecified cardiac arrhythmia type [I49.9]    Discharge Diagnoses:    Heart block s/p duel chamber pace maker placement        Hospital Course:   67years old female with past medical history of hypertension and peripheral vascular disease, history of bilateral iliac stents carotid artery stenosis history of syncope prior second-degree type I AV block on EKG was referred to ER after spotting complete heart block x3    Pt knonw PMH of Second-degree heart block, was found to have an intermittent episodes of complete heart block   with pauses noted for 3.3 seconds on holter. Patient continues to be asymptomatic at the time. Cardiology has evaluated the patient 8/28 pt was taken for dual-chamber pacer implantation in the morning, pt has tolerated the procedure with no acute complications reported     Pt is stable and ready to be discharged and FU OP with PCP and Cardiology        Imaging  XR CHEST (2 VW)    Result Date: 8/26/2023  No acute cardiopulmonary findings. XR CHEST PORTABLE    Result Date: 8/28/2023  1. New RIGHT pectoral implanted pacemaker with intact leads coursing to the RIGHT atrium and RIGHT ventricle. 2. No pneumothorax. No pulmonary edema.         PCP: Alexandre Isaac MD     Consults: cardiology    Condition of patient at discharge: Improved    Discharge Exam:    Physical Exam:    Gen: Well-developed, well-nourished, in no acute distress  HEENT:  Pink conjunctivae, PERRL, hearing intact to voice, moist mucous membranes  Neck: Supple, without masses, thyroid non-tender  Resp: No accessory muscle use, clear breath sounds without wheezes rales or rhonchi  Card: No murmurs, normal S1, S2 without thrills, bruits or peripheral edema  Abd:  Soft, non-tender, non-distended, normoactive

## 2023-09-06 ENCOUNTER — CLINICAL DOCUMENTATION (OUTPATIENT)
Age: 73
End: 2023-09-06

## 2023-09-06 LAB — ECHO BSA: 1.82 M2

## 2023-09-06 NOTE — PROGRESS NOTES
8/4/23- 9/1/23      Patient monitored for 22d 2h 5m  Pauses >2 seconds occurred 1 time(s) with longest pause 3.3 seconds  25 events were transmitted.  0 patient triggered; 25 auto triggered  Heart Block occurred 2 time(s) the most severe AHB; slowest 37 BPM  10,017 PACs with PAC burden of <1%  5,466 PVCs with PVC burden of <1%    Seeing EP

## 2023-09-07 NOTE — RESULT ENCOUNTER NOTE
Dear Ms. Kelvin Lepe,  I am so glad you wore the heart monitor. I heard you had a pacemaker placed. Hope you are feeling better. Please let me know if you have any questions.    Best Regards,  JENNIFER Maher NP

## 2023-09-11 ENCOUNTER — CLINICAL DOCUMENTATION (OUTPATIENT)
Age: 73
End: 2023-09-11

## 2023-09-11 ENCOUNTER — PROCEDURE VISIT (OUTPATIENT)
Age: 73
End: 2023-09-11
Payer: MEDICARE

## 2023-09-11 DIAGNOSIS — Z95.0 CARDIAC PACEMAKER IN SITU: Primary | ICD-10-CM

## 2023-09-11 PROCEDURE — 93280 PM DEVICE PROGR EVAL DUAL: CPT | Performed by: INTERNAL MEDICINE

## 2023-09-11 NOTE — PROGRESS NOTES
Patient presents for wound check post-device implantation. The dressing was removed prior to visit and the site was inspected. The site appeared to be well-healing without ecchymosis/tenderness/erythema. Denies pain, fevers, discharge. Future Appointments   Date Time Provider [address]   12/6/2023  8:40 AM PACEMAKER, JOSIAH TIDWELL AMB   12/6/2023  9:00 AM MD MARILUZ Andrade AMB   12/7/2023 11:00 AM MD URBAN Og BS AMB         Continue follow up in device clinic as planned.

## 2023-09-12 ENCOUNTER — TELEPHONE (OUTPATIENT)
Age: 73
End: 2023-09-12

## 2023-09-12 DIAGNOSIS — R00.9 ABNORMAL HEART RATE: ICD-10-CM

## 2023-09-12 DIAGNOSIS — Z95.0 CARDIAC PACEMAKER IN SITU: Primary | ICD-10-CM

## 2023-09-12 NOTE — PROGRESS NOTES
New pacemaker showed PAF   Not on [address] yet  Recommend eliquis 5 mg bid  She is on plavix with hx of carotid stent and so cannot stop antiplatelet

## 2023-09-12 NOTE — TELEPHONE ENCOUNTER
Phoned patient regarding recommendation, ID verified using two patient identifiers :    ----- Message from Matteo Helton MD sent at 9/11/2023 10:47 PM EDT -----  Regarding: RE: Follow up  New pacemaker showed PAF   Not on [address] yet  Recommend eliquis 5 mg bid  She is on plavix with hx of carotid stent and so cannot stop antiplatelet  ----- Message -----  From: Triston Fall RN  Sent: 9/11/2023  10:13 AM EDT  To: Matteo Helton MD; Simeon Talley MD; #  Subject: Follow up                                        Checked patient in the office, 2 week post implant. Dr Nilesh Tejada, picked up some new on set AF, transmission in [facility]. Dr. Farley and Carmita Padgett, patient's scheduled 12/7 for a 6mo follow up but is seeing Nilesh Tejada on 12/6+device check. She is wondering if that appt can be pushed out to stagger providers since she lives about an hour and half away?

## 2023-09-19 ENCOUNTER — TELEPHONE (OUTPATIENT)
Age: 73
End: 2023-09-19

## 2023-09-19 DIAGNOSIS — I49.9 CARDIAC ARRHYTHMIA, UNSPECIFIED CARDIAC ARRHYTHMIA TYPE: ICD-10-CM

## 2023-09-19 DIAGNOSIS — I10 ESSENTIAL (PRIMARY) HYPERTENSION: ICD-10-CM

## 2023-09-19 DIAGNOSIS — I48.91 ATRIAL FIBRILLATION, UNSPECIFIED TYPE (HCC): Primary | ICD-10-CM

## 2023-09-19 DIAGNOSIS — Z01.810 ENCOUNTER FOR PREPROCEDURAL CARDIOVASCULAR EXAMINATION: ICD-10-CM

## 2023-09-19 NOTE — TELEPHONE ENCOUNTER
Verified patient with two types of identifiers. Patient states that her BP today is 160/56 and HR 93. Patient confirms that she is taking lisinopril-HCTZ 10-12.5 mg daily, lopressor 25 mg BID. Will inform MD/NP and follow up with recommendations. Patient verbalized understanding and will call with any other questions.

## 2023-09-19 NOTE — TELEPHONE ENCOUNTER
Verified patient with two types of identifiers. Patient states that she has been extremely tired since her device procedure. She states that all she wants to do is sit her chair. She fell asleep at 130 yesterday afternoon and did not wake up until 5. She has not not experienced any other symptoms. Informed patient that her post procedure device check showed new onset Afib which can make her very tired. She has been taking her Eliquis. She does not take her BP at home, but there are recent BP measurements documented in her chart. Patient is not at home currently. Will call back with updated BP and to verify meds.

## 2023-09-19 NOTE — TELEPHONE ENCOUNTER
Pt stated that she had a pacemaker placed on 8/29 and she's been feeling tired since the surgery.      Pt # [phone]

## 2023-09-20 DIAGNOSIS — I48.91 ATRIAL FIBRILLATION, UNSPECIFIED TYPE (HCC): ICD-10-CM

## 2023-09-20 DIAGNOSIS — I10 ESSENTIAL (PRIMARY) HYPERTENSION: ICD-10-CM

## 2023-09-20 RX ORDER — METOPROLOL TARTRATE 50 MG/1
50 TABLET, FILM COATED ORAL 2 TIMES DAILY
Qty: 180 TABLET | Refills: 1
Start: 2023-09-20

## 2023-09-20 NOTE — TELEPHONE ENCOUNTER
Verified patient with two types of identifiers. Informed patient of Dr. Jaya Walter recommendations. Patient will incPatient scheduled [facility] at 1200 pm with 1100 am arrival.  Provided verbal pre procedure instructions. Will mail pre procedure instructions as well as lab slips. Patient verbalized understanding and will call with any other questions.       Future Appointments   Date Time Provider [address]   10/13/2023 12:00 PM [facility] STRESS LAB [facility]   11/16/2023 11:20 AM Major Anis, APRN - NP [facility] BS AMB   12/6/2023  8:40 AM PACEMAKER, STFRANCES [facility] BS AMB   12/6/2023  9:00 AM Sj Hill MD [facility] BS AMB   12/7/2023 11:00 AM Micah Botello MD [facility] BS AMB

## 2023-10-05 ENCOUNTER — PREP FOR PROCEDURE (OUTPATIENT)
Age: 73
End: 2023-10-05

## 2023-10-07 LAB
BUN SERPL-MCNC: 19 MG/DL (ref 8–27)
BUN/CREAT SERPL: 17 (ref 12–28)
CALCIUM SERPL-MCNC: 8.3 MG/DL (ref 8.7–10.3)
CHLORIDE SERPL-SCNC: 91 MMOL/L (ref 96–106)
CO2 SERPL-SCNC: 23 MMOL/L (ref 20–29)
CREAT SERPL-MCNC: 1.09 MG/DL (ref 0.57–1)
EGFRCR SERPLBLD CKD-EPI 2021: 53 ML/MIN/1.73
GLUCOSE SERPL-MCNC: 92 MG/DL (ref 70–99)
MAGNESIUM SERPL-MCNC: 1.9 MG/DL (ref 1.6–2.3)
POTASSIUM SERPL-SCNC: 4 MMOL/L (ref 3.5–5.2)
REPORT: NORMAL
SODIUM SERPL-SCNC: 130 MMOL/L (ref 134–144)

## 2023-10-07 RX ORDER — SODIUM CHLORIDE 0.9 % (FLUSH) 0.9 %
5-40 SYRINGE (ML) INJECTION PRN
OUTPATIENT
Start: 2023-10-07

## 2023-10-07 RX ORDER — SODIUM CHLORIDE 0.9 % (FLUSH) 0.9 %
5-40 SYRINGE (ML) INJECTION EVERY 12 HOURS SCHEDULED
OUTPATIENT
Start: 2023-10-07

## 2023-10-07 RX ORDER — SODIUM CHLORIDE 9 MG/ML
INJECTION, SOLUTION INTRAVENOUS PRN
OUTPATIENT
Start: 2023-10-07

## 2023-10-11 ENCOUNTER — CLINICAL DOCUMENTATION (OUTPATIENT)
Age: 73
End: 2023-10-11

## 2023-10-11 ENCOUNTER — TELEPHONE (OUTPATIENT)
Age: 73
End: 2023-10-11

## 2023-10-11 NOTE — TELEPHONE ENCOUNTER
Spoke with patient. Notified patient to discontinue Eliquis, continue Plavix. Cardioversion will be cancelled for this Friday. Will discuss referral to GI per pcp. Also prescribed PPI, has not picked up yet. Understanding expressed.

## 2023-10-11 NOTE — PROGRESS NOTES
Hb 8.5 so PCP suspected GI bleeding    Probably will need watchman  Hold off cardioversion as she will have to stop eliquis and see GI  If no active bleeding, I will put in Watchman device (still need aspirin and plavix)

## 2023-10-11 NOTE — TELEPHONE ENCOUNTER
----- Message from Ernie Hawley MD sent at 10/11/2023  2:54 PM EDT -----  Regarding: RE: Low Hemoglobin  Probably will need watchman  Hold off cardioversion as she will have to stop eliquis and see GI  If no active bleeding, I will put in Watchman device (still need aspirin and plavix)  ----- Message -----  From: Alistair uS LPN  Sent: 73/67/5253  12:12 PM EDT  To: Ernie Hawley MD; Felicita Monae MD; #  Subject: Low Hemoglobin                                   Call from patient's new pcp Dr. Jeanmarie Thibodeaux.    Drop in patient's hemoglobin from 12 to 8.5. Suspect GI  bleed. On Plavix for Carotid stent placement. Eliquis for new onset Afib seen on PPM.  Scheduled for DCCV on 10/13. Please advise.     Future Appointments  10/13/2023 12:00 PM   [facility] STRESS LAB 1           [facility]              [facility]  11/16/2023 11:20 AM   JENNIFER Esposito# [facility]               BS AMB  12/6/2023  8:40 AM    PACEMAKER, STFRANCES       [facility]               BS AMB  12/6/2023  9:00 AM    MD MARILUZ Wilson               BS AMB  12/7/2023  11:00 AM   Kaylynn Anguiano MD [facility]               BS AMB

## 2023-10-13 ENCOUNTER — APPOINTMENT (OUTPATIENT)
Facility: HOSPITAL | Age: 73
DRG: 292 | End: 2023-10-13
Payer: MEDICARE

## 2023-10-13 ENCOUNTER — HOSPITAL ENCOUNTER (INPATIENT)
Facility: HOSPITAL | Age: 73
LOS: 2 days | Discharge: HOME | DRG: 292 | End: 2023-10-15
Attending: EMERGENCY MEDICINE | Admitting: HOSPITALIST
Payer: MEDICARE

## 2023-10-13 DIAGNOSIS — R06.02 SOB (SHORTNESS OF BREATH): ICD-10-CM

## 2023-10-13 DIAGNOSIS — J90 BILATERAL PLEURAL EFFUSION: ICD-10-CM

## 2023-10-13 DIAGNOSIS — D64.9 SYMPTOMATIC ANEMIA: ICD-10-CM

## 2023-10-13 DIAGNOSIS — R06.09 DYSPNEA ON EXERTION: Primary | ICD-10-CM

## 2023-10-13 PROBLEM — E87.1 HYPONATREMIA: Status: ACTIVE | Noted: 2022-07-21

## 2023-10-13 PROBLEM — J44.9 COPD (CHRONIC OBSTRUCTIVE PULMONARY DISEASE) (HCC): Status: ACTIVE | Noted: 2022-07-22

## 2023-10-13 PROBLEM — R79.89 ELEVATED BRAIN NATRIURETIC PEPTIDE (BNP) LEVEL: Status: ACTIVE | Noted: 2023-10-13

## 2023-10-13 PROBLEM — I48.0 PAF (PAROXYSMAL ATRIAL FIBRILLATION) (HCC): Status: ACTIVE | Noted: 2023-10-13

## 2023-10-13 LAB
ALBUMIN SERPL-MCNC: 3 G/DL (ref 3.5–5)
ALBUMIN/GLOB SERPL: 0.6 (ref 1.1–2.2)
ALP SERPL-CCNC: 132 U/L (ref 45–117)
ALT SERPL-CCNC: 39 U/L (ref 12–78)
ANION GAP SERPL CALC-SCNC: 7 MMOL/L (ref 5–15)
APPEARANCE UR: CLEAR
AST SERPL-CCNC: 21 U/L (ref 15–37)
BACTERIA URNS QL MICRO: NEGATIVE /HPF
BASOPHILS # BLD: 0 K/UL (ref 0–0.1)
BASOPHILS NFR BLD: 0 % (ref 0–1)
BILIRUB SERPL-MCNC: 0.5 MG/DL (ref 0.2–1)
BILIRUB UR QL: NEGATIVE
BUN SERPL-MCNC: 18 MG/DL (ref 6–20)
BUN/CREAT SERPL: 15 (ref 12–20)
CALCIUM SERPL-MCNC: 9.1 MG/DL (ref 8.5–10.1)
CHLORIDE SERPL-SCNC: 97 MMOL/L (ref 97–108)
CO2 SERPL-SCNC: 26 MMOL/L (ref 21–32)
COLOR UR: ABNORMAL
COMMENT:: NORMAL
CREAT SERPL-MCNC: 1.24 MG/DL (ref 0.55–1.02)
CREAT UR-MCNC: 92 MG/DL
DIFFERENTIAL METHOD BLD: ABNORMAL
EKG ATRIAL RATE: 80 BPM
EKG DIAGNOSIS: NORMAL
EKG P AXIS: 61 DEGREES
EKG P-R INTERVAL: 180 MS
EKG Q-T INTERVAL: 212 MS
EKG QRS DURATION: 72 MS
EKG QTC CALCULATION (BAZETT): 244 MS
EKG R AXIS: 47 DEGREES
EKG T AXIS: 251 DEGREES
EKG VENTRICULAR RATE: 80 BPM
EOSINOPHIL # BLD: 0.1 K/UL (ref 0–0.4)
EOSINOPHIL NFR BLD: 1 % (ref 0–7)
EPITH CASTS URNS QL MICRO: ABNORMAL /LPF
ERYTHROCYTE [DISTWIDTH] IN BLOOD BY AUTOMATED COUNT: 16 % (ref 11.5–14.5)
GLOBULIN SER CALC-MCNC: 5.1 G/DL (ref 2–4)
GLUCOSE SERPL-MCNC: 119 MG/DL (ref 65–100)
GLUCOSE UR STRIP.AUTO-MCNC: NEGATIVE MG/DL
HCT VFR BLD AUTO: 27 % (ref 35–47)
HEMOCCULT STL QL: NEGATIVE
HGB BLD-MCNC: 8.6 G/DL (ref 11.5–16)
HGB UR QL STRIP: NEGATIVE
HYALINE CASTS URNS QL MICRO: ABNORMAL /LPF (ref 0–2)
IMM GRANULOCYTES # BLD AUTO: 0 K/UL (ref 0–0.04)
IMM GRANULOCYTES NFR BLD AUTO: 0 % (ref 0–0.5)
KETONES UR QL STRIP.AUTO: NEGATIVE MG/DL
LEUKOCYTE ESTERASE UR QL STRIP.AUTO: ABNORMAL
LYMPHOCYTES # BLD: 1.2 K/UL (ref 0.8–3.5)
LYMPHOCYTES NFR BLD: 18 % (ref 12–49)
MCH RBC QN AUTO: 28.3 PG (ref 26–34)
MCHC RBC AUTO-ENTMCNC: 31.9 G/DL (ref 30–36.5)
MCV RBC AUTO: 88.8 FL (ref 80–99)
MONOCYTES # BLD: 0.5 K/UL (ref 0–1)
MONOCYTES NFR BLD: 8 % (ref 5–13)
NEUTS SEG # BLD: 4.8 K/UL (ref 1.8–8)
NEUTS SEG NFR BLD: 73 % (ref 32–75)
NITRITE UR QL STRIP.AUTO: NEGATIVE
NRBC # BLD: 0 K/UL (ref 0–0.01)
NRBC BLD-RTO: 0 PER 100 WBC
NT PRO BNP: 2780 PG/ML
OSMOLALITY SERPL: 277 MOSM/KG H2O
OSMOLALITY UR: 421 MOSM/KG H2O
PH UR STRIP: 7 (ref 5–8)
PLATELET # BLD AUTO: 399 K/UL (ref 150–400)
PMV BLD AUTO: 10 FL (ref 8.9–12.9)
POTASSIUM SERPL-SCNC: 3.7 MMOL/L (ref 3.5–5.1)
PROT SERPL-MCNC: 8.1 G/DL (ref 6.4–8.2)
PROT UR STRIP-MCNC: 30 MG/DL
RBC # BLD AUTO: 3.04 M/UL (ref 3.8–5.2)
RBC #/AREA URNS HPF: ABNORMAL /HPF (ref 0–5)
RBC MORPH BLD: ABNORMAL
SODIUM SERPL-SCNC: 130 MMOL/L (ref 136–145)
SODIUM UR-SCNC: 88 MMOL/L
SP GR UR REFRACTOMETRY: 1.01 (ref 1–1.03)
SPECIMEN HOLD: NORMAL
SPECIMEN HOLD: NORMAL
TROPONIN I SERPL HS-MCNC: 9 NG/L (ref 0–51)
TSH SERPL DL<=0.05 MIU/L-ACNC: 1.44 UIU/ML (ref 0.36–3.74)
UROBILINOGEN UR QL STRIP.AUTO: 1 EU/DL (ref 0.2–1)
WBC # BLD AUTO: 6.6 K/UL (ref 3.6–11)
WBC URNS QL MICRO: ABNORMAL /HPF (ref 0–4)

## 2023-10-13 PROCEDURE — 36415 COLL VENOUS BLD VENIPUNCTURE: CPT

## 2023-10-13 PROCEDURE — 85025 COMPLETE CBC W/AUTO DIFF WBC: CPT

## 2023-10-13 PROCEDURE — 80053 COMPREHEN METABOLIC PANEL: CPT

## 2023-10-13 PROCEDURE — 84484 ASSAY OF TROPONIN QUANT: CPT

## 2023-10-13 PROCEDURE — 84443 ASSAY THYROID STIM HORMONE: CPT

## 2023-10-13 PROCEDURE — 99285 EMERGENCY DEPT VISIT HI MDM: CPT

## 2023-10-13 PROCEDURE — 71046 X-RAY EXAM CHEST 2 VIEWS: CPT

## 2023-10-13 PROCEDURE — 83880 ASSAY OF NATRIURETIC PEPTIDE: CPT

## 2023-10-13 PROCEDURE — 2580000003 HC RX 258: Performed by: INTERNAL MEDICINE

## 2023-10-13 PROCEDURE — 81001 URINALYSIS AUTO W/SCOPE: CPT

## 2023-10-13 PROCEDURE — 93005 ELECTROCARDIOGRAM TRACING: CPT | Performed by: FAMILY MEDICINE

## 2023-10-13 PROCEDURE — 84300 ASSAY OF URINE SODIUM: CPT

## 2023-10-13 PROCEDURE — 93010 ELECTROCARDIOGRAM REPORT: CPT | Performed by: INTERNAL MEDICINE

## 2023-10-13 PROCEDURE — 2500000003 HC RX 250 WO HCPCS: Performed by: INTERNAL MEDICINE

## 2023-10-13 PROCEDURE — 6370000000 HC RX 637 (ALT 250 FOR IP): Performed by: NURSE PRACTITIONER

## 2023-10-13 PROCEDURE — 82272 OCCULT BLD FECES 1-3 TESTS: CPT

## 2023-10-13 PROCEDURE — 83930 ASSAY OF BLOOD OSMOLALITY: CPT

## 2023-10-13 PROCEDURE — 82570 ASSAY OF URINE CREATININE: CPT

## 2023-10-13 PROCEDURE — 83935 ASSAY OF URINE OSMOLALITY: CPT

## 2023-10-13 PROCEDURE — 1100000000 HC RM PRIVATE

## 2023-10-13 PROCEDURE — 6370000000 HC RX 637 (ALT 250 FOR IP): Performed by: INTERNAL MEDICINE

## 2023-10-13 RX ORDER — BUMETANIDE 0.25 MG/ML
1 INJECTION INTRAMUSCULAR; INTRAVENOUS EVERY 12 HOURS
Status: DISCONTINUED | OUTPATIENT
Start: 2023-10-13 | End: 2023-10-15

## 2023-10-13 RX ORDER — FERROUS SULFATE 325(65) MG
325 TABLET ORAL
Status: DISCONTINUED | OUTPATIENT
Start: 2023-10-14 | End: 2023-10-15 | Stop reason: HOSPADM

## 2023-10-13 RX ORDER — LATANOPROST 50 UG/ML
1 SOLUTION/ DROPS OPHTHALMIC NIGHTLY
Status: DISCONTINUED | OUTPATIENT
Start: 2023-10-13 | End: 2023-10-15 | Stop reason: HOSPADM

## 2023-10-13 RX ORDER — SODIUM CHLORIDE 9 MG/ML
INJECTION, SOLUTION INTRAVENOUS PRN
Status: DISCONTINUED | OUTPATIENT
Start: 2023-10-13 | End: 2023-10-15 | Stop reason: HOSPADM

## 2023-10-13 RX ORDER — ACETAMINOPHEN 325 MG/1
650 TABLET ORAL EVERY 6 HOURS PRN
Status: DISCONTINUED | OUTPATIENT
Start: 2023-10-13 | End: 2023-10-15 | Stop reason: HOSPADM

## 2023-10-13 RX ORDER — ACETAMINOPHEN 650 MG/1
650 SUPPOSITORY RECTAL EVERY 6 HOURS PRN
Status: DISCONTINUED | OUTPATIENT
Start: 2023-10-13 | End: 2023-10-15 | Stop reason: HOSPADM

## 2023-10-13 RX ORDER — ONDANSETRON 4 MG/1
4 TABLET, ORALLY DISINTEGRATING ORAL EVERY 8 HOURS PRN
Status: DISCONTINUED | OUTPATIENT
Start: 2023-10-13 | End: 2023-10-15 | Stop reason: HOSPADM

## 2023-10-13 RX ORDER — SODIUM CHLORIDE 0.9 % (FLUSH) 0.9 %
5-40 SYRINGE (ML) INJECTION PRN
Status: DISCONTINUED | OUTPATIENT
Start: 2023-10-13 | End: 2023-10-15 | Stop reason: HOSPADM

## 2023-10-13 RX ORDER — LEVOTHYROXINE SODIUM 0.05 MG/1
50 TABLET ORAL
Status: DISCONTINUED | OUTPATIENT
Start: 2023-10-14 | End: 2023-10-15 | Stop reason: HOSPADM

## 2023-10-13 RX ORDER — METOPROLOL TARTRATE 50 MG/1
50 TABLET, FILM COATED ORAL 2 TIMES DAILY
Status: DISCONTINUED | OUTPATIENT
Start: 2023-10-13 | End: 2023-10-15 | Stop reason: HOSPADM

## 2023-10-13 RX ORDER — LORAZEPAM 1 MG/1
1 TABLET ORAL
COMMUNITY

## 2023-10-13 RX ORDER — SODIUM CHLORIDE 0.9 % (FLUSH) 0.9 %
5-40 SYRINGE (ML) INJECTION EVERY 12 HOURS SCHEDULED
Status: DISCONTINUED | OUTPATIENT
Start: 2023-10-13 | End: 2023-10-15 | Stop reason: HOSPADM

## 2023-10-13 RX ORDER — LORAZEPAM 1 MG/1
1 TABLET ORAL ONCE
Status: COMPLETED | OUTPATIENT
Start: 2023-10-13 | End: 2023-10-13

## 2023-10-13 RX ORDER — POLYETHYLENE GLYCOL 3350 17 G/17G
17 POWDER, FOR SOLUTION ORAL DAILY PRN
Status: DISCONTINUED | OUTPATIENT
Start: 2023-10-13 | End: 2023-10-15 | Stop reason: HOSPADM

## 2023-10-13 RX ORDER — ONDANSETRON 2 MG/ML
4 INJECTION INTRAMUSCULAR; INTRAVENOUS EVERY 6 HOURS PRN
Status: DISCONTINUED | OUTPATIENT
Start: 2023-10-13 | End: 2023-10-15 | Stop reason: HOSPADM

## 2023-10-13 RX ORDER — ROSUVASTATIN CALCIUM 10 MG/1
40 TABLET, COATED ORAL EVERY EVENING
Status: DISCONTINUED | OUTPATIENT
Start: 2023-10-13 | End: 2023-10-15 | Stop reason: HOSPADM

## 2023-10-13 RX ADMIN — METOPROLOL TARTRATE 50 MG: 50 TABLET ORAL at 20:49

## 2023-10-13 RX ADMIN — LATANOPROST 1 DROP: 50 SOLUTION OPHTHALMIC at 22:28

## 2023-10-13 RX ADMIN — LORAZEPAM 1 MG: 1 TABLET ORAL at 22:28

## 2023-10-13 RX ADMIN — BUMETANIDE 1 MG: 0.25 INJECTION INTRAMUSCULAR; INTRAVENOUS at 17:26

## 2023-10-13 RX ADMIN — SODIUM CHLORIDE, PRESERVATIVE FREE 10 ML: 5 INJECTION INTRAVENOUS at 20:51

## 2023-10-13 ASSESSMENT — ENCOUNTER SYMPTOMS
NAUSEA: 0
BACK PAIN: 0
ABDOMINAL PAIN: 0
COUGH: 0
SHORTNESS OF BREATH: 1
VOMITING: 0

## 2023-10-13 ASSESSMENT — PAIN SCALES - GENERAL: PAINLEVEL_OUTOF10: 0

## 2023-10-13 ASSESSMENT — PAIN - FUNCTIONAL ASSESSMENT: PAIN_FUNCTIONAL_ASSESSMENT: 0-10

## 2023-10-13 NOTE — ED PROVIDER NOTES
[facility] EMERGENCY DEPT  EMERGENCY DEPARTMENT ENCOUNTER      Pt Name: Giovanni Hernández  MRN: [id number]  [address] 1950  Date of evaluation: 10/13/2023  Provider: JENNIFER Hasasn NP    [address]       Chief Complaint   Patient presents with    Fatigue         HISTORY OF PRESENT ILLNESS   (Location/Symptom, Timing/Onset, Context/Setting, Quality, Duration, Modifying Factors, Severity)  Note limiting factors. Patient is a 65-year-old female with past medical history of arthritis, hypertension, degenerative disc disease, cardiac arrhythmia presenting to the emergency department for complaints of generalized weakness and fatigue. Reports symptoms have been going on for approximately 2 weeks. She has been following with her primary care, who noted that she was anemic. She was started on iron. Notes that she takes Eliquis and Plavix. However, she was supposed to have an ablation with cardiology and has recently stopped her Eliquis. She denies any source of bleeding such as vaginal bleeding, blood in stools, dark or tarry stools. Reports exertional shortness of breath. The history is provided by the patient. Review of External Medical Records:     Nursing Notes were reviewed. REVIEW OF SYSTEMS    (2-9 systems for level 4, 10 or more for level 5)     Review of Systems   Constitutional:  Positive for fatigue. Negative for unexpected weight change. HENT:  Negative for congestion. Eyes:  Negative for visual disturbance. Respiratory:  Positive for shortness of breath. Negative for cough. Cardiovascular:  Negative for chest pain and palpitations. Gastrointestinal:  Negative for abdominal pain, nausea and vomiting. Endocrine: Negative for polyuria. Genitourinary:  Negative for dysuria and flank pain. Musculoskeletal:  Negative for back pain. Skin:  Negative for pallor. Allergic/Immunologic: Negative for immunocompromised state. Neurological:  Positive for weakness. mis-transcribed.)    JENNIFER Chi NP (electronically signed)  Nurse Practitioner      JENNIFER Chi NP  10/13/23 [id number]

## 2023-10-13 NOTE — H&P
[address] [place] [address]  [phone]    Hospital Medicine Admission History and Physical      NAME:  Madison Gerardo   :   1950   MRN:  [id number]     PCP:  Sean Corado MD     Date of service:  10/13/2023         Subjective:     CHIEF COMPLAINT: Exertional dyspnea, fatigue    HISTORY OF PRESENT ILLNESS:     Ms. Briseyda Barillas is a 75 y.o.  female who is admitted with exertional dyspnea. Ms. Briseyda Barillas with past medical history of HTN, PAF, heart block s/p PPM, hyperlipidemia, glaucoma presented to ER complaining of exertional dyspnea and extreme fatigue, which started about 2 weeks ago. Patient has extremely fatigue associated with exertional dyspnea. Even going to the bathroom or taking a shower makes her short of breath. Denies chest pain or cough. Denies leg swelling. Today she was seen by her PCP and her hemoglobin was low from her baseline and referred to the emergency room. Her PCP also referred her to see a GI for possible endoscopic evaluation. Past Medical History:   Diagnosis Date    Anticoagulant long-term use     Arthritis     BCC (basal cell carcinoma of skin)     Claustrophobia     Degenerative disc disease, lumbar 2016    Glaucoma     Gout attack 2019    Big toe    H/O sinus tachycardia 2019    Following shoulder surgery    High potassium 2018    Chronic- Stays around 5.3    History of nuclear stress test 02/10/2019    Normal    Hypertension     Obesity (BMI 30-39. 9)     Peripheral vascular disease ([address])     stents in each iliac artery, states they are \"watching\" carotids    Prediabetes     Rib fracture     Unspecified adverse effect of anesthesia     BP drops    Unspecified sleep apnea     Does not uses CPAP        Past Surgical History:   Procedure Laterality Date    CAROTID STENT PLACEMENT Right 2022    EP DEVICE PROCEDURE N/A 2023    Insert PPM dual performed by Macie Robb MD

## 2023-10-13 NOTE — ED NOTES
Report given to Avel Early. Patient being transferred to room 540.       Romie Cosme RN  10/13/23 5735

## 2023-10-13 NOTE — ED TRIAGE NOTES
Patient states has had approximately two weeks of generalized weakness. Patient states seen by her PCP and told her blood levels were low and have continued to drop over two weeks. Was on Eliquis and Plavix, they discontinued the Plavix two weeks ago when her levels became low. No known source of bleeding. No blood in the urine or stool. No dark stools. Denies pain.

## 2023-10-14 ENCOUNTER — APPOINTMENT (OUTPATIENT)
Facility: HOSPITAL | Age: 73
DRG: 292 | End: 2023-10-14
Attending: INTERNAL MEDICINE
Payer: MEDICARE

## 2023-10-14 LAB
ANION GAP SERPL CALC-SCNC: 7 MMOL/L (ref 5–15)
BASOPHILS # BLD: 0 K/UL (ref 0–0.1)
BASOPHILS NFR BLD: 0 % (ref 0–1)
BUN SERPL-MCNC: 18 MG/DL (ref 6–20)
BUN/CREAT SERPL: 16 (ref 12–20)
CALCIUM SERPL-MCNC: 8.8 MG/DL (ref 8.5–10.1)
CHLORIDE SERPL-SCNC: 98 MMOL/L (ref 97–108)
CO2 SERPL-SCNC: 27 MMOL/L (ref 21–32)
CREAT SERPL-MCNC: 1.14 MG/DL (ref 0.55–1.02)
DIFFERENTIAL METHOD BLD: ABNORMAL
ECHO AO ARCH DIAM: 2.3 CM
ECHO AV AREA PEAK VELOCITY: 1.9 CM2
ECHO AV AREA/BSA PEAK VELOCITY: 1.1 CM2/M2
ECHO AV PEAK GRADIENT: 6 MMHG
ECHO AV PEAK VELOCITY: 1.2 M/S
ECHO AV VELOCITY RATIO: 0.75
ECHO BSA: 1.8 M2
ECHO LA DIAMETER INDEX: 1.59 CM/M2
ECHO LA DIAMETER: 2.8 CM
ECHO LA VOL 2C: 27 ML (ref 22–52)
ECHO LA VOL 2C: 27 ML (ref 22–52)
ECHO LA VOL 4C: 34 ML (ref 22–52)
ECHO LA VOL 4C: 37 ML (ref 22–52)
ECHO LA VOL BP: 31 ML (ref 22–52)
ECHO LA VOL/BSA BIPLANE: 18 ML/M2 (ref 16–34)
ECHO LA VOLUME AREA LENGTH: 33 ML
ECHO LA VOLUME INDEX AREA LENGTH: 19 ML/M2 (ref 16–34)
ECHO LV E' LATERAL VELOCITY: 7 CM/S
ECHO LV E' SEPTAL VELOCITY: 7 CM/S
ECHO LV EDV A2C: 49 ML
ECHO LV EDV A4C: 45 ML
ECHO LV EDV BP: 48 ML (ref 56–104)
ECHO LV EDV INDEX A4C: 26 ML/M2
ECHO LV EDV INDEX BP: 27 ML/M2
ECHO LV EDV NDEX A2C: 28 ML/M2
ECHO LV EJECTION FRACTION A2C: 71 %
ECHO LV EJECTION FRACTION A4C: 57 %
ECHO LV EJECTION FRACTION BIPLANE: 64 % (ref 55–100)
ECHO LV ESV A2C: 15 ML
ECHO LV ESV A4C: 20 ML
ECHO LV ESV BP: 17 ML (ref 19–49)
ECHO LV ESV INDEX A2C: 9 ML/M2
ECHO LV ESV INDEX A4C: 11 ML/M2
ECHO LV ESV INDEX BP: 10 ML/M2
ECHO LV FRACTIONAL SHORTENING: 28 % (ref 28–44)
ECHO LV INTERNAL DIMENSION DIASTOLE INDEX: 2.44 CM/M2
ECHO LV INTERNAL DIMENSION DIASTOLIC: 4.3 CM (ref 3.9–5.3)
ECHO LV INTERNAL DIMENSION SYSTOLIC INDEX: 1.76 CM/M2
ECHO LV INTERNAL DIMENSION SYSTOLIC: 3.1 CM
ECHO LV IVSD: 0.7 CM (ref 0.6–0.9)
ECHO LV MASS 2D: 88.5 G (ref 67–162)
ECHO LV MASS INDEX 2D: 50.3 G/M2 (ref 43–95)
ECHO LV POSTERIOR WALL DIASTOLIC: 0.7 CM (ref 0.6–0.9)
ECHO LV RELATIVE WALL THICKNESS RATIO: 0.33
ECHO LVOT AREA: 2.5 CM2
ECHO LVOT DIAM: 1.8 CM
ECHO LVOT MEAN GRADIENT: 2 MMHG
ECHO LVOT PEAK GRADIENT: 3 MMHG
ECHO LVOT PEAK VELOCITY: 0.9 M/S
ECHO LVOT STROKE VOLUME INDEX: 26.6 ML/M2
ECHO LVOT SV: 46.8 ML
ECHO LVOT VTI: 18.4 CM
ECHO MV A VELOCITY: 0.64 M/S
ECHO MV E DECELERATION TIME (DT): 243.7 MS
ECHO MV E VELOCITY: 0.63 M/S
ECHO MV E/A RATIO: 0.98
ECHO MV E/E' LATERAL: 9
ECHO MV E/E' RATIO (AVERAGED): 9
ECHO MV E/E' SEPTAL: 9
ECHO PV MAX VELOCITY: 0.9 M/S
ECHO PV PEAK GRADIENT: 3 MMHG
ECHO RV FREE WALL PEAK S': 8 CM/S
ECHO RV INTERNAL DIMENSION: 2.7 CM
ECHO RV TAPSE: 1.4 CM (ref 1.7–?)
ECHO TV REGURGITANT MAX VELOCITY: 2.46 M/S
ECHO TV REGURGITANT PEAK GRADIENT: 24 MMHG
EOSINOPHIL # BLD: 0.2 K/UL (ref 0–0.4)
EOSINOPHIL NFR BLD: 4 % (ref 0–7)
ERYTHROCYTE [DISTWIDTH] IN BLOOD BY AUTOMATED COUNT: 16 % (ref 11.5–14.5)
FOLATE SERPL-MCNC: 10.4 NG/ML (ref 5–21)
GLUCOSE SERPL-MCNC: 109 MG/DL (ref 65–100)
HAPTOGLOB SERPL-MCNC: 385 MG/DL (ref 30–200)
HCT VFR BLD AUTO: 25.9 % (ref 35–47)
HGB BLD-MCNC: 8.1 G/DL (ref 11.5–16)
IMM GRANULOCYTES # BLD AUTO: 0 K/UL (ref 0–0.04)
IMM GRANULOCYTES NFR BLD AUTO: 0 % (ref 0–0.5)
IRON SATN MFR SERPL: 13 % (ref 20–50)
IRON SERPL-MCNC: 27 UG/DL (ref 35–150)
LYMPHOCYTES # BLD: 1.6 K/UL (ref 0.8–3.5)
LYMPHOCYTES NFR BLD: 27 % (ref 12–49)
MCH RBC QN AUTO: 27.8 PG (ref 26–34)
MCHC RBC AUTO-ENTMCNC: 31.3 G/DL (ref 30–36.5)
MCV RBC AUTO: 89 FL (ref 80–99)
MONOCYTES # BLD: 0.6 K/UL (ref 0–1)
MONOCYTES NFR BLD: 9 % (ref 5–13)
NEUTS SEG # BLD: 3.6 K/UL (ref 1.8–8)
NEUTS SEG NFR BLD: 60 % (ref 32–75)
NRBC # BLD: 0 K/UL (ref 0–0.01)
NRBC BLD-RTO: 0 PER 100 WBC
PLATELET # BLD AUTO: 385 K/UL (ref 150–400)
PMV BLD AUTO: 10.1 FL (ref 8.9–12.9)
POTASSIUM SERPL-SCNC: 3.6 MMOL/L (ref 3.5–5.1)
RBC # BLD AUTO: 2.91 M/UL (ref 3.8–5.2)
RETICS # AUTO: 0.05 M/UL (ref 0.02–0.08)
RETICS/RBC NFR AUTO: 1.9 % (ref 0.7–2.1)
SODIUM SERPL-SCNC: 132 MMOL/L (ref 136–145)
TIBC SERPL-MCNC: 212 UG/DL (ref 250–450)
VIT B12 SERPL-MCNC: 355 PG/ML (ref 193–986)
WBC # BLD AUTO: 6 K/UL (ref 3.6–11)

## 2023-10-14 PROCEDURE — 82607 VITAMIN B-12: CPT

## 2023-10-14 PROCEDURE — 93306 TTE W/DOPPLER COMPLETE: CPT | Performed by: INTERNAL MEDICINE

## 2023-10-14 PROCEDURE — 97161 PT EVAL LOW COMPLEX 20 MIN: CPT

## 2023-10-14 PROCEDURE — 85045 AUTOMATED RETICULOCYTE COUNT: CPT

## 2023-10-14 PROCEDURE — 97530 THERAPEUTIC ACTIVITIES: CPT

## 2023-10-14 PROCEDURE — 83010 ASSAY OF HAPTOGLOBIN QUANT: CPT

## 2023-10-14 PROCEDURE — 36415 COLL VENOUS BLD VENIPUNCTURE: CPT

## 2023-10-14 PROCEDURE — 2580000003 HC RX 258: Performed by: INTERNAL MEDICINE

## 2023-10-14 PROCEDURE — 82746 ASSAY OF FOLIC ACID SERUM: CPT

## 2023-10-14 PROCEDURE — 80048 BASIC METABOLIC PNL TOTAL CA: CPT

## 2023-10-14 PROCEDURE — 6370000000 HC RX 637 (ALT 250 FOR IP): Performed by: NURSE PRACTITIONER

## 2023-10-14 PROCEDURE — 83550 IRON BINDING TEST: CPT

## 2023-10-14 PROCEDURE — 93306 TTE W/DOPPLER COMPLETE: CPT

## 2023-10-14 PROCEDURE — 83540 ASSAY OF IRON: CPT

## 2023-10-14 PROCEDURE — 6370000000 HC RX 637 (ALT 250 FOR IP): Performed by: INTERNAL MEDICINE

## 2023-10-14 PROCEDURE — 1100000000 HC RM PRIVATE

## 2023-10-14 PROCEDURE — 85025 COMPLETE CBC W/AUTO DIFF WBC: CPT

## 2023-10-14 PROCEDURE — 2500000003 HC RX 250 WO HCPCS: Performed by: INTERNAL MEDICINE

## 2023-10-14 RX ORDER — LORAZEPAM 1 MG/1
1 TABLET ORAL ONCE
Status: COMPLETED | OUTPATIENT
Start: 2023-10-14 | End: 2023-10-14

## 2023-10-14 RX ADMIN — FERROUS SULFATE TAB 325 MG (65 MG ELEMENTAL FE) 325 MG: 325 (65 FE) TAB at 08:25

## 2023-10-14 RX ADMIN — METOPROLOL TARTRATE 50 MG: 50 TABLET ORAL at 08:25

## 2023-10-14 RX ADMIN — SODIUM CHLORIDE, PRESERVATIVE FREE 10 ML: 5 INJECTION INTRAVENOUS at 21:05

## 2023-10-14 RX ADMIN — ROSUVASTATIN CALCIUM 40 MG: 10 TABLET, COATED ORAL at 17:39

## 2023-10-14 RX ADMIN — METOPROLOL TARTRATE 50 MG: 50 TABLET ORAL at 21:04

## 2023-10-14 RX ADMIN — LORAZEPAM 1 MG: 1 TABLET ORAL at 21:04

## 2023-10-14 RX ADMIN — BUMETANIDE 1 MG: 0.25 INJECTION INTRAMUSCULAR; INTRAVENOUS at 06:05

## 2023-10-14 RX ADMIN — LATANOPROST 1 DROP: 50 SOLUTION OPHTHALMIC at 21:04

## 2023-10-14 RX ADMIN — SERTRALINE 25 MG: 50 TABLET, FILM COATED ORAL at 08:25

## 2023-10-14 RX ADMIN — LEVOTHYROXINE SODIUM 50 MCG: 0.05 TABLET ORAL at 06:05

## 2023-10-14 RX ADMIN — SODIUM CHLORIDE, PRESERVATIVE FREE 10 ML: 5 INJECTION INTRAVENOUS at 08:26

## 2023-10-14 NOTE — CARE COORDINATION
10/14/23 1517   Service Assessment   Patient Orientation Alert and Oriented   Cognition Alert   History Provided By Patient   Primary Caregiver Self   Accompanied By/Relationship Brad Ly, sister-in-law   Support Systems Spouse/Significant Other;Family Members   Patient's Healthcare Decision Maker is: Legal Next of [address] [place] [address]   PCP Verified by CM Yes   Last Visit to PCP Within last 3 months   Prior Functional Level Independent in ADLs/IADLs   Current Functional Level Independent in ADLs/IADLs   Can patient return to prior living arrangement Yes   Ability to make needs known: Good   Family able to assist with home care needs: Yes   Financial Resources Medicare   Social/Functional History   Lives With Spouse   Type of [address] [place] [address] Center  Two level; Able to Live on Main level with bedroom/bathroom   Home Access Level entry   [place]   ADL Assistance Independent   Homemaking Assistance Independent   Ambulation Assistance Independent   Active  Yes   Discharge Planning   Type of Residence House   Living Arrangements Spouse/Significant Other   Current Services Prior To Admission None   Potential Assistance Needed N/A   Potential Assistance Purchasing Medications No   Type of Home Care Services None   Patient expects to be discharged to: House   History of falls? (Not recent)   Services At/After Discharge   Services At/After Discharge None   Confirm Follow Up Transport Family     Patient lives with her spouse and adult grandson in a two story house in [place]. Patient reports her sister-in-law, Joy Mejias, lives nearby and is an additional support system. Patient is independent with ADLs and ambulation. [facility]. Patient uses [address] in [place]. No anticipated discharge needs at this time. Family will transport at discharge.

## 2023-10-14 NOTE — PROGRESS NOTES
Occupational Therapy Note  [id number]    OT eval order received and acknowledged. Per PT report, pt with no acute OT needs and is at baseline Independent functional status for self care and functional transfers/mobility thus will D/C OT eval order at this time. Please re-consult if pt status changes.      Thank you,  Magdalene Blackburn OTR/L

## 2023-10-14 NOTE — PROGRESS NOTES
[address] [place] [address]  [phone]      Hospitalist Progress Note      NAME: Keisha Salmon   :  1950  MRM:  [id number]    Date of service: 10/14/2023  11:02 AM       Assessment and Plan:   Exertional dyspnea/bilateral pleural effusion/elevated proBNP: Possibly due to CHF. Check echocardiogram. On IV Bumex. Monitor daily weights, I's/O     2. Anemia. Hemoglobin dropped from baseline and trending down. Her PCP referred her to see a GI for endoscopic evaluation, but was admitted before seeing them. Check iron studies, vitamin A34, folic acid, haptoglobin, reticulocyte. Stool for occult blood is negative in ER, will repete. Consult GI     3. Hyponatremia. Likely hyperosmolar hyponatremia due to CHF. better. Check urine lytes, osmolalities. 4.  Hypertension. Continue metoprolol. 5.   PAF (paroxysmal atrial fibrillation) (HCC)/heart block. S/P pacemaker. On Eliquis and metoprolol. There is a plan to place Watchman device after patient was evaluated by GI. 6.  COPD (chronic obstructive pulmonary disease) ([address] Not in exacerbation. DuoNeb as needed. 7.  Fatigue/generalized body weakness. Consult PT and OT         Subjective:     Chief Complaint[de-identified] Patient was seen and examined as a follow up for exertional dyspnea. Chart was reviewed. better dyspnea and fatigue     ROS:  (bold if positive, if negative)    Tolerating PT  Tolerating Diet        Objective:     Last 24hrs VS reviewed since prior progress note.  Most recent are:    Vitals:    10/14/23 1012   BP: (!) 120/50   Pulse: 82   Resp:    Temp:    SpO2:      SpO2 Readings from Last 6 Encounters:   10/14/23 99%   23 98%   23 97%   23 96%          Intake/Output Summary (Last 24 hours) at 10/14/2023 1102  Last data filed at 10/14/2023 0816  Gross per 24 hour   Intake 400 ml   Output 0 ml   Net 400 ml        Physical Exam:    Gen:  Well-developed,

## 2023-10-15 VITALS
OXYGEN SATURATION: 99 % | HEIGHT: 63 IN | WEIGHT: 160 LBS | HEART RATE: 76 BPM | DIASTOLIC BLOOD PRESSURE: 65 MMHG | SYSTOLIC BLOOD PRESSURE: 124 MMHG | BODY MASS INDEX: 28.35 KG/M2 | RESPIRATION RATE: 18 BRPM | TEMPERATURE: 97.6 F

## 2023-10-15 LAB
ANION GAP SERPL CALC-SCNC: 8 MMOL/L (ref 5–15)
BASOPHILS # BLD: 0 K/UL (ref 0–0.1)
BASOPHILS NFR BLD: 0 % (ref 0–1)
BUN SERPL-MCNC: 33 MG/DL (ref 6–20)
BUN/CREAT SERPL: 23 (ref 12–20)
CALCIUM SERPL-MCNC: 8.8 MG/DL (ref 8.5–10.1)
CHLORIDE SERPL-SCNC: 96 MMOL/L (ref 97–108)
CO2 SERPL-SCNC: 28 MMOL/L (ref 21–32)
CREAT SERPL-MCNC: 1.42 MG/DL (ref 0.55–1.02)
DIFFERENTIAL METHOD BLD: ABNORMAL
EOSINOPHIL # BLD: 0.2 K/UL (ref 0–0.4)
EOSINOPHIL NFR BLD: 4 % (ref 0–7)
ERYTHROCYTE [DISTWIDTH] IN BLOOD BY AUTOMATED COUNT: 15.9 % (ref 11.5–14.5)
GLUCOSE SERPL-MCNC: 125 MG/DL (ref 65–100)
HCT VFR BLD AUTO: 29.5 % (ref 35–47)
HGB BLD-MCNC: 9.3 G/DL (ref 11.5–16)
IMM GRANULOCYTES # BLD AUTO: 0 K/UL (ref 0–0.04)
IMM GRANULOCYTES NFR BLD AUTO: 0 % (ref 0–0.5)
LYMPHOCYTES # BLD: 1 K/UL (ref 0.8–3.5)
LYMPHOCYTES NFR BLD: 20 % (ref 12–49)
MCH RBC QN AUTO: 27.9 PG (ref 26–34)
MCHC RBC AUTO-ENTMCNC: 31.5 G/DL (ref 30–36.5)
MCV RBC AUTO: 88.6 FL (ref 80–99)
MONOCYTES # BLD: 0.4 K/UL (ref 0–1)
MONOCYTES NFR BLD: 8 % (ref 5–13)
NEUTS SEG # BLD: 3.6 K/UL (ref 1.8–8)
NEUTS SEG NFR BLD: 68 % (ref 32–75)
NRBC # BLD: 0 K/UL (ref 0–0.01)
NRBC BLD-RTO: 0 PER 100 WBC
PLATELET # BLD AUTO: 468 K/UL (ref 150–400)
PMV BLD AUTO: 10 FL (ref 8.9–12.9)
POTASSIUM SERPL-SCNC: 3.5 MMOL/L (ref 3.5–5.1)
RBC # BLD AUTO: 3.33 M/UL (ref 3.8–5.2)
SODIUM SERPL-SCNC: 132 MMOL/L (ref 136–145)
WBC # BLD AUTO: 5.3 K/UL (ref 3.6–11)

## 2023-10-15 PROCEDURE — 36415 COLL VENOUS BLD VENIPUNCTURE: CPT

## 2023-10-15 PROCEDURE — 2580000003 HC RX 258: Performed by: INTERNAL MEDICINE

## 2023-10-15 PROCEDURE — 6370000000 HC RX 637 (ALT 250 FOR IP): Performed by: INTERNAL MEDICINE

## 2023-10-15 PROCEDURE — 85025 COMPLETE CBC W/AUTO DIFF WBC: CPT

## 2023-10-15 PROCEDURE — 2500000003 HC RX 250 WO HCPCS: Performed by: INTERNAL MEDICINE

## 2023-10-15 PROCEDURE — 80048 BASIC METABOLIC PNL TOTAL CA: CPT

## 2023-10-15 RX ORDER — BUMETANIDE 1 MG/1
0.5 TABLET ORAL 2 TIMES DAILY
Status: DISCONTINUED | OUTPATIENT
Start: 2023-10-15 | End: 2023-10-15

## 2023-10-15 RX ORDER — BUMETANIDE 0.5 MG/1
0.5 TABLET ORAL DAILY
Qty: 30 TABLET | Refills: 1 | Status: SHIPPED | OUTPATIENT
Start: 2023-10-15

## 2023-10-15 RX ORDER — CLOPIDOGREL BISULFATE 75 MG/1
75 TABLET ORAL DAILY
Status: DISCONTINUED | OUTPATIENT
Start: 2023-10-15 | End: 2023-10-15 | Stop reason: HOSPADM

## 2023-10-15 RX ORDER — BUMETANIDE 1 MG/1
0.5 TABLET ORAL 2 TIMES DAILY
Status: DISCONTINUED | OUTPATIENT
Start: 2023-10-15 | End: 2023-10-15 | Stop reason: HOSPADM

## 2023-10-15 RX ADMIN — CLOPIDOGREL BISULFATE 75 MG: 75 TABLET ORAL at 08:27

## 2023-10-15 RX ADMIN — SODIUM CHLORIDE, PRESERVATIVE FREE 10 ML: 5 INJECTION INTRAVENOUS at 08:24

## 2023-10-15 RX ADMIN — BUMETANIDE 1 MG: 0.25 INJECTION INTRAMUSCULAR; INTRAVENOUS at 06:47

## 2023-10-15 RX ADMIN — LEVOTHYROXINE SODIUM 50 MCG: 0.05 TABLET ORAL at 06:47

## 2023-10-15 RX ADMIN — SERTRALINE 25 MG: 50 TABLET, FILM COATED ORAL at 08:22

## 2023-10-15 RX ADMIN — FERROUS SULFATE TAB 325 MG (65 MG ELEMENTAL FE) 325 MG: 325 (65 FE) TAB at 08:22

## 2023-10-15 RX ADMIN — METOPROLOL TARTRATE 50 MG: 50 TABLET ORAL at 08:22

## 2023-10-15 NOTE — DISCHARGE SUMMARY
Hospitalist Discharge Summary     Patient ID:    Giovanni Hernández  [id number]  75 y.o.  1950    Admit date of service: 10/13/2023    Discharge date of service: 10/15/2023    Admission Diagnoses: SOB (shortness of breath) [R06.02]  Dyspnea on exertion [R06.09]  Bilateral pleural effusion [J90]  Symptomatic anemia [D64.9]    Chronic Diagnoses:      Discharge Medications:   Current Discharge Medication List        START taking these medications    Details   bumetanide (BUMEX) 0.5 MG tablet Take 1 tablet by mouth daily  Qty: 30 tablet, Refills: 1           CONTINUE these medications which have NOT CHANGED    Details   LORazepam (ATIVAN) 1 MG tablet Take 1 tablet by mouth nightly as needed for Anxiety.  Max Daily Amount: 1 mg      metoprolol tartrate (LOPRESSOR) 50 MG tablet Take 1 tablet by mouth 2 times daily  Qty: 180 tablet, Refills: 1      sertraline (ZOLOFT) 25 MG tablet Take 1 tablet by mouth daily  Qty: 30 tablet, Refills: 3      lisinopril-hydroCHLOROthiazide (PRINZIDE;ZESTORETIC) 10-12.5 MG per tablet Take 1 tablet by mouth daily  Qty: 30 tablet, Refills: 0      clopidogrel (PLAVIX) 75 MG tablet Take 1 tablet by mouth daily  Qty: 30 tablet, Refills: 0      levothyroxine (SYNTHROID) 50 MCG tablet Take 1 tablet by mouth every morning (before breakfast)  Qty: 30 tablet, Refills: 0      rosuvastatin (CRESTOR) 40 MG tablet Take 1 tablet by mouth every evening  Qty: 30 tablet, Refills: 0      acetaminophen (TYLENOL) 500 MG tablet Take 2 tablets by mouth every 6 hours as needed      albuterol sulfate HFA (PROVENTIL;VENTOLIN;PROAIR) 108 (90 Base) MCG/ACT inhaler Inhale 1 puff into the lungs every 6 hours as needed      ferrous sulfate (IRON 325) 325 (65 Fe) MG tablet Take 1 tablet by mouth daily (with breakfast)      Glucosamine Sulfate 500 MG TABS Take 1,000 mg by mouth      latanoprost (XALATAN) 0.005 % ophthalmic solution Apply 1 drop to eye           STOP taking these medications       apixaban (ELIQUIS) 5 MG TABS

## 2023-10-15 NOTE — PROGRESS NOTES
[address] [place] [address]  [phone]      Hospitalist Progress Note      NAME: Barb Jara   :  1950  MRM:  [id number]    Date of service: 10/15/2023  11:38 AM       Assessment and Plan:   Exertional dyspnea/bilateral pleural effusion/elevated proBNP: Possibly due to diastolic CHF. echocardiogram is with normal EF and wall motion. On Bumex 0.5 mg BID. Monitor daily weights, I's/O     2. Anemia. Hemoglobin dropped from baseline and trending down. Her PCP referred her to see a GI for endoscopic evaluation, but was admitted before seeing them. Iron deficient. Stool for occult blood is negative in ER. Consult GI     3. Hyponatremia. Likely hyperosmolar hyponatremia due to CHF. better. 4.  Hypertension. Continue metoprolol. 5.   PAF (paroxysmal atrial fibrillation) (HCC)/heart block. S/P pacemaker. Taken off of Eliquis. Cont plavix and metoprolol. There is a plan to place Watchman device after patient was evaluated by GI. 6.  COPD (chronic obstructive pulmonary disease) ([address] Not in exacerbation. DuoNeb as needed. 7.  Fatigue/generalized body weakness. Consult PT and OT         Subjective:     Chief Complaint[de-identified] Patient was seen and examined as a follow up for exertional dyspnea. Chart was reviewed. feels better     ROS:  (bold if positive, if negative)    Tolerating PT  Tolerating Diet        Objective:     Last 24hrs VS reviewed since prior progress note.  Most recent are:    Vitals:    10/15/23 0822   BP: 112/69   Pulse: 75   Resp:    Temp:    SpO2:      SpO2 Readings from Last 6 Encounters:   10/15/23 96%   23 98%   23 97%   23 96%          Intake/Output Summary (Last 24 hours) at 10/15/2023 1138  Last data filed at 10/15/2023 3900  Gross per 24 hour   Intake 990 ml   Output --   Net 990 ml          Physical Exam:    Gen:  Well-developed, well-nourished, in no acute distress  HEENT:  Pink

## 2023-10-15 NOTE — DISCHARGE INSTRUCTIONS
ACUTE DIAGNOSES:  SOB (shortness of breath) [R06.02]  Dyspnea on exertion [R06.09]  Bilateral pleural effusion [J90]  Symptomatic anemia [D64.9]    CHRONIC MEDICAL DIAGNOSES:  [unfilled]    DISCHARGE MEDICATIONS:   [unfilled]    It is important that you take the medication exactly as they are prescribed. Keep your medication in the bottles provided by the pharmacist and keep a list of the medication names, dosages, and times to be taken in your wallet. Do not take other medications without consulting your doctor. DIET:  cardiac diet    ACTIVITY: activity as tolerated    ADDITIONAL INFORMATION: If you experience any of the following symptoms then please call your primary care physician or return to the emergency room if you cannot get hold of your doctor: Fever, chills, nausea, vomiting, diarrhea, change in mentation, falling, bleeding, shortness of breath. FOLLOW UP CARE:   @[facility]@  you are to call and set up an appointment to see them in 5 days. Follow-up  No follow-up provider specified. Gastroenterology on 10/16/23    Information obtained by :  I understand that if any problems occur once I am at home I am to contact my physician. I understand and acknowledge receipt of the instructions indicated above.                                                                                                                                            Physician's or R.N.'s Signature                                                                  Date/Time                                                                                                                                              Patient or Representative Signature                                                          Date/Time

## 2023-10-15 NOTE — PROGRESS NOTES
Discharge instructions/AVS discussed in detail with pt. Pt verbalizes understanding of all instructions, including where to get new medications. Pt educated on importance of follow-up appointments. Pt denies any questions about instructions and has signed paper copy of AVS. IV removed and pt dressed independently. Pt discharged per MD order, being driven home by sister. Pt was transferred to car via wheelchair, assisted by staff. Pt in no apparent distress at time of discharge with no complaints.

## 2023-10-15 NOTE — CONSULTS
74 yo WM presented w HUMPHREY, but now improved w diuresis. Eventually for Watchman procedure, but cards and PCP desire GI w/u for Hgb 8.1  Fe sat 12.7. No gross bleeding. Last colonoscopy \"years ago\" by Dr Stu Wen. NT, ND  Asking cards if [facility] can be stopped  Will have on clears for tomorrow in case they want to proceed with just a look and not stop AC.   Dictated #[id number]

## 2023-10-16 NOTE — CONSULTS
[address] [place] [address]    Name:  Bard Bowman  MR#:  [id number]  :  1950  ACCOUNT #:  [de-identified]  DATE OF SERVICE:  10/15/2023    REQUESTING PHYSICIAN:  Dr. Sondra Damico. REASON FOR CONSULTATION:  Anemia. HISTORY OF PRESENT ILLNESS:  This is a 79-year-old white female who presented with dyspnea on exertion. She had some fluid overload and was diuresed and is now not having shortness of breath. She has a history of hypertension, PAF, heart block, hyperlipidemia. She is scheduled for a Watchman procedure and is on blood thinners. Apparently, her PCP was going to have her see GI as an outpatient because of the lower hemoglobin. She denies any hematemesis, melena, hematochezia. No abdominal pain. No nausea. No vomiting. No weight loss. No diarrhea. No constipation. She states she had a colonoscopy \"many years ago\" by Dr. Cheryl Martínez. She has never had an upper endoscopy before. She denies history of peptic ulcer disease. She rarely takes nonsteroidals, but had been on a baby aspirin. PAST MEDICAL HISTORY:  Her past medical history is as above plus glaucoma, history of gout, history of degenerative arthritis,  history of basal cell carcinoma, peripheral vascular disease, prediabetes. She has had coronary artery disease with stent placement, unspecified sleep apnea, she is not on CPAP. MEDICATIONS:  Current medications were reviewed. She has been on Eliquis. She is on Plavix and aspirin, Crestor, Prinzide, Zestoretic , Zoloft, Lopressor, Proventil inhalers, glucosamine. ALLERGIES:  INCLUDE METHYLPREDNISOLONE, CYCLOBENZAPRINE AND DICLOFENAC, NAPROXEN, TOLMETIN. SURGERIES: She has had a dual PPM placed before as she is being considered for a Watchman procedure. She has had an iliac stent. She has had lumbar fusion and as mentioned carotid stent placements. She has had rotator cuff repair.     SOCIAL HISTORY:  She used to be a 2 pack-a-day smoker, but has

## 2023-11-07 ENCOUNTER — CLINICAL DOCUMENTATION (OUTPATIENT)
Age: 73
End: 2023-11-07

## 2023-11-16 ENCOUNTER — PROCEDURE VISIT (OUTPATIENT)
Age: 73
End: 2023-11-16

## 2023-11-16 ENCOUNTER — OFFICE VISIT (OUTPATIENT)
Age: 73
End: 2023-11-16
Payer: MEDICARE

## 2023-11-16 VITALS
DIASTOLIC BLOOD PRESSURE: 64 MMHG | HEART RATE: 68 BPM | HEIGHT: 63 IN | OXYGEN SATURATION: 92 % | WEIGHT: 158 LBS | SYSTOLIC BLOOD PRESSURE: 132 MMHG | RESPIRATION RATE: 18 BRPM | BODY MASS INDEX: 28 KG/M2

## 2023-11-16 DIAGNOSIS — I49.5 SSS (SICK SINUS SYNDROME) (HCC): ICD-10-CM

## 2023-11-16 DIAGNOSIS — I48.0 PAF (PAROXYSMAL ATRIAL FIBRILLATION) (HCC): ICD-10-CM

## 2023-11-16 DIAGNOSIS — Z87.898 HISTORY OF SYNCOPE: ICD-10-CM

## 2023-11-16 DIAGNOSIS — I44.2 COMPLETE ATRIOVENTRICULAR BLOCK (HCC): ICD-10-CM

## 2023-11-16 DIAGNOSIS — Z95.0 CARDIAC PACEMAKER IN SITU: Primary | ICD-10-CM

## 2023-11-16 DIAGNOSIS — I10 PRIMARY HYPERTENSION: ICD-10-CM

## 2023-11-16 PROCEDURE — 99214 OFFICE O/P EST MOD 30 MIN: CPT | Performed by: NURSE PRACTITIONER

## 2023-11-16 RX ORDER — FLUTICASONE FUROATE, UMECLIDINIUM BROMIDE AND VILANTEROL TRIFENATATE 100; 62.5; 25 UG/1; UG/1; UG/1
1 POWDER RESPIRATORY (INHALATION) DAILY
COMMUNITY
Start: 2023-10-19

## 2023-11-16 RX ORDER — ASCORBIC ACID 500 MG
1000 TABLET ORAL DAILY
COMMUNITY

## 2023-11-16 ASSESSMENT — PATIENT HEALTH QUESTIONNAIRE - PHQ9
1. LITTLE INTEREST OR PLEASURE IN DOING THINGS: 0
SUM OF ALL RESPONSES TO PHQ QUESTIONS 1-9: 0
SUM OF ALL RESPONSES TO PHQ9 QUESTIONS 1 & 2: 0
SUM OF ALL RESPONSES TO PHQ QUESTIONS 1-9: 0
SUM OF ALL RESPONSES TO PHQ QUESTIONS 1-9: 0
2. FEELING DOWN, DEPRESSED OR HOPELESS: 0
SUM OF ALL RESPONSES TO PHQ QUESTIONS 1-9: 0

## 2023-11-16 NOTE — PROGRESS NOTES
[facility]  Cardiac Electrophysiology Clinic Care Note                  []Initial visit     [x]Established visit     Patient Name: Jeffery Coy - \[facility]\"":1/8/9326 - KMV:[id number]  Primary Cardiologist: iNcolle Degroot MD  Electrophysiologist: Moe Lucas MD     Reason for visit: Pacemaker follow up    HPI:  Ms. Shanti Hagen is a 75 y.o. female who presents for follow up s/p Medtronic dual chamber pacemaker (DOI 08/28/2023). She reports doing well since admission in 10/2023, states she no longer notes SOB. She had been scheduled for DCCV in 09/2023, but this wasn't done due to need for holding Eliquis for suspected GI bleed. Anticoagulated with Plavix, denies bleeding issues, but does have chronic anemia. Currently on metoprolol 50 mg po bid for rate control. Echo in 10/2023 showed LVEF 64% with mild concentric LVH & mild TR. BP borderline elevated initially, improved on recheck. Previous:  Admitted 10/2023 with bilateral pleural effusion & symptomatic anemia (Hgb 9.3, at/above baseline). 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 Admitted at [facility] 07/2022 with bilateral pneumonia. S/p right carotid stent 03/2022. Chronic thrombocytosis. S/p bilateral iliac stents, followed by Dr. Sofia Gunter. Known COPD. Assessment and Plan     Medtronic dual chamber pacemaker (DOI 08/28/2023): Implanted for syncope & 3rd degree AVB. Device check today shows proper lead & generator function. Generator longevity estimated 15.3 years. RA 12.3%, RV 0.3%. AF burden 1.4% with some RVR on 09/19/2023; last AF was in early October & longest episode <2 hours. PAF: Asymptomatic. See above for burden; no recurrence since early October. Continue metoprolol for rate control. HTN: BP controlled. Continue current medication regimen.     Anticoagulation: Currently on Plavix only due to history of iron deficiency anemia

## 2023-11-16 NOTE — PROGRESS NOTES
Room #: 4    Feeling great! Her shortness of breath has improved. Chief Complaint   Patient presents with    Follow-up     3 mo post    Device Check    Atrial Fibrillation       Vitals:    11/16/23 1135   BP: (!) 144/58   Site: Left Upper Arm   Position: Sitting   Cuff Size: Medium Adult   Pulse: 68   Resp: 18   SpO2: 92%   Weight: 71.7 kg (158 lb)   Height: 1.6 m (5' 3\")         Chest pain:  NO  Shortness of breath:  NO  Edema: NO  Palpitations, skipped beats, rapid heartbeat:  NO  Dizziness:  NO    1. Have you been to the ER, urgent care clinic since your last visit? Hospitalized since your last visit? NO    2. Have you seen or consulted any other health care providers outside of the [address] [place] [address] since your last visit? Include any pap smears or colon screening.  NO      Refills:  NO

## 2023-11-16 NOTE — PATIENT INSTRUCTIONS
You are scheduled for the following procedure with Dr. Sonia Fernandez:  Pre watchman MALIKA at [facility], [address]        PLEASE be aware that your procedure date/time is tentative and subject to change due to emergency cases. Procedure date/time:    January 5, 2024  Time: 10:30 am - please arrive by  9:00 am      ARRIVAL time:  (You will need  to be discharged home with.)     [X]  [facility] procedures: please arrive to check in on 2nd floor two hours prior to your procedure the day of your procedure. Pre-procedure Labs/Imaging:    PRE-PROCEDURE LABS NEEDED: YES - After  12/5/23 and before 12/29/23  Forms for labwork may be given at appointment. If not, they will be mailed to you. Labs should be completed within 5-7 days of procedure. These can be done at any [place] or [facility].         [address] [facility]  [address] [facility], [address] [address] [address]   Monday-Friday 730A-430P (closed 2270-721M)  [phone]    [address] [place] [address], [address] [place] [address]/[facility]  Monday-Friday 8:00AM - 5:00 PM   [phone]    Heart and Vascular [address]  Monday-Friday 7A-5P  [address], [address] [facility], [address] [facility], [facility] [address]   Monday-Friday 925X-952P  [phone] (closed 1-2P)    [address] , [address] [facility] [address]  [facility] [place]  Monday-Friday 730A-430P (VIXOVH 3512-870R)  [phone] [address] [facility]  Monday-Friday 7A-430P  [address] [facility], [address] [facility], [address] [place] [address]  Monday-Friday 730A-430P (closed 1-2P)  [phone]      Medication Instructions:    Please do not stop or hold any

## 2023-11-17 NOTE — PROGRESS NOTES
Physician Progress Note      Karli Godwin  CSN #:                  [id number]  :                       1950  ADMIT DATE:       10/13/2023 12:09 PM  [facility] DATE:        10/15/2023 4:30 PM  RESPONDING  PROVIDER #: Bryan Gottlieb MD          QUERY TEXT:    Pt admitted with Exertional dyspnea and has Diastolic CHF documented. Please   clarify further specificity regarding the acuity of CHF:    The medical record reflects the following:  Risk Factors: A-fib    Clinical Indicators:  DC Summary:  Exertional dyspnea/bilateral pleural effusion/elevated proBNP: Possibly due to   diastolic CHF. echocardiogram is with normal EF and wall motion. On Bumex 0.5   mg daily. Monitor daily weights, I's/O    Treatment: IV Bumex, Monitoring  Options provided:  -- Acute on Chronic Diastolic CHF/HFpEF  -- Acute Diastolic CHF/HFpEF  -- Chronic Diastolic CHF/HFpEF  -- Other - I will add my own diagnosis  -- Disagree - Not applicable / Not valid  -- Disagree - Clinically unable to determine / Unknown  -- Refer to Clinical Documentation Reviewer    PROVIDER RESPONSE TEXT:    This patient is in acute diastolic CHF/HFpEF.     Query created by: Alvaro Kern on 2023 2:35 PM      Electronically signed by:  Bryan Gottlieb MD 2023 2:37 PM

## 2023-12-07 ENCOUNTER — OFFICE VISIT (OUTPATIENT)
Age: 73
End: 2023-12-07
Payer: MEDICARE

## 2023-12-07 VITALS
BODY MASS INDEX: 28.7 KG/M2 | HEART RATE: 96 BPM | DIASTOLIC BLOOD PRESSURE: 58 MMHG | HEIGHT: 63 IN | RESPIRATION RATE: 18 BRPM | WEIGHT: 162 LBS | OXYGEN SATURATION: 96 % | SYSTOLIC BLOOD PRESSURE: 128 MMHG

## 2023-12-07 DIAGNOSIS — Z87.898 HISTORY OF SYNCOPE: ICD-10-CM

## 2023-12-07 DIAGNOSIS — I73.9 PERIPHERAL VASCULAR DISEASE (HCC): ICD-10-CM

## 2023-12-07 DIAGNOSIS — I48.0 PAF (PAROXYSMAL ATRIAL FIBRILLATION) (HCC): Primary | ICD-10-CM

## 2023-12-07 DIAGNOSIS — Z95.0 CARDIAC PACEMAKER IN SITU: ICD-10-CM

## 2023-12-07 DIAGNOSIS — E78.2 MIXED HYPERLIPIDEMIA: ICD-10-CM

## 2023-12-07 DIAGNOSIS — I44.2 COMPLETE ATRIOVENTRICULAR BLOCK (HCC): ICD-10-CM

## 2023-12-07 PROCEDURE — 1036F TOBACCO NON-USER: CPT | Performed by: INTERNAL MEDICINE

## 2023-12-07 PROCEDURE — 1090F PRES/ABSN URINE INCON ASSESS: CPT | Performed by: INTERNAL MEDICINE

## 2023-12-07 PROCEDURE — G8427 DOCREV CUR MEDS BY ELIG CLIN: HCPCS | Performed by: INTERNAL MEDICINE

## 2023-12-07 PROCEDURE — 99214 OFFICE O/P EST MOD 30 MIN: CPT | Performed by: INTERNAL MEDICINE

## 2023-12-07 PROCEDURE — 3078F DIAST BP <80 MM HG: CPT | Performed by: INTERNAL MEDICINE

## 2023-12-07 PROCEDURE — G8484 FLU IMMUNIZE NO ADMIN: HCPCS | Performed by: INTERNAL MEDICINE

## 2023-12-07 PROCEDURE — 3074F SYST BP LT 130 MM HG: CPT | Performed by: INTERNAL MEDICINE

## 2023-12-07 PROCEDURE — G8417 CALC BMI ABV UP PARAM F/U: HCPCS | Performed by: INTERNAL MEDICINE

## 2023-12-07 PROCEDURE — 3017F COLORECTAL CA SCREEN DOC REV: CPT | Performed by: INTERNAL MEDICINE

## 2023-12-07 PROCEDURE — 1123F ACP DISCUSS/DSCN MKR DOCD: CPT | Performed by: INTERNAL MEDICINE

## 2023-12-07 PROCEDURE — G8400 PT W/DXA NO RESULTS DOC: HCPCS | Performed by: INTERNAL MEDICINE

## 2023-12-07 NOTE — PROGRESS NOTES
Berenice Mcclendon MD      [address] [facility] Renaldo SHERMAN      Office [phone],CCE [phone]         Bill Yusuf is a 67 y.o.  female is here for f/u visit . Dear Dr. Cordell Montague,      I had the pleasure of seeing Ms. Bill Yusuf in the office today. Chief complaint: As documented in EMR      Assessment:   History of syncope/ Sinus pauses on event monitor - s/p PPM 8/28/23. - Dr Robby Majano - being evaluated for watchman device  PAF - not on anticoagulation ( Hx of anemia)   HTN   PVD w hx of krystina iliac stents/carotid artery stenosis ( Dr Natasha Phillips); status post right carotid stent-March 2022; also states that she may need  a stent left carotid artery - sees vascular yearly   HLD   Diabetes mellitus-controlled per patient - off Metformin   [facility] admission 7/21/2022-7/23/2022-bilateral pneumonia. [address] [facility] admn 10/13/23 - 10/15/23 Dyspnea/Anemia      Plan:       BP elevated in the office today. Did come down after what was checked then 15 minutes later. Advised to monitor blood pressure and maintain log. If elevated persistently, will change medications. Currently on metoprolol 25 twice daily and lisinopril/hydrochlorothiazide 10/12.5 mg daily. Erin nuclear study 5/11/22 - Nml   Cont Meds  Lipids 3/20/2023-, triglycerides 85, HDL 57, LDL 47: Hemoglobin A1c 6.2  7/10/2023-hemoglobin 8.5, platelets 808, creatinine 1.15   11/2023 - Hb 10   F/u 6 months/earlier as needed based on cardiac work-up      Patient understands the plan. All questions were answered to the patient's satisfaction. Medication Side Effects and Warnings were discussed with patient: yes   Patient Labs were reviewed and or requested:  yes   Patient Past Records were reviewed and or requested: yes      I appreciate the opportunity to be involved in Ms. [place]. See note below for details. Please do not hesitate to contact us with questions  or concerns.       Berenice Mcclendon MD

## 2023-12-18 ENCOUNTER — PREP FOR PROCEDURE (OUTPATIENT)
Age: 73
End: 2023-12-18

## 2023-12-18 RX ORDER — SODIUM CHLORIDE 0.9 % (FLUSH) 0.9 %
5-40 SYRINGE (ML) INJECTION EVERY 12 HOURS SCHEDULED
Status: CANCELLED | OUTPATIENT
Start: 2023-12-18

## 2023-12-18 RX ORDER — SODIUM CHLORIDE 9 MG/ML
INJECTION, SOLUTION INTRAVENOUS PRN
Status: CANCELLED | OUTPATIENT
Start: 2023-12-18

## 2023-12-18 RX ORDER — SODIUM CHLORIDE 0.9 % (FLUSH) 0.9 %
5-40 SYRINGE (ML) INJECTION PRN
Status: CANCELLED | OUTPATIENT
Start: 2023-12-18

## 2024-01-05 ENCOUNTER — HOSPITAL ENCOUNTER (OUTPATIENT)
Facility: HOSPITAL | Age: 74
Discharge: HOME | End: 2024-01-05
Attending: INTERNAL MEDICINE
Payer: MEDICARE

## 2024-01-05 VITALS
SYSTOLIC BLOOD PRESSURE: 177 MMHG | HEIGHT: 63 IN | HEART RATE: 60 BPM | OXYGEN SATURATION: 95 % | BODY MASS INDEX: 27.82 KG/M2 | WEIGHT: 157 LBS | DIASTOLIC BLOOD PRESSURE: 72 MMHG | TEMPERATURE: 97.5 F | RESPIRATION RATE: 13 BRPM

## 2024-01-05 DIAGNOSIS — I48.91 ATRIAL FIBRILLATION (HCC): ICD-10-CM

## 2024-01-05 PROCEDURE — 99153 MOD SED SAME PHYS/QHP EA: CPT | Performed by: INTERNAL MEDICINE

## 2024-01-05 PROCEDURE — 93312 ECHO TRANSESOPHAGEAL: CPT

## 2024-01-05 PROCEDURE — 93325 DOPPLER ECHO COLOR FLOW MAPG: CPT | Performed by: INTERNAL MEDICINE

## 2024-01-05 PROCEDURE — 99152 MOD SED SAME PHYS/QHP 5/>YRS: CPT | Performed by: INTERNAL MEDICINE

## 2024-01-05 PROCEDURE — 93312 ECHO TRANSESOPHAGEAL: CPT | Performed by: INTERNAL MEDICINE

## 2024-01-05 PROCEDURE — 6360000002 HC RX W HCPCS: Performed by: INTERNAL MEDICINE

## 2024-01-05 PROCEDURE — 93320 DOPPLER ECHO COMPLETE: CPT | Performed by: INTERNAL MEDICINE

## 2024-01-05 RX ORDER — FENTANYL CITRATE 50 UG/ML
INJECTION, SOLUTION INTRAMUSCULAR; INTRAVENOUS PRN
Status: COMPLETED | OUTPATIENT
Start: 2024-01-05 | End: 2024-01-05

## 2024-01-05 RX ORDER — METOPROLOL TARTRATE 50 MG/1
50 TABLET, FILM COATED ORAL 2 TIMES DAILY
Qty: 180 TABLET | Refills: 3 | Status: SHIPPED | OUTPATIENT
Start: 2024-01-05

## 2024-01-05 RX ORDER — ACETAMINOPHEN 325 MG/1
650 TABLET ORAL EVERY 4 HOURS PRN
Status: DISCONTINUED | OUTPATIENT
Start: 2024-01-05 | End: 2024-01-08 | Stop reason: HOSPADM

## 2024-01-05 RX ORDER — SODIUM CHLORIDE 0.9 % (FLUSH) 0.9 %
5-40 SYRINGE (ML) INJECTION PRN
Status: DISCONTINUED | OUTPATIENT
Start: 2024-01-05 | End: 2024-01-08 | Stop reason: HOSPADM

## 2024-01-05 RX ORDER — SODIUM CHLORIDE 9 MG/ML
INJECTION, SOLUTION INTRAVENOUS PRN
Status: DISCONTINUED | OUTPATIENT
Start: 2024-01-05 | End: 2024-01-08 | Stop reason: HOSPADM

## 2024-01-05 RX ORDER — MIDAZOLAM HYDROCHLORIDE 1 MG/ML
INJECTION INTRAMUSCULAR; INTRAVENOUS PRN
Status: COMPLETED | OUTPATIENT
Start: 2024-01-05 | End: 2024-01-05

## 2024-01-05 RX ORDER — SODIUM CHLORIDE 0.9 % (FLUSH) 0.9 %
5-40 SYRINGE (ML) INJECTION EVERY 12 HOURS SCHEDULED
Status: DISCONTINUED | OUTPATIENT
Start: 2024-01-05 | End: 2024-01-08 | Stop reason: HOSPADM

## 2024-01-05 RX ORDER — ONDANSETRON 2 MG/ML
4 INJECTION INTRAMUSCULAR; INTRAVENOUS EVERY 6 HOURS PRN
Status: DISCONTINUED | OUTPATIENT
Start: 2024-01-05 | End: 2024-01-08 | Stop reason: HOSPADM

## 2024-01-05 RX ORDER — HYDROCODONE BITARTRATE AND ACETAMINOPHEN 5; 325 MG/1; MG/1
1 TABLET ORAL EVERY 6 HOURS PRN
Status: DISCONTINUED | OUTPATIENT
Start: 2024-01-05 | End: 2024-01-08 | Stop reason: HOSPADM

## 2024-01-05 RX ADMIN — MIDAZOLAM HYDROCHLORIDE 2 MG: 1 INJECTION, SOLUTION INTRAMUSCULAR; INTRAVENOUS at 11:11

## 2024-01-05 RX ADMIN — FENTANYL CITRATE 50 MCG: 50 INJECTION, SOLUTION INTRAMUSCULAR; INTRAVENOUS at 11:14

## 2024-01-05 RX ADMIN — FENTANYL CITRATE 50 MCG: 50 INJECTION, SOLUTION INTRAMUSCULAR; INTRAVENOUS at 11:04

## 2024-01-05 NOTE — PROCEDURES
Cardiac Procedure Note   Patient: Valentina Dwyer  MRN: [id number]  SSN: [id number]   YOB: 1950 Age: 73 y.o.  Sex: female    Date of Procedure: 1/5/2024   Pre-procedure Diagnosis: Atrial Fibrillation   Post-procedure Diagnosis: same  Procedure: MALIKA  :  Dr. Valdez Minaya MD    Assistant(s):  None  Anesthesia: Moderate Sedation by me  Estimated Blood Loss: none  Specimens Removed: None  Findings:   Difficult to sedate with versed and fentanyl  No thrombus  NANCY measured quickly one view 15 mm    Will proceed with watchman device implant later  Complications: None   Implants:  None  Signed by:  Valdez Minaya MD  1/5/2024  12:08 PM

## 2024-01-05 NOTE — PROGRESS NOTES
10:10 AM  Patient arrived. ID and allergies verified verbally with patient. Pt voices understanding of procedure to be performed. Consent obtained. Pt prepped for procedure. Pt denies contrast allergy. Patient denies taking any blood thinners.    10:38 AM  TRANSFER - OUT REPORT:    Verbal report given to PRISCILLA Gonzalez on Valentina Dwyer  being transferred to EP lab for ordered procedure       Report consisted of patient's Situation, Background, Assessment and   Recommendations(SBAR).     Information from the following report(s) Nurse Handoff Report was reviewed with the receiving nurse.           Lines:   Peripheral IV 01/05/24 Proximal;Right Forearm (Active)        Opportunity for questions and clarification was provided.      Patient transported with:  Registered Nurse    11:25 AM  TRANSFER - IN REPORT:    Verbal report received from PRISCILLA Mcmillan on Valentina Dwyer  being received from EP lab for routine post-op      Report consisted of patient's Situation, Background, Assessment and   Recommendations(SBAR).     Information from the following report(s) Nurse Handoff Report was reviewed with the receiving nurse.    Opportunity for questions and clarification was provided.      Assessment completed upon patient's arrival to unit and care assumed.     12:00 PM  Discharge instructions given to patient and family. Time given to ask questions and gain clarity. No questions at this time. Patient and family confirm understanding of instructions given. Patient given copy of discharge instructions to take home.    12:35 PM  Pt discharged via wheelchair with sister-in-law. Personal belongings with patient upon discharge.

## 2024-01-05 NOTE — H&P
Ms. Dwyer is a 73 y.o. female who presents for MALIKA before Watchman implant  She had Medtronic dual chamber pacemaker (DOI 08/28/2023).     She reports doing well since admission in 10/2023, states she no longer notes SOB.     She had been scheduled for DCCV in 09/2023, but this wasn't done due to need for holding Eliquis for suspected GI bleed.  Anticoagulated with Plavix, denies bleeding issues, but does have chronic anemia.     Currently on metoprolol 50 mg po bid for rate control.     Echo in 10/2023 showed LVEF 64% with mild concentric LVH & mild TR.     BP borderline elevated initially, improved on recheck.        Previous:  Admitted 10/2023 with bilateral pleural effusion & symptomatic anemia (Hgb 9.3, at/above baseline).     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     Admitted at [facility] 07/2022 with bilateral pneumonia.     S/p right carotid stent 03/2022.     Chronic thrombocytosis.     S/p bilateral iliac stents, followed by Dr. Salazar.     Known COPD.         Assessment and Plan      Medtronic dual chamber pacemaker (DOI 08/28/2023): Implanted for syncope & 3rd degree AVB.  Device check today shows proper lead & generator function.  Generator longevity estimated 15.3 years.  RA 12.3%, RV 0.3%.  AF burden 1.4% with some RVR on 09/19/2023; last AF was in early October & longest episode <2 hours.     PAF: Asymptomatic.  See above for burden; no recurrence since early October.  Continue metoprolol for rate control.     HTN: BP controlled.  Continue current medication regimen.     Anticoagulation: Currently on Plavix only due to history of iron deficiency anemia without known source.  Fecal occult negative during admission in 10/2023.       [id number] 4 (female, age, PVD, HTN), HAS-BLED 4 (age, HTN, CKD, Plavix).  Reviewed risks/benefits of Watchman procedure as an alternative to long term OAC.  Reviewed risks/benefits of Watchman NANCY occlusion procedure.  Shared

## 2024-01-05 NOTE — DISCHARGE INSTRUCTIONS
Discharge Instructions Post MALIKA    No driving x 24 hours due to sedation medication that you received during your procedure.  Resume medications as previously prescribed.  Follow up as noted below.    Future Appointments   Date Time Provider Department Center   1/16/2024 10:20 AM Pebbles Reed APRN - NP [facility] BS AMB   2/7/2024  2:00 PM [facility] CATH LAB 3 [facility]   2/29/2024 11:20 AM Genesis Harmon APRN - NP [facility] BS AMB   6/20/2024 11:20 AM Sean Anguiano MD [facility] BS AMB   11/20/2024 11:00 AM PACEMAKER, STFRANCES [facility] BS AMB   11/20/2024 11:20 AM Valdez Minaya MD [facility] BS AMB       Valdez Minaya M.D.  Electrophysiology/Cardiology  [facility]  [address]  [address]                [place] [address]                  [place] [address] [phone]

## 2024-01-06 LAB — ECHO BSA: 1.78 M2

## 2024-01-15 ENCOUNTER — TELEPHONE (OUTPATIENT)
Age: 74
End: 2024-01-15

## 2024-01-15 NOTE — TELEPHONE ENCOUNTER
Pt is calling because she needs to have a sooner apt with the NP because she has tested positive for COVID-19.Pt is schedule to have a watchman on 2/5/24.Pt is scheduled for 2/5/24    [phone] patient

## 2024-01-30 ENCOUNTER — PREP FOR PROCEDURE (OUTPATIENT)
Age: 74
End: 2024-01-30

## 2024-01-31 RX ORDER — SODIUM CHLORIDE 9 MG/ML
INJECTION, SOLUTION INTRAVENOUS PRN
Status: CANCELLED | OUTPATIENT
Start: 2024-01-31

## 2024-01-31 RX ORDER — SODIUM CHLORIDE 0.9 % (FLUSH) 0.9 %
5-40 SYRINGE (ML) INJECTION PRN
Status: CANCELLED | OUTPATIENT
Start: 2024-01-31

## 2024-01-31 RX ORDER — SODIUM CHLORIDE 0.9 % (FLUSH) 0.9 %
5-40 SYRINGE (ML) INJECTION EVERY 12 HOURS SCHEDULED
Status: CANCELLED | OUTPATIENT
Start: 2024-01-31

## 2024-02-05 NOTE — H&P
dilatation.  TRACHEA/BRONCHI: Intermittent patency of the bilateral lower lobe segmental  airways. Scattered bronchial wall thickening.  PLEURA: No effusion or pneumothorax.  LUNGS: Multilobar mixed patchy and nodular groundglass and consolidative  opacification.  UPPER ABDOMEN: Partially imaged. No acute pathology.  BONES: No aggressive bone lesion or fracture.    Impression  1. Multilobar airspace disease with scattered masslike nodularity is as well as  bronchial wall thickening. Overall this is compatible with bronchopneumonia.  Recommend close imaging follow-up after acute issues are resolved to assess for  underlying solid nodules.  2. Debris is present in the segmental airways in the bilateral lower lobes.  3. Coronary artery calcifications.  4. No pulmonary embolism.    MRI Result (most recent):  MRI BRAIN W WO CONTRAST 02/04/2021    Narrative  EXAM:  MRI BRAIN W WO CONT    INDICATION:    sz like activity    COMPARISON:  None.    CONTRAST: 16 ml Dotarem.    TECHNIQUE:  Multiplanar multisequence acquisition without and with contrast of the brain.    FINDINGS:  Diffusion imaging does not show acute ischemic changes.  There is no extra-axial fluid collection, hemorrhage or shift.  Minimal nonspecific white matter changes.  Flow voids in major vessels at the base of the brain are present.  Special attention to the temporal lobes show no abnormal signal intensity  asymmetry or masses.  No masses or enhancing lesions.    Impression  1. No acute findings no mass.  2. Minimal nonspecific white matter disease.          Current meds:  No current facility-administered medications for this encounter.     Current Outpatient Medications   Medication Sig Dispense Refill    metoprolol tartrate (LOPRESSOR) 50 MG tablet Take 1 tablet by mouth 2 times daily 180 tablet 3    vitamin C (ASCORBIC ACID) 500 MG tablet Take 2 tablets by mouth daily      TRELEGY ELLIPTA 100-62.5-25 MCG/ACT AEPB inhaler Inhale 1 puff into the lungs

## 2024-02-05 NOTE — PATIENT INSTRUCTIONS
You are scheduled for the following procedure with Dr. Minaya:  Watchman at [facility], [address] [place] [address]        PLEASE be aware that your procedure date/time is tentative and subject to change due to emergency cases.      Procedure date/time:    February 7, 2024  Time: 2:00 pm - please arrive by  12:00 pm      ARRIVAL time:  (You will need  to be discharged home with.)     [X]  [facility] procedures: arrive to check in on 1st floor near  entrance two hours prior to your procedure.      Pre-procedure Labs/Imaging:    PRE-PROCEDURE LABS NEEDED: YES - Please have these done ASA - 2/6/24  Forms for labwork may be given at appointment. If not, they will be mailed to you. Labs should be completed within 5-7 days of procedure. These can be done at any [facility] or [facility] lab.        [facility]  [address], [address]  [place], [place] [address]  Monday-Friday 730A-430P (closed 1230-130P)  [phone]    [address], [address]   [place] [address]  Monday-Friday 8:00AM - 5:00 PM   [phone]    [facility]  [address], [address]  [place], [place] [address]  Monday-Friday 7A-5P  [phone]    [facility]  [address]  [place], [place] [address]  Monday-Friday 730A-430P  [phone] (closed 1-2P)    [facility]  [address] , [address]  [place], [place] [address]  Monday-Friday 730A-430P (closed 1230-130P)  [phone]    [facility]  [address] [place], [place] [address]  Monday-Friday 7A-430P  [phone]    [facility]  [address]  [place], [place] [address]  Monday-Friday 730A-430P (closed 1-2P)  [phone]        Medication Instructions:    Please do not stop or hold any medication prior to your

## 2024-02-05 NOTE — DISCHARGE INSTRUCTIONS
Hold metoprolol and lisinopril/HCTZ today  Resume tomorrow   May start plavix tomorrow    Patient Instructions Post Watchman Procedure    Start taking Aspirin 81 mg daily and continue Plavix 75 mg daily.  A prescription has been sent electronically to your pharmacy on record.  Please pick it up & take as directed.  If a MALIKA approx 6-8 weeks post Watchman shows successful placement of Watchman, you would stop Plavix after 6 months & continue aspirin 81 mg by mouth daily alone indefinitely.    2.  No heavy lifting or exercises for 1 week.  This includes the following:  Do not push or move furniture, jog or run    3.  Do not drive for 3 days.    4.  Call Dr. Minaya at [phone] if you experience any of the following symptoms:  Dizziness, lightheadedness, fainting spells  Lack of energy  Shortness of breath  Rapid heart rate  Chest or muscle twitches  Blurry vision, double vision, weakness, numbness  Nausea, vomiting  Fever  Bleeding in the stool, black stool  Leg swelling, pain    5.  Follow-up with RUDDY Hurtado as noted below on 2/29/2024 at 11:20 am.      Future Appointments   Date Time Provider Department Center   2/29/2024 11:20 AM Genesis Harmon APRN - NP [facility] BS AMB   6/20/2024 11:20 AM Sean Anguiano MD [facility] BS AMB   11/20/2024 11:00 AM PACEMAKER, STFRANCES [facility] BS AMB   11/20/2024 11:20 AM Valdez Minaya MD [facility] BS AMB       6.  Your post watchman implant MALIKA will be scheduled at your follow-up visit.     7.  If you have any dental work done in the next 6 months, you will need a prophylactic antibiotic.  Please call our clinic if this is the case.    Valdez Minaya M.D.  Electrophysiology/Cardiology  [facility]  [address] [place] [id number] [phone]

## 2024-02-06 ENCOUNTER — OFFICE VISIT (OUTPATIENT)
Age: 74
End: 2024-02-06
Payer: MEDICARE

## 2024-02-06 VITALS
WEIGHT: 164.6 LBS | DIASTOLIC BLOOD PRESSURE: 62 MMHG | BODY MASS INDEX: 29.16 KG/M2 | HEIGHT: 63 IN | SYSTOLIC BLOOD PRESSURE: 138 MMHG | OXYGEN SATURATION: 93 % | RESPIRATION RATE: 16 BRPM | HEART RATE: 80 BPM

## 2024-02-06 DIAGNOSIS — I48.0 PAF (PAROXYSMAL ATRIAL FIBRILLATION) (HCC): ICD-10-CM

## 2024-02-06 DIAGNOSIS — Z87.898 HISTORY OF SYNCOPE: ICD-10-CM

## 2024-02-06 DIAGNOSIS — I48.0 PAF (PAROXYSMAL ATRIAL FIBRILLATION) (HCC): Primary | ICD-10-CM

## 2024-02-06 DIAGNOSIS — I10 PRIMARY HYPERTENSION: ICD-10-CM

## 2024-02-06 DIAGNOSIS — Z95.0 CARDIAC PACEMAKER IN SITU: ICD-10-CM

## 2024-02-06 DIAGNOSIS — Z95.0 PACEMAKER: ICD-10-CM

## 2024-02-06 PROCEDURE — 1123F ACP DISCUSS/DSCN MKR DOCD: CPT | Performed by: NURSE PRACTITIONER

## 2024-02-06 PROCEDURE — 3078F DIAST BP <80 MM HG: CPT | Performed by: NURSE PRACTITIONER

## 2024-02-06 PROCEDURE — 1036F TOBACCO NON-USER: CPT | Performed by: NURSE PRACTITIONER

## 2024-02-06 PROCEDURE — G8427 DOCREV CUR MEDS BY ELIG CLIN: HCPCS | Performed by: NURSE PRACTITIONER

## 2024-02-06 PROCEDURE — 1090F PRES/ABSN URINE INCON ASSESS: CPT | Performed by: NURSE PRACTITIONER

## 2024-02-06 PROCEDURE — G8484 FLU IMMUNIZE NO ADMIN: HCPCS | Performed by: NURSE PRACTITIONER

## 2024-02-06 PROCEDURE — 99214 OFFICE O/P EST MOD 30 MIN: CPT | Performed by: NURSE PRACTITIONER

## 2024-02-06 PROCEDURE — G8417 CALC BMI ABV UP PARAM F/U: HCPCS | Performed by: NURSE PRACTITIONER

## 2024-02-06 PROCEDURE — 3075F SYST BP GE 130 - 139MM HG: CPT | Performed by: NURSE PRACTITIONER

## 2024-02-06 PROCEDURE — 3017F COLORECTAL CA SCREEN DOC REV: CPT | Performed by: NURSE PRACTITIONER

## 2024-02-06 PROCEDURE — G8400 PT W/DXA NO RESULTS DOC: HCPCS | Performed by: NURSE PRACTITIONER

## 2024-02-06 RX ORDER — NETARSUDIL 0.2 MG/ML
1 SOLUTION/ DROPS OPHTHALMIC; TOPICAL DAILY
COMMUNITY
Start: 2023-05-25

## 2024-02-06 NOTE — PROGRESS NOTES
[facility] Cardiology  Cardiac Electrophysiology Clinic Care Note                  []Initial visit     [x]Established visit     Patient Name: Valentina Dwyer - :1950 - MRN:[id number]  Primary Cardiologist: Sean Anguiano MD  Electrophysiologist: Valdez Minaya MD     Reason for visit: Watchman H&P    HPI:  Ms. Dwyer is a 73 y.o. female who presents for follow up s/p Medtronic dual chamber pacemaker (DOI 2023).    Echo in 10/2023 showed LVEF 64% with mild concentric LVH & mild TR.    She is scheduled for Watchman implant 24 with Dr. Minaya.     Previous:  Admitted 10/2023 with bilateral pleural effusion & symptomatic anemia (Hgb 9.3, at/above baseline).    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    Admitted at [facility] 2022 with bilateral pneumonia.    S/p right carotid stent 2022.    Chronic thrombocytosis.    S/p bilateral iliac stents, followed by Dr. Salazar.    Known COPD.       Assessment and Plan     Anticoagulation: Currently on Plavix only due to history of iron deficiency anemia without known source.  Fecal occult negative during admission in 10/2023.      [id number] 4 (female, age, PVD, HTN), HAS-BLED 4 (age, HTN, CKD, Plavix).  Reviewed risks/benefits of Watchman procedure as an alternative to long term OAC.  Reviewed risks/benefits of Watchman NANCY occlusion procedure.  Shared decision making interaction with the patient/family has been made several times. (CardioSmart decision tool utilized).  I have discussed at length the risks/benefits and alternatives of this procedure with regards to stroke risk versus bleeding risk. The patient understands that anticoagulation will be maintained in a variety of forms during the first year and agrees with plan. Risks include but not limited to: infection, bleeding, vessel injury, cardiac perforation at times requiring drainage or surgery, heart failure, migration of the device,

## 2024-02-06 NOTE — H&P (VIEW-ONLY)
the lungs daily      rosuvastatin (CRESTOR) 40 MG tablet TAKE 1 TABLET BY MOUTH EVERY  NIGHT 90 tablet 3    bumetanide (BUMEX) 0.5 MG tablet Take 1 tablet by mouth daily (Patient taking differently: Take 1 tablet by mouth daily Taking PRN) 30 tablet 1    LORazepam (ATIVAN) 1 MG tablet Take 1 tablet by mouth nightly as needed for Anxiety.      sertraline (ZOLOFT) 25 MG tablet Take 1 tablet by mouth daily 30 tablet 3    lisinopril-hydroCHLOROthiazide (PRINZIDE;ZESTORETIC) 10-12.5 MG per tablet Take 1 tablet by mouth daily 30 tablet 0    clopidogrel (PLAVIX) 75 MG tablet Take 1 tablet by mouth daily 30 tablet 0    levothyroxine (SYNTHROID) 50 MCG tablet Take 1 tablet by mouth every morning (before breakfast) 30 tablet 0    latanoprost (XALATAN) 0.005 % ophthalmic solution Place 1 drop into both eyes nightly       No current facility-administered medications for this visit.          JENNIFER Diaz - NP  [facility]  [address] [place] [address], [address]  [place], [place] [address] [phone]      CC:Valdez Zhong MD

## 2024-02-06 NOTE — PROGRESS NOTES
Room #:  5    Chief Complaint   Patient presents with    Atrial Fibrillation    H&P     Watchman (Dr. Minaya) 2/7/24     /62 (Site: Left Upper Arm, Position: Sitting, Cuff Size: Medium Adult)   Pulse 80   Resp 16   Ht 1.6 m (5' 3\")   Wt 74.7 kg (164 lb 9.6 oz)   SpO2 93%   BMI 29.16 kg/m²         Chest pain: NO       1. Have you been to the ER, urgent care clinic outside [facility] since your last visit?  Hospitalized since your last visit?  NO        Refills:  NO

## 2024-02-07 ENCOUNTER — ANESTHESIA (OUTPATIENT)
Facility: HOSPITAL | Age: 74
DRG: 274 | End: 2024-02-07
Payer: MEDICARE

## 2024-02-07 ENCOUNTER — HOSPITAL ENCOUNTER (INPATIENT)
Facility: HOSPITAL | Age: 74
LOS: 1 days | Discharge: HOME | DRG: 274 | End: 2024-02-07
Attending: INTERNAL MEDICINE | Admitting: INTERNAL MEDICINE
Payer: MEDICARE

## 2024-02-07 ENCOUNTER — HOSPITAL ENCOUNTER (OUTPATIENT)
Facility: HOSPITAL | Age: 74
Discharge: HOME | DRG: 274 | End: 2024-02-09
Attending: INTERNAL MEDICINE
Payer: MEDICARE

## 2024-02-07 ENCOUNTER — ANESTHESIA EVENT (OUTPATIENT)
Facility: HOSPITAL | Age: 74
DRG: 274 | End: 2024-02-07
Payer: MEDICARE

## 2024-02-07 VITALS
OXYGEN SATURATION: 97 % | TEMPERATURE: 97.9 F | DIASTOLIC BLOOD PRESSURE: 48 MMHG | HEART RATE: 67 BPM | WEIGHT: 167 LBS | RESPIRATION RATE: 16 BRPM | BODY MASS INDEX: 28.51 KG/M2 | HEIGHT: 64 IN | SYSTOLIC BLOOD PRESSURE: 125 MMHG

## 2024-02-07 DIAGNOSIS — I48.91 ATRIAL FIBRILLATION, UNSPECIFIED TYPE (HCC): ICD-10-CM

## 2024-02-07 PROBLEM — Z95.818 PRESENCE OF WATCHMAN LEFT ATRIAL APPENDAGE CLOSURE DEVICE: Status: ACTIVE | Noted: 2024-02-07

## 2024-02-07 LAB
ABO + RH BLD: NORMAL
ACT BLD: 287 SECS (ref 79–138)
ANION GAP SERPL CALC-SCNC: 5 MMOL/L (ref 5–15)
ANION GAP SERPL CALC-SCNC: 5 MMOL/L (ref 5–15)
BLOOD GROUP ANTIBODIES SERPL: NORMAL
BUN SERPL-MCNC: 35 MG/DL (ref 6–20)
BUN SERPL-MCNC: 37 MG/DL (ref 6–20)
BUN/CREAT SERPL: 35 (ref 12–20)
BUN/CREAT SERPL: 38 (ref 12–20)
CALCIUM SERPL-MCNC: 9 MG/DL (ref 8.5–10.1)
CALCIUM SERPL-MCNC: 9.2 MG/DL (ref 8.5–10.1)
CHLORIDE SERPL-SCNC: 104 MMOL/L (ref 97–108)
CHLORIDE SERPL-SCNC: 109 MMOL/L (ref 97–108)
CO2 SERPL-SCNC: 24 MMOL/L (ref 21–32)
CO2 SERPL-SCNC: 26 MMOL/L (ref 21–32)
CREAT SERPL-MCNC: 0.97 MG/DL (ref 0.55–1.02)
CREAT SERPL-MCNC: 1.01 MG/DL (ref 0.55–1.02)
ECHO BSA: 1.85 M2
ERYTHROCYTE [DISTWIDTH] IN BLOOD BY AUTOMATED COUNT: 18 % (ref 11.5–14.5)
GLUCOSE SERPL-MCNC: 108 MG/DL (ref 65–100)
GLUCOSE SERPL-MCNC: 88 MG/DL (ref 65–100)
HCT VFR BLD AUTO: 36.3 % (ref 35–47)
HGB BLD-MCNC: 11.9 G/DL (ref 11.5–16)
MCH RBC QN AUTO: 29.4 PG (ref 26–34)
MCHC RBC AUTO-ENTMCNC: 32.8 G/DL (ref 30–36.5)
MCV RBC AUTO: 89.6 FL (ref 80–99)
NRBC # BLD: 0 K/UL (ref 0–0.01)
NRBC BLD-RTO: 0 PER 100 WBC
PLATELET # BLD AUTO: 206 K/UL (ref 150–400)
PMV BLD AUTO: 11.6 FL (ref 8.9–12.9)
POTASSIUM SERPL-SCNC: 4.4 MMOL/L (ref 3.5–5.1)
POTASSIUM SERPL-SCNC: 5.3 MMOL/L (ref 3.5–5.1)
RBC # BLD AUTO: 4.05 M/UL (ref 3.8–5.2)
SODIUM SERPL-SCNC: 135 MMOL/L (ref 136–145)
SODIUM SERPL-SCNC: 138 MMOL/L (ref 136–145)
SPECIMEN EXP DATE BLD: NORMAL
WBC # BLD AUTO: 6.5 K/UL (ref 3.6–11)

## 2024-02-07 PROCEDURE — C1760 CLOSURE DEV, VASC: HCPCS | Performed by: INTERNAL MEDICINE

## 2024-02-07 PROCEDURE — C1766 INTRO/SHEATH,STRBLE,NON-PEEL: HCPCS | Performed by: INTERNAL MEDICINE

## 2024-02-07 PROCEDURE — 2500000003 HC RX 250 WO HCPCS: Performed by: NURSE ANESTHETIST, CERTIFIED REGISTERED

## 2024-02-07 PROCEDURE — 86901 BLOOD TYPING SEROLOGIC RH(D): CPT

## 2024-02-07 PROCEDURE — 33340 PERQ CLSR TCAT L ATR APNDGE: CPT | Performed by: INTERNAL MEDICINE

## 2024-02-07 PROCEDURE — 86850 RBC ANTIBODY SCREEN: CPT

## 2024-02-07 PROCEDURE — 3700000000 HC ANESTHESIA ATTENDED CARE: Performed by: INTERNAL MEDICINE

## 2024-02-07 PROCEDURE — C1889 IMPLANT/INSERT DEVICE, NOC: HCPCS | Performed by: INTERNAL MEDICINE

## 2024-02-07 PROCEDURE — C1894 INTRO/SHEATH, NON-LASER: HCPCS | Performed by: INTERNAL MEDICINE

## 2024-02-07 PROCEDURE — 36415 COLL VENOUS BLD VENIPUNCTURE: CPT

## 2024-02-07 PROCEDURE — 6360000002 HC RX W HCPCS: Performed by: NURSE ANESTHETIST, CERTIFIED REGISTERED

## 2024-02-07 PROCEDURE — 1100000003 HC PRIVATE W/ TELEMETRY

## 2024-02-07 PROCEDURE — 80048 BASIC METABOLIC PNL TOTAL CA: CPT

## 2024-02-07 PROCEDURE — 2580000003 HC RX 258: Performed by: INTERNAL MEDICINE

## 2024-02-07 PROCEDURE — 93355 ECHO TRANSESOPHAGEAL (TEE): CPT

## 2024-02-07 PROCEDURE — C2617 STENT, NON-COR, TEM W/O DEL: HCPCS | Performed by: INTERNAL MEDICINE

## 2024-02-07 PROCEDURE — 02L73DK OCCLUSION OF LEFT ATRIAL APPENDAGE WITH INTRALUMINAL DEVICE, PERCUTANEOUS APPROACH: ICD-10-PCS | Performed by: INTERNAL MEDICINE

## 2024-02-07 PROCEDURE — 85347 COAGULATION TIME ACTIVATED: CPT

## 2024-02-07 PROCEDURE — 3700000001 HC ADD 15 MINUTES (ANESTHESIA): Performed by: INTERNAL MEDICINE

## 2024-02-07 PROCEDURE — 76937 US GUIDE VASCULAR ACCESS: CPT | Performed by: INTERNAL MEDICINE

## 2024-02-07 PROCEDURE — 86900 BLOOD TYPING SEROLOGIC ABO: CPT

## 2024-02-07 PROCEDURE — 6360000002 HC RX W HCPCS: Performed by: INTERNAL MEDICINE

## 2024-02-07 PROCEDURE — 2709999900 HC NON-CHARGEABLE SUPPLY: Performed by: INTERNAL MEDICINE

## 2024-02-07 PROCEDURE — 2580000003 HC RX 258: Performed by: NURSE ANESTHETIST, CERTIFIED REGISTERED

## 2024-02-07 DEVICE — LEFT ATRIAL APPENDAGE CLOSURE DEVICE WITH DELIVERY SYSTEM
Type: IMPLANTABLE DEVICE | Status: FUNCTIONAL
Brand: WATCHMAN FLX™

## 2024-02-07 RX ORDER — PROTAMINE SULFATE 10 MG/ML
INJECTION, SOLUTION INTRAVENOUS PRN
Status: DISCONTINUED | OUTPATIENT
Start: 2024-02-07 | End: 2024-02-07 | Stop reason: SDUPTHER

## 2024-02-07 RX ORDER — ROCURONIUM BROMIDE 10 MG/ML
INJECTION, SOLUTION INTRAVENOUS PRN
Status: DISCONTINUED | OUTPATIENT
Start: 2024-02-07 | End: 2024-02-07 | Stop reason: SDUPTHER

## 2024-02-07 RX ORDER — 0.9 % SODIUM CHLORIDE 0.9 %
250 INTRAVENOUS SOLUTION INTRAVENOUS ONCE
Status: DISCONTINUED | OUTPATIENT
Start: 2024-02-07 | End: 2024-02-07 | Stop reason: HOSPADM

## 2024-02-07 RX ORDER — DEXAMETHASONE SODIUM PHOSPHATE 4 MG/ML
INJECTION, SOLUTION INTRA-ARTICULAR; INTRALESIONAL; INTRAMUSCULAR; INTRAVENOUS; SOFT TISSUE PRN
Status: DISCONTINUED | OUTPATIENT
Start: 2024-02-07 | End: 2024-02-07 | Stop reason: SDUPTHER

## 2024-02-07 RX ORDER — SUCCINYLCHOLINE/SOD CL,ISO/PF 200MG/10ML
SYRINGE (ML) INTRAVENOUS PRN
Status: DISCONTINUED | OUTPATIENT
Start: 2024-02-07 | End: 2024-02-07 | Stop reason: SDUPTHER

## 2024-02-07 RX ORDER — SODIUM CHLORIDE 9 MG/ML
INJECTION, SOLUTION INTRAVENOUS PRN
Status: DISCONTINUED | OUTPATIENT
Start: 2024-02-07 | End: 2024-02-07 | Stop reason: HOSPADM

## 2024-02-07 RX ORDER — PHENYLEPHRINE HCL IN 0.9% NACL 0.4MG/10ML
SYRINGE (ML) INTRAVENOUS PRN
Status: DISCONTINUED | OUTPATIENT
Start: 2024-02-07 | End: 2024-02-07 | Stop reason: SDUPTHER

## 2024-02-07 RX ORDER — ASPIRIN 81 MG/1
81 TABLET ORAL DAILY
Qty: 90 TABLET | Refills: 1 | Status: SHIPPED | OUTPATIENT
Start: 2024-02-07

## 2024-02-07 RX ORDER — HEPARIN SODIUM 1000 [USP'U]/ML
INJECTION, SOLUTION INTRAVENOUS; SUBCUTANEOUS PRN
Status: DISCONTINUED | OUTPATIENT
Start: 2024-02-07 | End: 2024-02-07 | Stop reason: SDUPTHER

## 2024-02-07 RX ORDER — CLOPIDOGREL BISULFATE 75 MG/1
75 TABLET ORAL DAILY
Qty: 90 TABLET | Refills: 1 | Status: SHIPPED | OUTPATIENT
Start: 2024-02-07 | End: 2024-08-05

## 2024-02-07 RX ORDER — SODIUM CHLORIDE 0.9 % (FLUSH) 0.9 %
5-40 SYRINGE (ML) INJECTION EVERY 12 HOURS SCHEDULED
Status: DISCONTINUED | OUTPATIENT
Start: 2024-02-07 | End: 2024-02-07 | Stop reason: HOSPADM

## 2024-02-07 RX ORDER — ACETAMINOPHEN 325 MG/1
650 TABLET ORAL EVERY 4 HOURS PRN
Status: DISCONTINUED | OUTPATIENT
Start: 2024-02-07 | End: 2024-02-07 | Stop reason: HOSPADM

## 2024-02-07 RX ORDER — SODIUM CHLORIDE 0.9 % (FLUSH) 0.9 %
5-40 SYRINGE (ML) INJECTION PRN
Status: DISCONTINUED | OUTPATIENT
Start: 2024-02-07 | End: 2024-02-07 | Stop reason: HOSPADM

## 2024-02-07 RX ORDER — LIDOCAINE HYDROCHLORIDE 20 MG/ML
INJECTION, SOLUTION EPIDURAL; INFILTRATION; INTRACAUDAL; PERINEURAL PRN
Status: DISCONTINUED | OUTPATIENT
Start: 2024-02-07 | End: 2024-02-07 | Stop reason: SDUPTHER

## 2024-02-07 RX ORDER — ONDANSETRON 2 MG/ML
INJECTION INTRAMUSCULAR; INTRAVENOUS PRN
Status: DISCONTINUED | OUTPATIENT
Start: 2024-02-07 | End: 2024-02-07 | Stop reason: SDUPTHER

## 2024-02-07 RX ADMIN — PROTAMINE SULFATE 50 MG: 10 INJECTION, SOLUTION INTRAVENOUS at 14:06

## 2024-02-07 RX ADMIN — ROCURONIUM BROMIDE 30 MG: 10 SOLUTION INTRAVENOUS at 13:15

## 2024-02-07 RX ADMIN — PROPOFOL 50 MG: 10 INJECTION, EMULSION INTRAVENOUS at 13:55

## 2024-02-07 RX ADMIN — WATER 2000 MG: 1 INJECTION INTRAMUSCULAR; INTRAVENOUS; SUBCUTANEOUS at 13:12

## 2024-02-07 RX ADMIN — Medication 80 MCG: at 13:05

## 2024-02-07 RX ADMIN — PROPOFOL 50 MG: 10 INJECTION, EMULSION INTRAVENOUS at 13:10

## 2024-02-07 RX ADMIN — PROPOFOL 150 MG: 10 INJECTION, EMULSION INTRAVENOUS at 13:05

## 2024-02-07 RX ADMIN — PHENYLEPHRINE HYDROCHLORIDE 20 MCG/MIN: 10 INJECTION INTRAVENOUS at 13:05

## 2024-02-07 RX ADMIN — Medication 80 MCG: at 13:48

## 2024-02-07 RX ADMIN — HEPARIN SODIUM 8000 UNITS: 1000 INJECTION, SOLUTION INTRAVENOUS; SUBCUTANEOUS at 13:38

## 2024-02-07 RX ADMIN — ONDANSETRON 4 MG: 2 INJECTION INTRAMUSCULAR; INTRAVENOUS at 13:55

## 2024-02-07 RX ADMIN — LIDOCAINE HYDROCHLORIDE 60 MG: 20 INJECTION, SOLUTION EPIDURAL; INFILTRATION; INTRACAUDAL; PERINEURAL at 13:05

## 2024-02-07 RX ADMIN — SODIUM CHLORIDE: 9 INJECTION, SOLUTION INTRAVENOUS at 12:50

## 2024-02-07 RX ADMIN — DEXAMETHASONE SODIUM PHOSPHATE 4 MG: 4 INJECTION, SOLUTION INTRAMUSCULAR; INTRAVENOUS at 13:12

## 2024-02-07 RX ADMIN — Medication 140 MG: at 13:06

## 2024-02-07 RX ADMIN — ROCURONIUM BROMIDE 10 MG: 10 SOLUTION INTRAVENOUS at 13:05

## 2024-02-07 RX ADMIN — SUGAMMADEX 200 MG: 100 INJECTION, SOLUTION INTRAVENOUS at 14:09

## 2024-02-07 ASSESSMENT — ENCOUNTER SYMPTOMS: SHORTNESS OF BREATH: 1

## 2024-02-07 NOTE — PROGRESS NOTES
TRANSFER - IN REPORT:    Verbal report received from Gertrude HASSAN on Valentina Dwyer  being received from Watchman procedure for routine progression of patient care. Report consisted of patient’s Situation, Background, Assessment and Recommendations(SBAR). Information from the following report(s) Procedure Summary was reviewed with the receiving clinician. Opportunity for questions and clarification was provided. Assessment completed upon patient’s arrival to Cardiac Cath Lab RECOVERY AREA and care assumed.       Cardiac Cath Lab Recovery Arrival Note:    Valentina Dwyer arrived to [facility] recovery area.  Patient procedure=Watchman procedure. Patient on cardiac monitor, non-invasive blood pressure, SPO2 monitor. On  O2 @ 2 lpm via nasal cannula.  IV  of Normal saline on pump at 25 ml/hr. Patient status doing well without problems. Patient is A&Ox 4. Patient reports no pain.    PROCEDURE SITE CHECK:    Procedure site:without any bleeding and no hematoma, no pain/discomfort reported at procedure site.     No change in patient status. Continue to monitor patient and status.    1450 Patient tolerating liquids without nausea. Sister-in-law updated on estimated time for discharge.   1545 Dr. Minaya in and spoke with the patient. Patient eating and tolerating it well with no complaints of nausea.     1615 Postoperative discharge instructions reviewed with patient and all questions answered. Patient verbalizes understanding. Patient to start on Aspirin in addition to already taking Plavix.     1725 Patient ambulated to the bathroom and is steady on her feet with no complaints of dizziness. Right groin site remains dry and intact.   Patient back to bay 2 and assisted with dressing.

## 2024-02-07 NOTE — DISCHARGE SUMMARY
Patient ID:  Valentina Dwyer  [id number]  73 y.o.  1950    Admit date: 2/7/2024    Discharge date: 2/7/2024     Admitting Physician: Valdez Minaya MD    Discharge Physician: Valdez Minaya MD      PCP:  Valdez Zhong MD    Admitting Diagnoses: atrial fibrillation    Discharge Diagnoses:     1. Atrial fibrillation, unspecified type (HCC)        Discharged Condition: Good    Disposition: home, see patient instructions for treatment and plan    Procedures for this admission:  Procedure(s):  Watchman timmy closure device    Discharge Medications:      Medication List        START taking these medications      aspirin 81 MG EC tablet  Take 1 tablet by mouth daily            CONTINUE taking these medications      bumetanide 0.5 MG tablet  Commonly known as: BUMEX  Take 1 tablet by mouth daily     clopidogrel 75 MG tablet  Commonly known as: PLAVIX  Take 1 tablet by mouth daily     latanoprost 0.005 % ophthalmic solution  Commonly known as: XALATAN     levothyroxine 50 MCG tablet  Commonly known as: SYNTHROID  Take 1 tablet by mouth every morning (before breakfast)     lisinopril-hydroCHLOROthiazide 10-12.5 MG per tablet  Commonly known as: PRINZIDE;ZESTORETIC  Take 1 tablet by mouth daily     LORazepam 1 MG tablet  Commonly known as: ATIVAN     metoprolol tartrate 50 MG tablet  Commonly known as: LOPRESSOR  Take 1 tablet by mouth 2 times daily     Rhopressa 0.02 % Soln  Generic drug: Netarsudil Dimesylate     rosuvastatin 40 MG tablet  Commonly known as: CRESTOR  TAKE 1 TABLET BY MOUTH EVERY  NIGHT     sertraline 25 MG tablet  Commonly known as: ZOLOFT  Take 1 tablet by mouth daily     Trelegy Ellipta 100-62.5-25 MCG/ACT Aepb inhaler  Generic drug: fluticasone-umeclidin-vilant     vitamin C 500 MG tablet  Commonly known as: ASCORBIC ACID               Where to Get Your Medications        These medications were sent to [facility] [address] [place] [address] - P [phone] - F [phone]

## 2024-02-07 NOTE — PROGRESS NOTES
Cardiac Cath Lab Procedure Area Arrival Note:    Valentina Dwyer arrived to Cardiac Cath Lab, Procedure Area. Patient identifiers verified with NAME and DATE OF BIRTH. Procedure verified with patient. Consent forms verified. Allergies verified. Patient informed of procedure and plan of care. Questions answered with review. Patient voiced understanding of procedure and plan of care.    Patient on cardiac monitor, non-invasive blood pressure, SPO2 monitor. On Stretcher to room 3 for MALIKA and NANCY closure device placement under general anesthesia. Patient status doing well without problems. Patient is A&Ox 4. Patient reports no complaints.     Patient medicated during procedure with orders obtained and verified by Dr. Minaya.    Refer to patients Cardiac Cath Lab PROCEDURE REPORT for vital signs, assessment, status, and response during procedure, printed at end of case. Printed report on chart or scanned into chart.

## 2024-02-07 NOTE — ANESTHESIA PRE PROCEDURE
History:        Procedure Laterality Date   • CAROTID STENT PLACEMENT Right 2022   • EP DEVICE PROCEDURE N/A 2023    Insert PPM dual performed by Valdez Minaya MD at [facility] CARDIAC CATH LAB   • HYSTERECTOMY (CERVIX STATUS UNKNOWN)     • IR ANGXPTA ILIAC W STENT Bilateral    • LUMBAR FUSION   &     ALIF and then did the posterior after it didnt work   • LUMBAR FUSION     • ORTHOPEDIC SURGERY Right     Ulnar nerve   • ROTATOR CUFF REPAIR Left    • SHOULDER ARTHROPLASTY Bilateral 2019       Social History:    Social History     Tobacco Use   • Smoking status: Former     Current packs/day: 0.00     Types: Cigarettes     Quit date: 10/2/2004     Years since quittin.3   • Smokeless tobacco: Never   Substance Use Topics   • Alcohol use: Yes     Alcohol/week: 4.0 standard drinks of alcohol                                Counseling given: Not Answered      Vital Signs (Current): There were no vitals filed for this visit.                                           BP Readings from Last 3 Encounters:   24 138/62   24 (!) 177/72   23 (!) 128/58       NPO Status:                                                                                 BMI:   Wt Readings from Last 3 Encounters:   24 74.7 kg (164 lb 9.6 oz)   24 71.2 kg (157 lb)   23 73.5 kg (162 lb)     There is no height or weight on file to calculate BMI.    CBC:   Lab Results   Component Value Date/Time    WBC 6.5 2024 10:13 AM    RBC 4.05 2024 10:13 AM    HGB 11.9 2024 10:13 AM    HCT 36.3 2024 10:13 AM    MCV 89.6 2024 10:13 AM    RDW 18.0 2024 10:13 AM     2024 10:13 AM       CMP:   Lab Results   Component Value Date/Time     2024 10:13 AM    K 5.3 2024 10:13 AM     2024 10:13 AM    CO2 26 2024 10:13 AM    BUN 37 2024 10:13 AM    CREATININE 0.97 2024 10:13 AM    GFRAA >60 2022 06:40 AM

## 2024-02-07 NOTE — PROGRESS NOTES
EP/Cath LAB to Recovery Room Report    Procedure: Rian GAMEZ: KATHERINE Minaya MD    Verbal Report given to Recovery Nurse on patient being transferred to Recovery Room for routine post-op. Patient stable upon transfer to .  Pt had general sedation with Anesthesia team, who managed MAR, vitals, and airway. Vitals, mental status, MAR, procedural summary discussed with recovery RN.       Right femoral vein 15 fr sheath removed, closed with perclose at 1407

## 2024-02-07 NOTE — PROGRESS NOTES
Patient discharged via wheelchair to home accompanied by her sister-in-law. Right groin site dry and intact just prior to discharge.

## 2024-02-07 NOTE — PROGRESS NOTES
Cardiac Cath Lab Recovery Arrival Note:      Valentina Dwyer arrived to Cardiac Cath Lab, Recovery Area. Staff introduced to patient. Patient identifiers verified with NAME and DATE OF BIRTH. Procedure verified with patient. Consent forms reviewed and signed by patient or authorized representative and verified. Allergies verified.     Patient and family oriented to department. Patient and family informed of procedure and plan of care.     Questions answered with review. Patient prepped for procedure, per orders from physician, prior to arrival.    Patient on cardiac monitor, non-invasive blood pressure, SPO2 monitor. On RA. Patient is A&Ox 4. Patient reports no discomfort.     Patient in stretcher, in low position, with side rails up, call bell within reach, patient instructed to call if assistance as needed.    Patient prep in: [facility] Recovery Area, [place] [facility].   Patient family has pager # [phone]   Family in: upstairs waiting area.

## 2024-02-07 NOTE — INTERVAL H&P NOTE
Update History & Physical    The patient's History and Physical of February 6, 2024 was reviewed with the patient and I examined the patient. There was no change. The surgical site was confirmed by the patient and me.     Plan: The risks, benefits, expected outcome, and alternative to the recommended procedure have been discussed with the patient. Patient understands and wants to proceed with the procedure.     Electronically signed by Valdez Minaya MD on 2/7/2024 at 12:48 PM

## 2024-02-07 NOTE — PROCEDURES
Cardiac Procedure Note   Patient: Valentina Dwyer  MRN: [id number]  SSN: [id number]   YOB: 1950 Age: 73 y.o.  Sex: female    Date of Procedure: 2/7/2024   Pre-procedure Diagnosis: Atrial Fibrillation/pacemaker  Post-procedure Diagnosis: same  Procedure: watchman device implant  Pacemaker checked post procedure  :  Dr. Valdez Minaya MD    Assistant(s):  None  Anesthesia: general   Estimated Blood Loss: Less than 10 mL   Specimens Removed: None  Findings: 24 mm device used\  Pacemaker check after implant, normal function   Complications: None   Implants: Watchman device  Signed by:  Valdez Minaya MD  2/7/2024  3:20 PM

## 2024-02-07 NOTE — ANESTHESIA PROCEDURE NOTES
Procedure Performed: MALIKA       Start Time:        End Time:      Preanesthesia Checklist:  Patient identified, IV assessed, risks and benefits discussed, monitors and equipment assessed, procedure being performed at surgeon's request and anesthesia consent obtained.    General Procedure Information  Diagnostic Indications for Echo:  assessment of ascending aorta, assessment of surgical repair and assessment of valve function  Location performed:  OR  Intubated  Probe Insertion:  Easy  Probe Type:  3D and mulitplane  Modalities:  2D, color flow mapping, continuous wave Doppler and pulse wave Doppler    Echocardiographic and Doppler Measurements    Ventricles    Right Ventricle:  Cavity size normal.  Hypertrophy not present.  Thrombus not present.  Global function mildly impaired.  Ejection Fraction 45%.    Left Ventricle:  Cavity size normal.  Hypertrophy present.  Global Function mildly impaired.  Ejection Fraction 45%.      Ventricular Regional Function:  1- Basal Anteroseptal:  hypokinetic  2- Basal Anterior:  normal  3- Basal Anterolateral:  normal  4- Basal Inferolateral:  normal  5- Basal Inferior:  hypokinetic  6- Basal Inferoseptal:  normal  7- Mid Anteroseptal:  hypokinetic  8- Mid Anterior:  normal  9- Mid Anterolateral:  normal  10- Mid Inferolateral:  hypokinetic  11- Mid Inferior:  hypokinetic  12- Mid Inferoseptal:  hypokinetic  13- Apical Anterior:  hypokinetic  14- Apical Lateral:  hypokinetic  15- Apical Inferior:  hypokinetic  16- Apical Septal:  hypokinetic  17- [place]:  hypokinetic      Valves    Aortic Valve:  Annulus normal.  Stenosis not present.  Regurgitation none.  Leaflets normal.  Leaflet motions normal.      Mitral Valve:  Annulus normal.  Stenosis not present.  Regurgitation mild.      Tricuspid Valve:  Annulus dilated.  Regurgitation mild.    Pulmonic Valve:  Annulus normal.        Aorta    Ascending Aorta:  Size normal.    Aortic Arch:  Size normal.    Descending Aorta:  Size normal.

## 2024-02-08 LAB — ECHO BSA: 1.85 M2

## 2024-02-09 NOTE — ANESTHESIA POSTPROCEDURE EVALUATION
Department of Anesthesiology  Postprocedure Note    Patient: Valentina Dwyer  MRN: [id number]  YOB: 1950  Date of evaluation: 2/9/2024    Procedure Summary       Date: 02/07/24 Room / Location: [facility] CATH LAB 3 / [facility] CARDIAC CATH LAB    Anesthesia Start: 1258 Anesthesia Stop: 1427    Procedure: Watchman timmy closure device Diagnosis:       PAF (paroxysmal atrial fibrillation) (HCC)      (watchman device implant)    Providers: Valdez Minaya MD Responsible Provider: Thomas Glass MD    Anesthesia Type: general ASA Status: 4            Anesthesia Type: No value filed.    Kamran Phase I: Kamran Score: 10    Kamran Phase II:      Anesthesia Post Evaluation    Patient location during evaluation: PACU  Patient participation: complete - patient participated  Level of consciousness: responsive to verbal stimuli and sleepy but conscious  Pain score: 2  Airway patency: patent  Cardiovascular status: blood pressure returned to baseline  Respiratory status: acceptable  Hydration status: stable  Comments: +Post-Anesthesia Evaluation and Assessment    Patient: Valentina Dwyer MRN: [id number]  SSN: [id number]   YOB: 1950  Age: 73 y.o.  Sex: female          Cardiovascular Function/Vital Signs    BP (!) 125/48   Pulse 67   Temp 97.9 °F (36.6 °C) (Oral)   Resp 16   Ht 1.626 m (5' 4\")   Wt 75.8 kg (167 lb)   SpO2 97%   Breastfeeding No   BMI 28.67 kg/m²     Patient is status post Procedure(s):  Watchman timmy closure device.    Nausea/Vomiting: Controlled.    Postoperative hydration reviewed and adequate.    Pain:      Managed.    Neurological Status:       At baseline.    Mental Status and Level of Consciousness: Arousable.    Pulmonary Status:       Adequate oxygenation and airway patent.    Complications related to anesthesia: None    Post-anesthesia assessment completed. No concerns.    I have evaluated the patient and the patient is stable and ready to be discharged from PACU .    Signed By: Thomas

## 2024-02-24 NOTE — H&P (VIEW-ONLY)
assess for  underlying solid nodules.  2. Debris is present in the segmental airways in the bilateral lower lobes.  3. Coronary artery calcifications.  4. No pulmonary embolism.    MRI Result (most recent):  MRI BRAIN W WO CONTRAST 02/04/2021    Narrative  EXAM:  MRI BRAIN W WO CONT    INDICATION:    sz like activity    COMPARISON:  None.    CONTRAST: 16 ml Dotarem.    TECHNIQUE:  Multiplanar multisequence acquisition without and with contrast of the brain.    FINDINGS:  Diffusion imaging does not show acute ischemic changes.  There is no extra-axial fluid collection, hemorrhage or shift.  Minimal nonspecific white matter changes.  Flow voids in major vessels at the base of the brain are present.  Special attention to the temporal lobes show no abnormal signal intensity  asymmetry or masses.  No masses or enhancing lesions.    Impression  1. No acute findings no mass.  2. Minimal nonspecific white matter disease.          Current meds:  Current Outpatient Medications   Medication Sig Dispense Refill    clopidogrel (PLAVIX) 75 MG tablet Take 1 tablet by mouth daily 90 tablet 1    aspirin 81 MG EC tablet Take 1 tablet by mouth daily 90 tablet 1    Netarsudil Dimesylate (RHOPRESSA) 0.02 % SOLN 1 drop daily      metoprolol tartrate (LOPRESSOR) 50 MG tablet Take 1 tablet by mouth 2 times daily 180 tablet 3    vitamin C (ASCORBIC ACID) 500 MG tablet Take 2 tablets by mouth daily      TRELEGY ELLIPTA 100-62.5-25 MCG/ACT AEPB inhaler Inhale 1 puff into the lungs daily      rosuvastatin (CRESTOR) 40 MG tablet TAKE 1 TABLET BY MOUTH EVERY  NIGHT 90 tablet 3    bumetanide (BUMEX) 0.5 MG tablet Take 1 tablet by mouth daily (Patient taking differently: Take 1 tablet by mouth daily Taking PRN) 30 tablet 1    LORazepam (ATIVAN) 1 MG tablet Take 1 tablet by mouth nightly as needed for Anxiety.      sertraline (ZOLOFT) 25 MG tablet Take 1 tablet by mouth daily 30 tablet 3    lisinopril-hydroCHLOROthiazide (PRINZIDE;ZESTORETIC)

## 2024-02-24 NOTE — PROGRESS NOTES
[facility] Cardiology  Cardiac Electrophysiology Clinic Care Note                  []Initial visit     [x]Established visit     Patient Name: Valentina Dwyer - :1950 - MRN:[id number]  Primary Cardiologist: Sean Anguiano MD  Electrophysiologist: Valdez Minaya MD     Reason for visit: Watchman follow up    HPI:  Ms. Dwyer is a 73 y.o. female who presents for follow up, is s/p Watchman NANCY occlusion procedure on 2024.    On DAPT post Watchman.  She reports intermittent left chest discomfort, nonexertional, that wraps laterally under left breast.  Chronic mild HUMPHREY (COPD) has been stable.  She denies palpitations, PND, orthopnea, syncope, or edema.    LVEF 55-60% per MALIKA in 2024 with mild BRAD.    BP controlled.      Previous:  S/p Watchman 2024.    Admitted 10/2023 with bilateral pleural effusion & symptomatic anemia (Hgb 9.3, at/above baseline).     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     Admitted at [facility] 2022 with bilateral pneumonia.     S/p right carotid stent 2022.     Chronic thrombocytosis.     S/p bilateral iliac stents, followed by Dr. Salazar.     Known COPD.       Assessment and Plan     Anticoagulation: [id number] 4, HAS-BLED 4.  Chronically anemic without known source.  Now s/p Watchman NANCY occlusion procedure on 2024.  Continue DAPT x 6 months.  If MALIKA >45 days post Watchman indicates complete NANCY closure & no device related thrombus, would stop Plavix after 6 months & continue ASA 81 mg po daily alone indefinitely thereafter.    Reviewed risks/benefits of MALIKA.  She agrees to proceed with scheduling.  Labs ordered.    Due to intermittent nonexertional chest discomfort post procedure, will check limited 2D echo to rule out pericardial effusion.    Medtronic dual chamber pacemaker (DOI 2023): Implanted for syncope & 3rd degree AVB.  Device check on 2024 showed proper lead & generator

## 2024-02-29 ENCOUNTER — OFFICE VISIT (OUTPATIENT)
Age: 74
End: 2024-02-29
Payer: MEDICARE

## 2024-02-29 ENCOUNTER — TELEPHONE (OUTPATIENT)
Age: 74
End: 2024-02-29

## 2024-02-29 VITALS
SYSTOLIC BLOOD PRESSURE: 130 MMHG | WEIGHT: 169.8 LBS | HEIGHT: 64 IN | RESPIRATION RATE: 20 BRPM | HEART RATE: 68 BPM | BODY MASS INDEX: 28.99 KG/M2 | OXYGEN SATURATION: 100 % | DIASTOLIC BLOOD PRESSURE: 56 MMHG

## 2024-02-29 DIAGNOSIS — Z95.818 PRESENCE OF WATCHMAN LEFT ATRIAL APPENDAGE CLOSURE DEVICE: Primary | ICD-10-CM

## 2024-02-29 DIAGNOSIS — I44.2 COMPLETE AV BLOCK (HCC): ICD-10-CM

## 2024-02-29 DIAGNOSIS — Z95.0 PACEMAKER: ICD-10-CM

## 2024-02-29 DIAGNOSIS — I48.0 PAF (PAROXYSMAL ATRIAL FIBRILLATION) (HCC): ICD-10-CM

## 2024-02-29 DIAGNOSIS — I49.9 CARDIAC ARRHYTHMIA, UNSPECIFIED CARDIAC ARRHYTHMIA TYPE: ICD-10-CM

## 2024-02-29 PROCEDURE — 3075F SYST BP GE 130 - 139MM HG: CPT | Performed by: NURSE PRACTITIONER

## 2024-02-29 PROCEDURE — 99214 OFFICE O/P EST MOD 30 MIN: CPT | Performed by: NURSE PRACTITIONER

## 2024-02-29 PROCEDURE — G8417 CALC BMI ABV UP PARAM F/U: HCPCS | Performed by: NURSE PRACTITIONER

## 2024-02-29 PROCEDURE — 1111F DSCHRG MED/CURRENT MED MERGE: CPT | Performed by: NURSE PRACTITIONER

## 2024-02-29 PROCEDURE — G8427 DOCREV CUR MEDS BY ELIG CLIN: HCPCS | Performed by: NURSE PRACTITIONER

## 2024-02-29 PROCEDURE — 1090F PRES/ABSN URINE INCON ASSESS: CPT | Performed by: NURSE PRACTITIONER

## 2024-02-29 PROCEDURE — 3078F DIAST BP <80 MM HG: CPT | Performed by: NURSE PRACTITIONER

## 2024-02-29 PROCEDURE — G8484 FLU IMMUNIZE NO ADMIN: HCPCS | Performed by: NURSE PRACTITIONER

## 2024-02-29 PROCEDURE — G8400 PT W/DXA NO RESULTS DOC: HCPCS | Performed by: NURSE PRACTITIONER

## 2024-02-29 PROCEDURE — 1123F ACP DISCUSS/DSCN MKR DOCD: CPT | Performed by: NURSE PRACTITIONER

## 2024-02-29 PROCEDURE — 3017F COLORECTAL CA SCREEN DOC REV: CPT | Performed by: NURSE PRACTITIONER

## 2024-02-29 PROCEDURE — 1036F TOBACCO NON-USER: CPT | Performed by: NURSE PRACTITIONER

## 2024-02-29 ASSESSMENT — PATIENT HEALTH QUESTIONNAIRE - PHQ9
1. LITTLE INTEREST OR PLEASURE IN DOING THINGS: 0
SUM OF ALL RESPONSES TO PHQ QUESTIONS 1-9: 0
2. FEELING DOWN, DEPRESSED OR HOPELESS: 0
SUM OF ALL RESPONSES TO PHQ QUESTIONS 1-9: 0
SUM OF ALL RESPONSES TO PHQ9 QUESTIONS 1 & 2: 0

## 2024-02-29 NOTE — PROGRESS NOTES
Room #: 2    Chief Complaint   Patient presents with    Follow-up     Post watchman    Atrial Fibrillation       Vitals:    02/29/24 1110   BP: (!) 130/56   Site: Right Upper Arm   Position: Sitting   Cuff Size: Medium Adult   Pulse: 68   Resp: 20   SpO2: 100%   Weight: 77 kg (169 lb 12.8 oz)   Height: 1.626 m (5' 4\")         Chest pain:  NO    Have you been to the ER, urgent care, or hospitalized outside of [facility] since your last visit?   NO    Refills:  NO

## 2024-02-29 NOTE — TELEPHONE ENCOUNTER
Spoke with Pt of MALIKA w/ Dr. Minaya at [facility] for 03/25/24 at 10am arriving at 9am  Check in at the first floor Outpt reg. Desk.   Pt Needs a  must stay on site  Pt is to be NPO from midnight the night before.   Medications:    Hold Bumex day of procedure  Have labs done between 3/1124 and no later then 3/18/24

## 2024-02-29 NOTE — PATIENT INSTRUCTIONS
Spoke with Pt of MALIKA decker for 3/25/24 at 10am and arriving at 9am   Check in at the first floor Outpt reg. Desk. [facility], [address] [place] [address]   Please make sure that you have a  must stay on site  Nothing to Eat or Drink from midnight the night before.   Medications:    Hold Bumex day of procedure  Please have lab work done between 3/11/24 and no later then 3/18/24

## 2024-03-04 ENCOUNTER — ANCILLARY PROCEDURE (OUTPATIENT)
Age: 74
End: 2024-03-04
Payer: MEDICARE

## 2024-03-04 VITALS
BODY MASS INDEX: 28.85 KG/M2 | SYSTOLIC BLOOD PRESSURE: 130 MMHG | HEART RATE: 78 BPM | HEIGHT: 64 IN | DIASTOLIC BLOOD PRESSURE: 70 MMHG | WEIGHT: 169 LBS

## 2024-03-04 DIAGNOSIS — I48.0 PAF (PAROXYSMAL ATRIAL FIBRILLATION) (HCC): ICD-10-CM

## 2024-03-04 DIAGNOSIS — Z95.818 PRESENCE OF WATCHMAN LEFT ATRIAL APPENDAGE CLOSURE DEVICE: ICD-10-CM

## 2024-03-04 DIAGNOSIS — I44.2 COMPLETE AV BLOCK (HCC): ICD-10-CM

## 2024-03-04 DIAGNOSIS — I49.9 CARDIAC ARRHYTHMIA, UNSPECIFIED CARDIAC ARRHYTHMIA TYPE: ICD-10-CM

## 2024-03-04 PROCEDURE — 93308 TTE F-UP OR LMTD: CPT | Performed by: INTERNAL MEDICINE

## 2024-03-10 LAB
ECHO AO ROOT DIAM: 3 CM
ECHO AO ROOT INDEX: 1.65 CM/M2
ECHO BSA: 1.86 M2
ECHO EST RA PRESSURE: 3 MMHG
ECHO LA DIAMETER INDEX: 1.98 CM/M2
ECHO LA DIAMETER: 3.6 CM
ECHO LA TO AORTIC ROOT RATIO: 1.2
ECHO LA VOL A-L A2C: 51 ML (ref 22–52)
ECHO LA VOL A-L A4C: 73 ML (ref 22–52)
ECHO LA VOL BP: 61 ML (ref 22–52)
ECHO LA VOL MOD A2C: 49 ML (ref 22–52)
ECHO LA VOL MOD A4C: 68 ML (ref 22–52)
ECHO LA VOL/BSA BIPLANE: 34 ML/M2 (ref 16–34)
ECHO LA VOLUME AREA LENGTH: 64 ML
ECHO LA VOLUME INDEX A-L A2C: 28 ML/M2 (ref 16–34)
ECHO LA VOLUME INDEX A-L A4C: 40 ML/M2 (ref 16–34)
ECHO LA VOLUME INDEX AREA LENGTH: 35 ML/M2 (ref 16–34)
ECHO LA VOLUME INDEX MOD A2C: 27 ML/M2 (ref 16–34)
ECHO LA VOLUME INDEX MOD A4C: 37 ML/M2 (ref 16–34)
ECHO LV EDV A2C: 64 ML
ECHO LV EDV A4C: 72 ML
ECHO LV EDV BP: 70 ML (ref 56–104)
ECHO LV EDV INDEX A4C: 40 ML/M2
ECHO LV EDV INDEX BP: 38 ML/M2
ECHO LV EDV NDEX A2C: 35 ML/M2
ECHO LV EJECTION FRACTION A2C: 59 %
ECHO LV EJECTION FRACTION A4C: 65 %
ECHO LV EJECTION FRACTION BIPLANE: 62 % (ref 55–100)
ECHO LV ESV A2C: 26 ML
ECHO LV ESV A4C: 25 ML
ECHO LV ESV BP: 26 ML (ref 19–49)
ECHO LV ESV INDEX A2C: 14 ML/M2
ECHO LV ESV INDEX A4C: 14 ML/M2
ECHO LV ESV INDEX BP: 14 ML/M2
ECHO LV FRACTIONAL SHORTENING: 35 % (ref 28–44)
ECHO LV INTERNAL DIMENSION DIASTOLE INDEX: 2.53 CM/M2
ECHO LV INTERNAL DIMENSION DIASTOLIC: 4.6 CM (ref 3.9–5.3)
ECHO LV INTERNAL DIMENSION SYSTOLIC INDEX: 1.65 CM/M2
ECHO LV INTERNAL DIMENSION SYSTOLIC: 3 CM
ECHO LV IVSD: 1 CM (ref 0.6–0.9)
ECHO LV MASS 2D: 158.8 G (ref 67–162)
ECHO LV MASS INDEX 2D: 87.3 G/M2 (ref 43–95)
ECHO LV POSTERIOR WALL DIASTOLIC: 1 CM (ref 0.6–0.9)
ECHO LV RELATIVE WALL THICKNESS RATIO: 0.43
ECHO RIGHT VENTRICULAR SYSTOLIC PRESSURE (RVSP): 35 MMHG
ECHO TV REGURGITANT MAX VELOCITY: 2.84 M/S
ECHO TV REGURGITANT PEAK GRADIENT: 32 MMHG

## 2024-03-10 PROCEDURE — 93325 DOPPLER ECHO COLOR FLOW MAPG: CPT | Performed by: INTERNAL MEDICINE

## 2024-03-10 PROCEDURE — 93321 DOPPLER ECHO F-UP/LMTD STD: CPT | Performed by: INTERNAL MEDICINE

## 2024-03-10 PROCEDURE — 93308 TTE F-UP OR LMTD: CPT | Performed by: INTERNAL MEDICINE

## 2024-03-11 ENCOUNTER — TELEPHONE (OUTPATIENT)
Age: 74
End: 2024-03-11

## 2024-03-11 NOTE — TELEPHONE ENCOUNTER
Verified patient with two types of identifiers. Notified of results and MD recommendations. She states she has not had anymore chest discomfort. Will keep MALIKA as scheduled. Patient verbalized understanding and will call with any other questions.      Future Appointments   Date Time Provider Department Center   3/25/2024 10:00 AM [facility]   6/20/2024 11:20 AM Sean Anguiano MD C[facility] BS AMB   11/20/2024 11:00 AM PACEMAKER, STPATSY C[facility] BS AMB   11/20/2024 11:20 AM Valdez Minaya MD [facility] BS AMB

## 2024-03-11 NOTE — TELEPHONE ENCOUNTER
----- Message from Valdez Minaya MD sent at 3/10/2024 10:19 PM EDT -----  2 D Echo no effusion   Normal LVEF  Mild TR    Future Appointments  3/25/2024  10:00 AM   [facility] STRESS LAB 1           [facility]  6/20/2024  11:20 AM   Sean Anguiano MD    CAVSF               BS AMB  11/20/2024 11:00 AM   PACEMAKER, STFRANCES       CAVSF               BS AMB  11/20/2024 11:20 AM   Valdez Minaya MD           CAVSF               BS AMB

## 2024-03-21 ENCOUNTER — PREP FOR PROCEDURE (OUTPATIENT)
Age: 74
End: 2024-03-21

## 2024-03-21 ENCOUNTER — TELEPHONE (OUTPATIENT)
Age: 74
End: 2024-03-21

## 2024-03-21 DIAGNOSIS — I48.0 PAF (PAROXYSMAL ATRIAL FIBRILLATION) (HCC): ICD-10-CM

## 2024-03-21 DIAGNOSIS — Z95.818 PRESENCE OF WATCHMAN LEFT ATRIAL APPENDAGE CLOSURE DEVICE: ICD-10-CM

## 2024-03-21 DIAGNOSIS — I49.9 CARDIAC ARRHYTHMIA, UNSPECIFIED CARDIAC ARRHYTHMIA TYPE: ICD-10-CM

## 2024-03-21 NOTE — TELEPHONE ENCOUNTER
Verified patient with two types of identifiers. Patient would [facility] [address] In [place].  [facility] to [facility] and faxed orders to [phone].  Fax confirmation received.

## 2024-03-22 LAB
BUN SERPL-MCNC: 29 MG/DL (ref 8–27)
BUN/CREAT SERPL: 25 (ref 12–28)
CALCIUM SERPL-MCNC: 8.9 MG/DL (ref 8.7–10.3)
CHLORIDE SERPL-SCNC: 101 MMOL/L (ref 96–106)
CO2 SERPL-SCNC: 23 MMOL/L (ref 20–29)
CREAT SERPL-MCNC: 1.18 MG/DL (ref 0.57–1)
EGFRCR SERPLBLD CKD-EPI 2021: 48 ML/MIN/1.73
ERYTHROCYTE [DISTWIDTH] IN BLOOD BY AUTOMATED COUNT: 14.7 % (ref 11.7–15.4)
GLUCOSE SERPL-MCNC: 85 MG/DL (ref 70–99)
HCT VFR BLD AUTO: 35 % (ref 34–46.6)
HGB BLD-MCNC: 11.8 G/DL (ref 11.1–15.9)
MCH RBC QN AUTO: 31.6 PG (ref 26.6–33)
MCHC RBC AUTO-ENTMCNC: 33.7 G/DL (ref 31.5–35.7)
MCV RBC AUTO: 94 FL (ref 79–97)
PLATELET # BLD AUTO: 253 X10E3/UL (ref 150–450)
POTASSIUM SERPL-SCNC: 5.4 MMOL/L (ref 3.5–5.2)
RBC # BLD AUTO: 3.73 X10E6/UL (ref 3.77–5.28)
REPORT: NORMAL
SODIUM SERPL-SCNC: 138 MMOL/L (ref 134–144)
WBC # BLD AUTO: 5.5 X10E3/UL (ref 3.4–10.8)

## 2024-03-22 RX ORDER — SODIUM CHLORIDE 0.9 % (FLUSH) 0.9 %
5-40 SYRINGE (ML) INJECTION PRN
Status: CANCELLED | OUTPATIENT
Start: 2024-03-22

## 2024-03-22 RX ORDER — SODIUM CHLORIDE 9 MG/ML
INJECTION, SOLUTION INTRAVENOUS PRN
Status: CANCELLED | OUTPATIENT
Start: 2024-03-22

## 2024-03-22 RX ORDER — SODIUM CHLORIDE 0.9 % (FLUSH) 0.9 %
5-40 SYRINGE (ML) INJECTION EVERY 12 HOURS SCHEDULED
Status: CANCELLED | OUTPATIENT
Start: 2024-03-22

## 2024-03-25 ENCOUNTER — HOSPITAL ENCOUNTER (OUTPATIENT)
Facility: HOSPITAL | Age: 74
Discharge: HOME | End: 2024-03-25
Attending: INTERNAL MEDICINE
Payer: MEDICARE

## 2024-03-25 VITALS
DIASTOLIC BLOOD PRESSURE: 48 MMHG | HEIGHT: 64 IN | WEIGHT: 169 LBS | SYSTOLIC BLOOD PRESSURE: 103 MMHG | BODY MASS INDEX: 28.85 KG/M2 | OXYGEN SATURATION: 93 % | RESPIRATION RATE: 13 BRPM | HEART RATE: 60 BPM

## 2024-03-25 DIAGNOSIS — I48.91 AF (ATRIAL FIBRILLATION) (HCC): ICD-10-CM

## 2024-03-25 LAB — ECHO BSA: 1.86 M2

## 2024-03-25 PROCEDURE — 93325 DOPPLER ECHO COLOR FLOW MAPG: CPT

## 2024-03-25 PROCEDURE — 93312 ECHO TRANSESOPHAGEAL: CPT | Performed by: INTERNAL MEDICINE

## 2024-03-25 PROCEDURE — 93320 DOPPLER ECHO COMPLETE: CPT | Performed by: INTERNAL MEDICINE

## 2024-03-25 PROCEDURE — 93325 DOPPLER ECHO COLOR FLOW MAPG: CPT | Performed by: INTERNAL MEDICINE

## 2024-03-25 PROCEDURE — 99152 MOD SED SAME PHYS/QHP 5/>YRS: CPT | Performed by: INTERNAL MEDICINE

## 2024-03-25 PROCEDURE — 99152 MOD SED SAME PHYS/QHP 5/>YRS: CPT

## 2024-03-25 PROCEDURE — 6370000000 HC RX 637 (ALT 250 FOR IP): Performed by: INTERNAL MEDICINE

## 2024-03-25 PROCEDURE — 6360000002 HC RX W HCPCS: Performed by: INTERNAL MEDICINE

## 2024-03-25 RX ORDER — FENTANYL CITRATE 50 UG/ML
INJECTION, SOLUTION INTRAMUSCULAR; INTRAVENOUS PRN
Status: COMPLETED | OUTPATIENT
Start: 2024-03-25 | End: 2024-03-25

## 2024-03-25 RX ORDER — MIDAZOLAM HYDROCHLORIDE 1 MG/ML
INJECTION INTRAMUSCULAR; INTRAVENOUS
Status: DISCONTINUED
Start: 2024-03-25 | End: 2024-03-25 | Stop reason: HOSPADM

## 2024-03-25 RX ORDER — HYDROCODONE BITARTRATE AND ACETAMINOPHEN 5; 325 MG/1; MG/1
1 TABLET ORAL EVERY 6 HOURS PRN
Status: DISCONTINUED | OUTPATIENT
Start: 2024-03-25 | End: 2024-03-26 | Stop reason: HOSPADM

## 2024-03-25 RX ORDER — ONDANSETRON 2 MG/ML
4 INJECTION INTRAMUSCULAR; INTRAVENOUS EVERY 6 HOURS PRN
Status: DISCONTINUED | OUTPATIENT
Start: 2024-03-25 | End: 2024-03-26 | Stop reason: HOSPADM

## 2024-03-25 RX ORDER — LIDOCAINE HYDROCHLORIDE 20 MG/ML
SOLUTION OROPHARYNGEAL PRN
Status: COMPLETED | OUTPATIENT
Start: 2024-03-25 | End: 2024-03-25

## 2024-03-25 RX ORDER — LIDOCAINE 50 MG/G
OINTMENT TOPICAL
Status: DISCONTINUED
Start: 2024-03-25 | End: 2024-03-25 | Stop reason: WASHOUT

## 2024-03-25 RX ORDER — CLOPIDOGREL BISULFATE 75 MG/1
75 TABLET ORAL DAILY
Qty: 90 TABLET | Refills: 1 | Status: SHIPPED
Start: 2024-03-25 | End: 2024-03-25

## 2024-03-25 RX ORDER — FENTANYL CITRATE 50 UG/ML
INJECTION, SOLUTION INTRAMUSCULAR; INTRAVENOUS
Status: DISCONTINUED
Start: 2024-03-25 | End: 2024-03-25 | Stop reason: HOSPADM

## 2024-03-25 RX ORDER — LIDOCAINE HYDROCHLORIDE 20 MG/ML
JELLY TOPICAL PRN
Status: COMPLETED | OUTPATIENT
Start: 2024-03-25 | End: 2024-03-25

## 2024-03-25 RX ORDER — LIDOCAINE HYDROCHLORIDE 20 MG/ML
JELLY TOPICAL
Status: DISCONTINUED
Start: 2024-03-25 | End: 2024-03-25 | Stop reason: HOSPADM

## 2024-03-25 RX ORDER — ACETAMINOPHEN 325 MG/1
650 TABLET ORAL EVERY 4 HOURS PRN
Status: DISCONTINUED | OUTPATIENT
Start: 2024-03-25 | End: 2024-03-26 | Stop reason: HOSPADM

## 2024-03-25 RX ORDER — MIDAZOLAM HYDROCHLORIDE 1 MG/ML
INJECTION INTRAMUSCULAR; INTRAVENOUS PRN
Status: COMPLETED | OUTPATIENT
Start: 2024-03-25 | End: 2024-03-25

## 2024-03-25 RX ORDER — LIDOCAINE HYDROCHLORIDE 20 MG/ML
SOLUTION OROPHARYNGEAL
Status: DISCONTINUED
Start: 2024-03-25 | End: 2024-03-25 | Stop reason: HOSPADM

## 2024-03-25 RX ORDER — CLOPIDOGREL BISULFATE 75 MG/1
75 TABLET ORAL DAILY
Qty: 90 TABLET | Refills: 1 | Status: SHIPPED | OUTPATIENT
Start: 2024-03-25 | End: 2024-08-07

## 2024-03-25 RX ADMIN — MIDAZOLAM HYDROCHLORIDE 2 MG: 1 INJECTION, SOLUTION INTRAMUSCULAR; INTRAVENOUS at 10:04

## 2024-03-25 RX ADMIN — LIDOCAINE HYDROCHLORIDE 15 ML: 20 SOLUTION ORAL; TOPICAL at 09:56

## 2024-03-25 RX ADMIN — FENTANYL CITRATE 25 MCG: 50 INJECTION, SOLUTION INTRAMUSCULAR; INTRAVENOUS at 10:03

## 2024-03-25 RX ADMIN — MIDAZOLAM HYDROCHLORIDE 2 MG: 1 INJECTION, SOLUTION INTRAMUSCULAR; INTRAVENOUS at 10:01

## 2024-03-25 RX ADMIN — LIDOCAINE HYDROCHLORIDE 5 ML: 20 JELLY TOPICAL at 10:02

## 2024-03-25 RX ADMIN — FENTANYL CITRATE 50 MCG: 50 INJECTION, SOLUTION INTRAMUSCULAR; INTRAVENOUS at 10:01

## 2024-03-25 NOTE — PROCEDURES
Cardiac Procedure Note   Patient: Valentina Dwyer  MRN: [id number]  SSN: [id number]   YOB: 1950 Age: 73 y.o.  Sex: female    Date of Procedure: 3/25/2024   Pre-procedure Diagnosis: Atrial Fibrillation/watchman  Post-procedure Diagnosis: same  Procedure: MALIKA  :  Dr. Valdez Minaya MD    Assistant(s):  None  Anesthesia: Moderate Sedation   Estimated Blood Loss: none  Specimens Removed: None  Findings:   Watchman device in place  No leak or thrombus  Complications: None   Implants:  None  Signed by:  Valdez Minaya MD  3/25/2024  9:57 AM

## 2024-03-25 NOTE — INTERVAL H&P NOTE
Update History & Physical    The patient's History and Physical of February 29, 2024 was reviewed with the patient and I examined the patient. There was no change. The surgical site was confirmed by the patient and me.     Plan: The risks, benefits, expected outcome, and alternative to the recommended procedure have been discussed with the patient. Patient understands and wants to proceed with the procedure.     Electronically signed by Valdez Minaya MD on 3/25/2024 at 9:48 AM

## 2024-03-25 NOTE — PROGRESS NOTES
Discharge instructions reviewed with patient and sister in-law. Allowed adequate time to ask questions, all questions answered. Printed copy of AVS given to patient. All belongings gathered, IV and tele discontinued. Transported via wheelchair to main entrance and into care of family.

## 2024-03-25 NOTE — PROGRESS NOTES
Patient arrived to Non-Invasive Cardiology Lab for Out Patient MALIKA Procedure. Staff introduced to patient. Patient identifiers verified with Name and Date of Birth. Procedure verified with patient. Consent forms reviewed and signed by patient or authorized representative and verified. Allergies verified. Patient informed of procedure and plan of care. Questions answered with review.     Patient on cardiac monitor, non-invasive blood pressure, SPO2 monitor. Patient is A&Ox3. Patient reports no complaints. Patient belongings and valuables to remain in the room with patient.    Patient on stretcher, in low position, with side rails up and brakes locked. Patient instructed to call for assistance as needed.    Family in waiting room.

## 2024-03-25 NOTE — DISCHARGE INSTRUCTIONS
Discharge Instructions Post MALIKA    No driving x 24 hours due to sedation medication that you received during your procedure.  Resume medications as previously prescribed.  You can stop Plavix on 08/07/2024 & continue aspirin 81 mg by mouth daily alone after that.  Follow up as noted below.    Future Appointments   Date Time Provider Department Center   3/25/2024 10:00 AM [facility] STRESS LAB 1 [facility]   6/20/2024 11:20 AM Sean Anguiano MD [facility]   11/20/2024 11:00 AM PACEMAKER, STFRANCES MARILUZ BS [facility]   11/20/2024 11:20 AM [facility]       Valdez Minaya M.D.  Electrophysiology/Cardiology  [facility]  [address]  [address] [address]                [place] [address]                  [place] [address] [phone]

## 2024-03-26 ENCOUNTER — TELEPHONE (OUTPATIENT)
Age: 74
End: 2024-03-26

## 2024-03-26 DIAGNOSIS — I48.91 AF (ATRIAL FIBRILLATION) (HCC): Primary | ICD-10-CM

## 2024-03-26 DIAGNOSIS — I48.91 AF (ATRIAL FIBRILLATION) (HCC): ICD-10-CM

## 2024-03-26 NOTE — TELEPHONE ENCOUNTER
----- Message from JENNIFER Hernandez - NP sent at 3/22/2024  4:03 PM EDT -----  Cr mildly elevated (1.18), appears to be at/around baseline.  K elevated (5.4).  Recommend rechecking BMP in 2-4 weeks.    Future Appointments  3/25/2024  10:00 AM   [facility] STRESS LAB 1           [facility]              [facility]  6/20/2024  11:20 AM   Sean Anguiano MD    CAV[facility]               BS AMB  11/20/2024 11:00 AM   PACEMAKER, STFRRUSTY       CAVSF               BS AMB  11/20/2024 11:20 AM   Valdez Minaya MD           CAVSF               BS AMB

## 2024-03-26 NOTE — TELEPHONE ENCOUNTER
Verified patient with two types of identifiers.   Spoke with patient to discuss labs. She is aware to have labs done in 4 weeks.

## 2024-04-17 LAB
BUN SERPL-MCNC: 31 MG/DL (ref 8–27)
BUN/CREAT SERPL: 25 (ref 12–28)
CALCIUM SERPL-MCNC: 9.1 MG/DL (ref 8.7–10.3)
CHLORIDE SERPL-SCNC: 100 MMOL/L (ref 96–106)
CO2 SERPL-SCNC: 19 MMOL/L (ref 20–29)
CREAT SERPL-MCNC: 1.25 MG/DL (ref 0.57–1)
EGFRCR SERPLBLD CKD-EPI 2021: 45 ML/MIN/1.73
GLUCOSE SERPL-MCNC: 101 MG/DL (ref 70–99)
POTASSIUM SERPL-SCNC: 4.8 MMOL/L (ref 3.5–5.2)
REPORT: NORMAL
SODIUM SERPL-SCNC: 140 MMOL/L (ref 134–144)

## 2024-04-18 ENCOUNTER — TELEPHONE (OUTPATIENT)
Age: 74
End: 2024-04-18

## 2024-04-18 NOTE — TELEPHONE ENCOUNTER
Attempted to reach patient by telephone. Secure message left on VM with results and MD recommendations.    Future Appointments   Date Time Provider Department Center   6/20/2024 11:20 AM Sean Anguiano MD [facility] BS AMB   11/20/2024 11:00 AM PACEMAKERJOSIAHF BS AMB   11/20/2024 11:20 AM Valdez Minaya MD [facility] BS AMB

## 2024-04-18 NOTE — TELEPHONE ENCOUNTER
----- Message from JENNIFER Hernandez NP sent at 4/18/2024  9:05 AM EDT -----  Cr remains elevated but stable.  K normalized.  No change in treatment plan based on results.

## 2024-06-17 NOTE — PROGRESS NOTES
Sean Anguiano MD      [id number],[facility],[place] [address]      Office [phone],Fax [phone]         Valentina Dwyer is a 72 y.o.  female is here for f/u visit .       Dear Dr. Gordillo,      I had the pleasure of seeing Ms. Valentina Dwyer in the office today.      Chief complaint: As documented in EMR      Assessment:   History of syncope/ Sinus pauses on event monitor - s/p PPM 8/28/23. - Dr Minaya -  PAF - not on anticoagulation ( Hx of anemia) s/p Watchman device 2/2024.  Had MALIKA post Watchman device by  3/25/24   HTN   PVD w hx of krystina iliac stents/carotid artery stenosis ( Dr Salazar); status post right carotid stent-March 2022; also states that she may need  a stent left carotid artery - sees vascular yearly   HLD   Diabetes mellitus-controlled per patient - off Metformin   [facility] admission 7/21/2022-7/23/2022-bilateral pneumonia. [facility] admn 10/13/23 - 10/15/23 Dyspnea/Anemia      Plan:      BP controlled    Doing well post pacemaker.  Trying to increase activity.   Erin nuclear study 5/11/22 - Nml   Cont Meds  10//2023-, TG 85, LDL 47   F/u 6 months/ NP      Patient understands the plan. All questions were answered to the patient's satisfaction.      Medication Side Effects and Warnings were discussed with patient: yes   Patient Labs were reviewed and or requested:  yes   Patient Past Records were reviewed and or requested: yes      I appreciate the opportunity to be involved in . See note below for details. Please do not hesitate to contact us with questions  or concerns.      Sean Anguiano MD      Cardiac Testing/ Procedures:      A.Cardiac Cath/PCI:      B.ECHO/MALIKA: 3/4/24  Left Ventricle: Normal left ventricular systolic function with a visually estimated EF of 60 - 65%. EF by 2D Simpsons Biplane is 62%. Left ventricle size is normal. Normal wall thickness. Normal wall motion.    Mitral Valve: Trace regurgitation.    Left Atrium:

## 2024-06-20 ENCOUNTER — OFFICE VISIT (OUTPATIENT)
Age: 74
End: 2024-06-20
Payer: MEDICARE

## 2024-06-20 VITALS
HEIGHT: 64 IN | OXYGEN SATURATION: 98 % | HEART RATE: 57 BPM | DIASTOLIC BLOOD PRESSURE: 60 MMHG | SYSTOLIC BLOOD PRESSURE: 124 MMHG | BODY MASS INDEX: 29.88 KG/M2 | WEIGHT: 175 LBS

## 2024-06-20 DIAGNOSIS — Z95.0 PACEMAKER: ICD-10-CM

## 2024-06-20 DIAGNOSIS — Z95.818 PRESENCE OF WATCHMAN LEFT ATRIAL APPENDAGE CLOSURE DEVICE: ICD-10-CM

## 2024-06-20 DIAGNOSIS — I45.9 HEART BLOCK: ICD-10-CM

## 2024-06-20 DIAGNOSIS — I10 PRIMARY HYPERTENSION: ICD-10-CM

## 2024-06-20 DIAGNOSIS — I48.0 PAF (PAROXYSMAL ATRIAL FIBRILLATION) (HCC): Primary | ICD-10-CM

## 2024-06-20 PROCEDURE — 93005 ELECTROCARDIOGRAM TRACING: CPT | Performed by: INTERNAL MEDICINE

## 2024-06-20 PROCEDURE — 99214 OFFICE O/P EST MOD 30 MIN: CPT | Performed by: INTERNAL MEDICINE

## 2024-06-20 NOTE — PROGRESS NOTES
Chief Complaint   Patient presents with    Atrial Fibrillation    Other     PPM, HDL, DM    Hypertension     Vitals:    06/20/24 1128   BP: 124/60   Site: Left Upper Arm   Position: Sitting   Cuff Size: Medium Adult   Pulse: 57   SpO2: 98%   Weight: 79.4 kg (175 lb)   Height: 1.626 m (5' 4\")      /60 (Site: Left Upper Arm, Position: Sitting, Cuff Size: Medium Adult)   Pulse 57   Ht 1.626 m (5' 4\")   Wt 79.4 kg (175 lb)   SpO2 98%   BMI 30.04 kg/m²

## 2024-07-23 PROCEDURE — 93294 REM INTERROG EVL PM/LDLS PM: CPT | Performed by: INTERNAL MEDICINE

## 2024-09-02 ENCOUNTER — APPOINTMENT (OUTPATIENT)
Facility: HOSPITAL | Age: 74
End: 2024-09-02
Payer: MEDICARE

## 2024-09-02 ENCOUNTER — HOSPITAL ENCOUNTER (EMERGENCY)
Facility: HOSPITAL | Age: 74
Discharge: HOME | End: 2024-09-02
Attending: STUDENT IN AN ORGANIZED HEALTH CARE EDUCATION/TRAINING PROGRAM
Payer: MEDICARE

## 2024-09-02 VITALS
RESPIRATION RATE: 18 BRPM | HEART RATE: 78 BPM | OXYGEN SATURATION: 98 % | DIASTOLIC BLOOD PRESSURE: 74 MMHG | SYSTOLIC BLOOD PRESSURE: 145 MMHG | TEMPERATURE: 98.1 F
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
## 2024-09-02 DIAGNOSIS — S09.90XA INJURY OF HEAD, INITIAL ENCOUNTER: Primary | ICD-10-CM

## 2024-09-02 PROCEDURE — 99285 EMERGENCY DEPT VISIT HI MDM: CPT

## 2024-09-02 PROCEDURE — 72125 CT NECK SPINE W/O DYE: CPT

## 2024-09-02 PROCEDURE — 70450 CT HEAD/BRAIN W/O DYE: CPT

## 2024-09-02 ASSESSMENT — LIFESTYLE VARIABLES
HOW OFTEN DO YOU HAVE A DRINK CONTAINING ALCOHOL: NEVER
HOW MANY STANDARD DRINKS CONTAINING ALCOHOL DO YOU HAVE ON A TYPICAL DAY: PATIENT DOES NOT DRINK

## 2024-09-02 ASSESSMENT — PAIN - FUNCTIONAL ASSESSMENT: PAIN_FUNCTIONAL_ASSESSMENT: NONE - DENIES PAIN

## 2024-09-02 NOTE — ED TRIAGE NOTES
Pt arrives to ED with family. Pt reports she tripped and fell. Hit head, unsure LOC. Takes blood thinners. Trauma Claudio called at triage. Abrasion noted to R eyebrow.

## 2024-09-03 NOTE — DISCHARGE INSTRUCTIONS
Thank you!    Thank you for allowing me to care for you in the emergency department.  I sincerely hope that you are satisfied with your visit today.  It is my goal to provide you with excellent care.    Below you will find a list of your labs and imaging from your visit today if applicable. Should you have any questions regarding these results please do not hesitate to call the emergency department. Please review [facility] for a more detailed result list since the below list may not be comprehensive. Instructions on how to sign up to [facility] should be provided in this packet.    Labs -   No results found for this or any previous visit (from the past 12 hour(s)).    Radiologic Studies -   CT HEAD WO CONTRAST   Final Result   No acute intracranial process is identified.    Please see above for additional nonemergent incidental findings.   There is no acute fracture or dislocation identified.             Electronically signed by DARCI NEWMAN      CT CERVICAL SPINE WO CONTRAST   Final Result   No fracture. Degenerative stenoses as described above.      Electronically signed by Boy Hull        [unfilled]  [unfilled]       If you feel that you have not received excellent quality care or timely care, please ask to speak to the nurse manager. Please choose us in the future for your continued health care needs.   ------------------------------------------------------------------------------------------------------------  The exam and treatment you received in the Emergency Department were for an urgent problem and are not intended as complete care. It is very important that you follow-up with a doctor, nurse practitioner, or physician assistant in a timely manner to:  (1) confirm your diagnosis and review all imaging and lab results,  (2) re-evaluation of changes in your illness and treatment, and  (3) for ongoing care.  If your symptoms become worse or you do not improve as expected and you are unable to reach your usual

## 2024-09-04 NOTE — ED PROVIDER NOTES
EMERGENCY DEPARTMENT HISTORY AND PHYSICAL EXAM      Date: 9/2/2024  Patient Name: Valentina Dwyer  MRN: [id number]  YOB: 1950  Date of evaluation: 9/2/2024  Provider: Jackson Ivan MD     History of Present Illness     Chief Complaint   Patient presents with    Fall    Head Injury       History Provided By: Patient    HPI: Valentina Dwyer, 73 y.o. female with past medical history as listed and reviewed below presenting to the ED for evaluation of a trip and fall.  Patient reports she is on Eliquis.  Patient reports she tripped and fell today and hit the right side of her head and suffering a contusion, denies loss of consciousness, denies any other pain.  She is ambulatory after the fall.    Medical History     Past Medical History:  Past Medical History:   Diagnosis Date    Anticoagulant long-term use     Arthritis     BCC (basal cell carcinoma of skin)     Claustrophobia     Degenerative disc disease, lumbar 1/6/2016    Glaucoma     Gout attack 04/2019    Big toe    H/O sinus tachycardia 01/08/2019    Following shoulder surgery    High potassium 12/2018    Chronic- Stays around 5.3    History of nuclear stress test 02/10/2019    Normal    Hypertension     Obesity (BMI 30-39.9)     Peripheral vascular disease (HCC)     stents in each iliac artery, states they are \"watching\" carotids    Prediabetes 2012    Rib fracture 2019    Unspecified adverse effect of anesthesia     BP drops    Unspecified sleep apnea     Does not uses CPAP       Past Surgical History:  Past Surgical History:   Procedure Laterality Date    CAROTID STENT PLACEMENT Right 03/22/2022    EP DEVICE PROCEDURE N/A 8/28/2023    Insert PPM dual performed by Valdez Minaya MD at [facility] CARDIAC CATH LAB    EP DEVICE PROCEDURE N/A 2/7/2024    Watchman timmy closure device performed by Valdez Minaya MD at [facility] CARDIAC CATH LAB    HYSTERECTOMY (CERVIX STATUS UNKNOWN)  1980    IR ANGXPTA ILIAC W STENT Bilateral 2010    LUMBAR FUSION  2016 & 2017    ALIF

## 2024-09-09 RX ORDER — ROSUVASTATIN CALCIUM 40 MG/1
40 TABLET, COATED ORAL
Qty: 90 TABLET | Refills: 3 | Status: SHIPPED | OUTPATIENT
Start: 2024-09-09

## 2024-10-26 PROCEDURE — 93294 REM INTERROG EVL PM/LDLS PM: CPT | Performed by: INTERNAL MEDICINE

## 2024-11-04 RX ORDER — METOPROLOL TARTRATE 50 MG
50 TABLET ORAL 2 TIMES DAILY
Qty: 180 TABLET | Refills: 2 | Status: SHIPPED | OUTPATIENT
Start: 2024-11-04

## 2024-11-04 NOTE — TELEPHONE ENCOUNTER
Med refilled per VO by MD.    Future Appointments   Date Time Provider Department Center   11/20/2024 11:00 AM PACEMAKER, JOSIAH [facility] BS AMB   11/20/2024 11:20 AM Valdez Minaya MD [facility] BS AMB   6/20/2025 11:00 AM Sean Anguiano MD [facility] BS AMB

## 2024-11-12 ENCOUNTER — TELEPHONE (OUTPATIENT)
Age: 74
End: 2024-11-12

## 2024-11-12 NOTE — TELEPHONE ENCOUNTER
Spoke to patient and confirmed new appointment time and provider.     Future Appointments   Date Time Provider Department Center   11/20/2024  1:20 PM PACEMAKER, STFRANCES [facility] BS AMB   11/20/2024  1:20 PM Pebbles Reed APRN - NP [facility] BS AMB   6/20/2025 11:00 AM Sean Anguiano MD [facility] BS AMB       [facility]

## 2024-11-19 NOTE — PROGRESS NOTES
[facility] Cardiology  Cardiac Electrophysiology Clinic Care Note                  []Initial visit     [x]Established visit     Patient Name: Valentina Dwyer - :1950 - MRN:[id number]  Primary Cardiologist: Sean Anguiano MD  Electrophysiologist: Valdez Minaya MD     Reason for visit: Watchman follow up    HPI:  Ms. Dwyer is a 74 y.o. female who presents for follow up, is s/p Watchman NANCY occlusion procedure on 2024.    LVEF 55-60% per MALIKA in 2024 with mild BRAD.    BP controlled.    She feels well and denies chest pain, dyspnea, dizziness or syncope. She does note some fatigue.     She accidentally stopped aspirin instead of Plavix post Watchman.       Previous:  S/p Watchman 2024.    Admitted 10/2023 with bilateral pleural effusion & symptomatic anemia (Hgb 9.3, at/above baseline).     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     Admitted at [facility] 2022 with bilateral pneumonia.     S/p right carotid stent 2022.     Chronic thrombocytosis.     S/p bilateral iliac stents, followed by Dr. Salazar.     Known COPD.     Assessment and Plan     Anticoagulation: [id number] 4, HAS-BLED 4.  Chronically anemic without known source.    Now s/p Watchman NANCY occlusion procedure 2024.    MALIKA 3/25/24 showed no device leak  - She accidentally stopped aspirin instead of Plavix post Watchman.   - Stop Plavix  - Start aspirin 81 mg daily     Medtronic dual chamber pacemaker (DOI 2023): Implanted for syncope & 3rd degree AVB.    Device check today shows normal lead and device function.   13.3 years on battery   29.2% A-paced. Less than 0.1% V-paced.    PAF: Asymptomatic.    - Reduce metoprolol to 25 mg BID to see if it helps fatigue    HTN: BP controlled.  Continue current medication regimen.       Follow up in EP clinic in 1 year.     Future Appointments   Date Time Provider Department Center   2025 11:00 AM Silvio

## 2024-11-20 ENCOUNTER — PROCEDURE VISIT (OUTPATIENT)
Age: 74
End: 2024-11-20
Payer: MEDICARE

## 2024-11-20 ENCOUNTER — OFFICE VISIT (OUTPATIENT)
Age: 74
End: 2024-11-20
Payer: MEDICARE

## 2024-11-20 VITALS
WEIGHT: 175 LBS | SYSTOLIC BLOOD PRESSURE: 124 MMHG | BODY MASS INDEX: 29.88 KG/M2 | DIASTOLIC BLOOD PRESSURE: 60 MMHG | HEIGHT: 64 IN | HEART RATE: 66 BPM | OXYGEN SATURATION: 96 %

## 2024-11-20 DIAGNOSIS — I10 PRIMARY HYPERTENSION: ICD-10-CM

## 2024-11-20 DIAGNOSIS — Z95.818 PRESENCE OF WATCHMAN LEFT ATRIAL APPENDAGE CLOSURE DEVICE: Primary | ICD-10-CM

## 2024-11-20 DIAGNOSIS — Z95.0 CARDIAC PACEMAKER IN SITU: Primary | ICD-10-CM

## 2024-11-20 DIAGNOSIS — I48.0 PAF (PAROXYSMAL ATRIAL FIBRILLATION) (HCC): ICD-10-CM

## 2024-11-20 DIAGNOSIS — Z95.0 PACEMAKER: ICD-10-CM

## 2024-11-20 PROCEDURE — 93280 PM DEVICE PROGR EVAL DUAL: CPT | Performed by: INTERNAL MEDICINE

## 2024-11-20 PROCEDURE — 99214 OFFICE O/P EST MOD 30 MIN: CPT | Performed by: NURSE PRACTITIONER

## 2024-11-20 RX ORDER — METOPROLOL TARTRATE 50 MG
25 TABLET ORAL 2 TIMES DAILY
Qty: 180 TABLET | Refills: 2
Start: 2024-11-20

## 2024-11-20 RX ORDER — ASPIRIN 81 MG/1
81 TABLET ORAL DAILY
COMMUNITY
Start: 2024-11-20

## 2024-11-20 NOTE — PROGRESS NOTES
Chief Complaint   Patient presents with    Atrial Fibrillation    Hypertension    Other     HEART BLOCK       Vitals:    11/20/24 1314   BP: 124/60   Site: Left Upper Arm   Position: Sitting   Cuff Size: Medium Adult   Pulse: 66   SpO2: 96%   Weight: 79.4 kg (175 lb)   Height: 1.626 m (5' 4\")      /60 (Site: Left Upper Arm, Position: Sitting, Cuff Size: Medium Adult)   Pulse 66   Ht 1.626 m (5' 4\")   Wt 79.4 kg (175 lb)   SpO2 96%   BMI 30.04 kg/m²

## 2025-03-25 RX ORDER — CLOPIDOGREL BISULFATE 75 MG/1
75 TABLET ORAL DAILY
Qty: 90 TABLET | Refills: 0 | OUTPATIENT
Start: 2025-03-25

## 2025-04-03 PROCEDURE — 93294 REM INTERROG EVL PM/LDLS PM: CPT | Performed by: INTERNAL MEDICINE

## 2025-06-04 ENCOUNTER — OFFICE VISIT (OUTPATIENT)
Age: 75
End: 2025-06-04
Payer: MEDICARE

## 2025-06-04 VITALS
DIASTOLIC BLOOD PRESSURE: 76 MMHG | HEIGHT: 64 IN | SYSTOLIC BLOOD PRESSURE: 122 MMHG | WEIGHT: 168 LBS | BODY MASS INDEX: 28.68 KG/M2

## 2025-06-04 DIAGNOSIS — J31.0 CHRONIC RHINITIS: Primary | ICD-10-CM

## 2025-06-04 DIAGNOSIS — R09.82 POSTNASAL DRIP: ICD-10-CM

## 2025-06-04 DIAGNOSIS — J31.0 CHRONIC RHINITIS: ICD-10-CM

## 2025-06-04 PROCEDURE — 3017F COLORECTAL CA SCREEN DOC REV: CPT

## 2025-06-04 PROCEDURE — 99203 OFFICE O/P NEW LOW 30 MIN: CPT

## 2025-06-04 PROCEDURE — 1123F ACP DISCUSS/DSCN MKR DOCD: CPT

## 2025-06-04 PROCEDURE — 1159F MED LIST DOCD IN RCRD: CPT

## 2025-06-04 PROCEDURE — G8427 DOCREV CUR MEDS BY ELIG CLIN: HCPCS

## 2025-06-04 PROCEDURE — 1126F AMNT PAIN NOTED NONE PRSNT: CPT

## 2025-06-04 PROCEDURE — G8417 CALC BMI ABV UP PARAM F/U: HCPCS

## 2025-06-04 PROCEDURE — G8400 PT W/DXA NO RESULTS DOC: HCPCS

## 2025-06-04 PROCEDURE — 3078F DIAST BP <80 MM HG: CPT

## 2025-06-04 PROCEDURE — 1090F PRES/ABSN URINE INCON ASSESS: CPT

## 2025-06-04 PROCEDURE — 1036F TOBACCO NON-USER: CPT

## 2025-06-04 PROCEDURE — 3074F SYST BP LT 130 MM HG: CPT

## 2025-06-04 RX ORDER — FLUTICASONE PROPIONATE 50 MCG
1 SPRAY, SUSPENSION (ML) NASAL DAILY
Qty: 16 G | Refills: 2 | Status: SHIPPED | OUTPATIENT
Start: 2025-06-04

## 2025-06-04 RX ORDER — AZELASTINE 1 MG/ML
1 SPRAY, METERED NASAL 2 TIMES DAILY
Qty: 60 ML | Refills: 1 | Status: SHIPPED | OUTPATIENT
Start: 2025-06-04

## 2025-06-04 NOTE — PROGRESS NOTES
[facility]    Subjective:   Patient: Valentina Dwyer  YOB: 1950  MRN: [id number]  Date of Service:  6/4/2025    New Patient Visit    Chief Complaint: Frequent nasal congestion    History of Present Illness:   Valentina Dwyer is a 74 y.o. female who presents today for the evaluation of frequent nasal congestion and clear nasal drainage daily for at least the past year.  She notes she has taken multiple allergy medications in the past year, including oral antihistamines, nasal sprays, and montelukast without much relief.  States that she does not note any obvious triggers, but notes that sometimes it is worse after she has been outside for an extended period of time.  She notes that she had a skin allergy test a few years ago, which came back negative for everything that was tested.  That test was prior to her issues with chronic rhinitis, which she notes began about a year ago.  Denies any ear pain, vertigo, changes in hearing, sinus pressure.     Review of Systems:  Consitutional: denies fever, excessive weight gain or loss.  Eyes: denies diplopia, eye pain.  Integumentary: denies new concerning skin lesions.  Ears, Nose, Mouth, Throat: denies except as per HPI.  Endocrine: denies hot or cold intolerance, increased thirst.  Respiratory: denies cough, hemoptysis, wheezing  Gastrointestinal: denies trouble swallowing, nausea, emesis, regurgitation  Musculoskeletal: denies muscle weakness or wasting  Cardiovascular: denies chest pain, shortness of breath  Neurologic: denies seizures, numbness or tingling, syncope  Hematologic: denies easy bleeding or bruising     Past Medical History:  Past Medical History:   Diagnosis Date    Anticoagulant long-term use     Arthritis     BCC (basal cell carcinoma of skin)     Claustrophobia     Degenerative disc disease, lumbar 1/6/2016    Glaucoma     Gout attack 04/2019    Big toe    H/O sinus tachycardia 01/08/2019    Following shoulder surgery

## 2025-06-17 LAB
A ALTERNATA IGE QN: <0.1 KU/L
A FUMIGATUS IGE QN: <0.1 KU/L
BERMUDA GRASS IGE QN: <0.1 KU/L
BOXELDER IGE QN: <0.1 KU/L
C HERBARUM IGE QN: <0.1 KU/L
CAT DANDER IGE QN: <0.1 KU/L
CMN PIGWEED IGE QN: <0.1 KU/L
COMMON RAGWEED IGE QN: <0.1 KU/L
COTTONWOOD IGE QN: <0.1 KU/L
D FARINAE IGE QN: <0.1 KU/L
D PTERONYSS IGE QN: <0.1 KU/L
DOG DANDER IGE QN: <0.1 KU/L
IGE SERPL-ACNC: 43 IU/ML (ref 6–495)
JOHNSON GRASS IGE QN: <0.1 KU/L
MOUSE URINE PROT IGE QN: <0.1 KU/L
MT JUNIPER IGE QN: <0.1 KU/L
P NOTATUM IGE QN: <0.1 KU/L
PECAN/HICK TREE IGE QN: <0.1 KU/L
ROACH IGE QN: <0.1 KU/L
SERVICE CMNT-IMP: NORMAL
SHEEP SORREL IGE QN: <0.1 KU/L
SILVER BIRCH IGE QN: <0.1 KU/L
TIMOTHY IGE QN: <0.1 KU/L
WHITE ELM IGE QN: <0.1 KU/L
WHITE MULBERRY IGE QN: <0.1 KU/L
WHITE OAK IGE QN: <0.1 KU/L

## 2025-06-26 ENCOUNTER — RESULTS FOLLOW-UP (OUTPATIENT)
Age: 75
End: 2025-06-26

## 2025-06-26 NOTE — TELEPHONE ENCOUNTER
----- Message from Adam Mares PA-C sent at 6/19/2025 12:44 PM EDT -----  Regarding: allergy testing results  Please notify the patient her recent allergy test results came back negative for dust mites, pet dander, common grasses, molds, trees, and weeds.  ----- Message -----  From: Sinan, [facility] Incoming Amb Ref Lab Orders To [facility]  Sent: 6/17/2025  10:35 AM EDT  To: Adam Mares PA-C

## 2025-07-01 ENCOUNTER — OFFICE VISIT (OUTPATIENT)
Age: 75
End: 2025-07-01
Payer: MEDICARE

## 2025-07-01 VITALS
RESPIRATION RATE: 16 BRPM | SYSTOLIC BLOOD PRESSURE: 120 MMHG | HEART RATE: 90 BPM | OXYGEN SATURATION: 96 % | TEMPERATURE: 98.5 F | DIASTOLIC BLOOD PRESSURE: 80 MMHG | HEIGHT: 64 IN | BODY MASS INDEX: 29.02 KG/M2 | WEIGHT: 170 LBS

## 2025-07-01 DIAGNOSIS — J31.0 CHRONIC RHINITIS: Primary | ICD-10-CM

## 2025-07-01 DIAGNOSIS — R09.82 POSTNASAL DRIP: ICD-10-CM

## 2025-07-01 PROCEDURE — G8400 PT W/DXA NO RESULTS DOC: HCPCS

## 2025-07-01 PROCEDURE — 3079F DIAST BP 80-89 MM HG: CPT

## 2025-07-01 PROCEDURE — 1090F PRES/ABSN URINE INCON ASSESS: CPT

## 2025-07-01 PROCEDURE — 1123F ACP DISCUSS/DSCN MKR DOCD: CPT

## 2025-07-01 PROCEDURE — 1126F AMNT PAIN NOTED NONE PRSNT: CPT

## 2025-07-01 PROCEDURE — 99213 OFFICE O/P EST LOW 20 MIN: CPT

## 2025-07-01 PROCEDURE — G8417 CALC BMI ABV UP PARAM F/U: HCPCS

## 2025-07-01 PROCEDURE — 3074F SYST BP LT 130 MM HG: CPT

## 2025-07-01 PROCEDURE — 1159F MED LIST DOCD IN RCRD: CPT

## 2025-07-01 PROCEDURE — 1036F TOBACCO NON-USER: CPT

## 2025-07-01 PROCEDURE — 3017F COLORECTAL CA SCREEN DOC REV: CPT

## 2025-07-01 PROCEDURE — G8427 DOCREV CUR MEDS BY ELIG CLIN: HCPCS

## 2025-07-01 RX ORDER — IPRATROPIUM BROMIDE 42 UG/1
2 SPRAY, METERED NASAL 4 TIMES DAILY
Qty: 15 ML | Refills: 3 | Status: SHIPPED | OUTPATIENT
Start: 2025-07-01

## 2025-07-01 ASSESSMENT — PATIENT HEALTH QUESTIONNAIRE - PHQ9
2. FEELING DOWN, DEPRESSED OR HOPELESS: NOT AT ALL
SUM OF ALL RESPONSES TO PHQ QUESTIONS 1-9: 0
1. LITTLE INTEREST OR PLEASURE IN DOING THINGS: NOT AT ALL
SUM OF ALL RESPONSES TO PHQ QUESTIONS 1-9: 0

## 2025-07-01 NOTE — PROGRESS NOTES
[facility]    Subjective:   Patient: Valentina Dwyer  YOB: 1950  MRN: [id number]  Date of Service:  7/1/2025    Folllow-up Visit    Chief Complaint: Frequent nasal congestion    History of Present Illness:     Initial HPI: 6/4/25  Valentina Dwyer is a 74 y.o. female who presents today for the evaluation of frequent nasal congestion and clear nasal drainage daily for at least the past year.  She notes she has taken multiple allergy medications in the past year, including oral antihistamines, nasal sprays, and montelukast without much relief.  States that she does not note any obvious triggers, but notes that sometimes it is worse after she has been outside for an extended period of time.  She notes that she had a skin allergy test a few years ago, which came back negative for everything that was tested.  That test was prior to her issues with chronic rhinitis, which she notes began about a year ago.  Denies any ear pain, vertigo, changes in hearing, sinus pressure.     7/1/25:  States she has been doing well overall  Continues to have frequent nasal congestion w thin clear nasal drainage, worse outdoors  Has been using flonase, azelastine, and claritin daily without relief  Has also tried montelukast and atrovent in the past without significant relief  Recent allergy testing negative (for common trees, weeds, grasses, molds, insects, pet dander)  Denies any ear pain or pressure, sore throat, purulent nasal drainage, or sinus pressure    Review of Systems:  Consitutional: denies fever, excessive weight gain or loss.  Eyes: denies diplopia, eye pain.  Integumentary: denies new concerning skin lesions.  Ears, Nose, Mouth, Throat: denies except as per HPI.  Endocrine: denies hot or cold intolerance, increased thirst.  Respiratory: denies cough, hemoptysis, wheezing  Gastrointestinal: denies trouble swallowing, nausea, emesis, regurgitation  Musculoskeletal: denies muscle weakness or

## 2025-07-01 NOTE — PROGRESS NOTES
1. Have you been to the ER, urgent care clinic since your last visit?  Hospitalized since your last visit?No    2. Have you seen or consulted any other health care providers outside of the [facility] since your last visit?  Include any pap smears or colon screening. No    Chief Complaint   Patient presents with    Follow-up     Health Maintenance Due   Topic Date Due    Hepatitis C screen  Never done    DTaP/Tdap/Td vaccine (1 - Tdap) Never done    Shingles vaccine (1 of 2) Never done    DEXA (modify frequency per FRAX score)  Never done    Pneumococcal 50+ years Vaccine (2 of 2 - PCV) 06/02/2022    A1C test (Diabetic or Prediabetic)  09/01/2023    Lipids  09/01/2023    Annual Wellness Visit (Medicare)  Never done    COVID-19 Vaccine (3 - 2024-25 season) 09/01/2024    Respiratory Syncytial Virus (RSV) Pregnant or age 60 yrs+ (1 - 1-dose 75+ series) Never done    Colorectal Cancer Screen  10/13/2024    Depression Screen  02/28/2025     PHQ-9 Total Score: 0 (7/1/2025 12:55 PM)      Vitals:    07/01/25 1255   BP: 120/80   Pulse: 90   Resp: 16   Temp: 98.5 °F (36.9 °C)   SpO2: 96%

## 2025-07-03 PROCEDURE — 93294 REM INTERROG EVL PM/LDLS PM: CPT | Performed by: HOSPITALIST

## 2025-07-14 ENCOUNTER — HOSPITAL ENCOUNTER (OUTPATIENT)
Facility: HOSPITAL | Age: 75
Discharge: HOME | End: 2025-07-17
Payer: MEDICARE

## 2025-07-14 DIAGNOSIS — J31.0 CHRONIC RHINITIS: ICD-10-CM

## 2025-07-14 PROCEDURE — 70486 CT MAXILLOFACIAL W/O DYE: CPT

## 2025-07-18 DIAGNOSIS — I10 PRIMARY HYPERTENSION: ICD-10-CM

## 2025-07-18 DIAGNOSIS — I48.0 PAF (PAROXYSMAL ATRIAL FIBRILLATION) (HCC): Primary | ICD-10-CM

## 2025-07-18 RX ORDER — METOPROLOL TARTRATE 50 MG
50 TABLET ORAL 2 TIMES DAILY
Qty: 180 TABLET | Refills: 3 | OUTPATIENT
Start: 2025-07-18

## 2025-07-18 RX ORDER — METOPROLOL TARTRATE 25 MG/1
25 TABLET, FILM COATED ORAL 2 TIMES DAILY
Qty: 180 TABLET | Refills: 3 | Status: SHIPPED | OUTPATIENT
Start: 2025-07-18

## 2025-07-18 NOTE — PROGRESS NOTES
Requested Prescriptions     Signed Prescriptions Disp Refills    metoprolol tartrate (LOPRESSOR) 25 MG tablet 180 tablet 3     Sig: Take 1 tablet by mouth 2 times daily     Verbal order for refill per Dr Anguiano

## 2025-07-22 ENCOUNTER — RESULTS FOLLOW-UP (OUTPATIENT)
Age: 75
End: 2025-07-22

## 2025-07-25 RX ORDER — ROSUVASTATIN CALCIUM 40 MG/1
40 TABLET, COATED ORAL
Qty: 90 TABLET | Refills: 3 | Status: SHIPPED | OUTPATIENT
Start: 2025-07-25

## 2025-07-25 NOTE — TELEPHONE ENCOUNTER
After reviewing imaging and treatment recommendations with Dr. Kraus, I spoke to the patient over the phone to review results of the recent CT sinus ordered for the evaluation of chronic rhinitis with negative allergy testing, refractory to oral and intranasal steroids, antihistamines, atrovent, and singulair.    CT shows mild chronic paranasal sinus disease, with mild mucosal thickening of the left frontal sinus, bilateral bilateral anterior ethmoid sinuses, sphenoid sinuses, and maxillary sinuses.  Ostiomeatal units clear. Septum midline.     Patient aware of results, and plans to follow-up with Dr. Kraus for further discussion of management, including possible surgical options, given failure of medical therapy.  Reviewed that her upcoming appointment with Dr. Kraus is scheduled for 8/19/2025.  Patient agrees with the plan and verbalizes understanding.  No additional questions at this time.    DIDI Mast [facility]  [address] [place] [id number]  Office Phone: [phone]

## 2025-07-25 NOTE — TELEPHONE ENCOUNTER
Requested Prescriptions     Signed Prescriptions Disp Refills    rosuvastatin (CRESTOR) 40 MG tablet 90 tablet 3     Sig: TAKE 1 TABLET BY MOUTH EVERY  NIGHT     Authorizing Provider: MARTHA ELIAS     Ordering User: TACHO BUI A     Verbal order for refill per Dr Elias

## 2025-08-19 ENCOUNTER — OFFICE VISIT (OUTPATIENT)
Age: 75
End: 2025-08-19
Payer: MEDICARE

## 2025-08-19 VITALS
HEIGHT: 64 IN | DIASTOLIC BLOOD PRESSURE: 68 MMHG | BODY MASS INDEX: 29.02 KG/M2 | WEIGHT: 170 LBS | SYSTOLIC BLOOD PRESSURE: 122 MMHG

## 2025-08-19 DIAGNOSIS — R09.82 POSTNASAL DRIP: ICD-10-CM

## 2025-08-19 DIAGNOSIS — J31.0 CHRONIC RHINITIS: Primary | ICD-10-CM

## 2025-08-19 DIAGNOSIS — J32.4 CHRONIC PANSINUSITIS: ICD-10-CM

## 2025-08-19 PROCEDURE — 1126F AMNT PAIN NOTED NONE PRSNT: CPT | Performed by: OTOLARYNGOLOGY

## 2025-08-19 PROCEDURE — 3074F SYST BP LT 130 MM HG: CPT | Performed by: OTOLARYNGOLOGY

## 2025-08-19 PROCEDURE — 1036F TOBACCO NON-USER: CPT | Performed by: OTOLARYNGOLOGY

## 2025-08-19 PROCEDURE — 31231 NASAL ENDOSCOPY DX: CPT | Performed by: OTOLARYNGOLOGY

## 2025-08-19 PROCEDURE — G8427 DOCREV CUR MEDS BY ELIG CLIN: HCPCS | Performed by: OTOLARYNGOLOGY

## 2025-08-19 PROCEDURE — G8417 CALC BMI ABV UP PARAM F/U: HCPCS | Performed by: OTOLARYNGOLOGY

## 2025-08-19 PROCEDURE — G8400 PT W/DXA NO RESULTS DOC: HCPCS | Performed by: OTOLARYNGOLOGY

## 2025-08-19 PROCEDURE — 3017F COLORECTAL CA SCREEN DOC REV: CPT | Performed by: OTOLARYNGOLOGY

## 2025-08-19 PROCEDURE — 99214 OFFICE O/P EST MOD 30 MIN: CPT | Performed by: OTOLARYNGOLOGY

## 2025-08-19 PROCEDURE — 3078F DIAST BP <80 MM HG: CPT | Performed by: OTOLARYNGOLOGY

## 2025-08-19 PROCEDURE — 1090F PRES/ABSN URINE INCON ASSESS: CPT | Performed by: OTOLARYNGOLOGY

## 2025-08-19 PROCEDURE — 1123F ACP DISCUSS/DSCN MKR DOCD: CPT | Performed by: OTOLARYNGOLOGY

## 2025-08-22 PROBLEM — J32.4 CHRONIC PANSINUSITIS: Status: ACTIVE | Noted: 2025-08-22

## 2025-08-22 PROBLEM — J31.0 CHRONIC RHINITIS: Status: ACTIVE | Noted: 2025-08-22

## 2025-08-22 PROBLEM — R09.82 POSTNASAL DRIP: Status: ACTIVE | Noted: 2025-08-22

## 2025-09-01 ENCOUNTER — HOSPITAL ENCOUNTER (EMERGENCY)
Facility: HOSPITAL | Age: 75
Discharge: HOME | End: 2025-09-01
Payer: MEDICARE

## 2025-09-01 ENCOUNTER — APPOINTMENT (OUTPATIENT)
Facility: HOSPITAL | Age: 75
End: 2025-09-01
Payer: MEDICARE

## 2025-09-01 VITALS
HEART RATE: 69 BPM | SYSTOLIC BLOOD PRESSURE: 140 MMHG | HEIGHT: 64 IN | WEIGHT: 170 LBS | RESPIRATION RATE: 18 BRPM | DIASTOLIC BLOOD PRESSURE: 72 MMHG | TEMPERATURE: 97.5 F | OXYGEN SATURATION: 97 % | BODY MASS INDEX: 29.02 KG/M2

## 2025-09-01 DIAGNOSIS — M87.051 AVASCULAR NECROSIS OF FEMUR, RIGHT (HCC): ICD-10-CM

## 2025-09-01 DIAGNOSIS — M16.0 OSTEOARTHRITIS OF BOTH HIPS, UNSPECIFIED OSTEOARTHRITIS TYPE: ICD-10-CM

## 2025-09-01 DIAGNOSIS — M87.052 AVASCULAR NECROSIS OF FEMUR, LEFT (HCC): ICD-10-CM

## 2025-09-01 DIAGNOSIS — M25.552 LEFT HIP PAIN: Primary | ICD-10-CM

## 2025-09-01 PROCEDURE — 6370000000 HC RX 637 (ALT 250 FOR IP): Performed by: PHYSICIAN ASSISTANT

## 2025-09-01 PROCEDURE — 99283 EMERGENCY DEPT VISIT LOW MDM: CPT

## 2025-09-01 PROCEDURE — 73502 X-RAY EXAM HIP UNI 2-3 VIEWS: CPT

## 2025-09-01 RX ORDER — LIDOCAINE 4 G/G
1 PATCH TOPICAL
Status: DISCONTINUED | OUTPATIENT
Start: 2025-09-01 | End: 2025-09-01 | Stop reason: HOSPADM

## 2025-09-01 RX ORDER — ACETAMINOPHEN 500 MG
500 TABLET ORAL EVERY 6 HOURS PRN
Qty: 30 TABLET | Refills: 0 | Status: SHIPPED | OUTPATIENT
Start: 2025-09-01

## 2025-09-01 RX ORDER — LIDOCAINE 4 G/G
1 PATCH TOPICAL DAILY
Qty: 30 PATCH | Refills: 0 | Status: SHIPPED | OUTPATIENT
Start: 2025-09-01 | End: 2025-10-01

## 2025-09-01 ASSESSMENT — LIFESTYLE VARIABLES
HOW MANY STANDARD DRINKS CONTAINING ALCOHOL DO YOU HAVE ON A TYPICAL DAY: PATIENT DOES NOT DRINK
HOW OFTEN DO YOU HAVE A DRINK CONTAINING ALCOHOL: NEVER

## 2025-09-01 ASSESSMENT — PAIN SCALES - GENERAL: PAINLEVEL_OUTOF10: 10

## 2025-09-01 ASSESSMENT — PAIN - FUNCTIONAL ASSESSMENT: PAIN_FUNCTIONAL_ASSESSMENT: 0-10

## 2025-09-01 ASSESSMENT — PAIN DESCRIPTION - LOCATION: LOCATION: HIP

## 2025-09-01 ASSESSMENT — PAIN DESCRIPTION - ORIENTATION: ORIENTATION: LEFT

## (undated) DEVICE — APPLIER CLP L9.375IN APER 2.1MM CLS L3.8MM 20 SM TI CLP

## (undated) DEVICE — GLOVE SURG SZ 85 L12IN FNGR THK79MIL GRN LTX FREE

## (undated) DEVICE — APPLICATOR BNDG 1MM ADH PREMIERPRO EXOFIN

## (undated) DEVICE — (D)PREP SKN CHLRAPRP APPL 26ML -- CONVERT TO ITEM 371833

## (undated) DEVICE — GUIDEWIRE VASC L80CM DIA0.018IN TIP L5CM 15DEG ANG NIT

## (undated) DEVICE — TELFA NON-ADHERENT ABSORBENT DRESSING: Brand: TELFA

## (undated) DEVICE — TAPE,CLOTH/SILK,CURAD,3"X10YD,LF,40/CS: Brand: CURAD

## (undated) DEVICE — PACKING 8004000 NEURAY 200PK 13X13MM: Brand: NEURAY ®

## (undated) DEVICE — T-MAX DISPOSABLE FACE MASK 8 PER BOX

## (undated) DEVICE — SUTURE VCRL SZ 0 L36IN ABSRB UD L40MM CT 1/2 CIR TAPERPOINT J958H

## (undated) DEVICE — PADDING CST 6IN STERILE --

## (undated) DEVICE — MASTISOL ADHESIVE LIQ 2/3ML

## (undated) DEVICE — INTENDED FOR TISSUE SEPARATION, AND OTHER PROCEDURES THAT REQUIRE A SHARP SURGICAL BLADE TO PUNCTURE OR CUT.: Brand: BARD-PARKER ® CARBON RIB-BACK BLADES

## (undated) DEVICE — SUTURE MCRYL SZ 4-0 L27IN ABSRB UD L19MM PS-2 1/2 CIR PRIM Y426H

## (undated) DEVICE — ROCKER SWITCH PENCIL BLADE ELECTRODE, HOLSTER: Brand: EDGE

## (undated) DEVICE — PINNACLE INTRODUCER SHEATH: Brand: PINNACLE

## (undated) DEVICE — KIT EVAC 400CC DIA2.3MM PVC RELIABLE SUCT DRNGE 3 SPR RND H

## (undated) DEVICE — SUTURE PERMAHAND SZ 3-0 L30IN NONABSORBABLE BLK SILK BRAID A304H

## (undated) DEVICE — SOLUTION IRRIG 1000ML H2O STRL BLT

## (undated) DEVICE — SUTURE VCRL + SZ 1-0 L36IN ABSRB UD CTX 1/2 CIR TAPR PNT VCP977H

## (undated) DEVICE — SPONGE: SPECIALTY PEANUT XR 100/CS: Brand: MEDICAL ACTION INDUSTRIES

## (undated) DEVICE — DRAPE,LAP,CHOLE,W/TROUGHS,STERILE: Brand: MEDLINE

## (undated) DEVICE — OVERLAY MATRS H2IN FOAM CONVOLUTED STD DENS

## (undated) DEVICE — COVER LT HNDL PLAS RIG 1 PER PK

## (undated) DEVICE — INTRODUCER SHTH L13CM OD7FR SH ORNG HUB SEAMLESS SAFSHTH

## (undated) DEVICE — PREP KIT PEEL PTCH POVIDONE IOD

## (undated) DEVICE — COVER,TABLE,60X90,STERILE: Brand: MEDLINE

## (undated) DEVICE — T4 HOOD

## (undated) DEVICE — NDL PRT INJ NSAF BLNT 18GX1.5 --

## (undated) DEVICE — SUTURE MCRYL SZ 3-0 L27IN ABSRB UD L24MM PS-1 3/8 CIR PRIM Y936H

## (undated) DEVICE — DRAPE XR C ARM 41X74IN LF --

## (undated) DEVICE — PADPRO DEFIBRILLATION/PACING/CARDIOVERSION/MONITORING ELECTRODES, ADULT/CHILD GREATER THAN 10 KG RADIOTRANSPARENT ELECTRODE, PHYSIO-CONTROL QUIK-COMBO (M) 60" (152 CM): Brand: PADPRO

## (undated) DEVICE — COVER,MAYO STAND,STERILE: Brand: MEDLINE

## (undated) DEVICE — SUTURE V-LOC 180 SZ 2-0 L12IN ABSRB VLT GS-21 L37MM 1/2 CIR VLOCM0315

## (undated) DEVICE — MEDI-TRACE CADENCE ADULT, DEFIBRILLATION ELECTRODE -RTS  (10 PR/PK) - PHYSIO-CONTROL: Brand: MEDI-TRACE CADENCE

## (undated) DEVICE — TOOL DC SP12MH30 LGD 12CM PROX 3.0 MH: Brand: MIDAS REX CLEARVIEW™

## (undated) DEVICE — GLOVE SURG SZ 9 L12IN FNGR THK79MIL GRN LTX FREE

## (undated) DEVICE — BIT DRL SCR 3.2MM CNTRL DISP --

## (undated) DEVICE — TIP CLEANER: Brand: VALLEYLAB

## (undated) DEVICE — SLING ORTHOPEDIC PCH UNIV 19.5X9 IN 2-39 IN ARM W/ FOAM STRP

## (undated) DEVICE — BIT DRL SCR PERIPH 2.7MM DISP --

## (undated) DEVICE — STERILE POLYISOPRENE POWDER-FREE SURGICAL GLOVES: Brand: PROTEXIS

## (undated) DEVICE — TOTAL JOINT-SFMC: Brand: MEDLINE INDUSTRIES, INC.

## (undated) DEVICE — DRAPE,REIN 53X77,STERILE: Brand: MEDLINE

## (undated) DEVICE — SURGICAL PROCEDURE PACK BASIN MAJ SET CUST NO CAUT

## (undated) DEVICE — 1010 S-DRAPE TOWEL DRAPE 10/BX: Brand: STERI-DRAPE™

## (undated) DEVICE — STERILE POLYISOPRENE POWDER-FREE SURGICAL GLOVES WITH EMOLLIENT COATING: Brand: PROTEXIS

## (undated) DEVICE — YANKAUER OPEN TIP, NO VENT: Brand: ARGYLE

## (undated) DEVICE — 3M™ IOBAN™ 2 ANTIMICROBIAL INCISE DRAPE 6640EZ: Brand: IOBAN™ 2

## (undated) DEVICE — BOOT ORTH XL KNEE GRN TYVEK HI CVR SKID RESIST HEAT SEAL E

## (undated) DEVICE — FLOSEAL HEMOSTATIC MATRIX, 10 ML: Brand: FLOSEAL

## (undated) DEVICE — DRAPE,U/ SHT,SPLIT,PLAS,STERIL: Brand: MEDLINE

## (undated) DEVICE — KIT INT FIX FEM TIB CKPT MAKOPLASTY

## (undated) DEVICE — REM POLYHESIVE ADULT PATIENT RETURN ELECTRODE: Brand: VALLEYLAB

## (undated) DEVICE — TRAP SUC MUCOUS 70ML -- MEDICHOICE MEDLINE

## (undated) DEVICE — DRAPE,UTILITY,TAPE,15X26,STERILE: Brand: MEDLINE

## (undated) DEVICE — STRYKER PERFORMANCE SERIES SAGITTAL BLADE: Brand: STRYKER PERFORMANCE SERIES

## (undated) DEVICE — ARGYLE FRAZIER SURGICAL SUCTION INSTRUMENT 10 FR/CH (3.3 MM): Brand: ARGYLE

## (undated) DEVICE — ELECTRODE BLDE L4IN NONINSULATED EDGE

## (undated) DEVICE — DEVON™ KNEE AND BODY STRAP 60" X 3" (1.5 M X 7.6 CM): Brand: DEVON

## (undated) DEVICE — INFECTION CONTROL KIT SYS

## (undated) DEVICE — SUTURE ETHBND EXCEL SZ 5 L30IN NONABSORBABLE GRN L40MM V-37 MB66G

## (undated) DEVICE — CODMAN® SURGICAL PATTIES 3/4" X 3/4" (1.91CM X 1.91CM): Brand: CODMAN®

## (undated) DEVICE — PREP CHLORAPREP 10.5 ML ORG --

## (undated) DEVICE — HOOD: Brand: FLYTE

## (undated) DEVICE — ELECTRODE PT RET AD L9FT HI MOIST COND ADH HYDRGEL CORDED

## (undated) DEVICE — TELFA ADHESIVE ISLAND DRESSING: Brand: TELFA

## (undated) DEVICE — STRIP,CLOSURE,WOUND,MEDI-STRIP,1/2X4: Brand: MEDLINE

## (undated) DEVICE — SUTURE V-LOC 90 3-0 L9IN ABSRB VLT L26MM V-20 1/2 CIR TAPR VLOCM0644

## (undated) DEVICE — KIT INTRO 9FR L13CM DIA0.118IN SPLITTABLE HEMSTAT ROBUST

## (undated) DEVICE — INSULATED BLADE ELECTRODE: Brand: EDGE

## (undated) DEVICE — BLUNTFILL: Brand: MONOJECT

## (undated) DEVICE — DRESSING POSTOP AG PRISMASEAL 3.5X6IN

## (undated) DEVICE — CATHETER ETER DIAG L110CM OD6FR VASC PGTL W SIDE H COR W OUT

## (undated) DEVICE — SPONGE GZ W4XL4IN COT 12 PLY TYP VII WVN C FLD DSGN

## (undated) DEVICE — SUTURE STRATAFIX SPRL MCRYL + SZ 3-0 L24IN ABSRB UD PS-2 SXMP1B108

## (undated) DEVICE — Device: Brand: JELCO

## (undated) DEVICE — DRILL 12MM: Brand: SHORELINE ACS

## (undated) DEVICE — SUTURE FIBERWIRE SZ 2 W/ TAPERED NEEDLE BLUE L38IN NONABSORB BLU L26.5MM 1/2 CIRCLE AR7200

## (undated) DEVICE — 3M™ TEGADERM™ TRANSPARENT FILM DRESSING FRAME STYLE, 1628, 6 IN X 8 IN (15 CM X 20 CM), 10/CT 8CT/CASE: Brand: 3M™ TEGADERM™

## (undated) DEVICE — SOLUTION IRRIG 3000ML 0.9% SOD CHL FLX CONT 0797208] ICU MEDICAL INC]

## (undated) DEVICE — Device

## (undated) DEVICE — CATHETER IV 14GA L1.25IN TEF STR HUB INTROCAN SFTY

## (undated) DEVICE — GOWN,SIRUS,NONRNF,SETINSLV,2XL,18/CS: Brand: MEDLINE

## (undated) DEVICE — HANDPIECE SET WITH COAXIAL HIGH FLOW TIP AND SUCTION TUBE: Brand: INTERPULSE

## (undated) DEVICE — BANDAGE COMPR W6INXL10YD ST M E WHITE/BEIGE

## (undated) DEVICE — DRAPE,EXTREMITY,89X128,STERILE: Brand: MEDLINE

## (undated) DEVICE — NDL SPNE QNCKE 18GX3.5IN LF --

## (undated) DEVICE — AGENT HEMSTAT 3GM PURIFIED PLNT STARCH PWD ABSRB ARISTA AH

## (undated) DEVICE — PLASMABLADE PS210-030S 3.0S LOCK: Brand: PLASMABLADE™

## (undated) DEVICE — 1 X VERSACROSS STEERABLE SHEATH (INCLUDING  1 X DILATOR AND 1 X J-TIP GUIDEWIRE); 1 X VERSACROSS RF WIRE (INCLUDING 1 X CONNECTOR CABLE (SINGLE USE)); 1 X DISPERSIVE ELECTRODE: Brand: VERSACROSS STEERABLE ACCESS SOLUTION

## (undated) DEVICE — SUTURE STRATAFIX SYMMETRIC SZ 1 L18IN ABSRB VLT CT1 L36CM SXPP1A404

## (undated) DEVICE — DRILL 14MM: Brand: SHORELINE ACS

## (undated) DEVICE — 3M™ WARMING BLANKET, FULL BODY, 10 PER CASE, 40068: Brand: BAIR HUGGER™

## (undated) DEVICE — 3M™ IOBAN™ 2 ANTIMICROBIAL INCISE DRAPE 6650EZ: Brand: IOBAN™ 2

## (undated) DEVICE — GLOVE SURG SZ 85 L12IN FNGR ORTHO 126MIL CRM LTX FREE

## (undated) DEVICE — SCREW EXT FIX L12MM FOR DISTRCTN

## (undated) DEVICE — DUAL IRRIGATION ADAPTOR

## (undated) DEVICE — 3000CC GUARDIAN II: Brand: GUARDIAN

## (undated) DEVICE — BONE MARROW KIT ASPIR 11 GA

## (undated) DEVICE — TRAY PREP DRY W/ PREM GLV 2 APPL 6 SPNG 2 UNDPD 1 OVERWRAP

## (undated) DEVICE — BLADE SURG SAW STD S STL OSC W/ SERR EDGE DISP

## (undated) DEVICE — GOWN,BREATHABLE,IMP SLV 3XL AURORA: Brand: MEDLINE

## (undated) DEVICE — DRAPE,LAPAROTOMY,PED,STERILE: Brand: MEDLINE

## (undated) DEVICE — ACCESS SHEATH WITH DILATOR: Brand: WATCHMAN FXD CURVE™ ACCESS SYSTEM

## (undated) DEVICE — DRAPE MICSCP XLN W48XL118IN 2 BRDG STEREO OBS ZEISS

## (undated) DEVICE — SUTURE ETHBND EXCEL SZ 2 L30IN NONABSORBABLE GRN L40MM V-37 MX69G

## (undated) DEVICE — TAPE,ELASTIC,FOAM,CURAD,3"X5.5YD,LF: Brand: CURAD

## (undated) DEVICE — BIPOLAR FORCEPS CORD: Brand: VALLEYLAB

## (undated) DEVICE — 3M™ IOBAN™ 2 ANTIMICROBIAL INCISE DRAPE 6651EZ: Brand: IOBAN™ 2

## (undated) DEVICE — BONE WAX WHITE: Brand: BONE WAX WHITE

## (undated) DEVICE — SUTURE VCRL SZ 2-0 L36IN ABSRB UD L36MM CT-1 1/2 CIR J945H

## (undated) DEVICE — KIT DRP FOR RIO ROBOTIC ARM ASST SYS

## (undated) DEVICE — PACK,BASIC,SIRUS,V: Brand: MEDLINE

## (undated) DEVICE — SHEET, DRAPE, SPLIT, STERILE: Brand: MEDLINE

## (undated) DEVICE — SYR LR LCK 1ML GRAD NSAF 30ML --

## (undated) DEVICE — PACEMAKER PACK: Brand: MEDLINE INDUSTRIES, INC.

## (undated) DEVICE — MAGNETIC INSTR DRAPE 20X16: Brand: MEDLINE INDUSTRIES, INC.

## (undated) DEVICE — SOLUTION IV 1000ML 0.9% SOD CHL

## (undated) DEVICE — SOLUTION IRRIG 3000ML 0.9% SOD CHL USP UROMATIC PLAS CONT

## (undated) DEVICE — PERCLOSE PROGLIDE™ SUTURE-MEDIATED CLOSURE SYSTEM: Brand: PERCLOSE PROGLIDE™

## (undated) DEVICE — KIT TRK KNEE PROC VIZADISC

## (undated) DEVICE — PERCLOSE A-T 6F SUTURE-MEDICATED CLOSURE SYSTEM: Brand: PERCLOSE

## (undated) DEVICE — SUTURE VCRL SZ 3-0 L27IN ABSRB UD L26MM SH 1/2 CIR J416H

## (undated) DEVICE — DRESSING ANTIMIC FOAM OPTIFOAM POSTOP ADH 4X14 IN

## (undated) DEVICE — SYR 10ML LUER LOK 1/5ML GRAD --

## (undated) DEVICE — TRAY CATH OD16FR SIL URIN M STATLOK STBL DEV SURSTP

## (undated) DEVICE — SUTURE VCRL SZ 2-0 L27IN ABSRB UD L36MM CP-1 1/2 CIR REV J266H

## (undated) DEVICE — 5.0MM PRECISION ROUND

## (undated) DEVICE — LIGHT HANDLE: Brand: DEVON

## (undated) DEVICE — TOWEL SURG W17XL27IN STD BLU COT NONFENESTRATED PREWASHED

## (undated) DEVICE — 5F (1.0MM ID) X 9CM5F (1.0MM ID) REGULAR MICRO-STICK® INTRODUCER SET WITHINTRODUCER SE STAINLESS STEEL GUIDEWIRESTAINLE WITH RADIOPAQUE TIPWITH RADIOPAQ: Brand: MICRO-STICK SETMICRO-STICK SET

## (undated) DEVICE — STRAP,POSITIONING,KNEE/BODY,FOAM,4X60": Brand: MEDLINE

## (undated) DEVICE — DRSG AQUACEL SURG 3.5X6IN -- CONVERT TO ITEM 369227

## (undated) DEVICE — 4-PORT MANIFOLD: Brand: NEPTUNE 2